# Patient Record
Sex: FEMALE | Race: BLACK OR AFRICAN AMERICAN | Employment: OTHER | URBAN - METROPOLITAN AREA
[De-identification: names, ages, dates, MRNs, and addresses within clinical notes are randomized per-mention and may not be internally consistent; named-entity substitution may affect disease eponyms.]

---

## 2017-02-23 ENCOUNTER — HOSPITAL ENCOUNTER (OUTPATIENT)
Dept: PHYSICAL THERAPY | Age: 72
Discharge: HOME | End: 2017-02-23
Payer: COMMERCIAL

## 2017-02-23 PROCEDURE — 97161 PT EVAL LOW COMPLEX 20 MIN: CPT

## 2017-02-23 NOTE — PROGRESS NOTES
Carlos Eduardo Quick  : [address] [facility]  [address] , [address], [place]  [phone]   [phone]        OUTPATIENT PHYSICAL THERAPY:Initial Assessment 2017    ICD-10: Treatment Diagnosis: Pain in right shoulder (M25.511),Strain of muscle and tendon of front wall of thorax, sequela (S29.011S)   Precautions/Allergies:   Review of patient's allergies indicates no known allergies. Fall Risk Score: 6  MD Orders: Evaluate and treat. MEDICAL/REFERRING DIAGNOSIS:  strain of tendon of front wall of thorax   DATE OF ONSET: 17  REFERRING PHYSICIAN: Victor Manuel Greene [facility] Street: 3-1-17     INITIAL ASSESSMENT:  Ms. Mary Soto presents to physical therapy with a pain in her right pectoralis minor and pectoralis major muscles due a fall on 17. She demonstrates strength deficits and range of motion deficits in both upper extremities. Ms. Mary Soto would benefit from skilled physical therapy to address these impairments and improve the mobility in her upper extremities. PROBLEM LIST (Impacting functional limitations):  1. Decreased Strength  2. Increased Pain  3. Decreased Activity Tolerance  4. Decreased Flexibility/Joint Mobility INTERVENTIONS PLANNED:  1. Cold  2. Heat  3. Home Exercise Program (HEP)  4. Manual Therapy  5. Neuromuscular Re-education/Strengthening  6. Range of Motion (ROM)  7. Therapeutic Exercise/Strengthening   TREATMENT PLAN:  Effective Dates: 17 TO 17. Frequency/Duration: 2 times a week for 3 weeks  GOALS: (Goals have been discussed and agreed upon with patient.)  Short-Term Functional Goals: Time Frame: 1.5 weeks  1. Patient will decrease by 5 points on the QuickDASH to show improvement in mobility and function. 2. Patient will participate in a strengthening program for both upper extremities to improve her mobility.    3. Patient will demonstrate compliance with HEP so that she is able to increase the strength in her upper extremities to have less difficulty performing duties at work. Discharge Goals: Time Frame: 3 weeks  1. Patient will decrease by 10 points on the QuickDASH to show improvement in mobility and function. 2. Patient will demonstrate full range of motion in her right upper extremity without pain so that she is able to perform the physical requirements of her work. 3. Patient will demonstrate 5/5 strength in her right upper extremity with all movements so that she is able to perform all duties required by her job. Rehabilitation Potential For Stated Goals: Good  Regarding Kristi Arambula's therapy, I certify that the treatment plan above will be carried out by a therapist or under their direction. Thank you for this referral,  Crispin Martinez     Referring Physician Signature: Alyssa Rooney*              Date                    HISTORY:   History of Present Injury/Illness (Reason for Referral):  Patient reports having a fall on 1-23-17. She reports blacking out before she hit the floor but she remembers trying to avoid bumping into a kid and side-stepping and then coming back to consciousness on the floor. She reports going to the doctor and being told that she has a muscle strain. She reports having pain when raising right arm above head and feeling a pulling sensation in her right chest. Patient states that pain depends on how active she is during the day. Patient reports that the pain is not constant but currently it is at a 4/10 pain. Patient reports that tylenol and ice help the pain. Patient reports currently working on light duty. Patient denies any recent falls within the last three months. Past Medical History/Comorbidities:   Ms. Nicholas Suárez  has a past medical history of Abdominal pain; Arthritis; Breast cancer, left ([facility] [address] (2001); Breast pain in female; Bunion;  Cholesterol serum elevated; Chronic pain of left knee; Corns and callosities; DDD (degenerative disc disease), lumbar; Diabetes mellitus type II, controlled ([facility] [address] Emotional disorder; Fungal infection of toenail; Hallux valgus; Hemorrhoids; Hip pain, bilateral; Hyperglycemia; Hyperlipidemia; Hypertension; Hypothyroidism; Lower GI bleed; Obesity; Other ill-defined conditions(799.89); Postmenopausal; Radiation therapy (2001); Radiation therapy complication; Right median nerve neuropathy; Stress incontinence; Traumatic arthropathy of knee; and Urinary incontinence. Ms. Nguyen Araujo  has a past surgical history that includes orthopaedic (2005); orthopaedic; orthopaedic; us guided core breast biopsy (Right); hysterectomy; breast lumpectomy (Left, 2001); tubal ligation; orthopaedic (Left); urological; shoulder replacement (Left); colonoscopy (2008); and breast biopsy (Right, 2011). Social History/Living Environment:    Lives alone. Prior Level of Function/Work/Activity:  Walking  Dominant Side:         RIGHT  Current Medications:    Current Outpatient Prescriptions:     Calcium-Cholecalciferol, D3, (CALCIUM 600 WITH VITAMIN D3) 600 mg(1,500mg) -400 unit chew, Take  by mouth daily. , Disp: , Rfl:     metFORMIN (GLUCOPHAGE) 500 mg tablet, Take  by mouth two (2) times a day. 1/2 tab, Disp: , Rfl:     multivitamin (ONE A DAY) tablet, Take 1 Tab by mouth., Disp: , Rfl:     glucose blood VI test strips (ONETOUCH ULTRA TEST) strip, by Does Not Apply route See Admin Instructions. , Disp: , Rfl:     promethazine-dextromethorphan (PROMETHAZINE-DM) 6.25-15 mg/5 mL syrup, Take  by mouth three (3) times daily as needed for Cough. , Disp: , Rfl:     Cetirizine (ZYRTEC) 10 mg cap, Take 10 mg by mouth nightly., Disp: , Rfl:     diclofenac EC (VOLTAREN) 75 mg EC tablet, Take  by mouth., Disp: , Rfl:     B.infantis-B.ani-B.long-B.bifi (PROBIOTIC 4X) 10-15 mg TbEC, Take  by mouth., Disp: , Rfl:     meloxicam (MOBIC) 15 mg tablet, Take 1 Tab by mouth daily. , Disp: 14 Tab, Rfl: 1    traMADol (ULTRAM) 50 mg tablet, Take 50 mg by mouth every six (6) hours as needed. , Disp: , Rfl:     Cholecalciferol, Vitamin D3, (VITAMIN D3) 1,000 unit Cap, Take  by mouth every Monday and Friday.  , Disp: , Rfl:     magnesium 500 mg Tab, Take  by mouth.  , Disp: , Rfl:     omega-3 fatty acids-vitamin e (FISH OIL) 1,000 mg Cap, Take 1 Cap by mouth daily. , Disp: , Rfl:     PV W-O TONO/FERROUS FUMARATE/FA (M-VIT PO), Take  by mouth daily. , Disp: , Rfl:     coenzyme q10 (CO Q-10) 10 mg Cap, Take  by mouth daily. , Disp: , Rfl:     levothyroxine (SYNTHROID) 88 mcg tablet, Take 88 mcg by mouth Daily (before breakfast). Take day of surgery. , Disp: , Rfl:     valsartan (DIOVAN) 320 mg tablet, Take 320 mg by mouth daily. , Disp: , Rfl:     atorvastatin (LIPITOR) 10 mg tablet, Take 20 mg by mouth every evening.  Pt takes at night, Disp: , Rfl:      Date Last Reviewed:  2/23/2017   Number of Personal Factors/Comorbidities that affect the Plan of Care: 0: LOW COMPLEXITY   EXAMINATION:   Observation/Orthostatic Postural Assessment:          Slouched posture   Palpation:          Trigger-point over right pectoralis major and pectoralis minor   ROM:          Decreased range on the left due to history of total shoulder replacement   Right: active range of motion: decreased flexion and abduction   Right Passive: pain at end range during flexion and abduction, range within functional limits  Strength:     UE: LEFT  Shoulder Flexion 3+/5     Shoulder Abduction 4/5  Shoulder Adduction 4-/5  Shoulder Internal Rotation 5/5  Shoulder External Rotation 4-/5  Elbow Flexion 5/5  Elbow Extension 3+/5    UE: RIGHT   Shoulder Flexion 5/5 (painful)  Shoulder Abduction 5/5  Shoulder Adduction 4/5  Shoulder Internal Rotation 5/5  Shoulder External Rotation 4/5  Elbow Flexion 5/5  Elbow Extension 5/5  Special Tests:          Pectoralis length: positive on right   Neurological Screen:        Myotomes:  intact        Dermatomes:  intact  Balance:          Not tested   Body Structures Involved:  1. Bones  2. Joints  3. Muscles Body Functions Affected:  1. Neuromusculoskeletal  2. Movement Related Activities and Participation Affected:  1. Domestic Life  2. Community, Social and [place]   Number of elements that affect the Plan of Care: 1-2: LOW COMPLEXITY   CLINICAL PRESENTATION:   Presentation: Stable and uncomplicated: LOW COMPLEXITY   CLINICAL DECISION MAKING:   Outcome Measure: Tool Used: Disabilities of the Arm, Shoulder and Hand (DASH) Questionnaire - Quick Version  Score:  Initial: 28/55  Most Recent: X/55 (Date: -- )   Interpretation of Score: The DASH is designed to measure the activities of daily living in person's with upper extremity dysfunction or pain. Each section is scored on a 1-5 scale, 5 representing the greatest disability. The scores of each section are added together for a total score of 55. Score 11 12-19 20-28 29-37 38-45 46-54 55   Modifier CH CI CJ CK CL CM CN     Medical Necessity:   · Patient demonstrates good rehab potential due to higher previous functional level. Reason for Services/Other Comments:  · Patient continues to require skilled intervention due to strength deficits and range of motion deficits. Use of outcome tool(s) and clinical judgement create a POC that gives a: Clear prediction of patient's progress: LOW COMPLEXITY   TREATMENT:   Pre-treatment Symptoms:  4/10  (In addition to Assessment/Re-Assessment sessions the following treatments were rendered)  THERAPEUTIC EXERCISE: ( minutes):  Exercises per grid below to improve mobility and strength. Required minimal visual, verbal, manual and tactile cues to promote proper body posture and promote proper body mechanics. Progressed resistance, range and repetitions as indicated.    Date:  2-23-17 Date:   Date:     Activity/Exercise Parameters Parameters Parameters   Patient Education Discussed POC and pectoralis stretch for HEP                                           Treatment/Session Assessment:  See assessment above. · Pain: Initial:  Pain Intensity 1: 4  Post Session:  4/10 ·   Compliance with Program/Exercises: compliant all of the time. · Recommendations/Intent for next treatment session: \"Next visit will focus on advancements to more challenging activities\". No future appointments.   Total Treatment Duration:  PT Patient Time In/Time Out  Time In: 1445  Time Out: 9024 [facility]

## 2017-02-23 NOTE — PROGRESS NOTES
Ambulatory/Rehab Services H2 Model Falls Risk Assessment    Risk Factor Pts. ·   Confusion/Disorientation/Impulsivity  []    4 ·   Symptomatic Depression  []   2 ·   Altered Elimination  []   1 ·   Dizziness/Vertigo  []   1 ·   Gender (Male)  []   1 ·   Any administered antiepileptics (anticonvulsants):  []   2 ·   Any administered benzodiazepines:  []   1 ·   Visual Impairment (specify):  []   1 ·   Portable Oxygen Use  []   1 ·   Orthostatic ? BP  []   1 ·   History of Recent Falls (within 3 mos.)  [x]   5     Ability to Rise from Chair (choose one) Pts. ·   Ability to rise in a single movement  []   0 ·   Pushes up, successful in one attempt  [x]   1 ·   Multiple attempts, but successful  []   3 ·   Unable to rise without assistance  []   4   Total: (5 or greater = High Risk) 6     Falls Prevention Plan:   [x]                Physical Limitations to Exercise (specify): supervision and set up    []                Mobility Assistance Device (type):   []                Exercise/Equipment Adaptation (specify):    ©2010 [facility] [address] [place] [address] Patent #[id number].  Federal Law prohibits the replication, distribution or use without written permission from [facility] [employer]

## 2017-02-28 ENCOUNTER — HOSPITAL ENCOUNTER (OUTPATIENT)
Dept: PHYSICAL THERAPY | Age: 72
Discharge: HOME | End: 2017-02-28
Payer: COMMERCIAL

## 2017-02-28 PROCEDURE — 97140 MANUAL THERAPY 1/> REGIONS: CPT

## 2017-02-28 PROCEDURE — 97110 THERAPEUTIC EXERCISES: CPT

## 2017-02-28 NOTE — PROGRESS NOTES
Gibson Stovall  :  [facility]  [address], [address], [place] [address]  [phone]   [phone]        OUTPATIENT PHYSICAL THERAPY:Daily Note 2017    ICD-10: Treatment Diagnosis: Pain in right shoulder (M25.511),Strain of muscle and tendon of front wall of thorax, sequela (S29.011S)   Precautions/Allergies:   Review of patient's allergies indicates no known allergies. Fall Risk Score: 6  MD Orders: Evaluate and treat. MEDICAL/REFERRING DIAGNOSIS:  strain of tendon of front wall of thorax   DATE OF ONSET: 17  REFERRING PHYSICIAN: Victor Manuel Greene [facility] Street: 3-1-17     INITIAL ASSESSMENT:  Ms. Elida Mckoy presents to physical therapy with a pain in her right pectoralis minor and pectoralis major muscles due a fall on 17. She demonstrates strength deficits and range of motion deficits in both upper extremities. Ms. Elida Mckoy would benefit from skilled physical therapy to address these impairments and improve the mobility in her upper extremities. PROBLEM LIST (Impacting functional limitations):  1. Decreased Strength  2. Increased Pain  3. Decreased Activity Tolerance  4. Decreased Flexibility/Joint Mobility INTERVENTIONS PLANNED:  1. Cold  2. Heat  3. Home Exercise Program (HEP)  4. Manual Therapy  5. Neuromuscular Re-education/Strengthening  6. Range of Motion (ROM)  7. Therapeutic Exercise/Strengthening   TREATMENT PLAN:  Effective Dates: 17 TO 17. Frequency/Duration: 2 times a week for 3 weeks  GOALS: (Goals have been discussed and agreed upon with patient.)  Short-Term Functional Goals: Time Frame: 1.5 weeks  1. Patient will decrease by 5 points on the QuickDASH to show improvement in mobility and function. 2. Patient will participate in a strengthening program for both upper extremities to improve her mobility.    3. Patient will demonstrate compliance with HEP so that she is able to increase the strength in her upper extremities to have less difficulty performing duties at work. Discharge Goals: Time Frame: 3 weeks  1. Patient will decrease by 10 points on the QuickDASH to show improvement in mobility and function. 2. Patient will demonstrate full range of motion in her right upper extremity without pain so that she is able to perform the physical requirements of her work. 3. Patient will demonstrate 5/5 strength in her right upper extremity with all movements so that she is able to perform all duties required by her job. Rehabilitation Potential For Stated Goals: Good  Regarding Kristi Arambula's therapy, I certify that the treatment plan above will be carried out by a therapist or under their direction. Thank you for this referral,  Brandi Samson DPT     Referring Physician Signature: Erick Back*              Date                    HISTORY:   History of Present Injury/Illness (Reason for Referral):  Patient reports having a fall on 1-23-17. She reports blacking out before she hit the floor but she remembers trying to avoid bumping into a kid and side-stepping and then coming back to consciousness on the floor. She reports going to the doctor and being told that she has a muscle strain. She reports having pain when raising right arm above head and feeling a pulling sensation in her right chest. Patient states that pain depends on how active she is during the day. Patient reports that the pain is not constant but currently it is at a 4/10 pain. Patient reports that tylenol and ice help the pain. Patient reports currently working on light duty. Patient denies any recent falls within the last three months. Past Medical History/Comorbidities:   Ms. Babs Fernandez  has a past medical history of Abdominal pain; Arthritis; Breast cancer, left ([facility] [address] (2001); Breast pain in female; Bunion;  Cholesterol serum elevated; Chronic pain of left knee; Corns and callosities; DDD (degenerative disc disease), lumbar; Diabetes mellitus type II, controlled ([facility] [address] Emotional disorder; Fungal infection of toenail; Hallux valgus; Hemorrhoids; Hip pain, bilateral; Hyperglycemia; Hyperlipidemia; Hypertension; Hypothyroidism; Lower GI bleed; Obesity; Other ill-defined conditions(799.89); Postmenopausal; Radiation therapy (2001); Radiation therapy complication; Right median nerve neuropathy; Stress incontinence; Traumatic arthropathy of knee; and Urinary incontinence. Ms. Elida Mckoy  has a past surgical history that includes orthopaedic (2005); orthopaedic; orthopaedic; us guided core breast biopsy (Right); hysterectomy; breast lumpectomy (Left, 2001); tubal ligation; orthopaedic (Left); urological; shoulder replacement (Left); colonoscopy (2008); and breast biopsy (Right, 2011). Social History/Living Environment:    Lives alone. Prior Level of Function/Work/Activity:  Walking  Dominant Side:         RIGHT  Current Medications:    Current Outpatient Prescriptions:     Calcium-Cholecalciferol, D3, (CALCIUM 600 WITH VITAMIN D3) 600 mg(1,500mg) -400 unit chew, Take  by mouth daily. , Disp: , Rfl:     metFORMIN (GLUCOPHAGE) 500 mg tablet, Take  by mouth two (2) times a day. 1/2 tab, Disp: , Rfl:     multivitamin (ONE A DAY) tablet, Take 1 Tab by mouth., Disp: , Rfl:     glucose blood VI test strips (ONETOUCH ULTRA TEST) strip, by Does Not Apply route See Admin Instructions. , Disp: , Rfl:     promethazine-dextromethorphan (PROMETHAZINE-DM) 6.25-15 mg/5 mL syrup, Take  by mouth three (3) times daily as needed for Cough. , Disp: , Rfl:     Cetirizine (ZYRTEC) 10 mg cap, Take 10 mg by mouth nightly., Disp: , Rfl:     diclofenac EC (VOLTAREN) 75 mg EC tablet, Take  by mouth., Disp: , Rfl:     B.infantis-B.ani-B.long-B.bifi (PROBIOTIC 4X) 10-15 mg TbEC, Take  by mouth., Disp: , Rfl:     meloxicam (MOBIC) 15 mg tablet, Take 1 Tab by mouth daily. , Disp: 14 Tab, Rfl: 1    traMADol (ULTRAM) 50 mg tablet, Take 50 mg by mouth every six (6) hours as needed. , Disp: , Rfl:     Cholecalciferol, Vitamin D3, (VITAMIN D3) 1,000 unit Cap, Take  by mouth every Monday and Friday.  , Disp: , Rfl:     magnesium 500 mg Tab, Take  by mouth.  , Disp: , Rfl:     omega-3 fatty acids-vitamin e (FISH OIL) 1,000 mg Cap, Take 1 Cap by mouth daily. , Disp: , Rfl:     PV W-O TONO/FERROUS FUMARATE/FA (M-VIT PO), Take  by mouth daily. , Disp: , Rfl:     coenzyme q10 (CO Q-10) 10 mg Cap, Take  by mouth daily. , Disp: , Rfl:     levothyroxine (SYNTHROID) 88 mcg tablet, Take 88 mcg by mouth Daily (before breakfast). Take day of surgery. , Disp: , Rfl:     valsartan (DIOVAN) 320 mg tablet, Take 320 mg by mouth daily. , Disp: , Rfl:     atorvastatin (LIPITOR) 10 mg tablet, Take 20 mg by mouth every evening.  Pt takes at night, Disp: , Rfl:      Date Last Reviewed:  2/28/2017   Number of Personal Factors/Comorbidities that affect the Plan of Care: 0: LOW COMPLEXITY   EXAMINATION:   Observation/Orthostatic Postural Assessment:          Slouched posture   Palpation:          Trigger-point over right pectoralis major and pectoralis minor   ROM:          Decreased range on the left due to history of total shoulder replacement   Right: active range of motion: decreased flexion and abduction   Right Passive: pain at end range during flexion and abduction, range within functional limits  Strength:     UE: LEFT  Shoulder Flexion 3+/5     Shoulder Abduction 4/5  Shoulder Adduction 4-/5  Shoulder Internal Rotation 5/5  Shoulder External Rotation 4-/5  Elbow Flexion 5/5  Elbow Extension 3+/5    UE: RIGHT   Shoulder Flexion 5/5 (painful)  Shoulder Abduction 5/5  Shoulder Adduction 4/5  Shoulder Internal Rotation 5/5  Shoulder External Rotation 4/5  Elbow Flexion 5/5  Elbow Extension 5/5  Special Tests:          Pectoralis length: positive on right   Neurological Screen:        Myotomes:  intact        Dermatomes:  intact  Balance:          Not tested   Body Structures Involved:  1. Bones  2. Joints  3. Muscles Body Functions Affected:  1. Neuromusculoskeletal  2. Movement Related Activities and Participation Affected:  1. Domestic Life  2. Community, Social and [place]   Number of elements that affect the Plan of Care: 1-2: LOW COMPLEXITY   CLINICAL PRESENTATION:   Presentation: Stable and uncomplicated: LOW COMPLEXITY   CLINICAL DECISION MAKING:   Outcome Measure: Tool Used: Disabilities of the Arm, Shoulder and Hand (DASH) Questionnaire - Quick Version  Score:  Initial: 28/55  Most Recent: X/55 (Date: -- )   Interpretation of Score: The DASH is designed to measure the activities of daily living in person's with upper extremity dysfunction or pain. Each section is scored on a 1-5 scale, 5 representing the greatest disability. The scores of each section are added together for a total score of 55. Score 11 12-19 20-28 29-37 38-45 46-54 55   Modifier CH CI CJ CK CL CM CN     Medical Necessity:   · Patient demonstrates good rehab potential due to higher previous functional level. Reason for Services/Other Comments:  · Patient continues to require skilled intervention due to strength deficits and range of motion deficits. Use of outcome tool(s) and clinical judgement create a POC that gives a: Clear prediction of patient's progress: LOW COMPLEXITY   TREATMENT:   Pre-treatment Symptoms:  Patient reports the stretch has been helping. (In addition to Assessment/Re-Assessment sessions the following treatments were rendered)  THERAPEUTIC EXERCISE: ( 15 minutes):  Exercises per grid below to improve mobility and strength. Required minimal visual, verbal, manual and tactile cues to promote proper body posture and promote proper body mechanics. Progressed resistance, range and repetitions as indicated.    Date:  2-23-17 Date:  2-28-17 Date:     Activity/Exercise Parameters Parameters Parameters   Patient Education Discussed POC and pectoralis stretch for HEP     UBE  Level 1 4/4      Corner Pec Stretch    5 sec hold x 10    Scapular Retractions    5 sec hold x 10                        Manual Therapy: (25 Minutes) to increase soft tissue mobility  Trigger point release and soft tissue mobilization to Pec Major and Pec Minor  Manual Pec Stretch in supine, 15 sec hold x 2    Modalities: (10 Minutes) to decrease pain and improve mobility  Moist hot pack over left shoulder     Treatment/Session Assessment:  Patient reported improved mobility and decreased pain following manual and stretching today. Updated HEP. Discussed heating more often throughout day. · Pain: Initial:  Pain Intensity 1: 3  Post Session:   ·   Compliance with Program/Exercises: compliant all of the time. · Recommendations/Intent for next treatment session: \"Next visit will focus on advancements to more challenging activities\".   Future Appointments  Date Time Provider Anaya Pooni   3/2/2017 2:45 PM Mando Vines DPT [facility]   [facility]2017 [facility]   3/9/2017 2:30 PM OMAYRA Soler [facility]   [facility]     Total Treatment Duration:  PT Patient Time In/Time Out  Time In: 1921  Time Out: [facility]

## 2017-03-02 ENCOUNTER — HOSPITAL ENCOUNTER (OUTPATIENT)
Dept: PHYSICAL THERAPY | Age: 72
Discharge: HOME | End: 2017-03-02
Payer: COMMERCIAL

## 2017-03-02 PROCEDURE — 97110 THERAPEUTIC EXERCISES: CPT

## 2017-03-02 PROCEDURE — 97140 MANUAL THERAPY 1/> REGIONS: CPT

## 2017-03-02 NOTE — PROGRESS NOTES
Tanvir Roberts  :  Therapy Center at [facility]  [address] , [address], [place] [address]  [phone]   [phone]        OUTPATIENT PHYSICAL THERAPY:Daily Note 3/2/2017    ICD-10: Treatment Diagnosis: Pain in right shoulder (M25.511),Strain of muscle and tendon of front wall of thorax, sequela (S29.011S)   Precautions/Allergies:   Review of patient's allergies indicates no known allergies. Fall Risk Score: 6  MD Orders: Evaluate and treat. MEDICAL/REFERRING DIAGNOSIS:  strain of tendon of front wall of thorax   DATE OF ONSET: 17  REFERRING PHYSICIAN: Victor Manuel Greene [facility] Street: 3-1-17     INITIAL ASSESSMENT:  Ms. Sam Marrero presents to physical therapy with a pain in her right pectoralis minor and pectoralis major muscles due a fall on 17. She demonstrates strength deficits and range of motion deficits in both upper extremities. Ms. Sam Marrero would benefit from skilled physical therapy to address these impairments and improve the mobility in her upper extremities. PROBLEM LIST (Impacting functional limitations):  1. Decreased Strength  2. Increased Pain  3. Decreased Activity Tolerance  4. Decreased Flexibility/Joint Mobility INTERVENTIONS PLANNED:  1. Cold  2. Heat  3. Home Exercise Program (HEP)  4. Manual Therapy  5. Neuromuscular Re-education/Strengthening  6. Range of Motion (ROM)  7. Therapeutic Exercise/Strengthening   TREATMENT PLAN:  Effective Dates: 17 TO 17. Frequency/Duration: 2 times a week for 3 weeks  GOALS: (Goals have been discussed and agreed upon with patient.)  Short-Term Functional Goals: Time Frame: 1.5 weeks  1. Patient will decrease by 5 points on the QuickDASH to show improvement in mobility and function. 2. Patient will participate in a strengthening program for both upper extremities to improve her mobility.    3. Patient will demonstrate compliance with HEP so that she is able to increase the strength in her upper extremities to have less difficulty performing duties at work. Discharge Goals: Time Frame: 3 weeks  1. Patient will decrease by 10 points on the QuickDASH to show improvement in mobility and function. 2. Patient will demonstrate full range of motion in her right upper extremity without pain so that she is able to perform the physical requirements of her work. 3. Patient will demonstrate 5/5 strength in her right upper extremity with all movements so that she is able to perform all duties required by her job. Rehabilitation Potential For Stated Goals: Good  Regarding Kristi Arambula's therapy, I certify that the treatment plan above will be carried out by a therapist or under their direction. Thank you for this referral,  Radha Ge DPT     Referring Physician Signature: Jose Paredes*              Date                    HISTORY:   History of Present Injury/Illness (Reason for Referral):  Patient reports having a fall on 1-23-17. She reports blacking out before she hit the floor but she remembers trying to avoid bumping into a kid and side-stepping and then coming back to consciousness on the floor. She reports going to the doctor and being told that she has a muscle strain. She reports having pain when raising right arm above head and feeling a pulling sensation in her right chest. Patient states that pain depends on how active she is during the day. Patient reports that the pain is not constant but currently it is at a 4/10 pain. Patient reports that tylenol and ice help the pain. Patient reports currently working on light duty. Patient denies any recent falls within the last three months. Past Medical History/Comorbidities:   Ms. Lesli Damon  has a past medical history of Abdominal pain; Arthritis; Breast cancer, left ([facility] [address] (2001); Breast pain in female; Bunion;  Cholesterol serum elevated; Chronic pain of left knee; Corns and callosities; DDD (degenerative disc disease), lumbar; Diabetes mellitus type II, controlled ([facility] [address] Emotional disorder; Fungal infection of toenail; Hallux valgus; Hemorrhoids; Hip pain, bilateral; Hyperglycemia; Hyperlipidemia; Hypertension; Hypothyroidism; Lower GI bleed; Obesity; Other ill-defined conditions(799.89); Postmenopausal; Radiation therapy (2001); Radiation therapy complication; Right median nerve neuropathy; Stress incontinence; Traumatic arthropathy of knee; and Urinary incontinence. Ms. Lesli Damon  has a past surgical history that includes orthopaedic (2005); orthopaedic; orthopaedic; us guided core breast biopsy (Right); hysterectomy; breast lumpectomy (Left, 2001); tubal ligation; orthopaedic (Left); urological; shoulder replacement (Left); colonoscopy (2008); and breast biopsy (Right, 2011). Social History/Living Environment:    Lives alone. Prior Level of Function/Work/Activity:  Walking  Dominant Side:         RIGHT  Current Medications:    Current Outpatient Prescriptions:     Calcium-Cholecalciferol, D3, (CALCIUM 600 WITH VITAMIN D3) 600 mg(1,500mg) -400 unit chew, Take  by mouth daily. , Disp: , Rfl:     metFORMIN (GLUCOPHAGE) 500 mg tablet, Take  by mouth two (2) times a day. 1/2 tab, Disp: , Rfl:     multivitamin (ONE A DAY) tablet, Take 1 Tab by mouth., Disp: , Rfl:     glucose blood VI test strips (ONETOUCH ULTRA TEST) strip, by Does Not Apply route See Admin Instructions. , Disp: , Rfl:     promethazine-dextromethorphan (PROMETHAZINE-DM) 6.25-15 mg/5 mL syrup, Take  by mouth three (3) times daily as needed for Cough. , Disp: , Rfl:     Cetirizine (ZYRTEC) 10 mg cap, Take 10 mg by mouth nightly., Disp: , Rfl:     diclofenac EC (VOLTAREN) 75 mg EC tablet, Take  by mouth., Disp: , Rfl:     B.infantis-B.ani-B.long-B.bifi (PROBIOTIC 4X) 10-15 mg TbEC, Take  by mouth., Disp: , Rfl:     meloxicam (MOBIC) 15 mg tablet, Take 1 Tab by mouth daily. , Disp: 14 Tab, Rfl: 1    traMADol (ULTRAM) 50 mg tablet, Take 50 mg by mouth every six (6) hours as needed. , Disp: , Rfl:     Cholecalciferol, Vitamin D3, (VITAMIN D3) 1,000 unit Cap, Take  by mouth every Monday and Friday.  , Disp: , Rfl:     magnesium 500 mg Tab, Take  by mouth.  , Disp: , Rfl:     omega-3 fatty acids-vitamin e (FISH OIL) 1,000 mg Cap, Take 1 Cap by mouth daily. , Disp: , Rfl:     PV W-O TONO/FERROUS FUMARATE/FA (M-VIT PO), Take  by mouth daily. , Disp: , Rfl:     coenzyme q10 (CO Q-10) 10 mg Cap, Take  by mouth daily. , Disp: , Rfl:     levothyroxine (SYNTHROID) 88 mcg tablet, Take 88 mcg by mouth Daily (before breakfast). Take day of surgery. , Disp: , Rfl:     valsartan (DIOVAN) 320 mg tablet, Take 320 mg by mouth daily. , Disp: , Rfl:     atorvastatin (LIPITOR) 10 mg tablet, Take 20 mg by mouth every evening.  Pt takes at night, Disp: , Rfl:      Date Last Reviewed:  3/2/2017   Number of Personal Factors/Comorbidities that affect the Plan of Care: 0: LOW COMPLEXITY   EXAMINATION:   Observation/Orthostatic Postural Assessment:          Slouched posture   Palpation:          Trigger-point over right pectoralis major and pectoralis minor   ROM:          Decreased range on the left due to history of total shoulder replacement   Right: active range of motion: decreased flexion and abduction   Right Passive: pain at end range during flexion and abduction, range within functional limits  Strength:     UE: LEFT  Shoulder Flexion 3+/5     Shoulder Abduction 4/5  Shoulder Adduction 4-/5  Shoulder Internal Rotation 5/5  Shoulder External Rotation 4-/5  Elbow Flexion 5/5  Elbow Extension 3+/5    UE: RIGHT   Shoulder Flexion 5/5 (painful)  Shoulder Abduction 5/5  Shoulder Adduction 4/5  Shoulder Internal Rotation 5/5  Shoulder External Rotation 4/5  Elbow Flexion 5/5  Elbow Extension 5/5  Special Tests:          Pectoralis length: positive on right   Neurological Screen:        Myotomes:  intact        Dermatomes:  intact  Balance:          Not tested   Body Structures Involved:  1. Bones  2. Joints  3. Muscles Body Functions Affected:  1. Neuromusculoskeletal  2. Movement Related Activities and Participation Affected:  1. Domestic Life  2. Community, Social and [place]   Number of elements that affect the Plan of Care: 1-2: LOW COMPLEXITY   CLINICAL PRESENTATION:   Presentation: Stable and uncomplicated: LOW COMPLEXITY   CLINICAL DECISION MAKING:   Outcome Measure: Tool Used: Disabilities of the Arm, Shoulder and Hand (DASH) Questionnaire - Quick Version  Score:  Initial: 28/55  Most Recent: X/55 (Date: -- )   Interpretation of Score: The DASH is designed to measure the activities of daily living in person's with upper extremity dysfunction or pain. Each section is scored on a 1-5 scale, 5 representing the greatest disability. The scores of each section are added together for a total score of 55. Score 11 12-19 20-28 29-37 38-45 46-54 55   Modifier CH CI CJ CK CL CM CN     Medical Necessity:   · Patient demonstrates good rehab potential due to higher previous functional level. Reason for Services/Other Comments:  · Patient continues to require skilled intervention due to strength deficits and range of motion deficits. Use of outcome tool(s) and clinical judgement create a POC that gives a: Clear prediction of patient's progress: LOW COMPLEXITY   TREATMENT:   Pre-treatment Symptoms:  Patient reports she felt really good right after therapy and the day after but yesterday she was more sore. (In addition to Assessment/Re-Assessment sessions the following treatments were rendered)  THERAPEUTIC EXERCISE: ( 30 minutes):  Exercises per grid below to improve mobility and strength. Required minimal visual, verbal, manual and tactile cues to promote proper body posture and promote proper body mechanics. Progressed resistance, range and repetitions as indicated.    Date:  2-23-17 Date:  2-28-17 Date:  3-2-17   Activity/Exercise Parameters Parameters Parameters   Patient Education Discussed POC and pectoralis stretch for HEP     UBE  Level 1 4/4   Level 2 4/4   Corner Pec Stretch    5 sec hold x 10 5 sec hold x 10   Scapular Retractions    5 sec hold x 10 5 sec hold x 10   Rows     GTB x 15   Extensions     GTB x 15           Manual Therapy: (15 Minutes) to increase soft tissue mobility  Trigger point release and soft tissue mobilization to Pec Major and Pec Minor  Manual Pec Stretch in supine, 15 sec hold x 2          Treatment/Session Assessment:  Patient reported improved mobility and decreased pain following manual and stretching today. · Pain: Initial:  Pain Intensity 1: 3  Post Session:   ·   Compliance with Program/Exercises: compliant all of the time. · Recommendations/Intent for next treatment session: \"Next visit will focus on advancements to more challenging activities\".   Future Appointments  Date Time Provider Anaya Arias   3/6/2017 2:30 PM Marie Sanders DPT [facility]   3/9/2017 2:30 PM Marie Sanders DPT [facility]   3/13/2017 2:30 PM Marie Sanders DPT [facility]     Total Treatment Duration:  PT Patient Time In/Time Out  Time In: 2090  Time Out: 0440 [facility], T

## 2017-03-06 ENCOUNTER — HOSPITAL ENCOUNTER (OUTPATIENT)
Dept: PHYSICAL THERAPY | Age: 72
Discharge: HOME | End: 2017-03-06
Payer: COMMERCIAL

## 2017-03-06 PROCEDURE — 97140 MANUAL THERAPY 1/> REGIONS: CPT

## 2017-03-06 PROCEDURE — 97110 THERAPEUTIC EXERCISES: CPT

## 2017-03-06 NOTE — PROGRESS NOTES
Bert Mims  :  Therapy Center at [facility]  [address], [address], [place] [address]  [phone]   [phone]        OUTPATIENT PHYSICAL THERAPY:Daily Note 3/6/2017    ICD-10: Treatment Diagnosis: Pain in right shoulder (M25.511),Strain of muscle and tendon of front wall of thorax, sequela (S29.011S)   Precautions/Allergies:   Review of patient's allergies indicates no known allergies. Fall Risk Score: 6  MD Orders: Evaluate and treat. MEDICAL/REFERRING DIAGNOSIS:  strain of tendon of front wall of thorax   DATE OF ONSET: 17  REFERRING PHYSICIAN: Victor Manuel Greene [facility] Street: 3-1-17     INITIAL ASSESSMENT:  Ms. Ya Ortega presents to physical therapy with a pain in her right pectoralis minor and pectoralis major muscles due a fall on 17. She demonstrates strength deficits and range of motion deficits in both upper extremities. Ms. Ya Ortega would benefit from skilled physical therapy to address these impairments and improve the mobility in her upper extremities. PROBLEM LIST (Impacting functional limitations):  1. Decreased Strength  2. Increased Pain  3. Decreased Activity Tolerance  4. Decreased Flexibility/Joint Mobility INTERVENTIONS PLANNED:  1. Cold  2. Heat  3. Home Exercise Program (HEP)  4. Manual Therapy  5. Neuromuscular Re-education/Strengthening  6. Range of Motion (ROM)  7. Therapeutic Exercise/Strengthening   TREATMENT PLAN:  Effective Dates: 17 TO 17. Frequency/Duration: 2 times a week for 3 weeks  GOALS: (Goals have been discussed and agreed upon with patient.)  Short-Term Functional Goals: Time Frame: 1.5 weeks  1. Patient will decrease by 5 points on the QuickDASH to show improvement in mobility and function. 2. Patient will participate in a strengthening program for both upper extremities to improve her mobility.    3. Patient will demonstrate compliance with HEP so that she is able to increase the strength in her upper extremities to have less difficulty performing duties at work. Discharge Goals: Time Frame: 3 weeks  1. Patient will decrease by 10 points on the QuickDASH to show improvement in mobility and function. 2. Patient will demonstrate full range of motion in her right upper extremity without pain so that she is able to perform the physical requirements of her work. 3. Patient will demonstrate 5/5 strength in her right upper extremity with all movements so that she is able to perform all duties required by her job. Rehabilitation Potential For Stated Goals: Good  Regarding Kristi Arambula's therapy, I certify that the treatment plan above will be carried out by a therapist or under their direction. Thank you for this referral,  Kori Bledsoe DPT     Referring Physician Signature: Joyce Salazar*              Date                    HISTORY:   History of Present Injury/Illness (Reason for Referral):  Patient reports having a fall on 1-23-17. She reports blacking out before she hit the floor but she remembers trying to avoid bumping into a kid and side-stepping and then coming back to consciousness on the floor. She reports going to the doctor and being told that she has a muscle strain. She reports having pain when raising right arm above head and feeling a pulling sensation in her right chest. Patient states that pain depends on how active she is during the day. Patient reports that the pain is not constant but currently it is at a 4/10 pain. Patient reports that tylenol and ice help the pain. Patient reports currently working on light duty. Patient denies any recent falls within the last three months. Past Medical History/Comorbidities:   Ms. Oneyda Denise  has a past medical history of Abdominal pain; Arthritis; Breast cancer, left ([facility] [address] (2001); Breast pain in female; Bunion;  Cholesterol serum elevated; Chronic pain of left knee; Corns and callosities; DDD (degenerative disc disease), lumbar; Diabetes mellitus type II, controlled ([facility] [address] Emotional disorder; Fungal infection of toenail; Hallux valgus; Hemorrhoids; Hip pain, bilateral; Hyperglycemia; Hyperlipidemia; Hypertension; Hypothyroidism; Lower GI bleed; Obesity; Other ill-defined conditions(799.89); Postmenopausal; Radiation therapy (2001); Radiation therapy complication; Right median nerve neuropathy; Stress incontinence; Traumatic arthropathy of knee; and Urinary incontinence. Ms. Jesus Manuel Lentz  has a past surgical history that includes orthopaedic (2005); orthopaedic; orthopaedic; us guided core breast biopsy (Right); hysterectomy; breast lumpectomy (Left, 2001); tubal ligation; orthopaedic (Left); urological; shoulder replacement (Left); colonoscopy (2008); and breast biopsy (Right, 2011). Social History/Living Environment:    Lives alone. Prior Level of Function/Work/Activity:  Walking  Dominant Side:         RIGHT  Current Medications:    Current Outpatient Prescriptions:     Calcium-Cholecalciferol, D3, (CALCIUM 600 WITH VITAMIN D3) 600 mg(1,500mg) -400 unit chew, Take  by mouth daily. , Disp: , Rfl:     metFORMIN (GLUCOPHAGE) 500 mg tablet, Take  by mouth two (2) times a day. 1/2 tab, Disp: , Rfl:     multivitamin (ONE A DAY) tablet, Take 1 Tab by mouth., Disp: , Rfl:     glucose blood VI test strips (ONETOUCH ULTRA TEST) strip, by Does Not Apply route See Admin Instructions. , Disp: , Rfl:     promethazine-dextromethorphan (PROMETHAZINE-DM) 6.25-15 mg/5 mL syrup, Take  by mouth three (3) times daily as needed for Cough. , Disp: , Rfl:     Cetirizine (ZYRTEC) 10 mg cap, Take 10 mg by mouth nightly., Disp: , Rfl:     diclofenac EC (VOLTAREN) 75 mg EC tablet, Take  by mouth., Disp: , Rfl:     B.infantis-B.ani-B.long-B.bifi (PROBIOTIC 4X) 10-15 mg TbEC, Take  by mouth., Disp: , Rfl:     meloxicam (MOBIC) 15 mg tablet, Take 1 Tab by mouth daily. , Disp: 14 Tab, Rfl: 1    traMADol (ULTRAM) 50 mg tablet, Take 50 mg by mouth every six (6) hours as needed. , Disp: , Rfl:     Cholecalciferol, Vitamin D3, (VITAMIN D3) 1,000 unit Cap, Take  by mouth every Monday and Friday.  , Disp: , Rfl:     magnesium 500 mg Tab, Take  by mouth.  , Disp: , Rfl:     omega-3 fatty acids-vitamin e (FISH OIL) 1,000 mg Cap, Take 1 Cap by mouth daily. , Disp: , Rfl:     PV W-O TONO/FERROUS FUMARATE/FA (M-VIT PO), Take  by mouth daily. , Disp: , Rfl:     coenzyme q10 (CO Q-10) 10 mg Cap, Take  by mouth daily. , Disp: , Rfl:     levothyroxine (SYNTHROID) 88 mcg tablet, Take 88 mcg by mouth Daily (before breakfast). Take day of surgery. , Disp: , Rfl:     valsartan (DIOVAN) 320 mg tablet, Take 320 mg by mouth daily. , Disp: , Rfl:     atorvastatin (LIPITOR) 10 mg tablet, Take 20 mg by mouth every evening.  Pt takes at night, Disp: , Rfl:      Date Last Reviewed:  3/6/2017   Number of Personal Factors/Comorbidities that affect the Plan of Care: 0: LOW COMPLEXITY   EXAMINATION:   Observation/Orthostatic Postural Assessment:          Slouched posture   Palpation:          Trigger-point over right pectoralis major and pectoralis minor   ROM:          Decreased range on the left due to history of total shoulder replacement   Right: active range of motion: decreased flexion and abduction   Right Passive: pain at end range during flexion and abduction, range within functional limits  Strength:     UE: LEFT  Shoulder Flexion 3+/5     Shoulder Abduction 4/5  Shoulder Adduction 4-/5  Shoulder Internal Rotation 5/5  Shoulder External Rotation 4-/5  Elbow Flexion 5/5  Elbow Extension 3+/5    UE: RIGHT   Shoulder Flexion 5/5 (painful)  Shoulder Abduction 5/5  Shoulder Adduction 4/5  Shoulder Internal Rotation 5/5  Shoulder External Rotation 4/5  Elbow Flexion 5/5  Elbow Extension 5/5  Special Tests:          Pectoralis length: positive on right   Neurological Screen:        Myotomes:  intact        Dermatomes:  intact  Balance:          Not tested   Body Structures Involved:  1. Bones  2. Joints  3. Muscles Body Functions Affected:  1. Neuromusculoskeletal  2. Movement Related Activities and Participation Affected:  1. Domestic Life  2. Community, Social and [place]   Number of elements that affect the Plan of Care: 1-2: LOW COMPLEXITY   CLINICAL PRESENTATION:   Presentation: Stable and uncomplicated: LOW COMPLEXITY   CLINICAL DECISION MAKING:   Outcome Measure: Tool Used: Disabilities of the Arm, Shoulder and Hand (DASH) Questionnaire - Quick Version  Score:  Initial: 28/55  Most Recent: X/55 (Date: -- )   Interpretation of Score: The DASH is designed to measure the activities of daily living in person's with upper extremity dysfunction or pain. Each section is scored on a 1-5 scale, 5 representing the greatest disability. The scores of each section are added together for a total score of 55. Score 11 12-19 20-28 29-37 38-45 46-54 55   Modifier CH CI CJ CK CL CM CN     Medical Necessity:   · Patient demonstrates good rehab potential due to higher previous functional level. Reason for Services/Other Comments:  · Patient continues to require skilled intervention due to strength deficits and range of motion deficits. Use of outcome tool(s) and clinical judgement create a POC that gives a: Clear prediction of patient's progress: LOW COMPLEXITY   TREATMENT:   Pre-treatment Symptoms:  Patient reports she is doing better. (In addition to Assessment/Re-Assessment sessions the following treatments were rendered)  THERAPEUTIC EXERCISE: ( 30 minutes):  Exercises per grid below to improve mobility and strength. Required minimal visual, verbal, manual and tactile cues to promote proper body posture and promote proper body mechanics. Progressed resistance, range and repetitions as indicated.    Date:  2-23-17 Date:  2-28-17 Date:  3-2-17 Date:  3-6-17   Activity/Exercise Parameters Parameters Parameters    Patient Education Discussed POC and pectoralis stretch for HEP UBE  Level 1 4/4   Level 2 4/4 Level 3 4/4   Corner Pec Stretch    5 sec hold x 10 5 sec hold x 10 5 sec hold x 10   Scapular Retractions    5 sec hold x 10 5 sec hold x 10 5 sec hold x 10   Rows     GTB x 15 GTB  x 15   Extensions     GTB x 15 GTB x 15   Scaption    1# x 15 R only     Lateral Raises    1# x 15 R only     Wall Washes      Next Visit     Manual Therapy: (15 Minutes) to increase soft tissue mobility  Trigger point release and soft tissue mobilization to Pec Major and Pec Minor  Manual Pec Stretch in supine, 15 sec hold x 2          Treatment/Session Assessment:  Patient's pec major/minor feel less tight and trigger points are reduced. Passive elevation of arm does not increase pain like it used too. Updated HEP today. · Pain: Initial:  Pain Intensity 1: 0  Post Session:   ·   Compliance with Program/Exercises: compliant all of the time. · Recommendations/Intent for next treatment session: \"Next visit will focus on advancements to more challenging activities\".   Future Appointments  Date Time Provider Anaya Arias   3/9/2017 2:30 PM OMAYRA Ballesteros [facility]   3/13/2017 2:30 PM OMAYRA Ballesteros [facility]     Total Treatment Duration:  PT Patient Time In/Time Out  Time In: 1430  [facility]

## 2017-03-09 ENCOUNTER — HOSPITAL ENCOUNTER (OUTPATIENT)
Dept: PHYSICAL THERAPY | Age: 72
Discharge: HOME | End: 2017-03-09
Payer: COMMERCIAL

## 2017-03-09 PROCEDURE — 97110 THERAPEUTIC EXERCISES: CPT

## 2017-03-13 ENCOUNTER — HOSPITAL ENCOUNTER (OUTPATIENT)
Dept: PHYSICAL THERAPY | Age: 72
Discharge: HOME | End: 2017-03-13
Payer: COMMERCIAL

## 2017-03-13 PROCEDURE — 97110 THERAPEUTIC EXERCISES: CPT

## 2017-03-13 NOTE — PROGRESS NOTES
Val Mcdonald  :  [facility]  [facility] [address], [place] [address]  [phone]   [phone]        OUTPATIENT PHYSICAL THERAPY:Daily Note and Discharge 3/13/2017    ICD-10: Treatment Diagnosis: Pain in right shoulder (M25.511),Strain of muscle and tendon of front wall of thorax, sequela (S29.011S)   Precautions/Allergies:   Review of patient's allergies indicates no known allergies. Fall Risk Score: 6  MD Orders: Evaluate and treat. MEDICAL/REFERRING DIAGNOSIS:  strain of tendon of front wall of thorax   DATE OF ONSET: 17  REFERRING PHYSICIAN: Katlyn Greene[address] [place] [address] Street: 3-1-17     INITIAL ASSESSMENT:  Ms. Rosy Martinez has been seen for 6 physical therapy visits since her initial evaluation on 2017. She has met 6/6 goals. She demonstrates 5/5 strength and full ROM to RUE. She no longer has trigger points in the pec major/minor muscles. She is compliant with her HEP. Discharge at this time to home program.           GOALS: (Goals have been discussed and agreed upon with patient.)  Short-Term Functional Goals: Time Frame: 1.5 weeks  1. Patient will decrease by 5 points on the QuickDASH to show improvement in mobility and function. Goal Met 3-13-17  2. Patient will participate in a strengthening program for both upper extremities to improve her mobility. Goal Met 3-13-17  3. Patient will demonstrate compliance with HEP so that she is able to increase the strength in her upper extremities to have less difficulty performing duties at work. Goal Met 3-13-17  Discharge Goals: Time Frame: 3 weeks  1. Patient will decrease by 10 points on the QuickDASH to show improvement in mobility and function. Goal Met 3-13-17  2. Patient will demonstrate full range of motion in her right upper extremity without pain so that she is able to perform the physical requirements of her work. Goal Met 3-13-17  3.  Patient will demonstrate 5/5 strength in her right upper extremity with all movements so that she is able to perform all duties required by her job. Goal Met [id number]    Regarding Lesly Arambula's therapy, I certify that the treatment plan above will be carried out by a therapist or under their direction. Thank you for this referral,  Jesus Officer, DPT     Referring Physician Signature: Keena Parrish*              Date                    HISTORY:   History of Present Injury/Illness (Reason for Referral):  Patient reports having a fall on 1-23-17. She reports blacking out before she hit the floor but she remembers trying to avoid bumping into a kid and side-stepping and then coming back to consciousness on the floor. She reports going to the doctor and being told that she has a muscle strain. She reports having pain when raising right arm above head and feeling a pulling sensation in her right chest. Patient states that pain depends on how active she is during the day. Patient reports that the pain is not constant but currently it is at a 4/10 pain. Patient reports that tylenol and ice help the pain. Patient reports currently working on light duty. Patient denies any recent falls within the last three months. Past Medical History/Comorbidities:   Ms. Rosy Martinez  has a past medical history of Abdominal pain; Arthritis; Breast cancer, left ([address] (2001); Breast pain in female; Bunion; Cholesterol serum elevated; Chronic pain of left knee; Corns and callosities; DDD (degenerative disc disease), lumbar; Diabetes mellitus type II, controlled ([address] Emotional disorder; Fungal infection of toenail; Hallux valgus; Hemorrhoids; Hip pain, bilateral; Hyperglycemia; Hyperlipidemia; Hypertension; Hypothyroidism; Lower GI bleed; Obesity; Other ill-defined conditions(799.89); Postmenopausal; Radiation therapy (2001);  Radiation therapy complication; Right median nerve neuropathy; Stress incontinence; Traumatic arthropathy of knee; and Urinary incontinence. Ms. Oneyda Denise  has a past surgical history that includes orthopaedic (2005); orthopaedic; orthopaedic; us guided core breast biopsy (Right); hysterectomy; breast lumpectomy (Left, 2001); tubal ligation; orthopaedic (Left); urological; shoulder replacement (Left); colonoscopy (2008); and breast biopsy (Right, 2011). Social History/Living Environment:    Lives alone. Prior Level of Function/Work/Activity:  Walking  Dominant Side:         RIGHT  Current Medications:    Current Outpatient Prescriptions:     Calcium-Cholecalciferol, D3, (CALCIUM 600 WITH VITAMIN D3) 600 mg(1,500mg) -400 unit chew, Take  by mouth daily. , Disp: , Rfl:     metFORMIN (GLUCOPHAGE) 500 mg tablet, Take  by mouth two (2) times a day. 1/2 tab, Disp: , Rfl:     multivitamin (ONE A DAY) tablet, Take 1 Tab by mouth., Disp: , Rfl:     glucose blood VI test strips (ONETOUCH ULTRA TEST) strip, by Does Not Apply route See Admin Instructions. , Disp: , Rfl:     promethazine-dextromethorphan (PROMETHAZINE-DM) 6.25-15 mg/5 mL syrup, Take  by mouth three (3) times daily as needed for Cough. , Disp: , Rfl:     Cetirizine (ZYRTEC) 10 mg cap, Take 10 mg by mouth nightly., Disp: , Rfl:     diclofenac EC (VOLTAREN) 75 mg EC tablet, Take  by mouth., Disp: , Rfl:     B.infantis-B.ani-B.long-B.bifi (PROBIOTIC 4X) 10-15 mg TbEC, Take  by mouth., Disp: , Rfl:     meloxicam (MOBIC) 15 mg tablet, Take 1 Tab by mouth daily. , Disp: 14 Tab, Rfl: 1    traMADol (ULTRAM) 50 mg tablet, Take 50 mg by mouth every six (6) hours as needed. , Disp: , Rfl:     Cholecalciferol, Vitamin D3, (VITAMIN D3) 1,000 unit Cap, Take  by mouth every Monday and Friday.  , Disp: , Rfl:     magnesium 500 mg Tab, Take  by mouth.  , Disp: , Rfl:     omega-3 fatty acids-vitamin e (FISH OIL) 1,000 mg Cap, Take 1 Cap by mouth daily. , Disp: , Rfl:     PV W-O TONO/FERROUS FUMARATE/FA (M-VIT PO), Take  by mouth daily.   , Disp: , Rfl:     coenzyme q10 (CO Q-10) 10 mg Cap, Take  by mouth daily. , Disp: , Rfl:     levothyroxine (SYNTHROID) 88 mcg tablet, Take 88 mcg by mouth Daily (before breakfast). Take day of surgery. , Disp: , Rfl:     valsartan (DIOVAN) 320 mg tablet, Take 320 mg by mouth daily. , Disp: , Rfl:     atorvastatin (LIPITOR) 10 mg tablet, Take 20 mg by mouth every evening. Pt takes at night, Disp: , Rfl:      Date Last Reviewed:  3/13/2017   Number of Personal Factors/Comorbidities that affect the Plan of Care: 0: LOW COMPLEXITY   EXAMINATION:   Observation/Orthostatic Postural Assessment:          Slouched posture   Palpation:         N/a 3-13-17   ROM:  3-13-17        Decreased range on the left due to history of total shoulder replacement   Right: active range of motion: WNL and painfree  Right Passive: WNL and painfree  Strength:  3-13-17   UE: LEFT  Shoulder Flexion 3+/5     Shoulder Abduction 4/5  Shoulder Adduction 4-/5  Shoulder Internal Rotation 5/5  Shoulder External Rotation 4-/5  Elbow Flexion 5/5  Elbow Extension 3+/5    UE: RIGHT   Shoulder Flexion 5/5   Shoulder Abduction 5/5  Shoulder Adduction 5/5  Shoulder Internal Rotation 5/5  Shoulder External Rotation 5/5  Elbow Flexion 5/5  Elbow Extension 5/5  Special Tests:          Pectoralis length: positive on right   Neurological Screen:        Myotomes:  intact        Dermatomes:  intact  Balance:          Not tested   1.  1.  1.    Number of elements that affect the Plan of Care: 1-2: LOW COMPLEXITY   CLINICAL PRESENTATION:   Presentation: Stable and uncomplicated: LOW COMPLEXITY   CLINICAL DECISION MAKING:   Outcome Measure: Tool Used: Disabilities of the Arm, Shoulder and Hand (DASH) Questionnaire - Quick Version  Score:  Initial: 28/55  Most Recent: 15/55 (Date: 3-13-17 ) ·      Use of outcome tool(s) and clinical judgement create a POC that gives a: Clear prediction of patient's progress: LOW COMPLEXITY   TREATMENT:   Pre-treatment Symptoms:  Patient reports no complaints.    (In addition to Assessment/Re-Assessment sessions the following treatments were rendered)  THERAPEUTIC EXERCISE: ( 30 minutes):  Exercises per grid below to improve mobility and strength. Required minimal visual, verbal, manual and tactile cues to promote proper body posture and promote proper body mechanics. Progressed resistance, range and repetitions as indicated. Date:  2-23-17 Date:  2-28-17 Date:  3-2-17 Date:  3-6-17 Date:  3-9-17 Date:  3-13-17   Activity/Exercise Parameters Parameters Parameters      Patient Education Discussed POC and pectoralis stretch for HEP        UBE  Level 1 4/4   Level 2 4/4 Level 3 4/4 Level 2 4/4 RUE only    Corner Pec Stretch    5 sec hold x 10 5 sec hold x 10 5 sec hold x 10 5 sec hold x 10    Scapular Retractions    5 sec hold x 10 5 sec hold x 10 5 sec hold x 10     Rows     GTB x 15 GTB  x 15 BTB x 15 BTB x 15   Extensions     GTB x 15 GTB x 15 BTB x 15 BTB x 15   Bear Hugs     YTB x 15   YTB x 15   Scaption    1# x 15 R only   1# x 15 R only 1# x 15 R only   Lateral Raises    1# x 15 R only   1# x 15 R only 1# x 15 R only   Wall Washes      Next Visit Forward elevation x 15    Circles CW/CCW x 10 each direction Forward elevation x 15    Circles CW/CCW x 10 each direction         · Treatment/Session Assessment:  See assessment above  No future appointments.   Total Treatment Duration:  PT Patient Time In/Time Out  Time In: 1445  Time Out: [address], DPT

## 2017-03-16 ENCOUNTER — APPOINTMENT (OUTPATIENT)
Dept: PHYSICAL THERAPY | Age: 72
End: 2017-03-16
Payer: COMMERCIAL

## 2017-03-20 ENCOUNTER — APPOINTMENT (OUTPATIENT)
Dept: PHYSICAL THERAPY | Age: 72
End: 2017-03-20
Payer: COMMERCIAL

## 2017-03-21 ENCOUNTER — APPOINTMENT (OUTPATIENT)
Dept: PHYSICAL THERAPY | Age: 72
End: 2017-03-21
Payer: COMMERCIAL

## 2017-03-28 ENCOUNTER — APPOINTMENT (OUTPATIENT)
Dept: PHYSICAL THERAPY | Age: 72
End: 2017-03-28
Payer: COMMERCIAL

## 2017-03-30 ENCOUNTER — APPOINTMENT (OUTPATIENT)
Dept: PHYSICAL THERAPY | Age: 72
End: 2017-03-30
Payer: COMMERCIAL

## 2017-03-31 ENCOUNTER — APPOINTMENT (OUTPATIENT)
Dept: CT IMAGING | Age: 72
DRG: 149 | End: 2017-03-31
Attending: EMERGENCY MEDICINE
Payer: OTHER MISCELLANEOUS

## 2017-03-31 ENCOUNTER — APPOINTMENT (OUTPATIENT)
Dept: GENERAL RADIOLOGY | Age: 72
DRG: 149 | End: 2017-03-31
Attending: EMERGENCY MEDICINE
Payer: OTHER MISCELLANEOUS

## 2017-03-31 ENCOUNTER — HOSPITAL ENCOUNTER (INPATIENT)
Age: 72
LOS: 2 days | Discharge: HOME | DRG: 149 | End: 2017-04-02
Attending: EMERGENCY MEDICINE | Admitting: FAMILY MEDICINE
Payer: OTHER MISCELLANEOUS

## 2017-03-31 DIAGNOSIS — R55 SYNCOPE AND COLLAPSE: ICD-10-CM

## 2017-03-31 DIAGNOSIS — S09.90XA HEAD INJURY, INITIAL ENCOUNTER: Primary | ICD-10-CM

## 2017-03-31 DIAGNOSIS — S01.81XA FACIAL LACERATION, INITIAL ENCOUNTER: ICD-10-CM
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
## 2017-03-31 LAB
ALBUMIN SERPL BCP-MCNC: 3.7 G/DL (ref 3.2–4.6)
ALBUMIN/GLOB SERPL: 0.8 {RATIO} (ref 1.2–3.5)
ALP SERPL-CCNC: 74 U/L (ref 50–136)
ALT SERPL-CCNC: 25 U/L (ref 12–65)
ANION GAP BLD CALC-SCNC: 10 MMOL/L (ref 7–16)
AST SERPL W P-5'-P-CCNC: 21 U/L (ref 15–37)
BASOPHILS # BLD AUTO: 0 K/UL (ref 0–0.2)
BASOPHILS # BLD: 0 % (ref 0–2)
BILIRUB SERPL-MCNC: 0.7 MG/DL (ref 0.2–1.1)
BUN SERPL-MCNC: 22 MG/DL (ref 8–23)
CALCIUM SERPL-MCNC: 9.2 MG/DL (ref 8.3–10.4)
CHLORIDE SERPL-SCNC: 105 MMOL/L (ref 98–107)
CO2 SERPL-SCNC: 27 MMOL/L (ref 21–32)
CREAT SERPL-MCNC: 0.95 MG/DL (ref 0.6–1)
DIFFERENTIAL METHOD BLD: NORMAL
EOSINOPHIL # BLD: 0.1 K/UL (ref 0–0.8)
EOSINOPHIL NFR BLD: 1 % (ref 0.5–7.8)
ERYTHROCYTE [DISTWIDTH] IN BLOOD BY AUTOMATED COUNT: 14 % (ref 11.9–14.6)
GLOBULIN SER CALC-MCNC: 4.8 G/DL (ref 2.3–3.5)
GLUCOSE BLD STRIP.AUTO-MCNC: 98 MG/DL (ref 65–100)
GLUCOSE SERPL-MCNC: 97 MG/DL (ref 65–100)
HCT VFR BLD AUTO: 40 % (ref 35.8–46.3)
HGB BLD-MCNC: 13.1 G/DL (ref 11.7–15.4)
IMM GRANULOCYTES # BLD: 0 K/UL (ref 0–0.5)
IMM GRANULOCYTES NFR BLD AUTO: 0.3 % (ref 0–5)
LYMPHOCYTES # BLD AUTO: 21 % (ref 13–44)
LYMPHOCYTES # BLD: 1.6 K/UL (ref 0.5–4.6)
MCH RBC QN AUTO: 26.9 PG (ref 26.1–32.9)
MCHC RBC AUTO-ENTMCNC: 32.8 G/DL (ref 31.4–35)
MCV RBC AUTO: 82.1 FL (ref 79.6–97.8)
MONOCYTES # BLD: 0.6 K/UL (ref 0.1–1.3)
MONOCYTES NFR BLD AUTO: 8 % (ref 4–12)
NEUTS SEG # BLD: 5.5 K/UL (ref 1.7–8.2)
NEUTS SEG NFR BLD AUTO: 70 % (ref 43–78)
PLATELET # BLD AUTO: 250 K/UL (ref 150–450)
PMV BLD AUTO: 10.8 FL (ref 10.8–14.1)
POTASSIUM SERPL-SCNC: 3.4 MMOL/L (ref 3.5–5.1)
PROT SERPL-MCNC: 8.5 G/DL (ref 6.3–8.2)
RBC # BLD AUTO: 4.87 M/UL (ref 4.05–5.25)
SODIUM SERPL-SCNC: 142 MMOL/L (ref 136–145)
TROPONIN I SERPL-MCNC: 0.09 NG/ML (ref 0.02–0.05)
TROPONIN I SERPL-MCNC: 0.09 NG/ML (ref 0.02–0.05)
WBC # BLD AUTO: 7.8 K/UL (ref 4.3–11.1)

## 2017-03-31 PROCEDURE — 77030018836 HC SOL IRR NACL ICUM -A

## 2017-03-31 PROCEDURE — 80053 COMPREHEN METABOLIC PANEL: CPT | Performed by: EMERGENCY MEDICINE

## 2017-03-31 PROCEDURE — 75810000293 HC SIMP/SUPERF WND  RPR: Performed by: EMERGENCY MEDICINE

## 2017-03-31 PROCEDURE — 74011250637 HC RX REV CODE- 250/637: Performed by: FAMILY MEDICINE

## 2017-03-31 PROCEDURE — 87641 MR-STAPH DNA AMP PROBE: CPT | Performed by: FAMILY MEDICINE

## 2017-03-31 PROCEDURE — 82962 GLUCOSE BLOOD TEST: CPT

## 2017-03-31 PROCEDURE — 74011250636 HC RX REV CODE- 250/636: Performed by: EMERGENCY MEDICINE

## 2017-03-31 PROCEDURE — 99284 EMERGENCY DEPT VISIT MOD MDM: CPT | Performed by: EMERGENCY MEDICINE

## 2017-03-31 PROCEDURE — 36415 COLL VENOUS BLD VENIPUNCTURE: CPT | Performed by: FAMILY MEDICINE

## 2017-03-31 PROCEDURE — 77030010507 HC ADH SKN DERMBND J&J -B

## 2017-03-31 PROCEDURE — 84484 ASSAY OF TROPONIN QUANT: CPT | Performed by: EMERGENCY MEDICINE

## 2017-03-31 PROCEDURE — 90471 IMMUNIZATION ADMIN: CPT | Performed by: EMERGENCY MEDICINE

## 2017-03-31 PROCEDURE — 93005 ELECTROCARDIOGRAM TRACING: CPT | Performed by: EMERGENCY MEDICINE

## 2017-03-31 PROCEDURE — 96372 THER/PROPH/DIAG INJ SC/IM: CPT

## 2017-03-31 PROCEDURE — 74011250636 HC RX REV CODE- 250/636: Performed by: FAMILY MEDICINE

## 2017-03-31 PROCEDURE — 65270000029 HC RM PRIVATE

## 2017-03-31 PROCEDURE — G0378 HOSPITAL OBSERVATION PER HR: HCPCS

## 2017-03-31 PROCEDURE — 85025 COMPLETE CBC W/AUTO DIFF WBC: CPT | Performed by: EMERGENCY MEDICINE

## 2017-03-31 PROCEDURE — 71020 XR CHEST PA LAT: CPT

## 2017-03-31 PROCEDURE — 99218 HC RM OBSERVATION: CPT

## 2017-03-31 PROCEDURE — 90715 TDAP VACCINE 7 YRS/> IM: CPT | Performed by: EMERGENCY MEDICINE

## 2017-03-31 PROCEDURE — 70486 CT MAXILLOFACIAL W/O DYE: CPT

## 2017-03-31 PROCEDURE — 70450 CT HEAD/BRAIN W/O DYE: CPT

## 2017-03-31 PROCEDURE — 72125 CT NECK SPINE W/O DYE: CPT

## 2017-03-31 PROCEDURE — 0HQ1XZZ REPAIR FACE SKIN, EXTERNAL APPROACH: ICD-10-PCS | Performed by: EMERGENCY MEDICINE

## 2017-03-31 RX ORDER — FERROUS SULFATE, DRIED 160(50) MG
1 TABLET, EXTENDED RELEASE ORAL 2 TIMES DAILY WITH MEALS
Status: DISCONTINUED | OUTPATIENT
Start: 2017-04-01 | End: 2017-04-02 | Stop reason: HOSPADM

## 2017-03-31 RX ORDER — OXYCODONE HYDROCHLORIDE 5 MG/1
5 TABLET ORAL
Status: DISCONTINUED | OUTPATIENT
Start: 2017-03-31 | End: 2017-04-02 | Stop reason: HOSPADM

## 2017-03-31 RX ORDER — TRAMADOL HYDROCHLORIDE 50 MG/1
50 TABLET ORAL
Status: DISCONTINUED | OUTPATIENT
Start: 2017-03-31 | End: 2017-04-02 | Stop reason: HOSPADM

## 2017-03-31 RX ORDER — ACETAMINOPHEN 325 MG/1
650 TABLET ORAL
Status: DISCONTINUED | OUTPATIENT
Start: 2017-03-31 | End: 2017-04-02 | Stop reason: HOSPADM

## 2017-03-31 RX ORDER — LEVOTHYROXINE SODIUM 88 UG/1
88 TABLET ORAL
Status: DISCONTINUED | OUTPATIENT
Start: 2017-04-01 | End: 2017-04-02 | Stop reason: HOSPADM

## 2017-03-31 RX ORDER — LANOLIN ALCOHOL/MO/W.PET/CERES
400 CREAM (GRAM) TOPICAL DAILY
Status: DISCONTINUED | OUTPATIENT
Start: 2017-04-01 | End: 2017-04-02 | Stop reason: HOSPADM

## 2017-03-31 RX ORDER — DEXTROSE 40 %
15 GEL (GRAM) ORAL AS NEEDED
Status: DISCONTINUED | OUTPATIENT
Start: 2017-03-31 | End: 2017-04-02 | Stop reason: HOSPADM

## 2017-03-31 RX ORDER — SODIUM CHLORIDE 0.9 % (FLUSH) 0.9 %
5-10 SYRINGE (ML) INJECTION AS NEEDED
Status: DISCONTINUED | OUTPATIENT
Start: 2017-03-31 | End: 2017-04-02 | Stop reason: HOSPADM

## 2017-03-31 RX ORDER — DEXTROSE 50 % IN WATER (D50W) INTRAVENOUS SYRINGE
25-50 AS NEEDED
Status: DISCONTINUED | OUTPATIENT
Start: 2017-03-31 | End: 2017-04-02 | Stop reason: HOSPADM

## 2017-03-31 RX ORDER — VALSARTAN 320 MG/1
320 TABLET ORAL DAILY
Status: DISCONTINUED | OUTPATIENT
Start: 2017-04-01 | End: 2017-04-02 | Stop reason: HOSPADM

## 2017-03-31 RX ORDER — ATORVASTATIN CALCIUM 10 MG/1
20 TABLET, FILM COATED ORAL EVERY EVENING
Status: DISCONTINUED | OUTPATIENT
Start: 2017-03-31 | End: 2017-04-02 | Stop reason: HOSPADM

## 2017-03-31 RX ORDER — ENOXAPARIN SODIUM 100 MG/ML
40 INJECTION SUBCUTANEOUS EVERY 24 HOURS
Status: DISCONTINUED | OUTPATIENT
Start: 2017-03-31 | End: 2017-04-02 | Stop reason: HOSPADM

## 2017-03-31 RX ORDER — BISACODYL 5 MG
5 TABLET, DELAYED RELEASE (ENTERIC COATED) ORAL DAILY PRN
Status: DISCONTINUED | OUTPATIENT
Start: 2017-03-31 | End: 2017-04-02 | Stop reason: HOSPADM

## 2017-03-31 RX ORDER — SODIUM CHLORIDE 0.9 % (FLUSH) 0.9 %
5-10 SYRINGE (ML) INJECTION EVERY 8 HOURS
Status: DISCONTINUED | OUTPATIENT
Start: 2017-03-31 | End: 2017-04-02 | Stop reason: HOSPADM

## 2017-03-31 RX ORDER — LORATADINE 10 MG/1
10 TABLET ORAL DAILY
Status: DISCONTINUED | OUTPATIENT
Start: 2017-04-01 | End: 2017-04-02 | Stop reason: HOSPADM

## 2017-03-31 RX ADMIN — ATORVASTATIN CALCIUM 20 MG: 10 TABLET, FILM COATED ORAL at 22:27

## 2017-03-31 RX ADMIN — TETANUS TOXOID, REDUCED DIPHTHERIA TOXOID AND ACELLULAR PERTUSSIS VACCINE, ADSORBED 0.5 ML: 5; 2.5; 8; 8; 2.5 SUSPENSION INTRAMUSCULAR at 19:37

## 2017-03-31 RX ADMIN — ENOXAPARIN SODIUM 40 MG: 40 INJECTION SUBCUTANEOUS at 22:27

## 2017-03-31 RX ADMIN — Medication 10 ML: at 22:31

## 2017-03-31 NOTE — ED PROVIDER NOTES
HPI Comments: Patient is a 67 yo female with fall and head injury. States she was at work and had just finished rolling out the garbage and states she went to step forward and \"stumbled\" and fell forward and struck her face. On multiple questioning states she doesn't exactly know why she fell and thinks she may have had short LOC. Denies any chest pain, or SOB, no abdominal pain, no nausea or vomiting, no dizziness (although states does have history of vertigo however no dizziness today or with this event). States pain on her right wrist and right face. Patient is a 66 y.o. female presenting with fall. The history is provided by the patient. No  was used. Fall   Associated symptoms include headaches (since striking her head). Pertinent negatives include no fever, no abdominal pain, no nausea and no vomiting.         Past Medical History:   Diagnosis Date    Abdominal pain     Arthritis     Breast cancer, left ([address] 2001    Breast pain in female     Bunion     Cholesterol serum elevated     medication    Chronic pain of left knee     Corns and callosities     DDD (degenerative disc disease), lumbar     Diabetes mellitus type II, controlled ([address]     Emotional disorder     Fungal infection of toenail     Hallux valgus     Hemorrhoids     Hip pain, bilateral     Hyperglycemia     Hyperlipidemia     Hypertension     medication    Hypothyroidism     Lower GI bleed     Obesity     Other ill-defined conditions(799.89)     pt reports urinalysis shows blood - she is scheduled for CT pelvis and abdomen in August)    Postmenopausal     Radiation therapy 2001    Radiation therapy complication     7293    Right median nerve neuropathy     Stress incontinence     Traumatic arthropathy of knee     Urinary incontinence        Past Surgical History:   Procedure Laterality Date    HX BREAST BIOPSY Right 2011    benign    HX BREAST LUMPECTOMY Left 2001    radiation for CA    HX COLONOSCOPY  2008    HX HYSTERECTOMY      HX ORTHOPAEDIC  2005    Left TSA    HX ORTHOPAEDIC      right ankle    HX ORTHOPAEDIC      left knee repair    HX ORTHOPAEDIC Left     ankle arthroscopy    HX SHOULDER REPLACEMENT Left     HX TUBAL LIGATION      HX UROLOGICAL      cystoscopy    US GUIDED CORE BREAST BIOPSY Right          Family History:   Problem Relation Age of Onset    Breast Cancer Sister 77    Breast Cancer Sister     Colon Cancer Other     Cancer Other      bone CA    Diabetes Other     Hypertension Other     Malignant Hyperthermia Neg Hx     Pseudocholinesterase Deficiency Neg Hx     Delayed Awakening Neg Hx     Post-op Nausea/Vomiting Neg Hx     Emergence Delirium Neg Hx     Post-op Cognitive Dysfunction Neg Hx     Other Neg Hx        Social History     Social History    Marital status: SINGLE     Spouse name: N/A    Number of children: N/A    Years of education: N/A     Occupational History    Not on file. Social History Main Topics    Smoking status: Former Smoker     Packs/day: 0.25     Years: 25.00     Quit date: 7/13/2001    Smokeless tobacco: Not on file    Alcohol use No    Drug use: No    Sexual activity: Not on file     Other Topics Concern    Not on file     Social History Narrative         ALLERGIES: Review of patient's allergies indicates no known allergies. Review of Systems   Constitutional: Negative for chills and fever. HENT: Negative for rhinorrhea and sore throat. Eyes: Negative for visual disturbance. Respiratory: Negative for cough and shortness of breath. Cardiovascular: Negative for chest pain and leg swelling. Gastrointestinal: Negative for abdominal pain, diarrhea, nausea and vomiting. Genitourinary: Negative for dysuria. Musculoskeletal: Positive for arthralgias. Negative for back pain and neck pain. Skin: Positive for wound. Negative for rash. Neurological: Positive for headaches (since striking her head).  Negative for weakness. Psychiatric/Behavioral: The patient is not nervous/anxious. Vitals:    03/31/17 1458   BP: (!) 143/95   Pulse: 77   Resp: 16   Temp: 97.8 °F (36.6 °C)   SpO2: 97%   Weight: 79.4 kg (175 lb)   Height: 5' 2\" (1.575 m)            Physical Exam   Constitutional: She is oriented to person, place, and time. She appears well-developed and well-nourished. HENT:   Head: Normocephalic. Right Ear: External ear normal.   Left Ear: External ear normal.   1 inch laceration below right eye, bruising around right eye, EOMI, TM WNL, no septal hematoma, no racoon eyes, no  Camara sign. Eyes: Conjunctivae and EOM are normal. Pupils are equal, round, and reactive to light. Neck: Normal range of motion. Neck supple. No tracheal deviation present. Cardiovascular: Normal rate, regular rhythm, normal heart sounds and intact distal pulses. No murmur heard. Pulmonary/Chest: Effort normal and breath sounds normal. No respiratory distress. Abdominal: Soft. There is no tenderness. Musculoskeletal: Normal range of motion. Neurological: She is alert and oriented to person, place, and time. No cranial nerve deficit. Cn 2-12 fully intact, strength and sensation 5/5 in all extremities, negative pronator drift, ambulates without difficulty, no focal deficits appreciated. Skin: No rash noted. Nursing note and vitals reviewed.        MDM  Number of Diagnoses or Management Options  Facial laceration, initial encounter: new and requires workup  Fall, initial encounter: new and requires workup  Head injury, initial encounter: new and requires workup  Syncope and collapse: new and requires workup     Amount and/or Complexity of Data Reviewed  Clinical lab tests: ordered and reviewed  Tests in the radiology section of CPT®: ordered and reviewed  Tests in the medicine section of CPT®: ordered and reviewed  Review and summarize past medical records: yes    Risk of Complications, Morbidity, and/or Mortality  Presenting problems: high  Diagnostic procedures: high  Management options: high    Patient Progress  Patient progress: stable    ED Course       Wound Repair  Date/Time: 3/31/2017 7:59 PM  Performed by: attendingPreparation: skin prepped with Betadine  Location details: face  Wound length:2.6 - 7.5 cm  Foreign bodies: no foreign bodies  Irrigation solution: saline  Irrigation method: syringe  Debridement: none  Skin closure: glue  Patient tolerance: Patient tolerated the procedure well with no immediate complications  My total time at bedside, performing this procedure was 1-15 minutes. Recent Results (from the past 12 hour(s))   CBC WITH AUTOMATED DIFF    Collection Time: 03/31/17  6:50 PM   Result Value Ref Range    WBC 7.8 4.3 - 11.1 K/uL    RBC 4.87 4.05 - 5.25 M/uL    HGB 13.1 11.7 - 15.4 g/dL    HCT 40.0 35.8 - 46.3 %    MCV 82.1 79.6 - 97.8 FL    MCH 26.9 26.1 - 32.9 PG    MCHC 32.8 31.4 - 35.0 g/dL    RDW 14.0 11.9 - 14.6 %    PLATELET 761 764 - 042 K/uL    MPV 10.8 10.8 - 14.1 FL    DF AUTOMATED      NEUTROPHILS 70 43 - 78 %    LYMPHOCYTES 21 13 - 44 %    MONOCYTES 8 4.0 - 12.0 %    EOSINOPHILS 1 0.5 - 7.8 %    BASOPHILS 0 0.0 - 2.0 %    IMMATURE GRANULOCYTES 0.3 0.0 - 5.0 %    ABS. NEUTROPHILS 5.5 1.7 - 8.2 K/UL    ABS. LYMPHOCYTES 1.6 0.5 - 4.6 K/UL    ABS. MONOCYTES 0.6 0.1 - 1.3 K/UL    ABS. EOSINOPHILS 0.1 0.0 - 0.8 K/UL    ABS. BASOPHILS 0.0 0.0 - 0.2 K/UL    ABS. IMM.  GRANS. 0.0 0.0 - 0.5 K/UL   METABOLIC PANEL, COMPREHENSIVE    Collection Time: 03/31/17  6:50 PM   Result Value Ref Range    Sodium 142 136 - 145 mmol/L    Potassium 3.4 (L) 3.5 - 5.1 mmol/L    Chloride 105 98 - 107 mmol/L    CO2 27 21 - 32 mmol/L    Anion gap 10 7 - 16 mmol/L    Glucose 97 65 - 100 mg/dL    BUN 22 8 - 23 MG/DL    Creatinine 0.95 0.6 - 1.0 MG/DL    GFR est AA >60 >60 ml/min/1.73m2    GFR est non-AA >60 >60 ml/min/1.73m2    Calcium 9.2 8.3 - 10.4 MG/DL    Bilirubin, total 0.7 0.2 - 1.1 MG/DL    ALT (SGPT) 25 12 - 65 U/L    AST (SGOT) 21 15 - 37 U/L    Alk. phosphatase 74 50 - 136 U/L    Protein, total 8.5 (H) 6.3 - 8.2 g/dL    Albumin 3.7 3.2 - 4.6 g/dL    Globulin 4.8 (H) 2.3 - 3.5 g/dL    A-G Ratio 0.8 (L) 1.2 - 3.5     TROPONIN I    Collection Time: 03/31/17  6:50 PM   Result Value Ref Range    Troponin-I, Qt. 0.09 (H) 0.02 - 0.05 NG/ML   EKG, 12 LEAD, INITIAL    Collection Time: 03/31/17  7:43 PM   Result Value Ref Range    Ventricular Rate 73 BPM    Atrial Rate 73 BPM    P-R Interval 184 ms    QRS Duration 70 ms    Q-T Interval 372 ms    QTC Calculation (Bezet) 409 ms    Calculated P Axis 67 degrees    Calculated R Axis 62 degrees    Calculated T Axis 82 degrees    Diagnosis       !! AGE AND GENDER SPECIFIC ECG ANALYSIS !! Normal sinus rhythm  Cannot rule out Anterior infarct , age undetermined  Abnormal ECG  When compared with ECG of 08-AUG-2009 23:39,  No significant change was found       Xr Chest Pa Lat    Result Date: 3/31/2017  CHEST X-RAY, 2 views 3/31/2017 History: Fall today. Technique: AP and lateral views of the chest. Comparison: Chest x-ray 8/9/2009 Findings: The cardiac silhouette is not felt to be significantly enlarged given the AP technique. The lungs are expanded without evidence for pneumothorax. No consolidation, or evidence of pleural effusion is seen. The bony thorax demonstrates no definite acute changes. Additional dedicated imaging as clinically indicated should be considered if acute osseous injury is suspected. Stable postsurgical changes are seen of the left shoulder. The upper abdomen is unremarkable in appearance. IMPRESSION: 1. No acute cardiopulmonary process evident by plain film imaging. Ct Head Wo Cont    Result Date: 3/31/2017  CT HEAD,  CERVICAL SPINE AND FACE WITHOUT CONTRAST, 3/31/2017. History: Fall striking right for head and cheek bone. Comparison: None.  Technique:   5 mm axial scans from the skull base to the vertex, 1.25 mm axial scans from the skull base into the upper chest performed, and sagittal and coronal reconstructed images performed, and Axial 2.5 mm scans from above the orbits through the mandible with coronal reconstructed images performed. Imaging was performed without contrast. All CT scans performed at this facility use one or all of the following: Automated exposure control, adjustment of the mA and/or kVp according to patient's size, iterative reconstruction. CT HEAD: Findings:  No evidence of intracranial hemorrhage is seen. No abnormal extra-axial fluid collections are seen. The ventricles are normal in size and configuration. No evidence of midline shift or obvious mass effect is seen. No abnormal edema pattern is seen in a vascular distribution to suggest large artery infarction. Evaluation with bone windows shows no acute osseous abnormality of the bony calvarium. No abnormal fluid collections are seen associated with the aerated sinuses. CT cervical spine: The skull base is grossly intact. No acute vertebral body, or posterior element abnormality is seen. 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 Reconstructed images show maintained alignment. The dens is intact, and the pre dens space is normal.  Prevertebral soft tissues are normal. Moderate left facet hypertrophic changes are seen in the upper to mid cervical spine. Limited evaluation of the lung apices to the weekend to. CT FACE: No acute findings are seen of the skull base, or mandible. The temporomandibular joints are maintained without evidence for dislocation. No fractures are seen on the facial bony structures. A moderate right periorbital soft tissue hematoma is seen. IMPRESSION: 1. No acute intracranial process evident by noncontrast CT study of the head. 2. No acute osseous abnormality the bony calvarium, facial bones, or cervical spine. A moderate right periorbital soft tissue hematoma is seen.      Ct Maxillofacial Wo Cont    Result Date: 3/31/2017  CT HEAD,  CERVICAL SPINE AND FACE WITHOUT CONTRAST, 3/31/2017. History: Fall striking right for head and cheek bone. Comparison: None. Technique:   5 mm axial scans from the skull base to the vertex, 1.25 mm axial scans from the skull base into the upper chest performed, and sagittal and coronal reconstructed images performed, and Axial 2.5 mm scans from above the orbits through the mandible with coronal reconstructed images performed. Imaging was performed without contrast. All CT scans performed at this facility use one or all of the following: Automated exposure control, adjustment of the mA and/or kVp according to patient's size, iterative reconstruction. CT HEAD: Findings:  No evidence of intracranial hemorrhage is seen. No abnormal extra-axial fluid collections are seen. The ventricles are normal in size and configuration. No evidence of midline shift or obvious mass effect is seen. No abnormal edema pattern is seen in a vascular distribution to suggest large artery infarction. Evaluation with bone windows shows no acute osseous abnormality of the bony calvarium. No abnormal fluid collections are seen associated with the aerated sinuses. CT cervical spine: The skull base is grossly intact. No acute vertebral body, or posterior element abnormality is seen. 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 Reconstructed images show maintained alignment. The dens is intact, and the pre dens space is normal.  Prevertebral soft tissues are normal. Moderate left facet hypertrophic changes are seen in the upper to mid cervical spine. Limited evaluation of the lung apices to the weekend to. CT FACE: No acute findings are seen of the skull base, or mandible. The temporomandibular joints are maintained without evidence for dislocation. No fractures are seen on the facial bony structures. A moderate right periorbital soft tissue hematoma is seen. IMPRESSION: 1.   No acute intracranial process evident by noncontrast CT study of the head. 2. No acute osseous abnormality the bony calvarium, facial bones, or cervical spine. A moderate right periorbital soft tissue hematoma is seen. Ct Spine Cerv Wo Cont    Result Date: 3/31/2017  CT HEAD,  CERVICAL SPINE AND FACE WITHOUT CONTRAST, 3/31/2017. History: Fall striking right for head and cheek bone. Comparison: None. Technique:   5 mm axial scans from the skull base to the vertex, 1.25 mm axial scans from the skull base into the upper chest performed, and sagittal and coronal reconstructed images performed, and Axial 2.5 mm scans from above the orbits through the mandible with coronal reconstructed images performed. Imaging was performed without contrast. All CT scans performed at this facility use one or all of the following: Automated exposure control, adjustment of the mA and/or kVp according to patient's size, iterative reconstruction. CT HEAD: Findings:  No evidence of intracranial hemorrhage is seen. No abnormal extra-axial fluid collections are seen. The ventricles are normal in size and configuration. No evidence of midline shift or obvious mass effect is seen. No abnormal edema pattern is seen in a vascular distribution to suggest large artery infarction. Evaluation with bone windows shows no acute osseous abnormality of the bony calvarium. No abnormal fluid collections are seen associated with the aerated sinuses. CT cervical spine: The skull base is grossly intact. No acute vertebral body, or posterior element abnormality is seen. 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 Reconstructed images show maintained alignment. The dens is intact, and the pre dens space is normal.  Prevertebral soft tissues are normal. Moderate left facet hypertrophic changes are seen in the upper to mid cervical spine. Limited evaluation of the lung apices to the weekend to. CT FACE: No acute findings are seen of the skull base, or mandible. The temporomandibular joints are maintained without evidence for dislocation. No fractures are seen on the facial bony structures. A moderate right periorbital soft tissue hematoma is seen. IMPRESSION: 1. No acute intracranial process evident by noncontrast CT study of the head. 2. No acute osseous abnormality the bony calvarium, facial bones, or cervical spine. A moderate right periorbital soft tissue hematoma is seen. 65 yo female with syncope:    Patient with mild elevation of troponin, unclear if patient passed out vs. Stumbled however I have concern she had syncopal episode given history. Will discuss with hospitalist for admission for observation and serial troponins.

## 2017-03-31 NOTE — IP AVS SNAPSHOT
Current Discharge Medication List  
  
CONTINUE these medications which have NOT CHANGED Dose & Instructions Dispensing Information Comments Morning Noon Evening Bedtime CALCIUM 600 WITH VITAMIN D3 600 mg(1,500mg) -400 unit Chew Generic drug:  Calcium-Cholecalciferol (D3) Your last dose was: Your next dose is: Take  by mouth daily. Refills:  0  
     
   
   
   
  
 CO Q-10 10 mg Cap Generic drug:  coenzyme q10 Your last dose was: Your next dose is: Take  by mouth daily. Refills:  0  
     
   
   
   
  
 diclofenac EC 75 mg EC tablet Commonly known as:  VOLTAREN Your last dose was: Your next dose is: Take  by mouth. Refills:  0  
     
   
   
   
  
 DIOVAN 320 mg tablet Generic drug:  valsartan Your last dose was: Your next dose is:    
   
   
 Dose:  320 mg Take 320 mg by mouth daily. Refills:  0  
     
   
   
   
  
 FISH OIL 1,000 mg Cap Generic drug:  omega-3 fatty acids-vitamin e Your last dose was: Your next dose is:    
   
   
 Dose:  1 Cap Take 1 Cap by mouth daily. Refills:  0  
     
   
   
   
  
 levothyroxine 88 mcg tablet Commonly known as:  SYNTHROID Your last dose was: Your next dose is:    
   
   
 Dose:  88 mcg Take 88 mcg by mouth Daily (before breakfast). Take day of surgery. Refills:  0 LIPITOR 10 mg tablet Generic drug:  atorvastatin Your last dose was: Your next dose is:    
   
   
 Dose:  20 mg Take 20 mg by mouth every evening. Pt takes at night Refills:  0  
     
   
   
   
  
 M-VIT PO Your last dose was: Your next dose is: Take  by mouth daily. Refills:  0  
     
   
   
   
  
 magnesium 500 mg Tab Your last dose was: Your next dose is: Take  by mouth. Refills:  0 meloxicam 15 mg tablet Commonly known as:  MOBIC Your last dose was: Your next dose is:    
   
   
 Dose:  15 mg Take 1 Tab by mouth daily. Quantity:  14 Tab Refills:  1  
     
   
   
   
  
 metFORMIN 500 mg tablet Commonly known as:  GLUCOPHAGE Your last dose was: Your next dose is: Take  by mouth two (2) times a day. 1/2 tab Refills:  0  
     
   
   
   
  
 multivitamin tablet Commonly known as:  ONE A DAY Your last dose was: Your next dose is:    
   
   
 Dose:  1 Tab Take 1 Tab by mouth. Refills:  0  
     
   
   
   
  
 ONETOUCH ULTRA TEST strip Generic drug:  glucose blood VI test strips Your last dose was: Your next dose is:    
   
   
 by Does Not Apply route See Admin Instructions. Refills:  0 PROBIOTIC 4X 10-15 mg Tbec Generic drug:  B.infantis-B.ani-B.long-B.bifi Your last dose was: Your next dose is: Take  by mouth. Refills:  0  
     
   
   
   
  
 promethazine-dextromethorphan 6.25-15 mg/5 mL syrup Commonly known as:  PROMETHAZINE-DM Your last dose was: Your next dose is: Take  by mouth three (3) times daily as needed for Cough. Refills:  0  
     
   
   
   
  
 traMADol 50 mg tablet Commonly known as:  ULTRAM  
   
Your last dose was: Your next dose is:    
   
   
 Dose:  50 mg Take 50 mg by mouth every six (6) hours as needed. Refills:  0  
     
   
   
   
  
 VITAMIN D3 1,000 unit Cap Generic drug:  cholecalciferol Your last dose was: Your next dose is: Take  by mouth every Monday and Friday. Refills:  0 ZyrTEC 10 mg Cap Generic drug:  Cetirizine Your last dose was: Your next dose is:    
   
   
 Dose:  10 mg Take 10 mg by mouth nightly. Refills:  0

## 2017-03-31 NOTE — IP AVS SNAPSHOT
Deni Kim 
 
 
 [address] [facility] [address] 
[phone] Patient: Gabrielle Ramsay MRN: [id number] EVZ:[birth year] You are allergic to the following No active allergies Immunizations Administered for This Admission Name Date Tdap 3/31/2017 Recent Documentation Height Weight BMI OB Status Smoking Status 1.575 m 82.3 kg 33.2 kg/m2 Hysterectomy Former Smoker Emergency Contacts Name Discharge Info Relation Home Work Mobile Anthony Carranza  Child [2] [phone] Joanne Ramirez  Friend [5] [phone] About your hospitalization You were admitted on:  March 31, 2017 You last received care in the:  [facility] 3 ICU You were discharged on:  April 2, 2017 Unit phone number:  [phone] Why you were hospitalized Your primary diagnosis was:  Syncope Your diagnoses also included:  Hypertension, Diabetes Mellitus Type Ii, Controlled (Hcc) Providers Seen During Your Hospitalizations Provider Role Specialty Primary office phone Mildred Ann MD Attending Provider Emergency Medicine [phone] Belkis Goldberg MD Attending Provider [facility] [phone] Jay Beltre MD Attending Provider Internal Medicine [phone] Your Primary Care Physician (PCP) Primary Care Physician Office Phone Office Fax NOT ON FILE ** None ** ** None ** Follow-up Information Follow up With Details Comments Contact Info Not On File Bshsi   Not On File (62) Patient has a PCP but that physician is not listed in [address] [place] [address]. Current Discharge Medication List  
  
CONTINUE these medications which have NOT CHANGED Dose & Instructions Dispensing Information Comments Morning Noon Evening Bedtime CALCIUM 600 WITH VITAMIN D3 600 mg(1,500mg) -400 unit Chew Generic drug:  Calcium-Cholecalciferol (D3)  
   
 Your last dose was: Your next dose is: Take  by mouth daily. Refills:  0  
     
   
   
   
  
 CO Q-10 10 mg Cap Generic drug:  coenzyme q10 Your last dose was: Your next dose is: Take  by mouth daily. Refills:  0  
     
   
   
   
  
 diclofenac EC 75 mg EC tablet Commonly known as:  VOLTAREN Your last dose was: Your next dose is: Take  by mouth. Refills:  0  
     
   
   
   
  
 DIOVAN 320 mg tablet Generic drug:  valsartan Your last dose was: Your next dose is:    
   
   
 Dose:  320 mg Take 320 mg by mouth daily. Refills:  0  
     
   
   
   
  
 FISH OIL 1,000 mg Cap Generic drug:  omega-3 fatty acids-vitamin e Your last dose was: Your next dose is:    
   
   
 Dose:  1 Cap Take 1 Cap by mouth daily. Refills:  0  
     
   
   
   
  
 levothyroxine 88 mcg tablet Commonly known as:  SYNTHROID Your last dose was: Your next dose is:    
   
   
 Dose:  88 mcg Take 88 mcg by mouth Daily (before breakfast). Take day of surgery. Refills:  0 LIPITOR 10 mg tablet Generic drug:  atorvastatin Your last dose was: Your next dose is:    
   
   
 Dose:  20 mg Take 20 mg by mouth every evening. Pt takes at night Refills:  0  
     
   
   
   
  
 M-VIT PO Your last dose was: Your next dose is: Take  by mouth daily. Refills:  0  
     
   
   
   
  
 magnesium 500 mg Tab Your last dose was: Your next dose is: Take  by mouth. Refills:  0  
     
   
   
   
  
 meloxicam 15 mg tablet Commonly known as:  MOBIC Your last dose was: Your next dose is:    
   
   
 Dose:  15 mg Take 1 Tab by mouth daily. Quantity:  14 Tab Refills:  1  
     
   
   
   
  
 metFORMIN 500 mg tablet Commonly known as:  GLUCOPHAGE Your last dose was: Your next dose is: Take  by mouth two (2) times a day. 1/2 tab Refills:  0  
     
   
   
   
  
 multivitamin tablet Commonly known as:  ONE A DAY Your last dose was: Your next dose is:    
   
   
 Dose:  1 Tab Take 1 Tab by mouth. Refills:  0  
     
   
   
   
  
 ONETOUCH ULTRA TEST strip Generic drug:  glucose blood VI test strips Your last dose was: Your next dose is:    
   
   
 by Does Not Apply route See Admin Instructions. Refills:  0 PROBIOTIC 4X 10-15 mg Tbec Generic drug:  B.infantis-B.ani-B.long-B.bifi Your last dose was: Your next dose is: Take  by mouth. Refills:  0  
     
   
   
   
  
 promethazine-dextromethorphan 6.25-15 mg/5 mL syrup Commonly known as:  PROMETHAZINE-DM Your last dose was: Your next dose is: Take  by mouth three (3) times daily as needed for Cough. Refills:  0  
     
   
   
   
  
 traMADol 50 mg tablet Commonly known as:  ULTRAM  
   
Your last dose was: Your next dose is:    
   
   
 Dose:  50 mg Take 50 mg by mouth every six (6) hours as needed. Refills:  0  
     
   
   
   
  
 VITAMIN D3 1,000 unit Cap Generic drug:  cholecalciferol Your last dose was: Your next dose is: Take  by mouth every Monday and Friday. Refills:  0 ZyrTEC 10 mg Cap Generic drug:  Cetirizine Your last dose was: Your next dose is:    
   
   
 Dose:  10 mg Take 10 mg by mouth nightly. Refills:  0 Discharge Instructions Noninsulin Medicines for Type 2 Diabetes: Care Instructions Your Care Instructions There are different types of noninsulin medicines for diabetes. Each works in a different way to help you control your blood sugar.  Some types help your body make insulin to lower your blood sugar. Others lower how much insulin your body needs. Some can slow how quickly your body digests sugars. And some can remove extra glucose through your urine. Some of these medicines are: · Alpha-glucosidase inhibitors. These keep starches from breaking down. This means that they lower the amount of glucose absorbed when you eat. They do not help your body make more insulin. So they will not cause low blood sugar unless they are used with other medicines for diabetes. They include acarbose and miglitol. · DPP-4 inhibitors. These help your body increase the level of insulin after eating. They also help your body make less of a hormone that raises blood sugar. They include linagliptin, saxagliptin, and sitagliptin. · Incretin hormones (GLP-1 receptor agonists). Your body makes a protein that can raise your insulin level, lower your blood sugar, and make you less hungry. You can inject hormone medicines that work the same way as this protein. They include exenatide and liraglutide. · Meglitinides. These help your body release insulin. They also help slow how your body digests sugars. In this way, they prevent your blood sugar from rising too fast after you eat. They include nateglinide and repaglinide. · Metformin. This lowers how much glucose your liver makes. And it helps you respond better to insulin. It also lowers the amount of stored sugar that your liver releases when you are not eating. · Sodium glucose co-transporter 2 inhibitors (SGLT2 inhibitors). These help to remove extra glucose through your urine. They may also help some people lose weight. They include canagliflozin, dapagliflozin, and empagliflozin. · Sulfonylureas. These help your body release more insulin. Some work for many hours and can cause low blood sugar if you don't eat as you expected. They include glipizide and glyburide. · Thiazolidinediones. These reduce the amount of blood glucose. They also help you respond better to insulin. They include pioglitazone and rosiglitazone. You may need to take more than one medicine for diabetes. Two or more medicines may work better to lower your blood sugar level than just one medicine. Follow-up care is a key part of your treatment and safety. Be sure to make and go to all appointments, and call your doctor if you are having problems. It's also a good idea to know your test results and keep a list of the medicines you take. How can you care for yourself at home? · Eat a healthy diet. Get some exercise each day. This may help you to reduce how much medicine you need. · Do not take other prescription or over-the-counter medicines, vitamins, herbal products, or supplements without talking to your doctor first. Some medicines for type 2 diabetes can cause problems with other medicines or supplements. · Tell your doctor if you plan to get pregnant. Some of these drugs are not safe for pregnant women. · Be safe with medicines. Take your medicines exactly as prescribed. Meglitinides or sulfonylureas can cause your blood sugar to drop very low. Call your doctor if you think you are having a problem with your medicine. · Check your blood sugar levels often. You can use a glucose monitor. Keeping track can help you know how certain foods, activities, and medicines affect your blood sugar. And it can help you keep your blood sugar from getting so low that it's not safe. When should you call for help? Call 911 anytime you think you may need emergency care. For example, call if: 
· You passed out (lost consciousness), or you suddenly become very sleepy or confused. (You may have very low blood sugar.) · You have symptoms of high blood sugar, such as: ¨ Blurred vision. ¨ Trouble staying awake or being woken up. ¨ Fast, deep breathing. ¨ Breath that smells fruity. ¨ Belly pain, not feeling hungry, and vomiting. ¨ Feeling confused. Call your doctor now or seek immediate medical care if: 
· You are sick and cannot control your blood sugar. · You have been vomiting or have had diarrhea for more than 6 hours. · Your blood sugar stays higher than the level your doctor has set for you. · You have symptoms of low blood sugar, such as: ¨ Sweating. ¨ Feeling nervous, shaky, and weak. ¨ Extreme hunger and slight nausea. ¨ Dizziness and headache. ¨ Blurred vision. ¨ Confusion. Watch closely for changes in your health, and be sure to contact your doctor if: 
· You have a hard time knowing when your blood sugar is low. · You have trouble keeping your blood sugar in the target range. · You often have problems controlling your blood sugar. · You have symptoms of long-term diabetes problems, such as: ¨ New vision changes. ¨ New pain, numbness, or tingling in your hands or feet. ¨ Skin problems. Where can you learn more? Go to http://kennedy-baldo.info/. Enter H153 in the search box to learn more about \"Noninsulin Medicines for Type 2 Diabetes: Care Instructions. \" Current as of: August 3, 2016 Content Version: 11.2 © 4676-9251 [employer]. Care instructions adapted under license by [employer] (which disclaims liability or warranty for this information). If you have questions about a medical condition or this instruction, always ask your healthcare professional. [place] [address] any warranty or liability for your use of this information. Learning About Diabetes Food Guidelines Your Care Instructions Meal planning is important to manage diabetes. It helps keep your blood sugar at a target level (which you set with your doctor). You don't have to eat special foods. You can eat what your family eats, including sweets once in a while.  But you do have to pay attention to how often you eat and how much you eat of certain foods. You may want to work with a dietitian or a certified diabetes educator (CDE) to help you plan meals and snacks. A dietitian or CDE can also help you lose weight if that is one of your goals. What should you know about eating carbs? Managing the amount of carbohydrate (carbs) you eat is an important part of healthy meals when you have diabetes. Carbohydrate is found in many foods. · Learn which foods have carbs. And learn the amounts of carbs in different foods. ¨ Bread, cereal, pasta, and rice have about 15 grams of carbs in a serving. A serving is 1 slice of bread (1 ounce), ½ cup of cooked cereal, or 1/3 cup of cooked pasta or rice. ¨ Fruits have 15 grams of carbs in a serving. A serving is 1 small fresh fruit, such as an apple or orange; ½ of a banana; ½ cup of cooked or canned fruit; ½ cup of fruit juice; 1 cup of melon or raspberries; or 2 tablespoons of dried fruit. ¨ Milk and no-sugar-added yogurt have 15 grams of carbs in a serving. A serving is 1 cup of milk or 2/3 cup of no-sugar-added yogurt. ¨ Starchy vegetables have 15 grams of carbs in a serving. A serving is ½ cup of mashed potatoes or sweet potato; 1 cup winter squash; ½ of a small baked potato; ½ cup of cooked beans; or ½ cup cooked corn or green peas. · Learn how much carbs to eat each day and at each meal. A dietitian or CDE can teach you how to keep track of the amount of carbs you eat. This is called carbohydrate counting. · If you are not sure how to count carbohydrate grams, use the Plate Method to plan meals. It is a good, quick way to make sure that you have a balanced meal. It also helps you spread carbs throughout the day. ¨ Divide your plate by types of foods. Put non-starchy vegetables on half the plate, meat or other protein food on one-quarter of the plate, and a grain or starchy vegetable in the final quarter of the plate.  To this you can add a small piece of fruit and 1 cup of milk or yogurt, depending on how many carbs you are supposed to eat at a meal. 
· Try to eat about the same amount of carbs at each meal. Do not \"save up\" your daily allowance of carbs to eat at one meal. 
· Proteins have very little or no carbs per serving. Examples of proteins are beef, chicken, turkey, fish, eggs, tofu, cheese, cottage cheese, and peanut butter. A serving size of meat is 3 ounces, which is about the size of a deck of cards. Examples of meat substitute serving sizes (equal to 1 ounce of meat) are 1/4 cup of cottage cheese, 1 egg, 1 tablespoon of peanut butter, and ½ cup of tofu. How can you eat out and still eat healthy? · Learn to estimate the serving sizes of foods that have carbohydrate. If you measure food at home, it will be easier to estimate the amount in a serving of restaurant food. · If the meal you order has too much carbohydrate (such as potatoes, corn, or baked beans), ask to have a low-carbohydrate food instead. Ask for a salad or green vegetables. · If you use insulin, check your blood sugar before and after eating out to help you plan how much to eat in the future. · If you eat more carbohydrate at a meal than you had planned, take a walk or do other exercise. This will help lower your blood sugar. What else should you know? · Limit saturated fat, such as the fat from meat and dairy products. This is a healthy choice because people who have diabetes are at higher risk of heart disease. So choose lean cuts of meat and nonfat or low-fat dairy products. Use olive or canola oil instead of butter or shortening when cooking. · Don't skip meals. Your blood sugar may drop too low if you skip meals and take insulin or certain medicines for diabetes. · Check with your doctor before you drink alcohol. Alcohol can cause your blood sugar to drop too low. Alcohol can also cause a bad reaction if you take certain diabetes medicines. Follow-up care is a key part of your treatment and safety. Be sure to make and go to all appointments, and call your doctor if you are having problems. It's also a good idea to know your test results and keep a list of the medicines you take. Where can you learn more? Go to http://kennedy-baldo.info/. Enter T610 in the search box to learn more about \"Learning About Diabetes Food Guidelines. \" Current as of: May 23, 2016 Content Version: 11.2 © 6918-4255 [employer]. Care instructions adapted under license by [employer] (which disclaims liability or warranty for this information). If you have questions about a medical condition or this instruction, always ask your healthcare professional. [address] any warranty or liability for your use of this information. Fainting: Care Instructions Your Care Instructions When you faint, or pass out, you lose consciousness for a short time. A brief drop in blood flow to the brain often causes it. When you fall or lie down, more blood flows to your brain and you regain consciousness. Emotional stress, pain, or overheatingespecially if you have been standingcan make you faint. In these cases, fainting is usually not serious. But fainting can be a sign of a more serious problem. Your doctor may want you to have more tests to rule out other causes. The treatment you need depends on the reason why you fainted. The doctor has checked you carefully, but problems can develop later. If you notice any problems or new symptoms, get medical treatment right away. Follow-up care is a key part of your treatment and safety. Be sure to make and go to all appointments, and call your doctor if you are having problems. It's also a good idea to know your test results and keep a list of the medicines you take. How can you care for yourself at home? · Drink plenty of fluids to prevent dehydration. If you have kidney, heart, or liver disease and have to limit fluids, talk with your doctor before you increase your fluid intake. When should you call for help? Call 911 anytime you think you may need emergency care. For example, call if: 
· You have symptoms of a heart problem. These may include: ¨ Chest pain or pressure. ¨ Severe trouble breathing. ¨ A fast or irregular heartbeat. ¨ Lightheadedness or sudden weakness. ¨ Coughing up pink, foamy mucus. ¨ Passing out. After you call 911, the  may tell you to chew 1 adult-strength or 2 to 4 low-dose aspirin. Wait for an ambulance. Do not try to drive yourself. · You have symptoms of a stroke. These may include: 
¨ Sudden numbness, tingling, weakness, or loss of movement in your face, arm, or leg, especially on only one side of your body. ¨ Sudden vision changes. ¨ Sudden trouble speaking. ¨ Sudden confusion or trouble understanding simple statements. ¨ Sudden problems with walking or balance. ¨ A sudden, severe headache that is different from past headaches. · You passed out (lost consciousness) again. Watch closely for changes in your health, and be sure to contact your doctor if: 
· You do not get better as expected. Where can you learn more? Go to http://kennedy-baldo.info/. Enter X064 in the search box to learn more about \"Fainting: Care Instructions. \" Current as of: May 27, 2016 Content Version: 11.2 © 7231-6602 [employer]. Care instructions adapted under license by [employer] (which disclaims liability or warranty for this information). If you have questions about a medical condition or this instruction, always ask your healthcare professional. [address] any warranty or liability for your use of this information. Dizziness: Care Instructions Your Care Instructions Dizziness is the feeling of unsteadiness or fuzziness in your head. It is different than having vertigo, which is a feeling that the room is spinning or that you are moving or falling. It is also different from lightheadedness, which is the feeling that you are about to faint. It can be hard to know what causes dizziness. Some people feel dizzy when they have migraine headaches. Sometimes bouts of flu can make you feel dizzy. Some medical conditions, such as heart problems or high blood pressure, can make you feel dizzy. Many medicines can cause dizziness, including medicines for high blood pressure, pain, or anxiety. If a medicine causes your symptoms, your doctor may recommend that you stop or change the medicine. If it is a problem with your heart, you may need medicine to help your heart work better. If there is no clear reason for your symptoms, your doctor may suggest watching and waiting for a while to see if the dizziness goes away on its own. Follow-up care is a key part of your treatment and safety. Be sure to make and go to all appointments, and call your doctor if you are having problems. It's also a good idea to know your test results and keep a list of the medicines you take. How can you care for yourself at home? · If your doctor recommends or prescribes medicine, take it exactly as directed. Call your doctor if you think you are having a problem with your medicine. · Do not drive while you feel dizzy. · Try to prevent falls. Steps you can take include: ¨ Using nonskid mats, adding grab bars near the tub, and using night-lights. ¨ Clearing your home so that walkways are free of anything you might trip on. ¨ Letting family and friends know that you have been feeling dizzy. This will help them know how to help you. When should you call for help? Call 911 anytime you think you may need emergency care. For example, call if: 
· You passed out (lost consciousness). · You have dizziness along with symptoms of a heart attack. These may include: ¨ Chest pain or pressure, or a strange feeling in the chest. 
¨ Sweating. ¨ Shortness of breath. ¨ Nausea or vomiting. ¨ Pain, pressure, or a strange feeling in the back, neck, jaw, or upper belly or in one or both shoulders or arms. ¨ Lightheadedness or sudden weakness. ¨ A fast or irregular heartbeat. · You have symptoms of a stroke. These may include: 
¨ Sudden numbness, tingling, weakness, or loss of movement in your face, arm, or leg, especially on only one side of your body. ¨ Sudden vision changes. ¨ Sudden trouble speaking. ¨ Sudden confusion or trouble understanding simple statements. ¨ Sudden problems with walking or balance. ¨ A sudden, severe headache that is different from past headaches. Call your doctor now or seek immediate medical care if: 
· You feel dizzy and have a fever, headache, or ringing in your ears. · You have new or increased nausea and vomiting. · Your dizziness does not go away or comes back. Watch closely for changes in your health, and be sure to contact your doctor if: 
· You do not get better as expected. Where can you learn more? Go to http://kennedy-baldo.info/. Enter N938 in the search box to learn more about \"Dizziness: Care Instructions. \" Current as of: May 27, 2016 Content Version: 11.2 © 6689-5890 [employer]. Care instructions adapted under license by [employer] (which disclaims liability or warranty for this information). If you have questions about a medical condition or this instruction, always ask your healthcare professional. [place] [address] any warranty or liability for your use of this information. DISCHARGE SUMMARY from Nurse CALL PRIMARY CARE PHYSICIAN ON April 3, 2017 TO SCHEDULE A ONE WEEK FOLLOW-UP APPOINTMENT. The following personal items are in your possession at time of discharge: Dental Appliances: Partials, Uppers Visual Aid: Glasses, With patient Clothing: Footwear, Socks, Pants, [place], Undergarments, [place], With patient PATIENT INSTRUCTIONS: 
 
 
F-face looks uneven A-arms unable to move or move unevenly S-speech slurred or non-existent T-time-call 911 as soon as signs and symptoms begin-DO NOT go Back to bed or wait to see if you get better-TIME IS BRAIN. Warning Signs of HEART ATTACK Call 911 if you have these symptoms: 
? Chest discomfort. Most heart attacks involve discomfort in the center of the chest that lasts more than a few minutes, or that goes away and comes back. It can feel like uncomfortable pressure, squeezing, fullness, or pain. ? Discomfort in other areas of the upper body. Symptoms can include pain or discomfort in one or both arms, the back, neck, jaw, or stomach. ? Shortness of breath with or without chest discomfort. ? Other signs may include breaking out in a cold sweat, nausea, or lightheadedness. Don't wait more than five minutes to call [address]! Fast action can save your life. Calling 911 is almost always the fastest way to get lifesaving treatment. Emergency Medical Services staff can begin treatment when they arrive  up to an hour sooner than if someone gets to the hospital by car. The discharge information has been reviewed with the patient. The patient verbalized understanding. Discharge medications reviewed with the patient and appropriate educational materials and side effects teaching were provided. Discharge Orders None [facility] Announcement  We are excited to announce that we are making your provider's discharge notes available to you in [employer]. You will see these notes when they are completed and signed by the physician that discharged you from your recent hospital stay. If you have any questions or concerns about any information you see in [employer] Voice, please call the Health Information Department where you were seen or reach out to your Primary Care Provider for more information about your plan of care. Introducing \[facility]\"" & HEALTH SERVICES! Andressa Dunaway introduces [employer] patient portal. Now you can access parts of your medical record, email your doctor's office, and request medication refills online. 1. In your internet browser, go to [email or web address]. Remember The Member/[employer] 2. Click on the First Time User? Click Here link in the Sign In box. You will see the New Member Sign Up page. 3. Enter your [employer] Access Code exactly as it appears below. You will not need to use this code after youve completed the sign-up process. If you do not sign up before the expiration date, you must request a new code. · [employer] Access Code: [email or web address] Expires: 5/17/2017  4:52 PM 
 
4. Enter the last four digits of your Social Security Number (xxxx) and Date of Birth (mm/dd/yyyy) as indicated and click Submit. You will be taken to the next sign-up page. 5. Create a [employer] ID. This will be your [employer] login ID and cannot be changed, so think of one that is secure and easy to remember. 6. Create a [employer] password. You can change your password at any time. 7. Enter your Password Reset Question and Answer. This can be used at a later time if you forget your password. 8. Enter your e-mail address. You will receive e-mail notification when new information is available in [address] 9. Click Sign Up. You can now view and download portions of your medical record. 10. Click the Download Summary menu link to download a portable copy of your medical information. If you have questions, please visit the Frequently Asked Questions section of the MyChart website. Remember, MyChart is NOT to be used for urgent needs. For medical emergencies, dial 911. Now available from your iPhone and Android! General Information Please provide this summary of care documentation to your next provider. Patient Signature:  ____________________________________________________________ Date:  ____________________________________________________________  
  
Cathlean Abu Provider Signature:  ____________________________________________________________ Date:  ____________________________________________________________

## 2017-03-31 NOTE — ED TRIAGE NOTES
Patient advises that she just got finished throwing a bag of trash into dumpster and fell when she turned back around. Patient complaining of right wrist pain, abrasion and contusion to right facial cheek, bandaged with ice pack present to face.

## 2017-04-01 ENCOUNTER — APPOINTMENT (OUTPATIENT)
Dept: GENERAL RADIOLOGY | Age: 72
DRG: 149 | End: 2017-04-01
Attending: HOSPITALIST
Payer: OTHER MISCELLANEOUS

## 2017-04-01 ENCOUNTER — APPOINTMENT (OUTPATIENT)
Dept: ULTRASOUND IMAGING | Age: 72
DRG: 149 | End: 2017-04-01
Attending: HOSPITALIST
Payer: OTHER MISCELLANEOUS

## 2017-04-01 LAB
ATRIAL RATE: 73 BPM
BACTERIA SPEC CULT: NORMAL
CALCULATED P AXIS, ECG09: 67 DEGREES
CALCULATED R AXIS, ECG10: 62 DEGREES
CALCULATED T AXIS, ECG11: 82 DEGREES
DIAGNOSIS, 93000: NORMAL
EST. AVERAGE GLUCOSE BLD GHB EST-MCNC: 146 MG/DL
GLUCOSE BLD STRIP.AUTO-MCNC: 108 MG/DL (ref 65–100)
GLUCOSE BLD STRIP.AUTO-MCNC: 108 MG/DL (ref 65–100)
GLUCOSE BLD STRIP.AUTO-MCNC: 109 MG/DL (ref 65–100)
GLUCOSE BLD STRIP.AUTO-MCNC: 122 MG/DL (ref 65–100)
HBA1C MFR BLD: 6.7 % (ref 4.8–6)
P-R INTERVAL, ECG05: 184 MS
Q-T INTERVAL, ECG07: 372 MS
QRS DURATION, ECG06: 70 MS
QTC CALCULATION (BEZET), ECG08: 409 MS
SERVICE CMNT-IMP: NORMAL
TROPONIN I SERPL-MCNC: 0.07 NG/ML (ref 0.02–0.05)
TROPONIN I SERPL-MCNC: 0.08 NG/ML (ref 0.02–0.05)
TROPONIN I SERPL-MCNC: 0.08 NG/ML (ref 0.02–0.05)
TROPONIN I SERPL-MCNC: 0.09 NG/ML (ref 0.02–0.05)
VENTRICULAR RATE, ECG03: 73 BPM

## 2017-04-01 PROCEDURE — 96375 TX/PRO/DX INJ NEW DRUG ADDON: CPT

## 2017-04-01 PROCEDURE — 74011250637 HC RX REV CODE- 250/637: Performed by: FAMILY MEDICINE

## 2017-04-01 PROCEDURE — 96361 HYDRATE IV INFUSION ADD-ON: CPT

## 2017-04-01 PROCEDURE — 82962 GLUCOSE BLOOD TEST: CPT

## 2017-04-01 PROCEDURE — G0378 HOSPITAL OBSERVATION PER HR: HCPCS

## 2017-04-01 PROCEDURE — 36415 COLL VENOUS BLD VENIPUNCTURE: CPT | Performed by: FAMILY MEDICINE

## 2017-04-01 PROCEDURE — 93880 EXTRACRANIAL BILAT STUDY: CPT

## 2017-04-01 PROCEDURE — 84484 ASSAY OF TROPONIN QUANT: CPT | Performed by: FAMILY MEDICINE

## 2017-04-01 PROCEDURE — 97161 PT EVAL LOW COMPLEX 20 MIN: CPT

## 2017-04-01 PROCEDURE — 99218 HC RM OBSERVATION: CPT

## 2017-04-01 PROCEDURE — 96365 THER/PROPH/DIAG IV INF INIT: CPT

## 2017-04-01 PROCEDURE — 96366 THER/PROPH/DIAG IV INF ADDON: CPT

## 2017-04-01 PROCEDURE — 93306 TTE W/DOPPLER COMPLETE: CPT

## 2017-04-01 PROCEDURE — 74011250637 HC RX REV CODE- 250/637: Performed by: HOSPITALIST

## 2017-04-01 PROCEDURE — 65270000029 HC RM PRIVATE

## 2017-04-01 PROCEDURE — G8979 MOBILITY GOAL STATUS: HCPCS

## 2017-04-01 PROCEDURE — 96368 THER/DIAG CONCURRENT INF: CPT

## 2017-04-01 PROCEDURE — G8978 MOBILITY CURRENT STATUS: HCPCS

## 2017-04-01 PROCEDURE — 73110 X-RAY EXAM OF WRIST: CPT

## 2017-04-01 PROCEDURE — 74011250636 HC RX REV CODE- 250/636: Performed by: HOSPITALIST

## 2017-04-01 PROCEDURE — 74011250636 HC RX REV CODE- 250/636: Performed by: FAMILY MEDICINE

## 2017-04-01 PROCEDURE — 96372 THER/PROPH/DIAG INJ SC/IM: CPT

## 2017-04-01 PROCEDURE — 83036 HEMOGLOBIN GLYCOSYLATED A1C: CPT | Performed by: FAMILY MEDICINE

## 2017-04-01 RX ORDER — GUAIFENESIN 100 MG/5ML
81 LIQUID (ML) ORAL DAILY
Status: DISCONTINUED | OUTPATIENT
Start: 2017-04-01 | End: 2017-04-02 | Stop reason: HOSPADM

## 2017-04-01 RX ORDER — POTASSIUM CHLORIDE 14.9 MG/ML
20 INJECTION INTRAVENOUS ONCE
Status: COMPLETED | OUTPATIENT
Start: 2017-04-01 | End: 2017-04-01

## 2017-04-01 RX ORDER — SODIUM CHLORIDE 9 MG/ML
75 INJECTION, SOLUTION INTRAVENOUS CONTINUOUS
Status: DISCONTINUED | OUTPATIENT
Start: 2017-04-01 | End: 2017-04-02 | Stop reason: HOSPADM

## 2017-04-01 RX ORDER — MAGNESIUM SULFATE HEPTAHYDRATE 40 MG/ML
2 INJECTION, SOLUTION INTRAVENOUS ONCE
Status: COMPLETED | OUTPATIENT
Start: 2017-04-01 | End: 2017-04-01

## 2017-04-01 RX ORDER — ONDANSETRON 2 MG/ML
4 INJECTION INTRAMUSCULAR; INTRAVENOUS
Status: DISCONTINUED | OUTPATIENT
Start: 2017-04-01 | End: 2017-04-02 | Stop reason: HOSPADM

## 2017-04-01 RX ADMIN — POTASSIUM CHLORIDE 20 MEQ: 200 INJECTION, SOLUTION INTRAVENOUS at 16:31

## 2017-04-01 RX ADMIN — ACETAMINOPHEN 650 MG: 325 TABLET, FILM COATED ORAL at 21:51

## 2017-04-01 RX ADMIN — ONDANSETRON 4 MG: 2 INJECTION INTRAMUSCULAR; INTRAVENOUS at 16:38

## 2017-04-01 RX ADMIN — ATORVASTATIN CALCIUM 20 MG: 10 TABLET, FILM COATED ORAL at 18:41

## 2017-04-01 RX ADMIN — OXYCODONE HYDROCHLORIDE 5 MG: 5 TABLET ORAL at 21:51

## 2017-04-01 RX ADMIN — OXYCODONE HYDROCHLORIDE 5 MG: 5 TABLET ORAL at 16:38

## 2017-04-01 RX ADMIN — Medication 10 ML: at 15:09

## 2017-04-01 RX ADMIN — Medication 400 MG: at 08:46

## 2017-04-01 RX ADMIN — CALCIUM CARBONATE 500 MG (1,250 MG)-VITAMIN D3 200 UNIT TABLET 1 TABLET: at 16:38

## 2017-04-01 RX ADMIN — VALSARTAN 320 MG: 320 TABLET, FILM COATED ORAL at 08:47

## 2017-04-01 RX ADMIN — ENOXAPARIN SODIUM 40 MG: 40 INJECTION SUBCUTANEOUS at 21:56

## 2017-04-01 RX ADMIN — ASPIRIN 81 MG CHEWABLE TABLET 81 MG: 81 TABLET CHEWABLE at 08:46

## 2017-04-01 RX ADMIN — MAGNESIUM SULFATE IN WATER 2 G: 40 INJECTION, SOLUTION INTRAVENOUS at 16:31

## 2017-04-01 RX ADMIN — SODIUM CHLORIDE 75 ML/HR: 900 INJECTION, SOLUTION INTRAVENOUS at 09:00

## 2017-04-01 RX ADMIN — OXYCODONE HYDROCHLORIDE 5 MG: 5 TABLET ORAL at 11:22

## 2017-04-01 RX ADMIN — LORATADINE 10 MG: 10 TABLET ORAL at 08:47

## 2017-04-01 RX ADMIN — Medication 10 ML: at 22:52

## 2017-04-01 RX ADMIN — ACETAMINOPHEN 650 MG: 325 TABLET, FILM COATED ORAL at 10:12

## 2017-04-01 RX ADMIN — Medication 10 ML: at 06:00

## 2017-04-01 RX ADMIN — CALCIUM CARBONATE 500 MG (1,250 MG)-VITAMIN D3 200 UNIT TABLET 1 TABLET: at 08:46

## 2017-04-01 RX ADMIN — LEVOTHYROXINE SODIUM 88 MCG: 88 TABLET ORAL at 08:46

## 2017-04-01 NOTE — PROGRESS NOTES
Dual Skin Assessment    Skin assessment completed with Radha Cole RN    See below.   Swollen red and purple right eye with serous drainage s/p fall  Scar lower mid abdomen  Scar on right knee          Jerod Machuca RN    4/1/2017 1:40 AM

## 2017-04-01 NOTE — PROGRESS NOTES
Shift assessment complete. Patient is alert and oriented. NSR/ST on the monitor. /75. Pain 7/10 in right wrist. Ice placed and wrist elevated. Patient wants to wait and see if pain improves without medication. She is oriented to call light and states she will call before exiting the bed or if pain does not improve or worsens. Lung sounds clear. Respirations are even and regular. Edema in right wrist from trauma. Otherwise extremities are within normal limits. Abdomen is soft and bowel sounds are active.

## 2017-04-01 NOTE — PROGRESS NOTES
Patient complaint of nausea and vomiting with some chest heaviness and diaphoresis. EKG obtained at this time. Dr. Nick Khalil notified.

## 2017-04-01 NOTE — PROGRESS NOTES
Bedside shift report received from [facility], [facility]. Patient in bed, resting quietly. VSS. Will continue to monitor.

## 2017-04-01 NOTE — PROGRESS NOTES
Patient developed acute onset of chest pain and diaphoresis. Repeat trop requested. ECG with possible U waves.   Will replete K and Mag - recheck in the am.    Eneida Talavera

## 2017-04-01 NOTE — PROGRESS NOTES
TRANSFER - IN REPORT:    Verbal report received from Loman Kand, [place] on Lizzie Fisher  being received from ED for routine progression of care      Report consisted of patients Situation, Background, Assessment and   Recommendations(SBAR). Information from the following report(s) SBAR, Kardex, ED Summary, Intake/Output, MAR and Recent Results was reviewed with the receiving nurse. Opportunity for questions and clarification was provided. Assessment completed upon patients arrival to unit and care assumed.

## 2017-04-01 NOTE — PROGRESS NOTES
Problem: Mobility Impaired (Adult and Pediatric)  Goal: *Acute Goals and Plan of Care (Insert Text)    LTG:  (1.)Ms. Quita Siddiqi will move from supine to sit and sit to supine in bed with INDEPENDENT within 1-3 day(s). (2.)Ms. Quita Siddiqi will transfer from bed to chair and chair to bed with INDEPENDENCE using the least restrictive device within 1-3 day(s). (3.)Ms. Quita Siddiqi will ambulate with SUPERVISION for 500 feet with the least restrictive device within 1-3 day(s). ________________________________________________________________________________________________      PHYSICAL THERAPY: INITIAL ASSESSMENT 4/1/2017  OBSERVATION: Hospital Day: 2  [place] / [place] / Product Type: Medicare /      NAME/AGE/GENDER: Evan Gowers is a 66 y.o. female         PRIMARY DIAGNOSIS: syncope Syncope Syncope        ICD-10: Treatment Diagnosis:       · Pain in Right Wrist (M25.531)  · Difficulty in walking, Not elsewhere classified (R26.2)   Precaution/Allergies:  Review of patient's allergies indicates no known allergies. ASSESSMENT:      Ms. Quita Siddiqi presents with limited independence with bed mobility and transfers along with a swollen painful right wrist with little to no arom due to a fall. She would benefit from PT to work on her gait. Advised her to ice her wrist, rest it, gently wrap it with an ace wrap, and slowly begin to try to move it as tolerated. Wrist x-ray came back negative for any fracture. She may go home today. No skilled needs noted once she discharges but did recommend she use a cane with gait for increased steadiness. She stated her unsteadiness comes from arthritic hips and stiffness once she gets up to walk. She may benefit from Outpatient vestibular therapy due to vertigo diagnosis. This section established at most recent assessment   PROBLEM LIST (Impairments causing functional limitations):  1. Decreased [place] with Bed Mobility  2.  Decreased [place] with Transfers  3. Decreased [place] with Ambulation  4. Decreased [place] with shoulder HEP    INTERVENTIONS PLANNED: (Benefits and precautions of physical therapy have been discussed with the patient.)  1. Bed Mobility Training  2. Transfer Training  3. Gait Training  4. Therapeutic Exercises per MD orders  5. Modalities for Pain      TREATMENT PLAN: Frequency/Duration: daily for duration of hospital stay  Rehabilitation Potential For Stated Goals: EXCELLENT      RECOMMENDED REHABILITATION/EQUIPMENT: (at time of discharge pending progress): Continue Skilled Therapy. May benefit from outpatient balance therapy due to vertigo. HISTORY:   History of Present Injury/Illness (Reason for Referral):  Patient is a 67 y/o female who was admitted after a trip and fall. Patient is quite articulate and oriented and vigorously denies any loss of consciousness. She reports recent onset of vertigo and ringing in the right ear for which her PCP made an ENT referral on Monday. Apart from discomfort to her face from her local trauma, patient denies any new complaints. Denies any chest pain or SOB. Troponins have trended down. Has diabetes but no longer taking Meformin since was unable to tolerate due to side effect - She is doing dietary modifications. Past Medical History/Comorbidities:   Ms. Blake Mcleod  has a past medical history of Abdominal pain; Arthritis; Breast cancer, left ([address] (2001); Breast pain in female; Bunion; Cholesterol serum elevated; Chronic pain of left knee; Corns and callosities; DDD (degenerative disc disease), lumbar; Diabetes mellitus type II, controlled ([address] Emotional disorder; Fungal infection of toenail; Hallux valgus; Hemorrhoids; Hip pain, bilateral; Hyperglycemia; Hyperlipidemia; Hypertension; Hypothyroidism; Lower GI bleed; Obesity; Other ill-defined conditions(799.89); Postmenopausal; Radiation therapy (2001);  Radiation therapy complication; Right median nerve neuropathy; Stress incontinence; Traumatic arthropathy of knee; and Urinary incontinence. Ms. Willie Sow  has a past surgical history that includes orthopaedic (2005); orthopaedic; orthopaedic; us guided core breast biopsy (Right); hysterectomy; breast lumpectomy (Left, 2001); tubal ligation; orthopaedic (Left); urological; shoulder replacement (Left); colonoscopy (2008); and breast biopsy (Right, 2011). Social History/Living Environment:   Home Environment: Apartment  One/Two Story Residence: Other (Comment)  Living Alone: Yes  Support Systems: Child(maureen), Rastafari / jason community, Friends \ neighbors  Patient Expects to be Discharged to[de-identified] Apartment  Current DME Used/Available at Home: Glucometer, Raised toilet seat, Safety frame toliet  Prior Level of Function/Work/Activity:  Works full time in [employer]   Number of Personal Factors/Comorbidities that affect the Plan of Care: 0: LOW COMPLEXITY   EXAMINATION:   Most Recent Physical Functioning:   Gross Assessment:  AROM: Generally decreased, functional (very limited R wrist)  Strength: Generally decreased, functional (very limited R wrist)               Posture:  Posture (WDL): Within defined limits  Balance:  Sitting: Intact  Standing: Pull to stand; With support Bed Mobility:  Supine to Sit: Stand-by asssistance  Sit to Supine:  (left up in chair)  Scooting: Independent  Wheelchair Mobility:     Transfers:  Sit to Stand: Contact guard assistance  Stand to Sit: Contact guard assistance  Bed to Chair: Contact guard assistance  Gait:     Speed/Elisabeth: Pace decreased (<100 feet/min)  Gait Abnormalities: Antalgic (mildly unstead)  Distance (ft): 300 Feet (ft)  Assistive Device:  (HHA)  Ambulation - Level of Assistance: Contact guard assistance;Assist x1  Interventions: Safety awareness training       Body Structures Involved:  1. Joints  2. Muscles Body Functions Affected:  1. Movement Related Activities and Participation Affected:  1.  Mobility   Number of elements that affect the Plan of Care: 4+: HIGH COMPLEXITY   CLINICAL PRESENTATION:   Presentation: Stable and uncomplicated: LOW COMPLEXITY   CLINICAL DECISION MAKIN [facility] [address] [address] AM-PAC 6 Clicks   Basic Mobility Inpatient Short Form  How much difficulty does the patient currently have. .. Unable A Lot A Little None   1. Turning over in bed (including adjusting bedclothes, sheets and blankets)? [ ] 1   [ ] 2   [X] 3   [ ] 4   2. Sitting down on and standing up from a chair with arms ( e.g., wheelchair, bedside commode, etc.)   [ ] 1   [ ] 2   [X] 3   [ ] 4   3. Moving from lying on back to sitting on the side of the bed? [ ] 1   [ ] 2   [X] 3   [ ] 4   How much help from another person does the patient currently need. .. Total A Lot A Little None   4. Moving to and from a bed to a chair (including a wheelchair)? [ ] 1   [ ] 2   [X] 3   [ ] 4   5. Need to walk in hospital room? [ ] 1   [ ] 2   [X] 3   [ ] 4   6. Climbing 3-5 steps with a railing? [ ] 1   [ ] 2   [X] 3   [ ] 4   © , Trustees of [address] [facility] [address] [address], under license to [employer]. All rights reserved    Score:  Initial: 18 Most Recent: X (Date: -- )     Interpretation of Tool:  Represents activities that are increasingly more difficult (i.e. Bed mobility, Transfers, Gait). Score 24 23 22-20 19-15 14-10 9-7 6       Modifier CH CI CJ CK CL CM CN         · Mobility - Walking and Moving Around:               - CURRENT STATUS:    CK - 40%-59% impaired, limited or restricted               - GOAL STATUS:           CJ - 20%-39% impaired, limited or restricted               - D/C STATUS:                       ---------------To be determined---------------  [place] / Product Type: Medicare /       Medical Necessity:     · Patient is expected to demonstrate progress in balance and functional technique to increase independence with transfers and gait.   Reason for Services/Other Comments:  · Patient continues to require skilled intervention due to limited functional independence. Use of outcome tool(s) and clinical judgement create a POC that gives a: Clear prediction of patient's progress: LOW COMPLEXITY                 TREATMENT:   (In addition to Assessment/Re-Assessment sessions the following treatments were rendered)   Pre-treatment Symptoms/Complaints:  No complaints  Pain: Initial:   Pain Intensity 1: 3  Post Session:  3      Assessment/Reassessment only, no treatment provided today       Date:    Date:    Date:      ACTIVITY/EXERCISE AM PM AM PM AM PM   Gripping               Wrist Flexion/Extension               Wrist Ulnar/Radial Deviation               Pronation/Supination               Elbow Flexion/Extension               Shoulder Flexion/Extension               Shoulder AB/ADduction               Shoulder IR/ER               Pulleys               Pendulums               Shrugs               Isometric:                 Flexion               Extension               ABduction               ADduction               Biceps/Triceps                               B = bilateral; AA = active assistive; A = active; P = passive     Treatment/Session Assessment:    · Response to Treatment:  Tolerated therapy well. · Interdisciplinary Collaboration:  · Physical Therapist  · Registered Nurse  · After treatment position/precautions:  · Up in chair  · Bed/Chair-wheels locked  · Call light within reach  · RN notified  · Family at bedside  · Compliance with Program/Exercises: compliant all of the time. · Recommendations/Intent for next treatment session: \"Next visit will focus on advancements to more challenging activities and reduction in assistance provided\".   Total Treatment Duration:  PT Patient Time In/Time Out  Time In: 1410  Time Out: [facility],

## 2017-04-01 NOTE — PROGRESS NOTES
Hospitalist Progress Note    Subjective:   Daily Progress Note: 4/1/2017 8:10 AM    Patient is a 65 y/o female who was admitted after a trip and fall. Patient is quite articulate and oriented and vigorously denies any loss of consciousness. She reports recent onset of vertigo and ringing in the right ear for which her PCP made an ENT referral on Monday. Apart from discomfort to her face from her local trauma, patient denies any new complaints. Denies any chest pain or SOB. Troponins have trended down. Has diabetes but no longer taking Meformin since was unable to tolerate due to side effect - She is doing dietary modifications.     Current Facility-Administered Medications   Medication Dose Route Frequency    aspirin chewable tablet 81 mg  81 mg Oral DAILY    atorvastatin (LIPITOR) tablet 20 mg  20 mg Oral QPM    calcium-vitamin D (OS-TONO) 500 mg-200 unit tablet  1 Tab Oral BID WITH MEALS    loratadine (CLARITIN) tablet 10 mg  10 mg Oral DAILY    levothyroxine (SYNTHROID) tablet 88 mcg  88 mcg Oral ACB    magnesium oxide (MAG-OX) tablet 400 mg  400 mg Oral DAILY    traMADol (ULTRAM) tablet 50 mg  50 mg Oral Q6H PRN    valsartan (DIOVAN) tablet 320 mg  320 mg Oral DAILY    sodium chloride (NS) flush 5-10 mL  5-10 mL IntraVENous Q8H    sodium chloride (NS) flush 5-10 mL  5-10 mL IntraVENous PRN    insulin regular (NOVOLIN R, HUMULIN R) injection   SubCUTAneous AC&HS    dextrose 40% (GLUTOSE) oral gel 1 Tube  15 g Oral PRN    glucagon (GLUCAGEN) injection 1 mg  1 mg IntraMUSCular PRN    dextrose (D50W) injection syrg 12.5-25 g  25-50 mL IntraVENous PRN    acetaminophen (TYLENOL) tablet 650 mg  650 mg Oral Q4H PRN    oxyCODONE IR (ROXICODONE) tablet 5 mg  5 mg Oral Q4H PRN    bisacodyl (DULCOLAX) tablet 5 mg  5 mg Oral DAILY PRN    enoxaparin (LOVENOX) injection 40 mg  40 mg SubCUTAneous Q24H        Review of Systems  A comprehensive review of systems was negative except for that written in the HPI.    Objective:     Visit Vitals    /86    Pulse 69    Temp 98.4 °F (36.9 °C)    Resp 18    Ht 5' 2\" (1.575 m)    Wt 79.8 kg (175 lb 14.8 oz)    SpO2 96%    BMI 32.18 kg/m2      O2 Device: Room air    Temp (24hrs), Av °F (36.7 °C), Min:97.8 °F (36.6 °C), Max:98.4 °F (36.9 °C)              General appearance: alert, cooperative, no distress, large right periorbital hematoma. Lungs: clear to auscultation bilaterally  Heart: regular rate and rhythm, S1, S2 normal  Abdomen: soft, non-tender. Bowel sounds normal. No masses,  no organomegaly  Extremities: extremities normal, atraumatic, no cyanosis or edema  Psych: A+ox4    Additional comments:I reviewed the patient's new clinical lab test results. CT brain. Data Review    Recent Results (from the past 24 hour(s))   CBC WITH AUTOMATED DIFF    Collection Time: 17  6:50 PM   Result Value Ref Range    WBC 7.8 4.3 - 11.1 K/uL    RBC 4.87 4.05 - 5.25 M/uL    HGB 13.1 11.7 - 15.4 g/dL    HCT 40.0 35.8 - 46.3 %    MCV 82.1 79.6 - 97.8 FL    MCH 26.9 26.1 - 32.9 PG    MCHC 32.8 31.4 - 35.0 g/dL    RDW 14.0 11.9 - 14.6 %    PLATELET 555 835 - 352 K/uL    MPV 10.8 10.8 - 14.1 FL    DF AUTOMATED      NEUTROPHILS 70 43 - 78 %    LYMPHOCYTES 21 13 - 44 %    MONOCYTES 8 4.0 - 12.0 %    EOSINOPHILS 1 0.5 - 7.8 %    BASOPHILS 0 0.0 - 2.0 %    IMMATURE GRANULOCYTES 0.3 0.0 - 5.0 %    ABS. NEUTROPHILS 5.5 1.7 - 8.2 K/UL    ABS. LYMPHOCYTES 1.6 0.5 - 4.6 K/UL    ABS. MONOCYTES 0.6 0.1 - 1.3 K/UL    ABS. EOSINOPHILS 0.1 0.0 - 0.8 K/UL    ABS. BASOPHILS 0.0 0.0 - 0.2 K/UL    ABS. IMM.  GRANS. 0.0 0.0 - 0.5 K/UL   METABOLIC PANEL, COMPREHENSIVE    Collection Time: 17  6:50 PM   Result Value Ref Range    Sodium 142 136 - 145 mmol/L    Potassium 3.4 (L) 3.5 - 5.1 mmol/L    Chloride 105 98 - 107 mmol/L    CO2 27 21 - 32 mmol/L    Anion gap 10 7 - 16 mmol/L    Glucose 97 65 - 100 mg/dL    BUN 22 8 - 23 MG/DL    Creatinine 0.95 0.6 - 1.0 MG/DL    GFR est AA >60 >60 ml/min/1.73m2    GFR est non-AA >60 >60 ml/min/1.73m2    Calcium 9.2 8.3 - 10.4 MG/DL    Bilirubin, total 0.7 0.2 - 1.1 MG/DL    ALT (SGPT) 25 12 - 65 U/L    AST (SGOT) 21 15 - 37 U/L    Alk. phosphatase 74 50 - 136 U/L    Protein, total 8.5 (H) 6.3 - 8.2 g/dL    Albumin 3.7 3.2 - 4.6 g/dL    Globulin 4.8 (H) 2.3 - 3.5 g/dL    A-G Ratio 0.8 (L) 1.2 - 3.5     TROPONIN I    Collection Time: 03/31/17  6:50 PM   Result Value Ref Range    Troponin-I, Qt. 0.09 (H) 0.02 - 0.05 NG/ML   EKG, 12 LEAD, INITIAL    Collection Time: 03/31/17  7:43 PM   Result Value Ref Range    Ventricular Rate 73 BPM    Atrial Rate 73 BPM    P-R Interval 184 ms    QRS Duration 70 ms    Q-T Interval 372 ms    QTC Calculation (Bezet) 409 ms    Calculated P Axis 67 degrees    Calculated R Axis 62 degrees    Calculated T Axis 82 degrees    Diagnosis       !! AGE AND GENDER SPECIFIC ECG ANALYSIS !!   Normal sinus rhythm  Cannot rule out Anterior infarct , age undetermined  Abnormal ECG  When compared with ECG of 08-AUG-2009 23:39,  No significant change was found     MRSA SCREEN - PCR (NASAL)    Collection Time: 03/31/17  9:20 PM   Result Value Ref Range    Special Requests: NO SPECIAL REQUESTS      Culture result:        MRSA target DNA is not detected (presumptive not colonized with MRSA)   GLUCOSE, POC    Collection Time: 03/31/17 10:36 PM   Result Value Ref Range    Glucose (POC) 98 65 - 100 mg/dL   TROPONIN I    Collection Time: 03/31/17 11:00 PM   Result Value Ref Range    Troponin-I, Qt. 0.09 (H) 0.02 - 0.05 NG/ML   TROPONIN I    Collection Time: 04/01/17  3:00 AM   Result Value Ref Range    Troponin-I, Qt. 0.07 (H) 0.02 - 0.05 NG/ML   HEMOGLOBIN A1C WITH EAG    Collection Time: 04/01/17  3:07 AM   Result Value Ref Range    Hemoglobin A1c 6.7 (H) 4.8 - 6.0 %    Est. average glucose 146 mg/dL   TROPONIN I    Collection Time: 04/01/17  7:00 AM   Result Value Ref Range    Troponin-I, Qt. 0.09 (H) 0.02 - 0.05 NG/ML         Assessment/Plan: Principal Problem:    Syncope (3/31/2017)    Active Problems:    Hypertension ()      Diabetes mellitus type II, controlled ([facility] ()    Plan:    Patient with recent onset of vertigo. Will,  - Get orthostatics: systolic drop from 288 sitting to 119 standing so will start IVF's. - Get echo due to mild elevation in troponin. Start aspirin. Also get carotid US due to dizziness. PT evaluation. Patient given Epley exercises for vertigo. - diabetic education on discharge. - Hopefully can discharge later after w/up complete. Care Plan discussed with: Patient/Family and Nurse    Total time spent with patient: 25 minutes.     Signed By: Bravo Kennedy MD     April 1, 2017

## 2017-04-02 VITALS
SYSTOLIC BLOOD PRESSURE: 122 MMHG | TEMPERATURE: 98 F | HEIGHT: 62 IN | HEART RATE: 73 BPM | OXYGEN SATURATION: 99 % | BODY MASS INDEX: 33.4 KG/M2 | RESPIRATION RATE: 22 BRPM | WEIGHT: 181.5 LBS | DIASTOLIC BLOOD PRESSURE: 77 MMHG

## 2017-04-02 LAB
ANION GAP BLD CALC-SCNC: 8 MMOL/L (ref 7–16)
BUN SERPL-MCNC: 14 MG/DL (ref 8–23)
CALCIUM SERPL-MCNC: 8.3 MG/DL (ref 8.3–10.4)
CHLORIDE SERPL-SCNC: 107 MMOL/L (ref 98–107)
CO2 SERPL-SCNC: 25 MMOL/L (ref 21–32)
CREAT SERPL-MCNC: 0.83 MG/DL (ref 0.6–1)
GLUCOSE BLD STRIP.AUTO-MCNC: 125 MG/DL (ref 65–100)
GLUCOSE BLD STRIP.AUTO-MCNC: 94 MG/DL (ref 65–100)
GLUCOSE SERPL-MCNC: 97 MG/DL (ref 65–100)
MAGNESIUM SERPL-MCNC: 2 MG/DL (ref 1.8–2.4)
POTASSIUM SERPL-SCNC: 3.6 MMOL/L (ref 3.5–5.1)
SODIUM SERPL-SCNC: 140 MMOL/L (ref 136–145)

## 2017-04-02 PROCEDURE — 83735 ASSAY OF MAGNESIUM: CPT | Performed by: HOSPITALIST

## 2017-04-02 PROCEDURE — 99218 HC RM OBSERVATION: CPT

## 2017-04-02 PROCEDURE — 74011250637 HC RX REV CODE- 250/637: Performed by: FAMILY MEDICINE

## 2017-04-02 PROCEDURE — 74011250637 HC RX REV CODE- 250/637: Performed by: HOSPITALIST

## 2017-04-02 PROCEDURE — 97116 GAIT TRAINING THERAPY: CPT

## 2017-04-02 PROCEDURE — 74011250636 HC RX REV CODE- 250/636: Performed by: HOSPITALIST

## 2017-04-02 PROCEDURE — 80048 BASIC METABOLIC PNL TOTAL CA: CPT | Performed by: HOSPITALIST

## 2017-04-02 PROCEDURE — 82962 GLUCOSE BLOOD TEST: CPT

## 2017-04-02 PROCEDURE — G8980 MOBILITY D/C STATUS: HCPCS

## 2017-04-02 PROCEDURE — 36415 COLL VENOUS BLD VENIPUNCTURE: CPT | Performed by: HOSPITALIST

## 2017-04-02 PROCEDURE — G8979 MOBILITY GOAL STATUS: HCPCS

## 2017-04-02 PROCEDURE — 96361 HYDRATE IV INFUSION ADD-ON: CPT

## 2017-04-02 PROCEDURE — G0378 HOSPITAL OBSERVATION PER HR: HCPCS

## 2017-04-02 RX ADMIN — ASPIRIN 81 MG CHEWABLE TABLET 81 MG: 81 TABLET CHEWABLE at 08:34

## 2017-04-02 RX ADMIN — Medication 400 MG: at 08:34

## 2017-04-02 RX ADMIN — LEVOTHYROXINE SODIUM 88 MCG: 88 TABLET ORAL at 08:34

## 2017-04-02 RX ADMIN — ACETAMINOPHEN 650 MG: 325 TABLET, FILM COATED ORAL at 04:16

## 2017-04-02 RX ADMIN — VALSARTAN 320 MG: 320 TABLET, FILM COATED ORAL at 08:34

## 2017-04-02 RX ADMIN — SODIUM CHLORIDE 75 ML/HR: 900 INJECTION, SOLUTION INTRAVENOUS at 04:16

## 2017-04-02 RX ADMIN — TRAMADOL HYDROCHLORIDE 50 MG: 50 TABLET, FILM COATED ORAL at 01:10

## 2017-04-02 RX ADMIN — CALCIUM CARBONATE 500 MG (1,250 MG)-VITAMIN D3 200 UNIT TABLET 1 TABLET: at 08:34

## 2017-04-02 RX ADMIN — LORATADINE 10 MG: 10 TABLET ORAL at 08:35

## 2017-04-02 NOTE — PROGRESS NOTES
Bedside and Verbal shift change report given to Slava Hawkins RN (oncoming nurse) by Chandana Salazar RN (offgoing nurse). Report included the following information SBAR, Kardex, ED Summary, Intake/Output, MAR, Recent Results, Med Rec Status and Cardiac Rhythm NSR.

## 2017-04-02 NOTE — DISCHARGE SUMMARY
Hospitalist Discharge Summary     Patient ID:  Russel Watkins  [id number]  66 y.o.  1945  Admit date: 3/31/2017  3:51 PM  Discharge date and time: 4/2/2017  Attending: Monse Johnson MD  PCP:  Not On File B[facility]  Treatment Team: Attending Provider: Monse Johnson MD; Utilization Review: Omer Cook RN    Principal Diagnosis Syncope ,ACUTE VERTIGO,TRAUMATIC FALL  Principal Problem:    Syncope (3/31/2017)    Active Problems:    Hypertension ()      Diabetes mellitus type II, controlled [facility]) ()     VERTIGO        Hospital Course:  Please refer to the admission H&P for details of presentation. In summary, the patient is a 66year old black lady with type 2 DM,HTN who recently developed vertigo and has just been refered by her PCP to an ENT surgeon with appointment scheduled but she does not know when. .  She had a bad episode of dizziness and fell at home falling on her face with visible injuries-ecchymosis in right periorbit. She denies any loss of consciousness and work up including  CT. MRI of brain and cervical spine were negative for any lesion. CT facial structures show no fractures. Carotid doppler ultrasound and 2 D echo were witin normal limits. Physical therapy ambulated patient successfully. She is ready to go home and follow up with her PCP and ENT on meclizine. Significant Diagnostic Studies:   CT,MRI brain  CT facials,Cervical spine  Carotid doppler Ultrasound  2D echo.       Labs: Results:       Chemistry Recent Labs      04/02/17   0334  03/31/17   1850   GLU  97  97   NA  140  142   K  3.6  3.4*   CL  107  105   CO2  25  27   BUN  14  22   CREA  0.83  0.95   CA  8.3  9.2   AGAP  8  10   AP   --   74   TP   --   8.5*   ALB   --   3.7   GLOB   --   4.8*   AGRAT   --   0.8*      CBC w/Diff Recent Labs      03/31/17   1850   WBC  7.8   RBC  4.87   HGB  13.1   HCT  40.0   PLT  250   GRANS  70   LYMPH  21   EOS  1      Cardiac Enzymes No results for input(s): CPK, CKND1, KYLIE in the last 72 hours. No lab exists for component: CKRMB, TROIP   Coagulation No results for input(s): PTP, INR, APTT in the last 72 hours. No lab exists for component: INREXT    Lipid Panel No results found for: CHOL, CHOLPOCT, CHOLX, CHLST, CHOLV, [id number], HDL, LDL, NLDLCT, DLDL, LDLC, DLDLP, [id number], VLDLC, VLDL, TGL, TGLX, TRIGL, [id number], TRIGP, TGLPOCT, [id number], CHHD, CHHDX   BNP No results for input(s): BNPP in the last 72 hours. Liver Enzymes Recent Labs      03/31/17   1850   TP  8.5*   ALB  3.7   AP  74   SGOT  21      Thyroid Studies No results found for: T4, T3U, TSH, TSHEXT         Discharge Exam:  Visit Vitals    /82    Pulse 68    Temp 97.9 °F (36.6 °C)    Resp 18    Ht 5' 2\" (1.575 m)    Wt 82.3 kg (181 lb 8 oz)    SpO2 100%    BMI 33.2 kg/m2     General : Well developed well nourished pleasant black lady in no apparent distress. HEENT: + resolving bruises on right face and julita-orbit. Lungs: Clear auscultation bilaterally. CVS: S1S2 regular rate and rhythm, no murmur, click, rub or gallop  Abdomen: soft, non-tender. Bowel sounds normal. No masses,  no organomegaly  Extremities: no cyanosis or edema  Neurologic: Grossly normal.No focal deficit    Disposition: Home  Discharge Condition: Good. Ambulatory . Patient Instructions:   Current Discharge Medication List      see reconciliation meds. Activity: As tolerated  Diet: Cardiac,ADA 1800kcal    Follow-up:1. PCP faisal 1 week  ENT: per arrangement by PCP  ·     Time spent to discharge patient  55 minutes  Signed:  Anastacio Looney MD  4/2/2017  9:42 AM

## 2017-04-02 NOTE — PROGRESS NOTES
Problem: Mobility Impaired (Adult and Pediatric)  Goal: *Acute Goals and Plan of Care (Insert Text)    LTG:  (1.)Ms. Dawson Moran will move from supine to sit and sit to supine in bed with INDEPENDENT within 1-3 day(s). (2.)Ms. Dawson Moran will transfer from bed to chair and chair to bed with INDEPENDENCE using the least restrictive device within 1-3 day(s). (3.)Ms. Dawson Moran will ambulate with SUPERVISION for 500 feet with the least restrictive device within 1-3 day(s). ________________________________________________________________________________________________      PHYSICAL THERAPY: Daily Note and AM 4/2/2017  OBSERVATION: Hospital Day: 3  [place] / Product Type: Medicare /      NAME/AGE/GENDER: Monique Aguillon is a 66 y.o. female         PRIMARY DIAGNOSIS: syncope Syncope Syncope        ICD-10: Treatment Diagnosis:       · Pain in Right Wrist (M25.531)  · Difficulty in walking, Not elsewhere classified (R26.2)   Precaution/Allergies:  Review of patient's allergies indicates no known allergies. ASSESSMENT:      Ms. Dawson Moran presents with limited independence with bed mobility and transfers along with a swollen painful right wrist with little to no arom due to a fall. She would benefit from PT to work on her gait. Advised her to ice her wrist, rest it, gently wrap it with an ace wrap, and slowly begin to try to move it as tolerated. Wrist x-ray came back negative for any fracture. She participated well with gait today and was able to walk further. Still think she should use her cane with mobility for balance and safety. This section established at most recent assessment   PROBLEM LIST (Impairments causing functional limitations):  1. Decreased [place] with Bed Mobility  2. Decreased [place] with Transfers  3. Decreased [place] with Ambulation  4.  Decreased [place] with shoulder HEP    INTERVENTIONS PLANNED: (Benefits and precautions of physical therapy have been discussed with the patient.)  1. Bed Mobility Training  2. Transfer Training  3. Gait Training  4. Therapeutic Exercises per MD orders  5. Modalities for Pain      TREATMENT PLAN: Frequency/Duration: daily for duration of hospital stay  Rehabilitation Potential For Stated Goals: EXCELLENT      RECOMMENDED REHABILITATION/EQUIPMENT: (at time of discharge pending progress): Continue Skilled Therapy. May benefit from outpatient balance therapy due to vertigo. HISTORY:   History of Present Injury/Illness (Reason for Referral):  Patient is a 67 y/o female who was admitted after a trip and fall. Patient is quite articulate and oriented and vigorously denies any loss of consciousness. She reports recent onset of vertigo and ringing in the right ear for which her PCP made an ENT referral on Monday. Apart from discomfort to her face from her local trauma, patient denies any new complaints. Denies any chest pain or SOB. Troponins have trended down. Has diabetes but no longer taking Meformin since was unable to tolerate due to side effect - She is doing dietary modifications. Past Medical History/Comorbidities:   Ms. Evelyn Moseley  has a past medical history of Abdominal pain; Arthritis; Breast cancer, left ([address] (2001); Breast pain in female; Bunion; Cholesterol serum elevated; Chronic pain of left knee; Corns and callosities; DDD (degenerative disc disease), lumbar; Diabetes mellitus type II, controlled ([address] Emotional disorder; Fungal infection of toenail; Hallux valgus; Hemorrhoids; Hip pain, bilateral; Hyperglycemia; Hyperlipidemia; Hypertension; Hypothyroidism; Lower GI bleed; Obesity; Other ill-defined conditions(799.89); Postmenopausal; Radiation therapy (2001); Radiation therapy complication; Right median nerve neuropathy; Stress incontinence; Traumatic arthropathy of knee; and Urinary incontinence.   Ms. Evelyn Moseley  has a past surgical history that includes orthopaedic (2005); orthopaedic; orthopaedic; us guided core breast biopsy (Right); hysterectomy; breast lumpectomy (Left, 2001); tubal ligation; orthopaedic (Left); urological; shoulder replacement (Left); colonoscopy (2008); and breast biopsy (Right, 2011). Social History/Living Environment:   Home Environment: Apartment  One/Two Story Residence: Other (Comment)  Living Alone: Yes  Support Systems: Child(maureen), [employer], Friends \ neighbors  Patient Expects to be Discharged to[de-identified] Apartment  Current DME Used/Available at Home: Glucometer, Raised toilet seat, Safety frame toliet  Prior Level of Function/Work/Activity:  Works full time in [place]   Number of Personal Factors/Comorbidities that affect the Plan of Care: 0: LOW COMPLEXITY   EXAMINATION:   Most Recent Physical Functioning:   Gross Assessment:  AROM: Generally decreased, functional (very limited R wrist)  Strength: Generally decreased, functional (very limited R wrist)               Posture:  Posture (WDL): Within defined limits  Balance:  Sitting: Intact  Standing: Pull to stand; With support Bed Mobility:  Supine to Sit: Stand-by asssistance  Sit to Supine:  (left up in chair)  Wheelchair Mobility:     Transfers:  Sit to Stand: Stand-by asssistance  Stand to Sit: Stand-by asssistance  Bed to Chair: Stand-by asssistance  Gait:     Speed/Elisabeth: Pace decreased (<100 feet/min)  Gait Abnormalities: Antalgic  Distance (ft): 600 Feet (ft)  Assistive Device:  (HHA x 1)  Ambulation - Level of Assistance: Contact guard assistance  Interventions: Safety awareness training  Duration: 15 Minutes       Body Structures Involved:  1. Joints  2. Muscles Body Functions Affected:  1. Movement Related Activities and Participation Affected:  1.  Mobility   Number of elements that affect the Plan of Care: 4+: HIGH COMPLEXITY   CLINICAL PRESENTATION:   Presentation: Stable and uncomplicated: LOW COMPLEXITY   CLINICAL DECISION MAKING:   [facility]revon Tran Inpatient Short Form  How much difficulty does the patient currently have. .. Unable A Lot A Little None   1. Turning over in bed (including adjusting bedclothes, sheets and blankets)? [ ] 1   [ ] 2   [X] 3   [ ] 4   2. Sitting down on and standing up from a chair with arms ( e.g., wheelchair, bedside commode, etc.)   [ ] 1   [ ] 2   [X] 3   [ ] 4   3. Moving from lying on back to sitting on the side of the bed? [ ] 1   [ ] 2   [X] 3   [ ] 4   How much help from another person does the patient currently need. .. Total A Lot A Little None   4. Moving to and from a bed to a chair (including a wheelchair)? [ ] 1   [ ] 2   [X] 3   [ ] 4   5. Need to walk in hospital room? [ ] 1   [ ] 2   [X] 3   [ ] 4   6. Climbing 3-5 steps with a railing? [ ] 1   [ ] 2   [X] 3   [ ] 4   © 2007, Trustees of [address] [place] [address], under license to [employer]. All rights reserved    Score:  Initial: 18 Most Recent: X (Date: -- )     Interpretation of Tool:  Represents activities that are increasingly more difficult (i.e. Bed mobility, Transfers, Gait). Score 24 23 22-20 19-15 14-10 9-7 6       Modifier CH CI CJ CK CL CM CN         · Mobility - Walking and Moving Around:               - CURRENT STATUS:    CK - 40%-59% impaired, limited or restricted               - GOAL STATUS:           CJ - 20%-39% impaired, limited or restricted               - D/C STATUS:               CK - 40%-59% impaired, limited or restricted  [place] / Product Type: Medicare /       Medical Necessity:     · Patient is expected to demonstrate progress in balance and functional technique to increase independence with transfers and gait. Reason for Services/Other Comments:  · Patient continues to require skilled intervention due to limited functional independence.    Use of outcome tool(s) and clinical judgement create a POC that gives a: Clear prediction of patient's progress: LOW COMPLEXITY                 TREATMENT:   (In addition to Assessment/Re-Assessment sessions the following treatments were rendered)   Pre-treatment Symptoms/Complaints:  No complaints  Pain: Initial:   Pain Intensity 1: 0  Post Session:  0      Gait Training (15 Minutes):  Gait training to improve and/or restore physical functioning as related to mobility and strength. Ambulated 600 Feet (ft) with Contact guard assistance using a  (HHA x 1) and minimal Safety awareness training related to their stance phase to promote proper body alignment. Date:    Date:    Date:      ACTIVITY/EXERCISE AM PM AM PM AM PM   Gripping               Wrist Flexion/Extension               Wrist Ulnar/Radial Deviation               Pronation/Supination               Elbow Flexion/Extension               Shoulder Flexion/Extension               Shoulder AB/ADduction               Shoulder IR/ER               Pulleys               Pendulums               Shrugs               Isometric:                 Flexion               Extension               ABduction               ADduction               Biceps/Triceps                               B = bilateral; AA = active assistive; A = active; P = passive     Treatment/Session Assessment:    · Response to Treatment:  Tolerated therapy well. · Interdisciplinary Collaboration:  · Physical Therapist  · Registered Nurse  · After treatment position/precautions:  · Up in chair, Bed/Chair-wheels locked, Call light within reach and RN notified  · Compliance with Program/Exercises: compliant all of the time. · Recommendations/Intent for next treatment session: \"Next visit will focus on advancements to more challenging activities and reduction in assistance provided\".   Total Treatment Duration:  PT Patient Time In/Time Out  Time In: 1410  Time Out: [facility],

## 2017-04-02 NOTE — DISCHARGE INSTRUCTIONS
Noninsulin Medicines for Type 2 Diabetes: Care Instructions  Your Care Instructions  There are different types of noninsulin medicines for diabetes. Each works in a different way to help you control your blood sugar. Some types help your body make insulin to lower your blood sugar. Others lower how much insulin your body needs. Some can slow how quickly your body digests sugars. And some can remove extra glucose through your urine. Some of these medicines are:  · Alpha-glucosidase inhibitors. These keep starches from breaking down. This means that they lower the amount of glucose absorbed when you eat. They do not help your body make more insulin. So they will not cause low blood sugar unless they are used with other medicines for diabetes. They include acarbose and miglitol. · DPP-4 inhibitors. These help your body increase the level of insulin after eating. They also help your body make less of a hormone that raises blood sugar. They include linagliptin, saxagliptin, and sitagliptin. · Incretin hormones (GLP-1 receptor agonists). Your body makes a protein that can raise your insulin level, lower your blood sugar, and make you less hungry. You can inject hormone medicines that work the same way as this protein. They include exenatide and liraglutide. · Meglitinides. These help your body release insulin. They also help slow how your body digests sugars. In this way, they prevent your blood sugar from rising too fast after you eat. They include nateglinide and repaglinide. · Metformin. This lowers how much glucose your liver makes. And it helps you respond better to insulin. It also lowers the amount of stored sugar that your liver releases when you are not eating. · Sodium glucose co-transporter 2 inhibitors (SGLT2 inhibitors). These help to remove extra glucose through your urine. They may also help some people lose weight. They include canagliflozin, dapagliflozin, and empagliflozin. · Sulfonylureas. These help your body release more insulin. Some work for many hours and can cause low blood sugar if you don't eat as you expected. They include glipizide and glyburide. · Thiazolidinediones. These reduce the amount of blood glucose. They also help you respond better to insulin. They include pioglitazone and rosiglitazone. You may need to take more than one medicine for diabetes. Two or more medicines may work better to lower your blood sugar level than just one medicine. Follow-up care is a key part of your treatment and safety. Be sure to make and go to all appointments, and call your doctor if you are having problems. It's also a good idea to know your test results and keep a list of the medicines you take. How can you care for yourself at home? · Eat a healthy diet. Get some exercise each day. This may help you to reduce how much medicine you need. · Do not take other prescription or over-the-counter medicines, vitamins, herbal products, or supplements without talking to your doctor first. Some medicines for type 2 diabetes can cause problems with other medicines or supplements. · Tell your doctor if you plan to get pregnant. Some of these drugs are not safe for pregnant women. · Be safe with medicines. Take your medicines exactly as prescribed. Meglitinides or sulfonylureas can cause your blood sugar to drop very low. Call your doctor if you think you are having a problem with your medicine. · Check your blood sugar levels often. You can use a glucose monitor. Keeping track can help you know how certain foods, activities, and medicines affect your blood sugar. And it can help you keep your blood sugar from getting so low that it's not safe. When should you call for help? Call 911 anytime you think you may need emergency care. For example, call if:  · You passed out (lost consciousness), or you suddenly become very sleepy or confused.  (You may have very low blood sugar.)  · You have symptoms of high blood sugar, such as:  ¨ Blurred vision. ¨ Trouble staying awake or being woken up. ¨ Fast, deep breathing. ¨ Breath that smells fruity. ¨ Belly pain, not feeling hungry, and vomiting. ¨ Feeling confused. Call your doctor now or seek immediate medical care if:  · You are sick and cannot control your blood sugar. · You have been vomiting or have had diarrhea for more than 6 hours. · Your blood sugar stays higher than the level your doctor has set for you. · You have symptoms of low blood sugar, such as:  ¨ Sweating. ¨ Feeling nervous, shaky, and weak. ¨ Extreme hunger and slight nausea. ¨ Dizziness and headache. ¨ Blurred vision. ¨ Confusion. Watch closely for changes in your health, and be sure to contact your doctor if:  · You have a hard time knowing when your blood sugar is low. · You have trouble keeping your blood sugar in the target range. · You often have problems controlling your blood sugar. · You have symptoms of long-term diabetes problems, such as:  ¨ New vision changes. ¨ New pain, numbness, or tingling in your hands or feet. ¨ Skin problems. Where can you learn more? Go to http://kennedyCiao Telecombaldo.info/. Enter H153 in the search box to learn more about \"Noninsulin Medicines for Type 2 Diabetes: Care Instructions. \"  Current as of: August 3, 2016  Content Version: 11.2  © 4099-9733 [employer]. Care instructions adapted under license by [employer] (which disclaims liability or warranty for this information). If you have questions about a medical condition or this instruction, always ask your healthcare professional. [place] [address] any warranty or liability for your use of this information. Learning About Diabetes Food Guidelines  Your Care Instructions  Meal planning is important to manage diabetes. It helps keep your blood sugar at a target level (which you set with your doctor).  You don't have to eat special foods. You can eat what your family eats, including sweets once in a while. But you do have to pay attention to how often you eat and how much you eat of certain foods. You may want to work with a dietitian or a certified diabetes educator (CDE) to help you plan meals and snacks. A dietitian or CDE can also help you lose weight if that is one of your goals. What should you know about eating carbs? Managing the amount of carbohydrate (carbs) you eat is an important part of healthy meals when you have diabetes. Carbohydrate is found in many foods. · Learn which foods have carbs. And learn the amounts of carbs in different foods. ¨ Bread, cereal, pasta, and rice have about 15 grams of carbs in a serving. A serving is 1 slice of bread (1 ounce), ½ cup of cooked cereal, or 1/3 cup of cooked pasta or rice. ¨ Fruits have 15 grams of carbs in a serving. A serving is 1 small fresh fruit, such as an apple or orange; ½ of a banana; ½ cup of cooked or canned fruit; ½ cup of fruit juice; 1 cup of melon or raspberries; or 2 tablespoons of dried fruit. ¨ Milk and no-sugar-added yogurt have 15 grams of carbs in a serving. A serving is 1 cup of milk or 2/3 cup of no-sugar-added yogurt. ¨ Starchy vegetables have 15 grams of carbs in a serving. A serving is ½ cup of mashed potatoes or sweet potato; 1 cup winter squash; ½ of a small baked potato; ½ cup of cooked beans; or ½ cup cooked corn or green peas. · Learn how much carbs to eat each day and at each meal. A dietitian or CDE can teach you how to keep track of the amount of carbs you eat. This is called carbohydrate counting. · If you are not sure how to count carbohydrate grams, use the Plate Method to plan meals. It is a good, quick way to make sure that you have a balanced meal. It also helps you spread carbs throughout the day. ¨ Divide your plate by types of foods.  Put non-starchy vegetables on half the plate, meat or other protein food on one-quarter of the plate, and a grain or starchy vegetable in the final quarter of the plate. To this you can add a small piece of fruit and 1 cup of milk or yogurt, depending on how many carbs you are supposed to eat at a meal.  · Try to eat about the same amount of carbs at each meal. Do not \"save up\" your daily allowance of carbs to eat at one meal.  · Proteins have very little or no carbs per serving. Examples of proteins are beef, chicken, turkey, fish, eggs, tofu, cheese, cottage cheese, and peanut butter. A serving size of meat is 3 ounces, which is about the size of a deck of cards. Examples of meat substitute serving sizes (equal to 1 ounce of meat) are 1/4 cup of cottage cheese, 1 egg, 1 tablespoon of peanut butter, and ½ cup of tofu. How can you eat out and still eat healthy? · Learn to estimate the serving sizes of foods that have carbohydrate. If you measure food at home, it will be easier to estimate the amount in a serving of restaurant food. · If the meal you order has too much carbohydrate (such as potatoes, corn, or baked beans), ask to have a low-carbohydrate food instead. Ask for a salad or green vegetables. · If you use insulin, check your blood sugar before and after eating out to help you plan how much to eat in the future. · If you eat more carbohydrate at a meal than you had planned, take a walk or do other exercise. This will help lower your blood sugar. What else should you know? · Limit saturated fat, such as the fat from meat and dairy products. This is a healthy choice because people who have diabetes are at higher risk of heart disease. So choose lean cuts of meat and nonfat or low-fat dairy products. Use olive or canola oil instead of butter or shortening when cooking. · Don't skip meals. Your blood sugar may drop too low if you skip meals and take insulin or certain medicines for diabetes. · Check with your doctor before you drink alcohol. Alcohol can cause your blood sugar to drop too low.  Alcohol can also cause a bad reaction if you take certain diabetes medicines. Follow-up care is a key part of your treatment and safety. Be sure to make and go to all appointments, and call your doctor if you are having problems. It's also a good idea to know your test results and keep a list of the medicines you take. Where can you learn more? Go to http://kennedy-baldo.info/. Enter Q142 in the search box to learn more about \"Learning About Diabetes Food Guidelines. \"  Current as of: May 23, 2016  Content Version: 11.2  © 1164-3604 [employer]. Care instructions adapted under license by [employer] (which disclaims liability or warranty for this information). If you have questions about a medical condition or this instruction, always ask your healthcare professional. [address] any warranty or liability for your use of this information. Fainting: Care Instructions  Your Care Instructions    When you faint, or pass out, you lose consciousness for a short time. A brief drop in blood flow to the brain often causes it. When you fall or lie down, more blood flows to your brain and you regain consciousness. Emotional stress, pain, or overheating--especially if you have been standing--can make you faint. In these cases, fainting is usually not serious. But fainting can be a sign of a more serious problem. Your doctor may want you to have more tests to rule out other causes. The treatment you need depends on the reason why you fainted. The doctor has checked you carefully, but problems can develop later. If you notice any problems or new symptoms, get medical treatment right away. Follow-up care is a key part of your treatment and safety. Be sure to make and go to all appointments, and call your doctor if you are having problems. It's also a good idea to know your test results and keep a list of the medicines you take.   How can you care for yourself at home?  · Drink plenty of fluids to prevent dehydration. If you have kidney, heart, or liver disease and have to limit fluids, talk with your doctor before you increase your fluid intake. When should you call for help? Call 911 anytime you think you may need emergency care. For example, call if:  · You have symptoms of a heart problem. These may include:  ¨ Chest pain or pressure. ¨ Severe trouble breathing. ¨ A fast or irregular heartbeat. ¨ Lightheadedness or sudden weakness. ¨ Coughing up pink, foamy mucus. ¨ Passing out. After you call 911, the  may tell you to chew 1 adult-strength or 2 to 4 low-dose aspirin. Wait for an ambulance. Do not try to drive yourself. · You have symptoms of a stroke. These may include:  ¨ Sudden numbness, tingling, weakness, or loss of movement in your face, arm, or leg, especially on only one side of your body. ¨ Sudden vision changes. ¨ Sudden trouble speaking. ¨ Sudden confusion or trouble understanding simple statements. ¨ Sudden problems with walking or balance. ¨ A sudden, severe headache that is different from past headaches. · You passed out (lost consciousness) again. Watch closely for changes in your health, and be sure to contact your doctor if:  · You do not get better as expected. Where can you learn more? Go to http://kennedy-baldo.info/. Enter D899 in the search box to learn more about \"Fainting: Care Instructions. \"  Current as of: May 27, 2016  Content Version: 11.2  © 1913-2903 [employer]. Care instructions adapted under license by [employer] (which disclaims liability or warranty for this information). If you have questions about a medical condition or this instruction, always ask your healthcare professional. [place] [address] any warranty or liability for your use of this information.        Dizziness: Care Instructions  Your Care Instructions  Dizziness is the feeling of unsteadiness or fuzziness in your head. It is different than having vertigo, which is a feeling that the room is spinning or that you are moving or falling. It is also different from lightheadedness, which is the feeling that you are about to faint. It can be hard to know what causes dizziness. Some people feel dizzy when they have migraine headaches. Sometimes bouts of flu can make you feel dizzy. Some medical conditions, such as heart problems or high blood pressure, can make you feel dizzy. Many medicines can cause dizziness, including medicines for high blood pressure, pain, or anxiety. If a medicine causes your symptoms, your doctor may recommend that you stop or change the medicine. If it is a problem with your heart, you may need medicine to help your heart work better. If there is no clear reason for your symptoms, your doctor may suggest watching and waiting for a while to see if the dizziness goes away on its own. Follow-up care is a key part of your treatment and safety. Be sure to make and go to all appointments, and call your doctor if you are having problems. It's also a good idea to know your test results and keep a list of the medicines you take. How can you care for yourself at home? · If your doctor recommends or prescribes medicine, take it exactly as directed. Call your doctor if you think you are having a problem with your medicine. · Do not drive while you feel dizzy. · Try to prevent falls. Steps you can take include:  ¨ Using nonskid mats, adding grab bars near the tub, and using night-lights. ¨ Clearing your home so that walkways are free of anything you might trip on. ¨ Letting family and friends know that you have been feeling dizzy. This will help them know how to help you. When should you call for help? Call 911 anytime you think you may need emergency care. For example, call if:  · You passed out (lost consciousness).   · You have dizziness along with symptoms of a heart attack. These may include:  ¨ Chest pain or pressure, or a strange feeling in the chest.  ¨ Sweating. ¨ Shortness of breath. ¨ Nausea or vomiting. ¨ Pain, pressure, or a strange feeling in the back, neck, jaw, or upper belly or in one or both shoulders or arms. ¨ Lightheadedness or sudden weakness. ¨ A fast or irregular heartbeat. · You have symptoms of a stroke. These may include:  ¨ Sudden numbness, tingling, weakness, or loss of movement in your face, arm, or leg, especially on only one side of your body. ¨ Sudden vision changes. ¨ Sudden trouble speaking. ¨ Sudden confusion or trouble understanding simple statements. ¨ Sudden problems with walking or balance. ¨ A sudden, severe headache that is different from past headaches. Call your doctor now or seek immediate medical care if:  · You feel dizzy and have a fever, headache, or ringing in your ears. · You have new or increased nausea and vomiting. · Your dizziness does not go away or comes back. Watch closely for changes in your health, and be sure to contact your doctor if:  · You do not get better as expected. Where can you learn more? Go to http://kennedy-baldo.info/. Enter R745 in the search box to learn more about \"Dizziness: Care Instructions. \"  Current as of: May 27, 2016  Content Version: 11.2  © 6078-5292 [employer]. Care instructions adapted under license by [employer] (which disclaims liability or warranty for this information). If you have questions about a medical condition or this instruction, always ask your healthcare professional. [place] [address] any warranty or liability for your use of this information. DISCHARGE SUMMARY from Nurse    [address] [place] April 3, 2017 TO SCHEDULE A ONE WEEK FOLLOW-UP APPOINTMENT.      The following personal items are in your possession at time of discharge:    Dental Appliances: Partials, Uppers  Visual Aid: Glasses, With patient           Clothing: Footwear, Socks, Pants, Belt, Undergarments, Shirt, With patient                PATIENT INSTRUCTIONS:    After general anesthesia or intravenous sedation, for 24 hours or while taking prescription Narcotics:  · Limit your activities  · Do not drive and operate hazardous machinery  · Do not make important personal or business decisions  · Do  not drink alcoholic beverages  · If you have not urinated within 8 hours after discharge, please contact your surgeon on call. Report the following to your surgeon:  · Excessive pain, swelling, redness or odor of or around the surgical area  · Temperature over 100.5  · Nausea and vomiting lasting longer than 4 hours or if unable to take medications  · Any signs of decreased circulation or nerve impairment to extremity: change in color, persistent  numbness, tingling, coldness or increase pain  · Any questions        What to do at Home:  Recommended activity: Activity as tolerated    If you experience any of the following symptoms lightheaded, dizziness, or fainting please follow up with your Primary Care Physician or dial 911. *  Please give a list of your current medications to your Primary Care Provider. *  Please update this list whenever your medications are discontinued, doses are      changed, or new medications (including over-the-counter products) are added. *  Please carry medication information at all times in case of emergency situations. These are general instructions for a healthy lifestyle:    No smoking/ No tobacco products/ Avoid exposure to second hand smoke    Surgeon General's Warning:  Quitting smoking now greatly reduces serious risk to your health.     Obesity, smoking, and sedentary lifestyle greatly increases your risk for illness    A healthy diet, regular physical exercise & weight monitoring are important for maintaining a healthy lifestyle    You may be retaining fluid if you have a history of heart failure or if you experience any of the following symptoms:  Weight gain of 3 pounds or more overnight or 5 pounds in a week, increased swelling in our hands or feet or shortness of breath while lying flat in bed. Please call your doctor as soon as you notice any of these symptoms; do not wait until your next office visit. Recognize signs and symptoms of STROKE:    F-face looks uneven    A-arms unable to move or move unevenly    S-speech slurred or non-existent    T-time-call 911 as soon as signs and symptoms begin-DO NOT go       Back to bed or wait to see if you get better-TIME IS BRAIN. Warning Signs of HEART ATTACK     Call 911 if you have these symptoms:   Chest discomfort. Most heart attacks involve discomfort in the center of the chest that lasts more than a few minutes, or that goes away and comes back. It can feel like uncomfortable pressure, squeezing, fullness, or pain.  Discomfort in other areas of the upper body. Symptoms can include pain or discomfort in one or both arms, the back, neck, jaw, or stomach.  Shortness of breath with or without chest discomfort.  Other signs may include breaking out in a cold sweat, nausea, or lightheadedness. Don't wait more than five minutes to call 911 - MINUTES MATTER! Fast action can save your life. Calling 911 is almost always the fastest way to get lifesaving treatment. Emergency Medical Services staff can begin treatment when they arrive -- up to an hour sooner than if someone gets to the hospital by car. The discharge information has been reviewed with the patient. The patient verbalized understanding. Discharge medications reviewed with the patient and appropriate educational materials and side effects teaching were provided.

## 2017-04-02 NOTE — PROGRESS NOTES
Discharge instructions given to patient and daughter. Questions and concerns addressed. IV removed with tip intact. Patient taken to discharge area by NOLA Rivera via wheelchair. Patient stable at discharge.

## 2017-04-13 ENCOUNTER — HOSPITAL ENCOUNTER (OUTPATIENT)
Dept: OCCUPATIONAL MEDICINE | Age: 72
Discharge: HOME | End: 2017-04-13

## 2017-04-13 DIAGNOSIS — T14.90XA INJURY, OTHER AND UNSPECIFIED, UNSPECIFIED SITE: ICD-10-CM

## 2017-06-04 ENCOUNTER — HOSPITAL ENCOUNTER (EMERGENCY)
Age: 72
Discharge: HOME | End: 2017-06-05
Attending: EMERGENCY MEDICINE
Payer: COMMERCIAL

## 2017-06-04 DIAGNOSIS — I15.9 SECONDARY HYPERTENSION: Primary | ICD-10-CM

## 2017-06-04 LAB
ALBUMIN SERPL BCP-MCNC: 3.6 G/DL (ref 3.2–4.6)
ALBUMIN/GLOB SERPL: 0.8 {RATIO} (ref 1.2–3.5)
ALP SERPL-CCNC: 71 U/L (ref 50–136)
ALT SERPL-CCNC: 23 U/L (ref 12–65)
ANION GAP BLD CALC-SCNC: 12 MMOL/L (ref 7–16)
AST SERPL W P-5'-P-CCNC: 24 U/L (ref 15–37)
BASOPHILS # BLD AUTO: 0 K/UL (ref 0–0.2)
BASOPHILS # BLD: 1 % (ref 0–2)
BILIRUB SERPL-MCNC: 1.1 MG/DL (ref 0.2–1.1)
BUN SERPL-MCNC: 11 MG/DL (ref 8–23)
CALCIUM SERPL-MCNC: 9.5 MG/DL (ref 8.3–10.4)
CHLORIDE SERPL-SCNC: 105 MMOL/L (ref 98–107)
CO2 SERPL-SCNC: 27 MMOL/L (ref 21–32)
CREAT SERPL-MCNC: 0.76 MG/DL (ref 0.6–1)
DIFFERENTIAL METHOD BLD: NORMAL
EOSINOPHIL # BLD: 0.1 K/UL (ref 0–0.8)
EOSINOPHIL NFR BLD: 1 % (ref 0.5–7.8)
ERYTHROCYTE [DISTWIDTH] IN BLOOD BY AUTOMATED COUNT: 14.4 % (ref 11.9–14.6)
GLOBULIN SER CALC-MCNC: 4.8 G/DL (ref 2.3–3.5)
GLUCOSE BLD STRIP.AUTO-MCNC: 85 MG/DL (ref 65–100)
GLUCOSE SERPL-MCNC: 91 MG/DL (ref 65–100)
HCT VFR BLD AUTO: 41.1 % (ref 35.8–46.3)
HGB BLD-MCNC: 13.1 G/DL (ref 11.7–15.4)
IMM GRANULOCYTES # BLD: 0 K/UL (ref 0–0.5)
IMM GRANULOCYTES NFR BLD AUTO: 0 % (ref 0–5)
LYMPHOCYTES # BLD AUTO: 24 % (ref 13–44)
LYMPHOCYTES # BLD: 1.6 K/UL (ref 0.5–4.6)
MCH RBC QN AUTO: 26.5 PG (ref 26.1–32.9)
MCHC RBC AUTO-ENTMCNC: 31.9 G/DL (ref 31.4–35)
MCV RBC AUTO: 83.2 FL (ref 79.6–97.8)
MONOCYTES # BLD: 0.6 K/UL (ref 0.1–1.3)
MONOCYTES NFR BLD AUTO: 9 % (ref 4–12)
NEUTS SEG # BLD: 4.4 K/UL (ref 1.7–8.2)
NEUTS SEG NFR BLD AUTO: 65 % (ref 43–78)
PLATELET # BLD AUTO: 218 K/UL (ref 150–450)
PMV BLD AUTO: 10.8 FL (ref 10.8–14.1)
POTASSIUM SERPL-SCNC: 4.1 MMOL/L (ref 3.5–5.1)
PROT SERPL-MCNC: 8.4 G/DL (ref 6.3–8.2)
RBC # BLD AUTO: 4.94 M/UL (ref 4.05–5.25)
SODIUM SERPL-SCNC: 144 MMOL/L (ref 136–145)
TROPONIN I BLD-MCNC: 0 NG/ML (ref 0–0.08)
WBC # BLD AUTO: 6.6 K/UL (ref 4.3–11.1)

## 2017-06-04 PROCEDURE — 84484 ASSAY OF TROPONIN QUANT: CPT

## 2017-06-04 PROCEDURE — 82962 GLUCOSE BLOOD TEST: CPT

## 2017-06-04 PROCEDURE — 99285 EMERGENCY DEPT VISIT HI MDM: CPT | Performed by: EMERGENCY MEDICINE

## 2017-06-04 PROCEDURE — 85025 COMPLETE CBC W/AUTO DIFF WBC: CPT | Performed by: EMERGENCY MEDICINE

## 2017-06-04 PROCEDURE — 80053 COMPREHEN METABOLIC PANEL: CPT | Performed by: EMERGENCY MEDICINE

## 2017-06-04 PROCEDURE — 74011250637 HC RX REV CODE- 250/637: Performed by: EMERGENCY MEDICINE

## 2017-06-04 RX ORDER — AMLODIPINE BESYLATE 10 MG/1
5 TABLET ORAL DAILY
Qty: 30 TAB | Refills: 0 | Status: SHIPPED | OUTPATIENT
Start: 2017-06-04 | End: 2017-06-14

## 2017-06-04 RX ORDER — CLONIDINE HYDROCHLORIDE 0.1 MG/1
0.1 TABLET ORAL
Status: COMPLETED | OUTPATIENT
Start: 2017-06-04 | End: 2017-06-04

## 2017-06-04 RX ADMIN — CLONIDINE HYDROCHLORIDE 0.1 MG: 0.1 TABLET ORAL at 22:47

## 2017-06-05 VITALS
SYSTOLIC BLOOD PRESSURE: 160 MMHG | BODY MASS INDEX: 31.1 KG/M2 | OXYGEN SATURATION: 96 % | WEIGHT: 169 LBS | TEMPERATURE: 98 F | HEART RATE: 64 BPM | HEIGHT: 62 IN | RESPIRATION RATE: 18 BRPM | DIASTOLIC BLOOD PRESSURE: 90 MMHG

## 2017-06-05 LAB
ATRIAL RATE: 59 BPM
CALCULATED P AXIS, ECG09: 63 DEGREES
CALCULATED R AXIS, ECG10: 53 DEGREES
CALCULATED T AXIS, ECG11: 56 DEGREES
DIAGNOSIS, 93000: NORMAL
P-R INTERVAL, ECG05: 194 MS
Q-T INTERVAL, ECG07: 424 MS
QRS DURATION, ECG06: 76 MS
QTC CALCULATION (BEZET), ECG08: 419 MS
VENTRICULAR RATE, ECG03: 59 BPM

## 2017-06-05 NOTE — DISCHARGE INSTRUCTIONS
High Blood Pressure: Care Instructions  Your Care Instructions  If your blood pressure is usually above 140/90, you have high blood pressure, or hypertension. That means the top number is 140 or higher or the bottom number is 90 or higher, or both. Despite what a lot of people think, high blood pressure usually doesn't cause headaches or make you feel dizzy or lightheaded. It usually has no symptoms. But it does increase your risk for heart attack, stroke, and kidney or eye damage. The higher your blood pressure, the more your risk increases. Your doctor will give you a goal for your blood pressure. Your goal will be based on your health and your age. An example of a goal is to keep your blood pressure below 140/90. Lifestyle changes, such as eating healthy and being active, are always important to help lower blood pressure. You might also take medicine to reach your blood pressure goal.  Follow-up care is a key part of your treatment and safety. Be sure to make and go to all appointments, and call your doctor if you are having problems. It's also a good idea to know your test results and keep a list of the medicines you take. How can you care for yourself at home? Medical treatment  · If you stop taking your medicine, your blood pressure will go back up. You may take one or more types of medicine to lower your blood pressure. Be safe with medicines. Take your medicine exactly as prescribed. Call your doctor if you think you are having a problem with your medicine. · Talk to your doctor before you start taking aspirin every day. Aspirin can help certain people lower their risk of a heart attack or stroke. But taking aspirin isn't right for everyone, because it can cause serious bleeding. · See your doctor regularly. You may need to see the doctor more often at first or until your blood pressure comes down.   · If you are taking blood pressure medicine, talk to your doctor before you take decongestants or anti-inflammatory medicine, such as ibuprofen. Some of these medicines can raise blood pressure. · Learn how to check your blood pressure at home. Lifestyle changes  · Stay at a healthy weight. This is especially important if you put on weight around the waist. Losing even 10 pounds can help you lower your blood pressure. · If your doctor recommends it, get more exercise. Walking is a good choice. Bit by bit, increase the amount you walk every day. Try for at least 30 minutes on most days of the week. You also may want to swim, bike, or do other activities. · Avoid or limit alcohol. Talk to your doctor about whether you can drink any alcohol. · Try to limit how much sodium you eat to less than 2,300 milligrams (mg) a day. Your doctor may ask you to try to eat less than 1,500 mg a day. · Eat plenty of fruits (such as bananas and oranges), vegetables, legumes, whole grains, and low-fat dairy products. · Lower the amount of saturated fat in your diet. Saturated fat is found in animal products such as milk, cheese, and meat. Limiting these foods may help you lose weight and also lower your risk for heart disease. · Do not smoke. Smoking increases your risk for heart attack and stroke. If you need help quitting, talk to your doctor about stop-smoking programs and medicines. These can increase your chances of quitting for good. When should you call for help? Call 911 anytime you think you may need emergency care. This may mean having symptoms that suggest that your blood pressure is causing a serious heart or blood vessel problem. Your blood pressure may be over 180/110. For example, call 911 if:  · You have symptoms of a heart attack. These may include:  ¨ Chest pain or pressure, or a strange feeling in the chest.  ¨ Sweating. ¨ Shortness of breath. ¨ Nausea or vomiting. ¨ Pain, pressure, or a strange feeling in the back, neck, jaw, or upper belly or in one or both shoulders or arms.   ¨ Lightheadedness or sudden weakness. ¨ A fast or irregular heartbeat. · You have symptoms of a stroke. These may include:  ¨ Sudden numbness, tingling, weakness, or loss of movement in your face, arm, or leg, especially on only one side of your body. ¨ Sudden vision changes. ¨ Sudden trouble speaking. ¨ Sudden confusion or trouble understanding simple statements. ¨ Sudden problems with walking or balance. ¨ A sudden, severe headache that is different from past headaches. · You have severe back or belly pain. Do not wait until your blood pressure comes down on its own. Get help right away. Call your doctor now or seek immediate care if:  · Your blood pressure is much higher than normal (such as 180/110 or higher), but you don't have symptoms. · You think high blood pressure is causing symptoms, such as:  ¨ Severe headache. ¨ Blurry vision. Watch closely for changes in your health, and be sure to contact your doctor if:  · Your blood pressure measures 140/90 or higher at least 2 times. That means the top number is 140 or higher or the bottom number is 90 or higher, or both. · You think you may be having side effects from your blood pressure medicine. · Your blood pressure is usually normal, but it goes above normal at least 2 times. Where can you learn more? Go to http://kennedy-baldo.info/. Enter Q405 in the search box to learn more about \"High Blood Pressure: Care Instructions. \"  Current as of: August 8, 2016  Content Version: 11.2  © 6874-2727 [email or web address]. Care instructions adapted under license by [employer] (which disclaims liability or warranty for this information). If you have questions about a medical condition or this instruction, always ask your healthcare professional. [place] [address] any warranty or liability for your use of this information.     Amlodipine (By mouth)   Amlodipine ([id number])  Treats high blood pressure and angina (chest pain). This medicine is a calcium channel blocker. Brand Name(s): Norvasc   There may be other brand names for this medicine. When This Medicine Should Not Be Used: This medicine is not right for everyone. Do not use it if you had an allergic reaction to amlodipine. How to Use This Medicine:   Tablet, Dissolving Tablet  · Take your medicine as directed. Your dose may need to be changed several times to find what works best for you. Take this medicine at the same time each day. · Read and follow the patient instructions that come with this medicine. Talk to your doctor or pharmacist if you have any questions. · Missed dose: Take a dose as soon as you remember. If it has been more than 12 hours since you were supposed to take your dose, skip the missed dose and take your next regular dose at the regular time. · Store the medicine in a closed container at room temperature, away from heat, moisture, and direct light. Drugs and Foods to Avoid:   Ask your doctor or pharmacist before using any other medicine, including over-the-counter medicines, vitamins, and herbal products. · Some medicines can affect how amlodipine works. Tell your doctor if you are also using any of the following:   ¨ Clarithromycin, cyclosporine, diltiazem, itraconazole, ritonavir, sildenafil, simvastatin, tacrolimus  Warnings While Using This Medicine:   · Tell your doctor if you are pregnant or breastfeeding, or if you have liver disease, heart disease, coronary artery disease, or aortic stenosis. · This medicine could lower your blood pressure too much, especially when you first use it or if you are dehydrated. Stand or sit up slowly if you feel lightheaded or dizzy. · Your doctor will check your progress and the effects of this medicine at regular visits. Keep all appointments. · Do not stop using this medicine without asking your doctor, even if you feel well.  This medicine will not cure high blood pressure, but it will help keep it in normal range. You may have to take blood pressure medicine for the rest of your life. · Keep all medicine out of the reach of children. Never share your medicine with anyone. Possible Side Effects While Using This Medicine:   Call your doctor right away if you notice any of these side effects:  · Allergic reaction: Itching or hives, swelling in your face or hands, swelling or tingling in your mouth or throat, chest tightness, trouble breathing  · Lightheadedness, dizziness  · New or worsening chest pain  · Swelling in your hands, ankles, or legs  · Trouble breathing, nausea, unusual sweating, fainting  If you notice other side effects that you think are caused by this medicine, tell your doctor. Call your doctor for medical advice about side effects. You may report side effects to FDA at 2-459-FDA-1946  © 2017 [facility] Information is for End User's use only and may not be sold, redistributed or otherwise used for commercial purposes. The above information is an  only. It is not intended as medical advice for individual conditions or treatments. Talk to your doctor, nurse or pharmacist before following any medical regimen to see if it is safe and effective for you.

## 2017-06-05 NOTE — ED NOTES
I have reviewed discharge instructions with the patient. The patient verbalized understanding. pt left ambulatory and states she feels so much better.

## 2017-06-05 NOTE — ED PROVIDER NOTES
HPI Comments: Patient is a 66 yo female with htn. Patient states she was at home when she took her BP and states it was over 180 on the top and over 100 on the bottom. Went to her PCP where from talking to the patient sounds like she had a misunderstanding with her PCP because she was concerned about the side effects of the medication she was to be prescribed so she came to the emergency department so she could get the medications and be observed since the patient was concerned about the side effects. Again in the ED the patient denies any chest pain, no shortness of breath, no nausea or vomiting, no abdominal pain, no headache. Patient is a 67 y.o. female presenting with hypertension. The history is provided by the patient. No  was used. Hypertension    Pertinent negatives include no chest pain, no headaches, no neck pain, no shortness of breath, no nausea and no vomiting.         Past Medical History:   Diagnosis Date    Abdominal pain     Arthritis     Breast cancer, left ([address] 2001    Breast pain in female     Bunion     Cholesterol serum elevated     medication    Chronic pain of left knee     Corns and callosities     DDD (degenerative disc disease), lumbar     Diabetes mellitus type II, controlled ([address]     Emotional disorder     Fungal infection of toenail     Hallux valgus     Hemorrhoids     Hip pain, bilateral     Hyperglycemia     Hyperlipidemia     Hypertension     medication    Hypothyroidism     Lower GI bleed     Obesity     Other ill-defined conditions     pt reports urinalysis shows blood - she is scheduled for CT pelvis and abdomen in August)    Postmenopausal     Radiation therapy 2001    Radiation therapy complication     9304    Right median nerve neuropathy     Stress incontinence     Traumatic arthropathy of knee     Urinary incontinence        Past Surgical History:   Procedure Laterality Date    HX BREAST BIOPSY Right 2011    benign  HX BREAST LUMPECTOMY Left 2001    radiation for CA    HX COLONOSCOPY  2008    HX HYSTERECTOMY      HX ORTHOPAEDIC  2005    Left TSA    HX ORTHOPAEDIC      right ankle    HX ORTHOPAEDIC      left knee repair    HX ORTHOPAEDIC Left     ankle arthroscopy    HX SHOULDER REPLACEMENT Left     HX TUBAL LIGATION      HX UROLOGICAL      cystoscopy    US GUIDED CORE BREAST BIOPSY Right          Family History:   Problem Relation Age of Onset    Breast Cancer Sister 77    Breast Cancer Sister     Colon Cancer Other     Cancer Other      bone CA    Diabetes Other     Hypertension Other     Malignant Hyperthermia Neg Hx     Pseudocholinesterase Deficiency Neg Hx     Delayed Awakening Neg Hx     Post-op Nausea/Vomiting Neg Hx     Emergence Delirium Neg Hx     Post-op Cognitive Dysfunction Neg Hx     Other Neg Hx        Social History     Social History    Marital status: SINGLE     Spouse name: N/A    Number of children: N/A    Years of education: N/A     Occupational History    Not on file. Social History Main Topics    Smoking status: Former Smoker     Packs/day: 0.25     Years: 25.00     Quit date: 7/13/2001    Smokeless tobacco: Never Used    Alcohol use No    Drug use: No    Sexual activity: Not Currently     Other Topics Concern    Not on file     Social History Narrative         ALLERGIES: Review of patient's allergies indicates no known allergies. Review of Systems   Constitutional: Negative for chills and fever. HENT: Negative for rhinorrhea and sore throat. Eyes: Negative for visual disturbance. Respiratory: Negative for cough and shortness of breath. Cardiovascular: Negative for chest pain and leg swelling. Gastrointestinal: Negative for abdominal pain, diarrhea, nausea and vomiting. Genitourinary: Negative for dysuria. Musculoskeletal: Negative for back pain and neck pain. Skin: Negative for rash. Neurological: Negative for weakness and headaches. Psychiatric/Behavioral: The patient is not nervous/anxious. Vitals:    06/04/17 1828   BP: (!) 181/111   Pulse: 67   Resp: 18   Temp: 97.9 °F (36.6 °C)   SpO2: 98%   Weight: 76.7 kg (169 lb)   Height: 5' 2\" (1.575 m)            Physical Exam   Constitutional: She is oriented to person, place, and time. She appears well-developed and well-nourished. HENT:   Head: Normocephalic. Right Ear: External ear normal.   Left Ear: External ear normal.   Eyes: Conjunctivae and EOM are normal. Pupils are equal, round, and reactive to light. Neck: Normal range of motion. Neck supple. No tracheal deviation present. Cardiovascular: Normal rate, regular rhythm, normal heart sounds and intact distal pulses. No murmur heard. Pulmonary/Chest: Effort normal and breath sounds normal. No respiratory distress. Abdominal: Soft. There is no tenderness. Musculoskeletal: Normal range of motion. Neurological: She is alert and oriented to person, place, and time. No cranial nerve deficit. Cn 2-12 fully intact, strength and sensation 5/5 in all extremities, negative pronator drift, ambulates without difficulty, visual fields fully intact, EOMI, finger to nose normal. no focal deficits appreciated. Skin: No rash noted. Nursing note and vitals reviewed.        MDM  Number of Diagnoses or Management Options  Secondary hypertension: new and requires workup     Amount and/or Complexity of Data Reviewed  Clinical lab tests: ordered and reviewed  Review and summarize past medical records: yes    Risk of Complications, Morbidity, and/or Mortality  Presenting problems: moderate  Diagnostic procedures: moderate  Management options: moderate    Patient Progress  Patient progress: stable    ED Course       Procedures     Recent Results (from the past 12 hour(s))   CBC WITH AUTOMATED DIFF    Collection Time: 06/04/17 10:30 PM   Result Value Ref Range    WBC 6.6 4.3 - 11.1 K/uL    RBC 4.94 4.05 - 5.25 M/uL    HGB 13.1 11.7 - 15.4 g/dL    HCT 41.1 35.8 - 46.3 %    MCV 83.2 79.6 - 97.8 FL    MCH 26.5 26.1 - 32.9 PG    MCHC 31.9 31.4 - 35.0 g/dL    RDW 14.4 11.9 - 14.6 %    PLATELET 729 003 - 040 K/uL    MPV 10.8 10.8 - 14.1 FL    DF AUTOMATED      NEUTROPHILS 65 43 - 78 %    LYMPHOCYTES 24 13 - 44 %    MONOCYTES 9 4.0 - 12.0 %    EOSINOPHILS 1 0.5 - 7.8 %    BASOPHILS 1 0.0 - 2.0 %    IMMATURE GRANULOCYTES 0.0 0.0 - 5.0 %    ABS. NEUTROPHILS 4.4 1.7 - 8.2 K/UL    ABS. LYMPHOCYTES 1.6 0.5 - 4.6 K/UL    ABS. MONOCYTES 0.6 0.1 - 1.3 K/UL    ABS. EOSINOPHILS 0.1 0.0 - 0.8 K/UL    ABS. BASOPHILS 0.0 0.0 - 0.2 K/UL    ABS. IMM. GRANS. 0.0 0.0 - 0.5 K/UL   METABOLIC PANEL, COMPREHENSIVE    Collection Time: 06/04/17 10:30 PM   Result Value Ref Range    Sodium 144 136 - 145 mmol/L    Potassium 4.1 3.5 - 5.1 mmol/L    Chloride 105 98 - 107 mmol/L    CO2 27 21 - 32 mmol/L    Anion gap 12 7 - 16 mmol/L    Glucose 91 65 - 100 mg/dL    BUN 11 8 - 23 MG/DL    Creatinine 0.76 0.6 - 1.0 MG/DL    GFR est AA >60 >60 ml/min/1.73m2    GFR est non-AA >60 >60 ml/min/1.73m2    Calcium 9.5 8.3 - 10.4 MG/DL    Bilirubin, total 1.1 0.2 - 1.1 MG/DL    ALT (SGPT) 23 12 - 65 U/L    AST (SGOT) 24 15 - 37 U/L    Alk. phosphatase 71 50 - 136 U/L    Protein, total 8.4 (H) 6.3 - 8.2 g/dL    Albumin 3.6 3.2 - 4.6 g/dL    Globulin 4.8 (H) 2.3 - 3.5 g/dL    A-G Ratio 0.8 (L) 1.2 - 3.5     POC TROPONIN-I    Collection Time: 06/04/17 10:36 PM   Result Value Ref Range    Troponin-I (POC) 0 0.0 - 0.08 ng/ml   GLUCOSE, POC    Collection Time: 06/04/17 10:41 PM   Result Value Ref Range    Glucose (POC) 85 65 - 100 mg/dL         68 yo female with HTN:     Labs WNL, patient has follow up with PCP in 2 days for recheck, given catapress in ED and will observe for BP to improve and patient agrees with discharge with addition of amlodipine to mediations and return with any chest pain, SOB, nausea or vomiting, fevers or chills or any further concerns.  She is very well appearing and NAD and although initially very upset due to having to wait so long and the episode at her PCPs office today however I was able to calm her and she is in full agreement with above plan and grateful for care.

## 2017-06-12 ENCOUNTER — HOSPITAL ENCOUNTER (OUTPATIENT)
Dept: MAMMOGRAPHY | Age: 72
Discharge: HOME | End: 2017-06-12
Attending: INTERNAL MEDICINE
Payer: COMMERCIAL

## 2017-06-12 DIAGNOSIS — Z12.31 ENCOUNTER FOR SCREENING MAMMOGRAM FOR MALIGNANT NEOPLASM OF BREAST: ICD-10-CM

## 2017-06-12 PROCEDURE — 77067 SCR MAMMO BI INCL CAD: CPT

## 2017-06-13 PROBLEM — R42 DIZZINESS: Status: ACTIVE | Noted: 2017-06-13

## 2017-06-26 ENCOUNTER — HOSPITAL ENCOUNTER (OUTPATIENT)
Dept: CT IMAGING | Age: 72
Discharge: HOME | End: 2017-06-26
Attending: INTERNAL MEDICINE
Payer: SELF-PAY

## 2017-06-26 ENCOUNTER — HOSPITAL ENCOUNTER (OUTPATIENT)
Dept: LAB | Age: 72
Discharge: HOME | End: 2017-06-26
Attending: INTERNAL MEDICINE
Payer: MEDICARE

## 2017-06-26 DIAGNOSIS — I10 ESSENTIAL HYPERTENSION: ICD-10-CM

## 2017-06-26 DIAGNOSIS — E11.9 CONTROLLED TYPE 2 DIABETES MELLITUS WITHOUT COMPLICATION, WITHOUT LONG-TERM CURRENT USE OF INSULIN (HCC): ICD-10-CM

## 2017-06-26 LAB
T4 SERPL-MCNC: 10.6 UG/DL (ref 4.8–13.9)
TSH SERPL DL<=0.005 MIU/L-ACNC: 0.25 UIU/ML (ref 0.36–3.74)

## 2017-06-26 PROCEDURE — 36415 COLL VENOUS BLD VENIPUNCTURE: CPT | Performed by: INTERNAL MEDICINE

## 2017-06-26 PROCEDURE — 75571 CT HRT W/O DYE W/CA TEST: CPT

## 2017-06-26 PROCEDURE — 84436 ASSAY OF TOTAL THYROXINE: CPT | Performed by: INTERNAL MEDICINE

## 2017-06-26 PROCEDURE — 84443 ASSAY THYROID STIM HORMONE: CPT | Performed by: INTERNAL MEDICINE

## 2017-07-05 NOTE — PROGRESS NOTES
Pt was notified of elevated Calcium Score/Dr. Nicolás Laguna response, needs cara exercise nuke study. Pt voiced understanding. Pt states that she would like to discuss having the nuclear test  W/Dr. Jennifer Landers                         Pt notified of elevated calcium score/Dr. Aletha Sanchez response, needs  cara exercise nuke study. Pt states that she has a F/U appt w/Dr. Jennifer Landers on Friday and would like to discuss this w/him first.  Pt states that she also had blood work done and needs to come in for those results as well.

## 2017-11-09 NOTE — H&P
HOSPITALIST H&P/CONSULT  NAME:  Lenin Medina   Age:  66 y.o.  :   1945   MRN:   [id number]  PCP: Not On File [facility]  Treatment Team: Attending Provider: Tammy Tavarez MD; Primary Nurse: Denisse Nguyen RN    No Order     CC: Reason for admission is: syncope    HPI:   Patient case was reviewed with the ER provider prior to seeing the patient. Patient is a 66 y.o. female who presents to the ER after a fall/syncope at work. She struck the rt side of her head/face with obvious trauma. She is unsure what happened, but believes that she blacked out for a moment. ER w/u shows a slightly elevated troponin. We are asked to admit for telem monitoring and trend troponin. ROS:  +vertigo/tinnitus - chronic; headache since fall; All systems have been reviewed and are negative except as stated in HPI or elsewhere.       Past Medical History:   Diagnosis Date    Abdominal pain     Arthritis     Breast cancer, left ([address]     Breast pain in female     Bunion     Cholesterol serum elevated     medication    Chronic pain of left knee     Corns and callosities     DDD (degenerative disc disease), lumbar     Diabetes mellitus type II, controlled ([address]     Emotional disorder     Fungal infection of toenail     Hallux valgus     Hemorrhoids     Hip pain, bilateral     Hyperglycemia     Hyperlipidemia     Hypertension     medication    Hypothyroidism     Lower GI bleed     Obesity     Other ill-defined conditions(799.89)     pt reports urinalysis shows blood - she is scheduled for CT pelvis and abdomen in August)    Postmenopausal     Radiation therapy     Radiation therapy complication     7506    Right median nerve neuropathy     Stress incontinence     Traumatic arthropathy of knee     Urinary incontinence       Past Surgical History:   Procedure Laterality Date    HX BREAST BIOPSY Right     benign    HX BREAST LUMPECTOMY Left     radiation for CA    HX COLONOSCOPY  2008    HX HYSTERECTOMY      HX ORTHOPAEDIC  2005    Left TSA    HX ORTHOPAEDIC      right ankle    HX ORTHOPAEDIC      left knee repair    HX ORTHOPAEDIC Left     ankle arthroscopy    HX SHOULDER REPLACEMENT Left     HX TUBAL LIGATION      HX UROLOGICAL      cystoscopy    US GUIDED CORE BREAST BIOPSY Right       Social History   Substance Use Topics    Smoking status: Former Smoker     Packs/day: 0.25     Years: 25.00     Quit date: 7/13/2001    Smokeless tobacco: Not on file    Alcohol use No      Family History   Problem Relation Age of Onset    Breast Cancer Sister 77    Breast Cancer Sister     Colon Cancer Other     Cancer Other      bone CA    Diabetes Other     Hypertension Other     Malignant Hyperthermia Neg Hx     Pseudocholinesterase Deficiency Neg Hx     Delayed Awakening Neg Hx     Post-op Nausea/Vomiting Neg Hx     Emergence Delirium Neg Hx     Post-op Cognitive Dysfunction Neg Hx     Other Neg Hx        FH Reviewed and non-contributory to admitting diagnosis    No Known Allergies   Prior to Admission Medications   Prescriptions Last Dose Informant Patient Reported? Taking? B.infantis-B.ani-B.long-B.bifi (PROBIOTIC 4X) 10-15 mg TbEC   Yes No   Sig: Take  by mouth. Calcium-Cholecalciferol, D3, (CALCIUM 600 WITH VITAMIN D3) 600 mg(1,500mg) -400 unit chew   Yes No   Sig: Take  by mouth daily. Cetirizine (ZYRTEC) 10 mg cap   Yes No   Sig: Take 10 mg by mouth nightly. Cholecalciferol, Vitamin D3, (VITAMIN D3) 1,000 unit Cap   Yes No   Sig: Take  by mouth every Monday and Friday. PV W-O TONO/FERROUS FUMARATE/FA (M-VIT PO)   Yes No   Sig: Take  by mouth daily. atorvastatin (LIPITOR) 10 mg tablet   Yes No   Sig: Take 20 mg by mouth every evening. Pt takes at night   coenzyme q10 (CO Q-10) 10 mg Cap   Yes No   Sig: Take  by mouth daily. diclofenac EC (VOLTAREN) 75 mg EC tablet   Yes No   Sig: Take  by mouth.    glucose blood VI test strips (ONETOUCH ULTRA TEST) strip   Yes No   Sig: by Does Not Apply route See Admin Instructions. levothyroxine (SYNTHROID) 88 mcg tablet   Yes No   Sig: Take 88 mcg by mouth Daily (before breakfast). Take day of surgery. magnesium 500 mg Tab   Yes No   Sig: Take  by mouth.     meloxicam (MOBIC) 15 mg tablet   No No   Sig: Take 1 Tab by mouth daily. metFORMIN (GLUCOPHAGE) 500 mg tablet   Yes No   Sig: Take  by mouth two (2) times a day. 1/2 tab   multivitamin (ONE A DAY) tablet   Yes No   Sig: Take 1 Tab by mouth. omega-3 fatty acids-vitamin e (FISH OIL) 1,000 mg Cap   Yes No   Sig: Take 1 Cap by mouth daily. promethazine-dextromethorphan (PROMETHAZINE-DM) 6.25-15 mg/5 mL syrup   Yes No   Sig: Take  by mouth three (3) times daily as needed for Cough. traMADol (ULTRAM) 50 mg tablet   Yes No   Sig: Take 50 mg by mouth every six (6) hours as needed. valsartan (DIOVAN) 320 mg tablet   Yes No   Sig: Take 320 mg by mouth daily. Facility-Administered Medications: None         Objective:     Visit Vitals    BP (!) 143/95 (BP 1 Location: Right arm, BP Patient Position: At rest)    Pulse 77    Temp 97.8 °F (36.6 °C)    Resp 16    Ht 5' 2\" (1.575 m)    Wt 79.4 kg (175 lb)    SpO2 97%    BMI 32.01 kg/m2      Temp (24hrs), Av.8 °F (36.6 °C), Min:97.8 °F (36.6 °C), Max:97.8 °F (36.6 °C)    Oxygen Therapy  O2 Sat (%): 97 % (17 1458)  O2 Device: Room air (17 1458)   Body mass index is 32.01 kg/(m^2). Physical Exam:    General:    WD and WN , No apparent distress. Head:   Normocephalic, without obvious abnormality, ecchymosis around rt upper face/eye with abrasion  Eyes:  PERRL; EOMI  ENT:  Hearing is normal.  Oropharynx is clear with tacky mucous membranes   Resp:    Clear to auscultation bilaterally. No Wheezing or Rhonchi. Resp are even and unlabored  Heart[de-identified]  Regular rate and rhythm,  no murmur, rub or gallop. No LE edema  Abdomen:   Soft, non-tender. Not distended.   Bowel sounds normal. hepato-splenomegaly -none  Musc/SK: Muscle strength and tone normal and appropriate for age and condition. No cyanosis. No clubbing  Skin:     Texture, turgor normal. No significant rashes or lesions. Neurologic: CN II - XII are tested and intact; Reflexes unobtainable  Psych: Alert and oriented x 4;  Judgement and insight are normal     Data Review:   Recent Results (from the past 24 hour(s))   CBC WITH AUTOMATED DIFF    Collection Time: 03/31/17  6:50 PM   Result Value Ref Range    WBC 7.8 4.3 - 11.1 K/uL    RBC 4.87 4.05 - 5.25 M/uL    HGB 13.1 11.7 - 15.4 g/dL    HCT 40.0 35.8 - 46.3 %    MCV 82.1 79.6 - 97.8 FL    MCH 26.9 26.1 - 32.9 PG    MCHC 32.8 31.4 - 35.0 g/dL    RDW 14.0 11.9 - 14.6 %    PLATELET 468 034 - 329 K/uL    MPV 10.8 10.8 - 14.1 FL    DF AUTOMATED      NEUTROPHILS 70 43 - 78 %    LYMPHOCYTES 21 13 - 44 %    MONOCYTES 8 4.0 - 12.0 %    EOSINOPHILS 1 0.5 - 7.8 %    BASOPHILS 0 0.0 - 2.0 %    IMMATURE GRANULOCYTES 0.3 0.0 - 5.0 %    ABS. NEUTROPHILS 5.5 1.7 - 8.2 K/UL    ABS. LYMPHOCYTES 1.6 0.5 - 4.6 K/UL    ABS. MONOCYTES 0.6 0.1 - 1.3 K/UL    ABS. EOSINOPHILS 0.1 0.0 - 0.8 K/UL    ABS. BASOPHILS 0.0 0.0 - 0.2 K/UL    ABS. IMM. GRANS. 0.0 0.0 - 0.5 K/UL   METABOLIC PANEL, COMPREHENSIVE    Collection Time: 03/31/17  6:50 PM   Result Value Ref Range    Sodium 142 136 - 145 mmol/L    Potassium 3.4 (L) 3.5 - 5.1 mmol/L    Chloride 105 98 - 107 mmol/L    CO2 27 21 - 32 mmol/L    Anion gap 10 7 - 16 mmol/L    Glucose 97 65 - 100 mg/dL    BUN 22 8 - 23 MG/DL    Creatinine 0.95 0.6 - 1.0 MG/DL    GFR est AA >60 >60 ml/min/1.73m2    GFR est non-AA >60 >60 ml/min/1.73m2    Calcium 9.2 8.3 - 10.4 MG/DL    Bilirubin, total 0.7 0.2 - 1.1 MG/DL    ALT (SGPT) 25 12 - 65 U/L    AST (SGOT) 21 15 - 37 U/L    Alk.  phosphatase 74 50 - 136 U/L    Protein, total 8.5 (H) 6.3 - 8.2 g/dL    Albumin 3.7 3.2 - 4.6 g/dL    Globulin 4.8 (H) 2.3 - 3.5 g/dL    A-G Ratio 0.8 (L) 1.2 - 3.5     TROPONIN I    Collection Time: 03/31/17  6:50 PM   Result Value Ref Range    Troponin-I, Qt. 0.09 (H) 0.02 - 0.05 NG/ML   EKG, 12 LEAD, INITIAL    Collection Time: 03/31/17  7:43 PM   Result Value Ref Range    Ventricular Rate 73 BPM    Atrial Rate 73 BPM    P-R Interval 184 ms    QRS Duration 70 ms    Q-T Interval 372 ms    QTC Calculation (Bezet) 409 ms    Calculated P Axis 67 degrees    Calculated R Axis 62 degrees    Calculated T Axis 82 degrees    Diagnosis       !! AGE AND GENDER SPECIFIC ECG ANALYSIS !! Normal sinus rhythm  Cannot rule out Anterior infarct , age undetermined  Abnormal ECG  When compared with ECG of 08-AUG-2009 23:39,  No significant change was found       CXR Results  (Last 48 hours)               03/31/17 1830  XR CHEST PA LAT Final result    Impression:  IMPRESSION:    1. No acute cardiopulmonary process evident by plain film imaging. Narrative:  CHEST X-RAY, 2 views 3/31/2017       History: Fall today. Technique: AP and lateral views of the chest.        Comparison: Chest x-ray 8/9/2009       Findings: The cardiac silhouette is not felt to be significantly enlarged given the AP   technique. The lungs are expanded without evidence for pneumothorax. No   consolidation, or evidence of pleural effusion is seen. The bony thorax demonstrates no definite acute changes. Additional dedicated   imaging as clinically indicated should be considered if acute osseous injury is   suspected. Stable postsurgical changes are seen of the left shoulder. The upper   abdomen is unremarkable in appearance. CT Results  (Last 48 hours)               03/31/17 1811  CT HEAD WO CONT Final result    Impression:  IMPRESSION:    1. No acute intracranial process evident by noncontrast CT study of the head. 2. No acute osseous abnormality the bony calvarium, facial bones, or cervical   spine. A moderate right periorbital soft tissue hematoma is seen.            Narrative:  CT HEAD,  CERVICAL [address] [place] [address] WITHOUT CONTRAST, 3/31/2017. History: Fall striking right for head and cheek bone. Comparison: None. Technique:   5 mm axial scans from the skull base to the vertex, 1.25 mm axial   scans from the skull base into the upper chest performed, and sagittal and   coronal reconstructed images performed, and Axial 2.5 mm scans from above the   orbits through the mandible with coronal reconstructed images performed. Imaging was performed without contrast. All CT scans performed at this facility   use one or all of the following: Automated exposure control, adjustment of the   mA and/or kVp according to patient's size, iterative reconstruction. CT HEAD:   Findings:  No evidence of intracranial hemorrhage is seen. No abnormal   extra-axial fluid collections are seen. The ventricles are normal in size and   configuration. No evidence of midline shift or obvious mass effect is seen. No   abnormal edema pattern is seen in a vascular distribution to suggest large   artery infarction. Evaluation with bone windows shows no acute osseous abnormality of the bony   calvarium. No abnormal fluid collections are seen associated with the aerated   sinuses. CT cervical spine:    The skull base is grossly intact. No acute vertebral body, or posterior element   abnormality is seen. 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 Reconstructed images show maintained alignment. The dens is intact, and the pre   dens space is normal.  Prevertebral soft tissues are normal. Moderate left facet   hypertrophic changes are seen in the upper to mid cervical spine. Limited evaluation of the lung apices to the weekend to.       CT FACE:   No acute findings are seen of the skull base, or mandible. The temporomandibular   joints are maintained without evidence for dislocation. No fractures are seen on   the facial bony structures.  A moderate right periorbital soft tissue hematoma is   seen. 03/31/17 1811  CT MAXILLOFACIAL WO CONT Final result    Impression:  IMPRESSION:    1. No acute intracranial process evident by noncontrast CT study of the head. 2. No acute osseous abnormality the bony calvarium, facial bones, or cervical   spine. A moderate right periorbital soft tissue hematoma is seen. Narrative:  CT HEAD,  CERVICAL SPINE AND FACE WITHOUT CONTRAST, 3/31/2017. History: Fall striking right for head and cheek bone. Comparison: None. Technique:   5 mm axial scans from the skull base to the vertex, 1.25 mm axial   scans from the skull base into the upper chest performed, and sagittal and   coronal reconstructed images performed, and Axial 2.5 mm scans from above the   orbits through the mandible with coronal reconstructed images performed. Imaging was performed without contrast. All CT scans performed at this facility   use one or all of the following: Automated exposure control, adjustment of the   mA and/or kVp according to patient's size, iterative reconstruction. CT HEAD:   Findings:  No evidence of intracranial hemorrhage is seen. No abnormal   extra-axial fluid collections are seen. The ventricles are normal in size and   configuration. No evidence of midline shift or obvious mass effect is seen. No   abnormal edema pattern is seen in a vascular distribution to suggest large   artery infarction. Evaluation with bone windows shows no acute osseous abnormality of the bony   calvarium. No abnormal fluid collections are seen associated with the aerated   sinuses. CT cervical spine:    The skull base is grossly intact. No acute vertebral body, or posterior element   abnormality is seen. 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 Reconstructed images show maintained alignment.   The dens is intact, and the pre   dens space is normal.  Prevertebral soft tissues are normal. Moderate left facet   hypertrophic changes are seen in the upper to mid cervical spine. Limited evaluation of the lung apices to the weekend to.       CT FACE:   No acute findings are seen of the skull base, or mandible. The temporomandibular   joints are maintained without evidence for dislocation. No fractures are seen on   the facial bony structures. A moderate right periorbital soft tissue hematoma is   seen. 03/31/17 1811  CT SPINE CERV WO CONT Final result    Impression:  IMPRESSION:    1. No acute intracranial process evident by noncontrast CT study of the head. 2. No acute osseous abnormality the bony calvarium, facial bones, or cervical   spine. A moderate right periorbital soft tissue hematoma is seen. Narrative:  CT HEAD,  CERVICAL SPINE AND FACE WITHOUT CONTRAST, 3/31/2017. History: Fall striking right for head and cheek bone. Comparison: None. Technique:   5 mm axial scans from the skull base to the vertex, 1.25 mm axial   scans from the skull base into the upper chest performed, and sagittal and   coronal reconstructed images performed, and Axial 2.5 mm scans from above the   orbits through the mandible with coronal reconstructed images performed. Imaging was performed without contrast. All CT scans performed at this facility   use one or all of the following: Automated exposure control, adjustment of the   mA and/or kVp according to patient's size, iterative reconstruction. CT HEAD:   Findings:  No evidence of intracranial hemorrhage is seen. No abnormal   extra-axial fluid collections are seen. The ventricles are normal in size and   configuration. No evidence of midline shift or obvious mass effect is seen. No   abnormal edema pattern is seen in a vascular distribution to suggest large   artery infarction. Evaluation with bone windows shows no acute osseous abnormality of the bony   calvarium.   No abnormal fluid collections are seen associated with the aerated   sinuses. CT cervical spine:    The skull base is grossly intact. No acute vertebral body, or posterior element   abnormality is seen. 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 Reconstructed images show maintained alignment. The dens is intact, and the pre   dens space is normal.  Prevertebral soft tissues are normal. Moderate left facet   hypertrophic changes are seen in the upper to mid cervical spine. Limited evaluation of the lung apices to the weekend to.       CT FACE:   No acute findings are seen of the skull base, or mandible. The temporomandibular   joints are maintained without evidence for dislocation. No fractures are seen on   the facial bony structures. A moderate right periorbital soft tissue hematoma is   seen. Assessment and Plan: Active Hospital Problems    Diagnosis Date Noted    Syncope 03/31/2017    Hypertension     Diabetes mellitus type II, controlled ([facility] [address]          · PLAN   · Telem monitoring  · Serial troponin  · Echo in am  · Consult cardiology if any further events or positive tests  · D/c her hills-2 inhibitors due to age and potential for kidney damage; as well as her metformin  · orthostat VS  · Cont appropriate home meds (see MAR)  · Control symptoms (pain, n/v, fever, etc)  · Monitor appropriate labs   · Plans discussed with patient and/or caregiver; questions answered.     Med records reviewed if applicable; findings:     Critical care time if applicable:      Signed By: Teri Jordan MD     March 31, 2017 mother

## 2017-12-01 PROBLEM — R93.1 AGATSTON CORONARY ARTERY CALCIUM SCORE GREATER THAN 400: Status: ACTIVE | Noted: 2017-12-01

## 2018-02-20 ENCOUNTER — HOSPITAL ENCOUNTER (EMERGENCY)
Age: 73
Discharge: HOME | End: 2018-02-20
Attending: EMERGENCY MEDICINE
Payer: COMMERCIAL

## 2018-02-20 VITALS
WEIGHT: 178 LBS | OXYGEN SATURATION: 99 % | DIASTOLIC BLOOD PRESSURE: 82 MMHG | BODY MASS INDEX: 30.39 KG/M2 | TEMPERATURE: 98 F | RESPIRATION RATE: 16 BRPM | HEART RATE: 70 BPM | SYSTOLIC BLOOD PRESSURE: 138 MMHG | HEIGHT: 64 IN

## 2018-02-20 DIAGNOSIS — R04.0 EPISTAXIS: Primary | ICD-10-CM

## 2018-02-20 PROCEDURE — 77030013848 HC PK NSL EPITAX S&N -B

## 2018-02-20 PROCEDURE — 74011250637 HC RX REV CODE- 250/637: Performed by: EMERGENCY MEDICINE

## 2018-02-20 PROCEDURE — 75810000284 HC CNTRL NASAL HEMORHRAGE SIMPLE: Performed by: EMERGENCY MEDICINE

## 2018-02-20 PROCEDURE — 99283 EMERGENCY DEPT VISIT LOW MDM: CPT | Performed by: EMERGENCY MEDICINE

## 2018-02-20 RX ORDER — CEPHALEXIN 500 MG/1
500 CAPSULE ORAL 4 TIMES DAILY
Qty: 28 CAP | Refills: 0 | Status: SHIPPED | OUTPATIENT
Start: 2018-02-20 | End: 2018-02-27

## 2018-02-20 RX ORDER — OXYMETAZOLINE HCL 0.05 %
2 SPRAY, NON-AEROSOL (ML) NASAL
Status: COMPLETED | OUTPATIENT
Start: 2018-02-20 | End: 2018-02-20

## 2018-02-20 RX ADMIN — OXYMETAZOLINE HYDROCHLORIDE 2 SPRAY: 5 SPRAY NASAL at 10:37

## 2018-02-20 NOTE — DISCHARGE INSTRUCTIONS
Nosebleeds: Care Instructions  Your Care Instructions    Nosebleeds are common, especially if you have colds or allergies. Many things can cause a nosebleed. Some nosebleeds stop on their own with pressure. Others need packing. Some get cauterized (sealed). If you have gauze or other packing materials in your nose, you will need to follow up with your doctor to have the packing removed. You may need more treatment if you get nosebleeds a lot. The doctor has checked you carefully, but problems can develop later. If you notice any problems or new symptoms, get medical treatment right away. Follow-up care is a key part of your treatment and safety. Be sure to make and go to all appointments, and call your doctor if you are having problems. It's also a good idea to know your test results and keep a list of the medicines you take. How can you care for yourself at home? · If you get another nosebleed:  ¨ Sit up and tilt your head slightly forward. This keeps blood from going down your throat. ¨ Use your thumb and index finger to pinch your nose shut for 10 minutes. Use a clock. Do not check to see if the bleeding has stopped before the 10 minutes are up. If the bleeding has not stopped, pinch your nose shut for another 10 minutes. ¨ When the bleeding has stopped, try not to pick, rub, or blow your nose for 12 hours. Avoiding these things helps keep your nose from bleeding again. · If your doctor prescribed antibiotics, take them as directed. Do not stop taking them just because you feel better. You need to take the full course of antibiotics. To prevent nosebleeds  · Do not blow your nose too hard. · Try not to lift or strain after a nosebleed. · Raise your head on a pillow while you sleep. · Put a thin layer of a saline- or water-based nasal gel, such as NasoGel, inside your nose. Put it on the septum, which divides your nostrils. This will prevent dryness that can cause nosebleeds.   · Use a vaporizer or humidifier to add moisture to your bedroom. Follow the directions for cleaning the machine. · Do not use aspirin, ibuprofen (Advil, Motrin), or naproxen (Aleve) for 36 to 48 hours after a nosebleed unless your doctor tells you to. You can use acetaminophen (Tylenol) for pain relief. · Talk to your doctor about stopping any other medicines you are taking. Some medicines may make you more likely to get a nosebleed. · Do not use cold medicines or nasal sprays without first talking to your doctor. They can make your nose dry. When should you call for help? Call 911 anytime you think you may need emergency care. For example, call if:  ? · You passed out (lost consciousness). ?Call your doctor now or seek immediate medical care if:  ? · You get another nosebleed and your nose is still bleeding after you have applied pressure 3 times for 10 minutes each time (30 minutes total). ? · There is a lot of blood running down the back of your throat even after you pinch your nose and tilt your head forward. ? · You have a fever. ? · You have sinus pain. ? Watch closely for changes in your health, and be sure to contact your doctor if:  ? · You get nosebleeds often, even if they stop. ? · You do not get better as expected. Where can you learn more? Go to http://kennedy-baldo.info/. Enter S156 in the search box to learn more about \"Nosebleeds: Care Instructions. \"  Current as of: March 20, 2017  Content Version: 11.4  © 1778-0351 [employer]. Care instructions adapted under license by [employer] (which disclaims liability or warranty for this information). If you have questions about a medical condition or this instruction, always ask your healthcare professional. [address] any warranty or liability for your use of this information.

## 2018-02-20 NOTE — ED NOTES
I have reviewed discharge instructions with the patient. The patient verbalized understanding. Patient left ED via Discharge Method: ambulatory to Home with self. Opportunity for questions and clarification provided. Patient given 1 scripts. To continue your aftercare when you leave the hospital, you may receive an automated call from our care team to check in on how you are doing. This is a free service and part of our promise to provide the best care and service to meet your aftercare needs.  If you have questions, or wish to unsubscribe from this service please call [phone]. Thank you for Choosing our [facility] Emergency Department.

## 2018-02-20 NOTE — ED TRIAGE NOTES
Pt in states nose bleed starting at 0930. States she was working and went into freezer at work and nose started bleeding. States takes 81mg aspirin every other day. Bleeding present on arrival. Denies injury or pain.

## 2018-02-20 NOTE — ED PROVIDER NOTES
HPI Comments: 76-year-old female was at work felt the pop started having bleeding from her left naris. Persisted throughout her transport here.   Continue have some oozing from the left naris despite nasal clamp           Past Medical History:   Diagnosis Date    Abdominal pain     Arthritis     Breast cancer, left ([address] 2001    Breast pain in female     Bunion     Cholesterol serum elevated     medication    Chronic pain of left knee     Corns and callosities     DDD (degenerative disc disease), lumbar     Diabetes mellitus type II, controlled ([address]     Emotional disorder     Fungal infection of toenail     Hallux valgus     Hip pain, bilateral     Hyperglycemia     Hyperlipidemia     Hypertension     medication    Hypothyroidism     Lower GI bleed     Obesity     Other ill-defined conditions(799.89)     pt reports urinalysis shows blood - she is scheduled for CT pelvis and abdomen in August)    Postmenopausal     Radiation therapy 2001    Right median nerve neuropathy     Stress incontinence     Traumatic arthropathy of knee        Past Surgical History:   Procedure Laterality Date    HX BREAST BIOPSY Right 2011    benign    HX BREAST LUMPECTOMY Left 2001    radiation for CA    HX COLONOSCOPY  2008    HX HYSTERECTOMY      HX ORTHOPAEDIC  2005    Left TSA    HX ORTHOPAEDIC      right ankle    HX ORTHOPAEDIC      left knee repair    HX ORTHOPAEDIC Left     ankle arthroscopy    HX SHOULDER REPLACEMENT Left     HX TUBAL LIGATION      HX UROLOGICAL      cystoscopy    US GUIDED CORE BREAST BIOPSY Right          Family History:   Problem Relation Age of Onset    Breast Cancer Sister 77    Cancer Mother      colon    Diabetes Mother     Diabetes Father     Breast Cancer Sister     Colon Cancer Other      mother    Cancer Other      bone CA    Diabetes Other     Hypertension Other     Malignant Hyperthermia Neg Hx     Pseudocholinesterase Deficiency Neg Hx     Delayed Awakening Neg Hx     Post-op Nausea/Vomiting Neg Hx     Emergence Delirium Neg Hx     Post-op Cognitive Dysfunction Neg Hx     Other Neg Hx        Social History     Social History    Marital status: SINGLE     Spouse name: N/A    Number of children: N/A    Years of education: N/A     Occupational History    Not on file. Social History Main Topics    Smoking status: Former Smoker     Packs/day: 0.25     Years: 25.00     Quit date: 7/13/2001    Smokeless tobacco: Never Used    Alcohol use No    Drug use: No    Sexual activity: Not Currently     Other Topics Concern    Not on file     Social History Narrative         ALLERGIES: Review of patient's allergies indicates no known allergies. Review of Systems   Constitutional: Negative for chills and fever. Respiratory: Negative for shortness of breath. Vitals:    02/20/18 1011   BP: (!) 149/99   Pulse: 75   Resp: 16   Temp: 98.1 °F (36.7 °C)   SpO2: 98%   Weight: 80.7 kg (178 lb)   Height: 5' 4\" (1.626 m)            Physical Exam   Constitutional: She is oriented to person, place, and time. She appears well-developed and well-nourished. HENT:   Nose:       Cardiovascular: Regular rhythm. Pulmonary/Chest: Breath sounds normal.   Neurological: She is oriented to person, place, and time. Nursing note and vitals reviewed. MDM  Number of Diagnoses or Management Options  Diagnosis management comments: Head patient blowout large number of clots. Afrin instilled into the left naris. A 5.5 cm rapid Rhino was placed and gently inflated    We'll observe for changes  Umesh Boyd MD; 2/20/2018 @10:43 AM===========================================    Patient recheck. Resting comfortably. Little bit of oozing from the left naris but no active bleeding    Patient appears well    Start on Keflex. Have her follow-up with ENT on Thursday.   Umesh Boyd MD; 2/20/2018 @11:39 AM===========================================      ED Course       Procedures

## 2018-06-01 PROBLEM — R04.0 EPISTAXIS: Status: ACTIVE | Noted: 2018-06-01

## 2018-06-21 ENCOUNTER — HOSPITAL ENCOUNTER (OUTPATIENT)
Dept: MAMMOGRAPHY | Age: 73
Discharge: HOME | End: 2018-06-21
Attending: INTERNAL MEDICINE
Payer: COMMERCIAL

## 2018-06-21 DIAGNOSIS — Z12.31 VISIT FOR SCREENING MAMMOGRAM: ICD-10-CM

## 2018-06-21 PROCEDURE — 77067 SCR MAMMO BI INCL CAD: CPT

## 2018-07-24 PROBLEM — Z28.21 REFUSED DIPHTHERIA-TETANUS VACCINE: Status: ACTIVE | Noted: 2018-07-24

## 2018-07-24 PROBLEM — Z28.21 INFLUENZA VACCINE REFUSED: Status: ACTIVE | Noted: 2018-07-24

## 2018-07-24 PROBLEM — R55 SYNCOPE: Status: RESOLVED | Noted: 2017-03-31 | Resolved: 2018-07-24

## 2018-07-24 PROBLEM — Z28.21 PNEUMOCOCCAL VACCINE REFUSED: Status: ACTIVE | Noted: 2018-07-24

## 2018-07-24 PROBLEM — R42 DIZZINESS: Status: RESOLVED | Noted: 2017-06-13 | Resolved: 2018-07-24

## 2018-07-24 PROBLEM — R04.0 EPISTAXIS: Status: RESOLVED | Noted: 2018-06-01 | Resolved: 2018-07-24

## 2018-10-03 PROBLEM — L03.115 CELLULITIS OF RIGHT LOWER EXTREMITY: Status: ACTIVE | Noted: 2018-10-03

## 2018-11-26 ENCOUNTER — HOSPITAL ENCOUNTER (OUTPATIENT)
Dept: CT IMAGING | Age: 73
Discharge: HOME | End: 2018-11-26
Attending: INTERNAL MEDICINE
Payer: COMMERCIAL

## 2018-11-26 DIAGNOSIS — R10.9 ACUTE FLANK PAIN: ICD-10-CM

## 2018-11-26 DIAGNOSIS — R31.29 OTHER MICROSCOPIC HEMATURIA: ICD-10-CM

## 2018-11-26 PROCEDURE — 74176 CT ABD & PELVIS W/O CONTRAST: CPT

## 2018-11-27 NOTE — PROGRESS NOTES
I called pt and informed. Pt want to know if she needs a follow up visit due to she had blood in urine? Please advise.  thanks

## 2018-11-27 NOTE — PROGRESS NOTES
I called pt and informed. Pt still insisted that she wants to check her urine to see if their is any trace of blood.  Pt scheduled for nurse visit to recheck her urine

## 2018-12-18 PROBLEM — K43.9 ABDOMINAL WALL HERNIA: Status: ACTIVE | Noted: 2018-12-18

## 2018-12-18 PROBLEM — R10.9 ACUTE FLANK PAIN: Status: RESOLVED | Noted: 2018-11-26 | Resolved: 2018-12-18

## 2018-12-18 PROBLEM — L03.115 CELLULITIS OF RIGHT LOWER EXTREMITY: Status: RESOLVED | Noted: 2018-10-03 | Resolved: 2018-12-18

## 2018-12-18 PROBLEM — M47.817 SPONDYLOSIS OF LUMBOSACRAL REGION WITHOUT MYELOPATHY OR RADICULOPATHY: Status: ACTIVE | Noted: 2018-12-18

## 2018-12-18 PROBLEM — R31.29 OTHER MICROSCOPIC HEMATURIA: Status: RESOLVED | Noted: 2018-11-26 | Resolved: 2018-12-18

## 2019-02-27 ENCOUNTER — HOSPITAL ENCOUNTER (OUTPATIENT)
Dept: MRI IMAGING | Age: 74
Discharge: HOME | End: 2019-02-27
Attending: INTERNAL MEDICINE
Payer: COMMERCIAL

## 2019-02-27 DIAGNOSIS — M47.817 SPONDYLOSIS OF LUMBOSACRAL REGION WITHOUT MYELOPATHY OR RADICULOPATHY: ICD-10-CM

## 2019-02-27 DIAGNOSIS — Z85.3 HISTORY OF BREAST CANCER: ICD-10-CM

## 2019-02-27 PROCEDURE — 72148 MRI LUMBAR SPINE W/O DYE: CPT

## 2019-02-27 NOTE — PROGRESS NOTES
Please call patient. ... She has a bulging disc that is probably causing her low back pain. I would recommend consultation with a pain management specialist for evaluation for localized injection therapy/nerve block to decrease inflammation. I would also recommend PT. Referral orders are done.   Candy Canada MD

## 2019-03-06 ENCOUNTER — HOSPITAL ENCOUNTER (OUTPATIENT)
Dept: PHYSICAL THERAPY | Age: 74
Discharge: HOME | End: 2019-03-06
Attending: INTERNAL MEDICINE
Payer: COMMERCIAL

## 2019-03-06 DIAGNOSIS — M51.36 DDD (DEGENERATIVE DISC DISEASE), LUMBAR: ICD-10-CM

## 2019-03-06 PROCEDURE — 97110 THERAPEUTIC EXERCISES: CPT

## 2019-03-06 PROCEDURE — 97162 PT EVAL MOD COMPLEX 30 MIN: CPT

## 2019-03-06 NOTE — THERAPY EVALUATION
Malorie Clement  : [birth year]  Primary: Agustin Queen  Secondary: [place] [id number] [address] [facility], [address] [address]  [phone]   [phone]          OUTPATIENT PHYSICAL THERAPY:Initial Assessment 3/6/2019   ICD-10: Treatment Diagnosis: low back pain M54.5  Precautions/Allergies:   Patient has no known allergies. MD Orders: eval and treat MEDICAL/REFERRING DIAGNOSIS:  DDD (degenerative disc disease), lumbar [M51.36]   DATE OF ONSET: 2019  REFERRING PHYSICIAN: Jerardo Carr MD  RETURN PHYSICIAN APPOINTMENT: TBD, Pain management 2019     INITIAL ASSESSMENT:  Ms. Brett Saravia presents decreased AROM in all planes, decreased TA activation, pain with R sidebend and flexion that limits ability to lift and bend forward. Pt would benefit from skilled therapy to address these deficits for return to previous level of function. PROBLEM LIST (Impacting functional limitations):  1. Decreased Strength  2. Decreased ADL/Functional Activities  3. Increased Pain  4. Decreased Flexibility/Joint Mobility  5. Decreased [place] with Home Exercise Program INTERVENTIONS PLANNED: (Treatment may consist of any combination of the following)  1. Cold  2. Electrical Stimulation  3. Heat  4. Home Exercise Program (HEP)  5. Manual Therapy  6. Range of Motion (ROM)  7. Therapeutic Exercise/Strengthening   TREATMENT PLAN:  Effective Dates: 3/6/2019 TO 2019 (60 days). Frequency/Duration: 2 times a week for 60 Day(s)  GOALS: (Goals have been discussed and agreed upon with patient.)  Short-Term Functional Goals: Time Frame: 4 weeks  1. Pt will be I with HEP to allow for continued progress with symptom management. 2. Pt will improve sidebend ROM to 70%. Discharge Goals: Time Frame: 8 weeks  1. Pt will improve c/o pain to 2/10 to allow for improved tolerance for return to previous level of function.   2. Pt will improve ELLIE score to <10 to reflect an improvement in function. 3. Pt will demonstrate correct biomechanics with lifting 20 pounds from knee to waist height. 4. Pt will improve TA activation to 1 min. Rehabilitation Potential For Stated Goals: Good  Regarding Kristi Arambula's therapy, I certify that the treatment plan above will be carried out by a therapist or under their direction. Thank you for this referral,  [place], PT     Referring Physician Signature: Breanne Corona MD              Date                    The information in this section was collected on 03-06-19 (except where otherwise noted). HISTORY:   History of Present Injury/Illness (Reason for Referral):  Pt is a 75 y.o. Female presenting to PT with low back pain. Pain begins in the posterior R hip and runs down into the buttocks. When the pain was worse last week the pain limited her ability to bend forward. States that it is not always worse with forward flexion. Enjoys sleepingo nthe R side but when the pain was painful last Thursday pt c/o bilateral LE pain. Got out of bed and walked around. States that initially it was difficult to walk. Went to urgent care and was prescribed meloxicam for 15 days and tramadol. States that she tries to avoid taking tramadol. Works in the kitchen at [employer]. At work she bent forward that she had to [place] maneuver to stand back up.     Past Medical History/Comorbidities:   Ms. Irene Liao  has a past medical history of Abdominal pain, Arthritis, Breast cancer, left ([address] (2001), Breast pain in female, Bunion, Cholesterol serum elevated, Chronic pain of left knee, Corns and callosities, DDD (degenerative disc disease), lumbar, Diabetes mellitus type II, controlled ([address] Emotional disorder, Fungal infection of toenail, Hallux valgus, Hip pain, bilateral, Hyperglycemia, Hyperlipidemia, Hypertension, Hypothyroidism, Lower GI bleed, Obesity, Other ill-defined conditions(799.89), Postmenopausal, Radiation therapy (2001), Right median nerve neuropathy, Stress incontinence, and Traumatic arthropathy of knee. Ms. Jd Santoyo  has a past surgical history that includes us guided core breast biopsy (Right); hx hysterectomy; hx tubal ligation; hx urological; hx colonoscopy (2008); hx breast lumpectomy (Left, 2001); hx breast biopsy (Right, 2011); hx orthopaedic (2005); hx orthopaedic; hx orthopaedic; hx orthopaedic (Left); and hx shoulder replacement (Left). Social History/Living Environment:     unknown  Prior Level of Function/Work/Activity:  unrestricted  none     Ambulatory/Rehab Services H2 Model Falls Risk Assessment    Risk Factors:       No Risk Factors Identified Ability to Rise from Chair:       (1)  Pushes up, successful in one attempt    Falls Prevention Plan:       No modifications necessary   Total: (5 or greater = High Risk): 1    ©2010 [facility] of [employer]. All Rights Reserved. [employer] Patent #[id number]. Federal Law prohibits the replication, distribution or use without written permission from [facility] [employer]     Current Medications:       Current Outpatient Medications:     traMADol (ULTRAM) 50 mg tablet, Take 1 Tab by mouth every six (6) hours as needed for Pain. Max Daily Amount: 200 mg., Disp: 30 Tab, Rfl: 0    irbesartan (AVAPRO) 150 mg tablet, Take 2 Tabs by mouth nightly., Disp: 60 Tab, Rfl: 5    levothyroxine (SYNTHROID) 75 mcg tablet, TK 1 T PO D, Disp: 90 Tab, Rfl: 3    atenolol-chlorthalidone (TENORETIC) 50-25 mg per tablet, Take 0.5 Tabs by mouth daily. , Disp: 90 Tab, Rfl: 3    atorvastatin (LIPITOR) 40 mg tablet, Take 1 Tab by mouth nightly., Disp: 90 Tab, Rfl: 3    aspirin delayed-release 81 mg tablet, Take  by mouth every other day., Disp: , Rfl:     Lancing Device with Lancets (ONE TOUCH DELICA) kit, Needs new glucometer.  Check blood sugar BID, Disp: 1 Kit, Rfl: 0    glucose blood VI test strips (ONETOUCH ULTRA TEST) strip, by Does Not Apply route two (2) times a day. DX:E11.65, Disp: 200 Strip, Rfl: 2    ONETOUCH DELICA LANCETS 30 gauge misc, Check blood sugar daily, E11.9, Disp: 50 Lancet, Rfl: 5    mupirocin calcium (BACTROBAN) 2 % nasal ointment, by Both Nostrils route two (2) times a day., Disp: 1 g, Rfl: 0    co-enzyme Q-10 (CO Q-10) 100 mg capsule, Take 100 mg by mouth daily. , Disp: , Rfl:     magnesium 250 mg tab, Take 500 mg by mouth daily. , Disp: , Rfl:     multivitamin (ONE A DAY) tablet, Take 1 Tab by mouth daily. , Disp: , Rfl:     cholecalciferol, vitamin D3, (VITAMIN D3) 2,000 unit tab, Take  by mouth., Disp: , Rfl:     Omega-3 Fatty Acids (FISH OIL) 300 mg cap, Take  by mouth., Disp: , Rfl:     Blood-Glucose Meter (ONETOUCH ULTRAMINI) monitoring kit, Check blood sugar BID, Disp: 1 Kit, Rfl: 0   Date Last Reviewed:  3/6/2019    Number of Personal Factors/Comorbidities that affect the Plan of Care: 1-2: MODERATE COMPLEXITY   EXAMINATION:   Observation/Orthostatic Postural Assessment:          Slight lateral shift of lumbar spine to L in standint  Palpation:          Tenderness in glutes  ROM:            LUMBAR SPINE    AROM    Flexion   Extension  Right Rotation  Left Rotation  Right Sidebend  Left Sidebend 80 %  80 %  80 %  70 %  50 % pain  80 %        Strength:          LE- 5-/5 globally with c/o pain with resisted R hip ER  TA-  4/5 with poor activation  Special Tests:          Slump- negative    Body Structures Involved:  1. Nerves  2. Joints  3. Muscles Body Functions Affected:  1. Sensory/Pain  2. Neuromusculoskeletal Activities and Participation Affected:  1. General Tasks and Demands   Number of elements (examined above) that affect the Plan of Care: 3: MODERATE COMPLEXITY   CLINICAL PRESENTATION:   Presentation: Evolving clinical presentation with changing clinical characteristics: MODERATE COMPLEXITY   CLINICAL DECISION MAKING:   Outcome Measure:    Tool Used: Modified Oswestry Low Back Pain Questionnaire  Score:  Initial: 10/50  Most Recent: X/50 (Date: -- )   Interpretation of Score: Each section is scored on a 0-5 scale, 5 representing the greatest disability. The scores of each section are added together for a total score of 50. Medical Necessity:   · Patient is expected to demonstrate progress in strength and range of motion to to return to previous level of function. Reason for Services/Other Comments:  · Patient continues to require present interventions due to patient's inability to lift at work. Use of outcome tool(s) and clinical judgement create a POC that gives a: Questionable prediction of patient's progress: MODERATE COMPLEXITY            TREATMENT:   (In addition to Assessment/Re-Assessment sessions the following treatments were rendered)  Pre-treatment Symptoms/Complaints:  Low back pain  Pain: Initial:   Pain Intensity 1: (2-4/10)  Post Session:  4/10     THERAPEUTIC EXERCISE: (13 minutes):  Exercises per grid below to improve mobility and strength. Required moderate visual, verbal, manual and tactile cues to promote proper body alignment, promote proper body posture and promote proper body mechanics. Progressed resistance, range, repetitions and complexity of movement as indicated. Date:  03-06-19 Date:   Date:     Activity/Exercise Parameters Parameters Parameters   Lumbar rotation supine 20 reps     SKTC 20 reps     Pelvic tilt 20 reps                                [employer] Portal  Treatment/Session Assessment:    · Response to Treatment:  Pt would benefit from skilled therapy to address the aforementioned deficits that limit functional ability to return to previous level of function. .  · Compliance with Program/Exercises: Will assess as treatment progresses. · Recommendations/Intent for next treatment session: \"Next visit will focus on advancements to more challenging activities\".   Total Treatment Duration:  PT Patient Time In/Time Out  Time In: Wade Ibarra, PT    Future Appointments   Date Time Provider Anaya Arias   3/12/2019  3:15 PM Angelo Hemphill Rias, PT [facility]   3/14/2019  2:30 PM Jeni Kingman Michel, PTA [facility]   3/18/2019  2:30 PM [employer] Bruce Michel, PTA [facility]   3/20/2019  3:15 PM Angelo Hemphill Rias, PT [facility]   3/25/2019  3:15 PM [employer] Kingman Michel, PTA [facility]   3/27/2019  3:15 PM Angelo Hemphill Rias, PT [facility]   6/7/2019  3:15 PM Mckenna Nguyễn MD [facility]   7/29/2019  8:30 AM MAT LAB [facility]   8/5/2019  3:00 PM Antione Doherty MD [facility] MAT

## 2019-03-12 ENCOUNTER — HOSPITAL ENCOUNTER (OUTPATIENT)
Dept: PHYSICAL THERAPY | Age: 74
Discharge: HOME | End: 2019-03-12
Attending: INTERNAL MEDICINE
Payer: COMMERCIAL

## 2019-03-12 PROCEDURE — 97140 MANUAL THERAPY 1/> REGIONS: CPT

## 2019-03-12 PROCEDURE — 97110 THERAPEUTIC EXERCISES: CPT

## 2019-03-12 NOTE — PROGRESS NOTES
Guero Pollard  : [id number]  Primary: Nitin Queen  [place] [id number] at [place] [id number] [facility], [address] [place] [address] [address], [address] [facility]  [phone]   [phone]          OUTPATIENT PHYSICAL THERAPY:Daily Note 3/12/2019   ICD-10: Treatment Diagnosis: low back pain M54.5  Precautions/Allergies:   Patient has no known allergies. MD Orders: eval and treat MEDICAL/REFERRING DIAGNOSIS:  Other intervertebral disc degeneration, lumbar region [M51.36]   DATE OF ONSET: 2019  REFERRING PHYSICIAN: Tristin Solis MD  RETURN PHYSICIAN APPOINTMENT: TBD, Pain management 2019     INITIAL ASSESSMENT:  Ms. Zulma Baez presents decreased AROM in all planes, decreased TA activation, pain with R sidebend and flexion that limits ability to lift and bend forward. Pt would benefit from skilled therapy to address these deficits for return to previous level of function. PROBLEM LIST (Impacting functional limitations):  1. Decreased Strength  2. Decreased ADL/Functional Activities  3. Increased Pain  4. Decreased Flexibility/Joint Mobility  5. Decreased [place] with Home Exercise Program INTERVENTIONS PLANNED: (Treatment may consist of any combination of the following)  1. Cold  2. Electrical Stimulation  3. Heat  4. Home Exercise Program (HEP)  5. Manual Therapy  6. Range of Motion (ROM)  7. Therapeutic Exercise/Strengthening   TREATMENT PLAN:  Effective Dates: 3/6/2019 TO 2019 (60 days). Frequency/Duration: 2 times a week for 60 Day(s)  GOALS: (Goals have been discussed and agreed upon with patient.)  Short-Term Functional Goals: Time Frame: 4 weeks  1. Pt will be I with HEP to allow for continued progress with symptom management. 2. Pt will improve sidebend ROM to 70%. Discharge Goals: Time Frame: 8 weeks  1. Pt will improve c/o pain to 2/10 to allow for improved tolerance for return to previous level of function.   2. Pt will improve ELLIE score to <10 to reflect an improvement in function. 3. Pt will demonstrate correct biomechanics with lifting 20 pounds from knee to waist height. 4. Pt will improve TA activation to 1 min. Rehabilitation Potential For Stated Goals: Good  Regarding Kristi ANETA Arambula's therapy, I certify that the treatment plan above will be carried out by a therapist or under their direction. Thank you for this referral,  Judy Casas PT     Referring Physician Signature: Karina Beavers MD              Date                    The information in this section was collected on 03-06-19 (except where otherwise noted). HISTORY:   History of Present Injury/Illness (Reason for Referral):  Pt is a 75 y.o. Female presenting to PT with low back pain. Pain begins in the posterior R hip and runs down into the buttocks. When the pain was worse last week the pain limited her ability to bend forward. States that it is not always worse with forward flexion. Enjoys sleepingo nthe R side but when the pain was painful last Thursday pt c/o bilateral LE pain. Got out of bed and walked around. States that initially it was difficult to walk. Went to urgent care and was prescribed meloxicam for 15 days and tramadol. States that she tries to avoid taking tramadol. Works in the kitchen at [employer]. At work she bent forward that she had to [place] maneuver to stand back up.     Past Medical History/Comorbidities:   Ms. Cale Downey  has a past medical history of Abdominal pain, Arthritis, Breast cancer, left ([address] (2001), Breast pain in female, Bunion, Cholesterol serum elevated, Chronic pain of left knee, Corns and callosities, DDD (degenerative disc disease), lumbar, Diabetes mellitus type II, controlled ([address] Emotional disorder, Fungal infection of toenail, Hallux valgus, Hip pain, bilateral, Hyperglycemia, Hyperlipidemia, Hypertension, Hypothyroidism, Lower GI bleed, Obesity, Other ill-defined conditions(799.89), Postmenopausal, Radiation therapy (2001), Right median nerve neuropathy, Stress incontinence, and Traumatic arthropathy of knee. Ms. Pablo Mac  has a past surgical history that includes us guided core breast biopsy (Right); hx hysterectomy; hx tubal ligation; hx urological; hx colonoscopy (2008); hx breast lumpectomy (Left, 2001); hx breast biopsy (Right, 2011); hx orthopaedic (2005); hx orthopaedic; hx orthopaedic; hx orthopaedic (Left); and hx shoulder replacement (Left). Social History/Living Environment:     unknown  Prior Level of Function/Work/Activity:  unrestricted  none     Ambulatory/Rehab Services H2 Model Falls Risk Assessment    Risk Factors:       No Risk Factors Identified Ability to Rise from Chair:       (1)  Pushes up, successful in one attempt    Falls Prevention Plan:       No modifications necessary   Total: (5 or greater = High Risk): 1    ©2010 [facility] of [employer]. All Rights Reserved. [facility] Patent #[id number]. Federal Law prohibits the replication, distribution or use without written permission from [facility] [employer]     Current Medications:       Current Outpatient Medications:     traMADol (ULTRAM) 50 mg tablet, Take 1 Tab by mouth every six (6) hours as needed for Pain. Max Daily Amount: 200 mg., Disp: 30 Tab, Rfl: 0    irbesartan (AVAPRO) 150 mg tablet, Take 2 Tabs by mouth nightly., Disp: 60 Tab, Rfl: 5    levothyroxine (SYNTHROID) 75 mcg tablet, TK 1 T PO D, Disp: 90 Tab, Rfl: 3    atenolol-chlorthalidone (TENORETIC) 50-25 mg per tablet, Take 0.5 Tabs by mouth daily. , Disp: 90 Tab, Rfl: 3    atorvastatin (LIPITOR) 40 mg tablet, Take 1 Tab by mouth nightly., Disp: 90 Tab, Rfl: 3    aspirin delayed-release 81 mg tablet, Take  by mouth every other day., Disp: , Rfl:     Lancing Device with Lancets (ONE TOUCH DELICA) kit, Needs new glucometer.  Check blood sugar BID, Disp: 1 Kit, Rfl: 0    glucose blood VI test strips (ONETOUCH ULTRA TEST) strip, by Does Not Apply route two (2) times a day. DX:E11.65, Disp: 200 Strip, Rfl: 2    ONETOUCH DELICA LANCETS 30 gauge misc, Check blood sugar daily, E11.9, Disp: 50 Lancet, Rfl: 5    mupirocin calcium (BACTROBAN) 2 % nasal ointment, by Both Nostrils route two (2) times a day., Disp: 1 g, Rfl: 0    co-enzyme Q-10 (CO Q-10) 100 mg capsule, Take 100 mg by mouth daily. , Disp: , Rfl:     magnesium 250 mg tab, Take 500 mg by mouth daily. , Disp: , Rfl:     multivitamin (ONE A DAY) tablet, Take 1 Tab by mouth daily. , Disp: , Rfl:     cholecalciferol, vitamin D3, (VITAMIN D3) 2,000 unit tab, Take  by mouth., Disp: , Rfl:     Omega-3 Fatty Acids (FISH OIL) 300 mg cap, Take  by mouth., Disp: , Rfl:     Blood-Glucose Meter (ONETOUCH ULTRAMINI) monitoring kit, Check blood sugar BID, Disp: 1 Kit, Rfl: 0   Date Last Reviewed:  3/12/2019    Number of Personal Factors/Comorbidities that affect the Plan of Care: 1-2: MODERATE COMPLEXITY   EXAMINATION:   Observation/Orthostatic Postural Assessment:          Slight lateral shift of lumbar spine to L in standint  Palpation:          Tenderness in glutes  ROM:            LUMBAR SPINE    AROM    Flexion   Extension  Right Rotation  Left Rotation  Right Sidebend  Left Sidebend 80 %  80 %  80 %  70 %  50 % pain  80 %        Strength:          LE- 5-/5 globally with c/o pain with resisted R hip ER  TA-  4/5 with poor activation  Special Tests:          Slump- negative    Body Structures Involved:  1. Nerves  2. Joints  3. Muscles Body Functions Affected:  1. Sensory/Pain  2. Neuromusculoskeletal Activities and Participation Affected:  1. General Tasks and Demands   Number of elements (examined above) that affect the Plan of Care: 3: MODERATE COMPLEXITY   CLINICAL PRESENTATION:   Presentation: Evolving clinical presentation with changing clinical characteristics: MODERATE COMPLEXITY   CLINICAL DECISION MAKING:   Outcome Measure:    Tool Used: Modified Oswestry Low Back Pain Questionnaire  Score:  Initial: 10/50  Most Recent: X/50 (Date: -- )   Interpretation of Score: Each section is scored on a 0-5 scale, 5 representing the greatest disability. The scores of each section are added together for a total score of 50. Medical Necessity:   · Patient is expected to demonstrate progress in strength and range of motion to to return to previous level of function. Reason for Services/Other Comments:  · Patient continues to require present interventions due to patient's inability to lift at work. Use of outcome tool(s) and clinical judgement create a POC that gives a: Questionable prediction of patient's progress: MODERATE COMPLEXITY            TREATMENT:   (In addition to Assessment/Re-Assessment sessions the following treatments were rendered)  Pre-treatment Symptoms/Complaints:  Low back pain- 3/12/2019 Pt reports that she is doing her exercises daily. Pain: Initial:   Pain Intensity 1: 3  Post Session:  4/10     THERAPEUTIC EXERCISE: (30 minutes):  Exercises per grid below to improve mobility and strength. Required moderate visual, verbal, manual and tactile cues to promote proper body alignment, promote proper body posture and promote proper body mechanics. Progressed resistance, range, repetitions and complexity of movement as indicated. Date:  03-06-19 Date:  03-12-19 Date:     Activity/Exercise Parameters Parameters Parameters   Lumbar rotation supine 20 reps 20 reps    SKTC 20 reps 10x3 sec hold each    Pelvic tilt 20 reps     nustep  6 min level 4.0    Figure 4 stretch  3x30 sec hold    Posterior hip stretch  3x30 sec hold    bridge  20 reps    Clamshell  20 reps each green    SLR with TA  10 reps each    March with TA  20 reps each    Isometric hip flexion  10x5 sec hold each                                   MANUAL THERAPY (10 minutes)  Trigger point release R glutesTo promote tissue length and joint mobility for return to previous level of function.    [employer] Portal  Treatment/Session Assessment:    · Response to Treatment:  Pt would benefit from skilled therapy to address the aforementioned deficits that limit functional ability to return to previous level of function. .  · Compliance with Program/Exercises: Will assess as treatment progresses. · Recommendations/Intent for next treatment session: \"Next visit will focus on advancements to more challenging activities\".   Total Treatment Duration:  PT Patient Time In/Time Out  Time In: 7945  Time Out: [address] [address], PT    Future Appointments   Date Time Provider Anaya Arias   3/14/2019  2:30 PM Thien Rosado PTA [facility] SFE   3/18/2019  2:30 PM Jose Luis Ngo, PTA [facility]FE   3/20/2019  3:15 PM Kenyatta Hemphill, PT [facility] SFE   3/25/2019  3:15 PM Thien Rosado, PTA [facility] SFE   3/27/2019  3:15 PM Kenyatta Hemphill, PT [facility] SFE   6/7/2019  3:15 PM MD MARY Lopez [facility] UCD   7/29/2019  8:30 AM MAT LAB MARY MAT MAT   8/5/2019  3:00 PM MD MARY Guerrero MAT MAT

## 2019-03-14 ENCOUNTER — HOSPITAL ENCOUNTER (OUTPATIENT)
Dept: PHYSICAL THERAPY | Age: 74
Discharge: HOME | End: 2019-03-14
Attending: INTERNAL MEDICINE
Payer: COMMERCIAL

## 2019-03-14 PROCEDURE — 97140 MANUAL THERAPY 1/> REGIONS: CPT

## 2019-03-14 PROCEDURE — 97110 THERAPEUTIC EXERCISES: CPT

## 2019-03-14 NOTE — PROGRESS NOTES
Daniel Kwong  : 89  Primary: Tunde García [facility]  [place]: [place] [id number] [facility] [address], [address] [place] [address], [address] [facility]  [phone]   [phone]          OUTPATIENT PHYSICAL THERAPY:Daily Note 3/14/2019   ICD-10: Treatment Diagnosis: low back pain M54.5  Precautions/Allergies:   Patient has no known allergies. MD Orders: eval and treat MEDICAL/REFERRING DIAGNOSIS:  Other intervertebral disc degeneration, lumbar region [M51.36]   DATE OF ONSET: 2019  REFERRING PHYSICIAN: Mary Haddad MD  RETURN PHYSICIAN APPOINTMENT: TBD, Pain management 2019     INITIAL ASSESSMENT:  Ms. Sylvester Hollingsworth presents decreased AROM in all planes, decreased TA activation, pain with R sidebend and flexion that limits ability to lift and bend forward. Pt would benefit from skilled therapy to address these deficits for return to previous level of function. PROBLEM LIST (Impacting functional limitations):  1. Decreased Strength  2. Decreased ADL/Functional Activities  3. Increased Pain  4. Decreased Flexibility/Joint Mobility  5. Decreased [place] with Home Exercise Program INTERVENTIONS PLANNED: (Treatment may consist of any combination of the following)  1. Cold  2. Electrical Stimulation  3. Heat  4. Home Exercise Program (HEP)  5. Manual Therapy  6. Range of Motion (ROM)  7. Therapeutic Exercise/Strengthening   TREATMENT PLAN:  Effective Dates: 3/6/2019 TO 2019 (60 days). Frequency/Duration: 2 times a week for 60 Day(s)  GOALS: (Goals have been discussed and agreed upon with patient.)  Short-Term Functional Goals: Time Frame: 4 weeks  1. Pt will be I with HEP to allow for continued progress with symptom management. 2. Pt will improve sidebend ROM to 70%. Discharge Goals: Time Frame: 8 weeks  1. Pt will improve c/o pain to 2/10 to allow for improved tolerance for return to previous level of function.   2. Pt will improve ELLIE score to <10 to reflect an improvement in function. 3. Pt will demonstrate correct biomechanics with lifting 20 pounds from knee to waist height. 4. Pt will improve TA activation to 1 min. Rehabilitation Potential For Stated Goals: Good  Regarding Kristi Arambula's therapy, I certify that the treatment plan above will be carried out by a therapist or under their direction. Thank you for this referral,  Agatha Black PTA     Referring Physician Signature: Tristin Solis MD              Date                    The information in this section was collected on 03-06-19 (except where otherwise noted). HISTORY:   History of Present Injury/Illness (Reason for Referral):  Pt is a 75 y.o. Female presenting to PT with low back pain. Pain begins in the posterior R hip and runs down into the buttocks. When the pain was worse last week the pain limited her ability to bend forward. States that it is not always worse with forward flexion. Enjoys sleepingo nthe R side but when the pain was painful last Thursday pt c/o bilateral LE pain. Got out of bed and walked around. States that initially it was difficult to walk. Went to urgent care and was prescribed meloxicam for 15 days and tramadol. States that she tries to avoid taking tramadol. 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     Past Medical History/Comorbidities:   Ms. Zulma Baez  has a past medical history of Abdominal pain, Arthritis, Breast cancer, left ([address] (2001), Breast pain in female, Bunion, Cholesterol serum elevated, Chronic pain of left knee, Corns and callosities, DDD (degenerative disc disease), lumbar, Diabetes mellitus type II, controlled ([address] Emotional disorder, Fungal infection of toenail, Hallux valgus, Hip pain, bilateral, Hyperglycemia, Hyperlipidemia, Hypertension, Hypothyroidism, Lower GI bleed, Obesity, Other ill-defined conditions(799.89), Postmenopausal, Radiation therapy (2001), Right median nerve neuropathy, Stress incontinence, and Traumatic arthropathy of knee. Ms. Telma Rush  has a past surgical history that includes us guided core breast biopsy (Right); hx hysterectomy; hx tubal ligation; hx urological; hx colonoscopy (2008); hx breast lumpectomy (Left, 2001); hx breast biopsy (Right, 2011); hx orthopaedic (2005); hx orthopaedic; hx orthopaedic; hx orthopaedic (Left); and hx shoulder replacement (Left). Social History/Living Environment:     unknown  Prior Level of Function/Work/Activity:  unrestricted  none     Ambulatory/Rehab Services H2 Model Falls Risk Assessment    Risk Factors:       No Risk Factors Identified Ability to Rise from Chair:       (1)  Pushes up, successful in one attempt    Falls Prevention Plan:       No modifications necessary   Total: (5 or greater = High Risk): 1    ©2010 [facility] of [employer]. All Rights Reserved. [facility] Patent #[id number]. Federal Law prohibits the replication, distribution or use without written permission from [facility] [employer]     Current Medications:       Current Outpatient Medications:     traMADol (ULTRAM) 50 mg tablet, Take 1 Tab by mouth every six (6) hours as needed for Pain. Max Daily Amount: 200 mg., Disp: 30 Tab, Rfl: 0    irbesartan (AVAPRO) 150 mg tablet, Take 2 Tabs by mouth nightly., Disp: 60 Tab, Rfl: 5    levothyroxine (SYNTHROID) 75 mcg tablet, TK 1 T PO D, Disp: 90 Tab, Rfl: 3    atenolol-chlorthalidone (TENORETIC) 50-25 mg per tablet, Take 0.5 Tabs by mouth daily. , Disp: 90 Tab, Rfl: 3    atorvastatin (LIPITOR) 40 mg tablet, Take 1 Tab by mouth nightly., Disp: 90 Tab, Rfl: 3    aspirin delayed-release 81 mg tablet, Take  by mouth every other day., Disp: , Rfl:     Lancing Device with Lancets (ONE TOUCH DELICA) kit, Needs new glucometer.  Check blood sugar BID, Disp: 1 Kit, Rfl: 0    glucose blood VI test strips (ONETOUCH ULTRA TEST) strip, by Does Not Apply route two (2) times a day. DX:E11.65, Disp: 200 Strip, Rfl: 2    ONETOUCH DELICA LANCETS 30 gauge misc, Check blood sugar daily, E11.9, Disp: 50 Lancet, Rfl: 5    mupirocin calcium (BACTROBAN) 2 % nasal ointment, by Both Nostrils route two (2) times a day., Disp: 1 g, Rfl: 0    co-enzyme Q-10 (CO Q-10) 100 mg capsule, Take 100 mg by mouth daily. , Disp: , Rfl:     magnesium 250 mg tab, Take 500 mg by mouth daily. , Disp: , Rfl:     multivitamin (ONE A DAY) tablet, Take 1 Tab by mouth daily. , Disp: , Rfl:     cholecalciferol, vitamin D3, (VITAMIN D3) 2,000 unit tab, Take  by mouth., Disp: , Rfl:     Omega-3 Fatty Acids (FISH OIL) 300 mg cap, Take  by mouth., Disp: , Rfl:     Blood-Glucose Meter (ONETOUCH ULTRAMINI) monitoring kit, Check blood sugar BID, Disp: 1 Kit, Rfl: 0   Date Last Reviewed:  3/14/2019    Number of Personal Factors/Comorbidities that affect the Plan of Care: 1-2: MODERATE COMPLEXITY   EXAMINATION:   Observation/Orthostatic Postural Assessment:          Slight lateral shift of lumbar spine to L in standint  Palpation:          Tenderness in glutes  ROM:            LUMBAR SPINE    AROM    Flexion   Extension  Right Rotation  Left Rotation  Right Sidebend  Left Sidebend 80 %  80 %  80 %  70 %  50 % pain  80 %        Strength:          LE- 5-/5 globally with c/o pain with resisted R hip ER  TA-  4/5 with poor activation  Special Tests:          Slump- negative    Body Structures Involved:  1. Nerves  2. Joints  3. Muscles Body Functions Affected:  1. Sensory/Pain  2. Neuromusculoskeletal Activities and Participation Affected:  1. General Tasks and Demands   Number of elements (examined above) that affect the Plan of Care: 3: MODERATE COMPLEXITY   CLINICAL PRESENTATION:   Presentation: Evolving clinical presentation with changing clinical characteristics: MODERATE COMPLEXITY   CLINICAL DECISION MAKING:   Outcome Measure:    Tool Used: Modified Oswestry Low Back Pain Questionnaire  Score:  Initial: 10/50  Most Recent: X/50 (Date: -- )   Interpretation of Score: Each section is scored on a 0-5 scale, 5 representing the greatest disability. The scores of each section are added together for a total score of 50. Medical Necessity:   · Patient is expected to demonstrate progress in strength and range of motion to to return to previous level of function. Reason for Services/Other Comments:  · Patient continues to require present interventions due to patient's inability to lift at work. Use of outcome tool(s) and clinical judgement create a POC that gives a: Questionable prediction of patient's progress: MODERATE COMPLEXITY            TREATMENT:   (In addition to Assessment/Re-Assessment sessions the following treatments were rendered)  Pre-treatment Symptoms/Complaints:  Low back pain- 3/14/2019 \"I am doing good\". Pain: Initial:      Post Session:  4/10     THERAPEUTIC EXERCISE: (30 minutes):  Exercises per grid below to improve mobility and strength. Required moderate visual, verbal, manual and tactile cues to promote proper body alignment, promote proper body posture and promote proper body mechanics. Progressed resistance, range, repetitions and complexity of movement as indicated. Date:  03-14-19 Date:     Activity/Exercise Parameters Parameters   Lumbar rotation supine 20 reps    SKTC 10x3 sec hold each    Pelvic tilt     nustep 6 min level 4.0    Figure 4 stretch 3x30 sec hold    Posterior hip stretch 3x30 sec hold    bridge 20 reps    Clamshell 20 reps each green    SLR with TA 10 reps each    March with TA 20 reps each    Isometric hip flexion 10x5 sec hold each                              MANUAL THERAPY (10 minutes)  Trigger point release R glutesTo promote tissue length and joint mobility for return to previous level of function. [employer] Portal  Treatment/Session Assessment:    · Response to Treatment:  Patient did well today. Patient has a lot of questions and concerns about her symptoms.   Point tenderness along Right Hip region. Continue plan of care. · Compliance with Program/Exercises: Will assess as treatment progresses. · Recommendations/Intent for next treatment session: \"Next visit will focus on advancements to more challenging activities\".   Total Treatment Duration:  PT Patient Time In/Time Out  Time In: 1430  Time Out: [facility] [place]    Future Appointments   Date Time Provider Anaya Arias   3/14/2019  2:30 PM Shaq Camejo, PTA [facility] SFE   3/18/2019  2:30 PM Cookie Ngo, PTA [facility] SFE   3/20/2019  3:15 PM Rosa Isela Hemphill, PT [facility] SFE   3/25/2019  3:15 PM Shaq Camejo, PTA [facility]   3/27/2019  3:15 PM Rosa Isela Hemphill, PT [facility] SFE   6/7/2019  3:15 PM Carley Lyman MD [facility] UCD   7/29/2019  8:30 AM MAT LAB [facility] MAT MAT   8/5/2019  3:00 PM Suzan Reid MD [facility] MAT MAT

## 2019-03-18 ENCOUNTER — HOSPITAL ENCOUNTER (OUTPATIENT)
Dept: PHYSICAL THERAPY | Age: 74
Discharge: HOME | End: 2019-03-18
Attending: INTERNAL MEDICINE
Payer: COMMERCIAL

## 2019-03-18 PROCEDURE — 97110 THERAPEUTIC EXERCISES: CPT

## 2019-03-18 PROCEDURE — 97140 MANUAL THERAPY 1/> REGIONS: CPT

## 2019-03-18 NOTE — PROGRESS NOTES
Ashutosh Aldrich  :   Primary: [employer]  [place] [id number] at [place] [address] [facility], [address] [place] [address],[address], [address] [facility]  [phone]   [phone]          OUTPATIENT PHYSICAL THERAPY:Daily Note 3/18/2019   ICD-10: Treatment Diagnosis: low back pain M54.5  Precautions/Allergies:   Patient has no known allergies. MD Orders: eval and treat MEDICAL/REFERRING DIAGNOSIS:  Other intervertebral disc degeneration, lumbar region [M51.36]   DATE OF ONSET: 2019  REFERRING PHYSICIAN: Zabrina Hopson MD  RETURN PHYSICIAN APPOINTMENT: TBD, Pain management 2019     INITIAL ASSESSMENT:  Ms. Kiel Casillas presents decreased AROM in all planes, decreased TA activation, pain with R sidebend and flexion that limits ability to lift and bend forward. Pt would benefit from skilled therapy to address these deficits for return to previous level of function. PROBLEM LIST (Impacting functional limitations):  1. Decreased Strength  2. Decreased ADL/Functional Activities  3. Increased Pain  4. Decreased Flexibility/Joint Mobility  5. Decreased [place] with Home Exercise Program INTERVENTIONS PLANNED: (Treatment may consist of any combination of the following)  1. Cold  2. Electrical Stimulation  3. Heat  4. Home Exercise Program (HEP)  5. Manual Therapy  6. Range of Motion (ROM)  7. Therapeutic Exercise/Strengthening   TREATMENT PLAN:  Effective Dates: 3/6/2019 TO 2019 (60 days). Frequency/Duration: 2 times a week for 60 Day(s)  GOALS: (Goals have been discussed and agreed upon with patient.)  Short-Term Functional Goals: Time Frame: 4 weeks  1. Pt will be I with HEP to allow for continued progress with symptom management. 2. Pt will improve sidebend ROM to 70%. Discharge Goals: Time Frame: 8 weeks  1. Pt will improve c/o pain to 2/10 to allow for improved tolerance for return to previous level of function.   2. Pt will improve ELLIE score to <10 to reflect an improvement in function. 3. Pt will demonstrate correct biomechanics with lifting 20 pounds from knee to waist height. 4. Pt will improve TA activation to 1 min. Rehabilitation Potential For Stated Goals: Good  Regarding Kristi CORNELL Ralf's therapy, I certify that the treatment plan above will be carried out by a therapist or under their direction. Thank you for this referral,  Cookie Moran PTA     Referring Physician Signature: Pablo Marie MD              Date                    The information in this section was collected on 03-06-19 (except where otherwise noted). HISTORY:   History of Present Injury/Illness (Reason for Referral):  Pt is a 75 y.o. Female presenting to PT with low back pain. Pain begins in the posterior R hip and runs down into the buttocks. When the pain was worse last week the pain limited her ability to bend forward. States that it is not always worse with forward flexion. Enjoys sleepingo nthe R side but when the pain was painful last Thursday pt c/o bilateral LE pain. Got out of bed and walked around. States that initially it was difficult to walk. Went to urgent care and was prescribed meloxicam for 15 days and tramadol. States that she tries to avoid taking tramadol. Works in the kitchen at [employer]. At work she bent forward that she had to Azar maneuver to stand back up.     Past Medical History/Comorbidities:   Ms. Tiki Boyer  has a past medical history of Abdominal pain, Arthritis, Breast cancer, left ([address] (2001), Breast pain in female, Bunion, Cholesterol serum elevated, Chronic pain of left knee, Corns and callosities, DDD (degenerative disc disease), lumbar, Diabetes mellitus type II, controlled ([address] Emotional disorder, Fungal infection of toenail, Hallux valgus, Hip pain, bilateral, Hyperglycemia, Hyperlipidemia, Hypertension, Hypothyroidism, Lower GI bleed, Obesity, Other ill-defined conditions(799.89), Postmenopausal, Radiation therapy (2001), Right median nerve neuropathy, Stress incontinence, and Traumatic arthropathy of knee. Ms. Pablo Mac  has a past surgical history that includes us guided core breast biopsy (Right); hx hysterectomy; hx tubal ligation; hx urological; hx colonoscopy (2008); hx breast lumpectomy (Left, 2001); hx breast biopsy (Right, 2011); hx orthopaedic (2005); hx orthopaedic; hx orthopaedic; hx orthopaedic (Left); and hx shoulder replacement (Left). Social History/Living Environment:     unknown  Prior Level of Function/Work/Activity:  unrestricted  none     Ambulatory/Rehab Services H2 Model Falls Risk Assessment    Risk Factors:       No Risk Factors Identified Ability to Rise from Chair:       (1)  Pushes up, successful in one attempt    Falls Prevention Plan:       No modifications necessary   Total: (5 or greater = High Risk): 1    ©2010 [facility]. All Rights Reserved. [facility] Patent #[id number]. Federal Law prohibits the replication, distribution or use without written permission from [facility] [employer]     Current Medications:       Current Outpatient Medications:     traMADol (ULTRAM) 50 mg tablet, Take 1 Tab by mouth every six (6) hours as needed for Pain. Max Daily Amount: 200 mg., Disp: 30 Tab, Rfl: 0    irbesartan (AVAPRO) 150 mg tablet, Take 2 Tabs by mouth nightly., Disp: 60 Tab, Rfl: 5    levothyroxine (SYNTHROID) 75 mcg tablet, TK 1 T PO D, Disp: 90 Tab, Rfl: 3    atenolol-chlorthalidone (TENORETIC) 50-25 mg per tablet, Take 0.5 Tabs by mouth daily. , Disp: 90 Tab, Rfl: 3    atorvastatin (LIPITOR) 40 mg tablet, Take 1 Tab by mouth nightly., Disp: 90 Tab, Rfl: 3    aspirin delayed-release 81 mg tablet, Take  by mouth every other day., Disp: , Rfl:     Lancing Device with Lancets (ONE TOUCH DELICA) kit, Needs new glucometer.  Check blood sugar BID, Disp: 1 Kit, Rfl: 0    glucose blood VI test strips (ONETOUCH ULTRA TEST) strip, by Does Not Apply route two (2) times a day. DX:E11.65, Disp: 200 Strip, Rfl: 2    ONETOUCH DELICA LANCETS 30 gauge misc, Check blood sugar daily, E11.9, Disp: 50 Lancet, Rfl: 5    mupirocin calcium (BACTROBAN) 2 % nasal ointment, by Both Nostrils route two (2) times a day., Disp: 1 g, Rfl: 0    co-enzyme Q-10 (CO Q-10) 100 mg capsule, Take 100 mg by mouth daily. , Disp: , Rfl:     magnesium 250 mg tab, Take 500 mg by mouth daily. , Disp: , Rfl:     multivitamin (ONE A DAY) tablet, Take 1 Tab by mouth daily. , Disp: , Rfl:     cholecalciferol, vitamin D3, (VITAMIN D3) 2,000 unit tab, Take  by mouth., Disp: , Rfl:     Omega-3 Fatty Acids (FISH OIL) 300 mg cap, Take  by mouth., Disp: , Rfl:     Blood-Glucose Meter (ONETOUCH ULTRAMINI) monitoring kit, Check blood sugar BID, Disp: 1 Kit, Rfl: 0   Date Last Reviewed:  3/18/2019    Number of Personal Factors/Comorbidities that affect the Plan of Care: 1-2: MODERATE COMPLEXITY   EXAMINATION:   Observation/Orthostatic Postural Assessment:          Slight lateral shift of lumbar spine to L in standint  Palpation:          Tenderness in glutes  ROM:            LUMBAR SPINE    AROM    Flexion   Extension  Right Rotation  Left Rotation  Right Sidebend  Left Sidebend 80 %  80 %  80 %  70 %  50 % pain  80 %        Strength:          LE- 5-/5 globally with c/o pain with resisted R hip ER  TA-  4/5 with poor activation  Special Tests:          Slump- negative    Body Structures Involved:  1. Nerves  2. Joints  3. Muscles Body Functions Affected:  1. Sensory/Pain  2. Neuromusculoskeletal Activities and Participation Affected:  1. General Tasks and Demands   Number of elements (examined above) that affect the Plan of Care: 3: MODERATE COMPLEXITY   CLINICAL PRESENTATION:   Presentation: Evolving clinical presentation with changing clinical characteristics: MODERATE COMPLEXITY   CLINICAL DECISION MAKING:   Outcome Measure:    Tool Used: Modified Oswestry Low Back Pain Questionnaire  Score:  Initial: 10/50  Most Recent: X/50 (Date: -- )   Interpretation of Score: Each section is scored on a 0-5 scale, 5 representing the greatest disability. The scores of each section are added together for a total score of 50. Medical Necessity:   · Patient is expected to demonstrate progress in strength and range of motion to to return to previous level of function. Reason for Services/Other Comments:  · Patient continues to require present interventions due to patient's inability to lift at work. Use of outcome tool(s) and clinical judgement create a POC that gives a: Questionable prediction of patient's progress: MODERATE COMPLEXITY            TREATMENT:   (In addition to Assessment/Re-Assessment sessions the following treatments were rendered)  Pre-treatment Symptoms/Complaints:  Low back pain- 3/18/2019 \"I stubbed my toy and it jointed my body so I am sore\". \"the weekend was rough\". \"feels like left side now\"  Pain: Initial:     4 Post Session:  4/10     THERAPEUTIC EXERCISE: (30 minutes):  Exercises per grid below to improve mobility and strength. Required moderate visual, verbal, manual and tactile cues to promote proper body alignment, promote proper body posture and promote proper body mechanics. Progressed resistance, range, repetitions and complexity of movement as indicated. Date:  03-18-19 Date:     Activity/Exercise Parameters Parameters   Lumbar rotation supine 20 reps    SKTC 10x3 sec hold each    Pelvic tilt     nustep 6 min level 4.0    Figure 4 stretch 3x30 sec hold    Posterior hip stretch 3x30 sec hold    bridge 20 reps    Clamshell 20 reps each red    SLR with TA 10 reps each    March with TA 20 reps each    Isometric hip flexion 10x5 sec hold each    SLR X 10 reps                         MANUAL THERAPY (10 minutes)  Trigger point release R glutesTo promote tissue length and joint mobility for return to previous level of function. MOIST HEAT (10):   Right Hip/ region secondary to most pain.   Skin clear.  [facility] Portal  · Treatment/Session Assessment:  Patient continue's to feel stiff, pain in right hip and discouraged. She has seen some slight improvement so far- I spoke with her that this is improvement, maybe not as quick as you would like but progress. She has a pain management appointment this month sometime. · Response to Treatment: Soreness  · Compliance with Program/Exercises: Compliant most of the time. · Recommendations/Intent for next treatment session: \"Next visit will focus on advancements to more challenging activities\".   Total Treatment Duration:  PT Patient Time In/Time Out  Time In: 1430  Time Out: [address] [place], [place]    Future Appointments   Date Time Provider Anaya Arias   3/20/2019  3:15 PM Sharon Maldonado, PT [facility] SFE   3/25/2019  3:15 PM Erica Wong, PTA SFEORPT E   3/27/2019  3:15 PM Ana María Hemphill, PT SFEORPT SFE   6/7/2019  3:15 PM Sudhakar Laguna MD [facility] UCDG UCD   7/29/2019  8:30 AM MAT LAB [facility] MAT MAT   8/5/2019  3:00 PM MD MARY Torrez MAT MAT

## 2019-03-20 ENCOUNTER — HOSPITAL ENCOUNTER (OUTPATIENT)
Dept: PHYSICAL THERAPY | Age: 74
Discharge: HOME | End: 2019-03-20
Attending: INTERNAL MEDICINE
Payer: COMMERCIAL

## 2019-03-20 PROCEDURE — 97140 MANUAL THERAPY 1/> REGIONS: CPT

## 2019-03-20 PROCEDURE — 97110 THERAPEUTIC EXERCISES: CPT

## 2019-03-20 NOTE — PROGRESS NOTES
Ana Lilia Lara  : [birth year]  Primary: [employer]  [place] [id number] at [place] [address] [facility], [address] [place] [address],[address], [facility].  [phone]   [phone]         OUTPATIENT PHYSICAL THERAPY: Daily Treatment Note 3/20/2019  Pre-treatment Symptoms/Complaints:  Right hip and LBP- 3/20/2019 Pt reports that she sees improvement. Pain: Initial:   3/10 Post Session:  3/10   Medications Last Reviewed:  3/20/2019   Updated Objective Findings:  None Today   TREATMENT:   THERAPEUTIC EXERCISE: (30 minutes):  Exercises per grid below to improve mobility and strength. Required moderate visual, verbal, manual and tactile cues to promote proper body alignment, promote proper body posture and promote proper body mechanics. Progressed resistance, range, repetitions and complexity of movement as indicated.      Date:  19 Date:      Activity/Exercise Parameters Parameters   Lumbar rotation supine 20 reps     SKTC 10x3 sec hold each     Pelvic tilt       nustep 6 min level 4.0     Figure 4 stretch 3x30 sec hold     Posterior hip stretch 3x30 sec hold     bridge 20 reps     Clamshell 20 reps each red     SLR with TA 10 reps each     March with TA 20 reps each     Isometric hip flexion 10x5 sec hold each     SLR X 10 reps                                          MANUAL THERAPY (10 minutes)  Trigger point release R glutesTo promote tissue length and joint mobility for return to previous level of function. MOIST HEAT (10):   Right Hip/ region secondary to most pain. Skin clear. [employer] Portal  Treatment/Session Summary:    · Response to Treatment:  Added self mobilization with ball at home. .  · Communication/Consultation:  None today  · Equipment provided today:  None today  · Recommendations/Intent for next treatment session: Next visit will focus on TA isolation. Treatment Plan of Care Effective Dates:  3/6/2019 TO 2019 (60 days).   Total Treatment Billable Duration:  40 minutes  PT Patient Time In/Time Out  Time In: 6105  Time Out: [address] PT    Future Appointments   Date Time Provider Anaya Arias   3/26/2019  8:15 AM Erica Wong, PTA SFEORPT E   3/28/2019  8:15 AM Ana María Hemphill, PT SFEORPT E   6/7/2019  3:15 PM Sud[facility]akar Laguna [facility]   7/29/2019  8:30 AM MAT LAB [facility] MAT MAT   8/5/2019  3:00 PM Tristin Solis MD [facility] MAT MAT

## 2019-03-26 ENCOUNTER — HOSPITAL ENCOUNTER (OUTPATIENT)
Dept: PHYSICAL THERAPY | Age: 74
Discharge: HOME | End: 2019-03-26
Attending: INTERNAL MEDICINE
Payer: COMMERCIAL

## 2019-03-26 PROCEDURE — 97014 ELECTRIC STIMULATION THERAPY: CPT

## 2019-03-26 PROCEDURE — 97110 THERAPEUTIC EXERCISES: CPT

## 2019-03-26 NOTE — PROGRESS NOTES
Erinn Lovell  : 1828  Primary: [facility]  [place] [id number] [address] at [place] [id number] [facility], [address], [place] [address].  [phone]   [phone]         OUTPATIENT PHYSICAL THERAPY: Daily Treatment Note 3/26/2019  Pre-treatment Symptoms/Complaints:  Right hip and LBP- 3/26/2019 \"I went to pain management this morning, I don't know what to do, injection, pills? Patient with a lot of questions this afternoon. Pain: Initial:   3/10 Post Session:  3/10   Medications Last Reviewed:  3/26/2019   DJD, Lumbar Nerve Root disorder per MRI report patient brought in to PT today. Updated Objective Findings:  None Today   TREATMENT:   THERAPEUTIC EXERCISE: (40 minutes):  Exercises per grid below to improve mobility and strength. Required minimal verbal cues to promote proper body alignment, promote proper body posture and promote proper body mechanics. Progressed resistance, range, repetitions and complexity of movement as indicated.      Date:  19 Date:      Activity/Exercise Parameters Parameters   Lumbar rotation supine 20 reps     SKTC 10x3 sec hold each     Pelvic tilt       nustep 10 min level 4.0     Figure 4 stretch 3x30 sec hold     Posterior hip stretch 3x30 sec hold     bridge 20 reps     Clamshell 20 reps each red     SLR with TA 10 reps each     March with TA 20 reps each     Isometric hip flexion 10x5 sec hold each     SLR X 10 reps                                          Estimulation to Right Hip/buttocks region secondary to pain and discomfort x 15 minutes to tolerance with moist heat-skin clear. [employer] Portal  Treatment/Session Summary:    · Response to Treatment:  Patient with alot of questions concerning her back. Patient went to Pain management this morning, trying to decide on injection or steroids in pill form.   .  Difficulty keeping patient on task secondary to having a lot of questions. · Communication/Consultation:  None today  · Equipment provided today:  None today  · Recommendations/Intent for next treatment session: Next visit will focus on TA isolation. Treatment Plan of Care Effective Dates:  3/6/2019 TO 5/5/2019 (60 days).   Total Treatment Billable Duration:  55 minutes  PT Patient Time In/Time Out  Time In: 1300  Time Out: [address], PTA    Future Appointments   Date Time Provider Anaya Arias   3/28/2019  8:15 AM Scarlet Hemphill, PT SFEORPT SFE   6/7/2019  3:15 PM MD MARY Flores UCDG UCD   7/29/2019  8:30 AM MAT LAB MARY MAT MAT   8/5/2019  3:00 PM MD MARY Yang MAT

## 2019-03-28 ENCOUNTER — HOSPITAL ENCOUNTER (OUTPATIENT)
Dept: PHYSICAL THERAPY | Age: 74
Discharge: HOME | End: 2019-03-28
Attending: INTERNAL MEDICINE
Payer: COMMERCIAL

## 2019-03-28 PROCEDURE — 97110 THERAPEUTIC EXERCISES: CPT

## 2019-03-28 NOTE — PROGRESS NOTES
Tiana Callaway  : [birth year]  Primary: [facility]  [place] [id number] at [facility], [address] [facility] 83,[address], [address].  [phone]   [phone]         OUTPATIENT PHYSICAL THERAPY: Daily Treatment Note 3/28/2019  Pre-treatment Symptoms/Complaints:  Right hip and LBP- 3/28/2019 Pt reports that she has good days and bad days. Pain: Initial:   3/10 Post Session:  3/10   Medications Last Reviewed:  3/28/2019   DJD, Lumbar Nerve Root disorder per MRI report patient brought in to PT today. Updated Objective Findings:  None Today   TREATMENT:   THERAPEUTIC EXERCISE: (40 minutes):  Exercises per grid below to improve mobility and strength. Required minimal verbal cues to promote proper body alignment, promote proper body posture and promote proper body mechanics. Progressed resistance, range, repetitions and complexity of movement as indicated.      Date:  19 Date:   19   Activity/Exercise Parameters Parameters   Lumbar rotation supine 20 reps  20 reps   SKTC 10x3 sec hold each  10x5 sec hold each   Pelvic tilt    20 reps   nustep 10 min level 4.0 8 min level 4.0   Figure 4 stretch 3x30 sec hold  3x30 sec hold   Posterior hip stretch 3x30 sec hold  3x30 sec hold   bridge 20 reps     Clamshell 20 reps each red     SLR with TA 10 reps each  20 reps each   March with TA 20 reps each  20 reps   Isometric hip flexion 10x5 sec hold each  10x5 sec hold each          TA with bounce on ball   5 min                             [employer] Portal  Treatment/Session Summary:    · Response to Treatment:  Patient tolerated all therapeutic exercise today without increased c/o pain. Some c/o tightness in R lateral hip that improved with manual stretch  · Communication/Consultation:  None today  · Equipment provided today:  None today  · Recommendations/Intent for next treatment session: Next visit will focus on TA isolation.   Treatment Plan of Care Effective Dates:  3/6/2019 TO 5/5/2019 (60 days).   Total Treatment Billable Duration:  40 minutes  PT Patient Time In/Time Out  Time In: 0815  Time Out: 0900  Dominga Mc, MOE    Future Appointments   Date Time Provider Anaya Arias   6/7/2019  3:15 PM Katia Posada MD [facility] UCDG UCD   7/29/2019  8:30 AM MAT LAB [facility] MAT MAT   8/5/2019  3:00 PM Ganesh Rios MD [facility] MAT

## 2019-06-09 ENCOUNTER — APPOINTMENT (OUTPATIENT)
Dept: GENERAL RADIOLOGY | Age: 74
End: 2019-06-09
Attending: EMERGENCY MEDICINE
Payer: COMMERCIAL

## 2019-06-09 ENCOUNTER — HOSPITAL ENCOUNTER (EMERGENCY)
Age: 74
Discharge: HOME | End: 2019-06-09
Attending: EMERGENCY MEDICINE
Payer: COMMERCIAL

## 2019-06-09 VITALS
TEMPERATURE: 98.2 F | HEIGHT: 64 IN | BODY MASS INDEX: 30.22 KG/M2 | OXYGEN SATURATION: 99 % | RESPIRATION RATE: 16 BRPM | HEART RATE: 78 BPM | SYSTOLIC BLOOD PRESSURE: 105 MMHG | DIASTOLIC BLOOD PRESSURE: 68 MMHG | WEIGHT: 177 LBS

## 2019-06-09 DIAGNOSIS — M25.562 ACUTE PAIN OF LEFT KNEE: Primary | ICD-10-CM

## 2019-06-09 PROCEDURE — L1830 KO IMMOB CANVAS LONG PRE OTS: HCPCS

## 2019-06-09 PROCEDURE — 73562 X-RAY EXAM OF KNEE 3: CPT

## 2019-06-09 PROCEDURE — 99283 EMERGENCY DEPT VISIT LOW MDM: CPT | Performed by: EMERGENCY MEDICINE

## 2019-06-09 RX ORDER — TRAMADOL HYDROCHLORIDE 50 MG/1
50 TABLET ORAL
Qty: 12 TAB | Refills: 0 | Status: SHIPPED | OUTPATIENT
Start: 2019-06-09 | End: 2019-06-12

## 2019-06-09 NOTE — LETTER
[address] [facility] EMERGENCY DEPT 
[facility] [address] [facility] [address] 
[phone] Work/School Note Date: 6/9/2019 To Whom It May concern: Gibson Stovall was seen and treated today in the emergency room by the following provider(s): 
Attending Provider: Chava Luna MD. Gibson Stovall Special Instructions: Work excuse, Beatrice 10 and 11 Sincerely, 
 
 
 
 
Zoe Martinez MD

## 2019-06-09 NOTE — DISCHARGE INSTRUCTIONS
Wear knee immobilizer until follow-up with orthopedist.  Cool pack to area of knee soreness. Recheck with any fever/redness or other changes not present currently. Over-the-counter medications for discomfort.   Limited prescription for tramadol for unrelieved pain

## 2019-06-09 NOTE — ED NOTES
I have reviewed discharge instructions with the patient. The patient verbalized understanding. Patient left ED via Discharge Method: ambulatory to Home with herself. Opportunity for questions and clarification provided. Patient given 1 scripts. To continue your aftercare when you leave the hospital, you may receive an automated call from our care team to check in on how you are doing. This is a free service and part of our promise to provide the best care and service to meet your aftercare needs.  If you have questions, or wish to unsubscribe from this service please call [phone]. Thank you for Choosing our [facility] Emergency Department.

## 2019-06-09 NOTE — ED TRIAGE NOTES
Patient advises left knee pain started on 6/7/2019, states no trauma. Patient advises knee has \"gave out\" on her however no falls. Patient is ambulating with cane and was able to drive herself here. Advises surgery \"years and years ago\" to above knee.

## 2019-06-09 NOTE — ED PROVIDER NOTES
Patient has a history of knee injury requiring surgery. Somewhat uncertain as to specifics beyond this general information. She does have a vertically oriented, well-healed scar to the niece on the left side. Has some global soreness, though no focal area of discomfort other than seemingly slightly more to the area above the knee And below. She maintains inability to take her knee into extension, though this is painful. By her report. She is walking with a cane. She has had in addition to knee surgery, surgery, left shoulder and also surgery on her left ankle (Dr. Bette Walker). 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 She is scheduled to be involved with a summer program beginning tomorrow. She works proximal to 6 hours and is on her feet. Part of the time. No fever, redness, or noted significant swelling. It does have some sense it might buckle if she does not use her cane      Knee Pain    This is a new problem. The current episode started more than 2 days ago. The problem has been gradually worsening. The pain is present in the left knee. The pain is moderate. Associated symptoms include stiffness. Pertinent negatives include no numbness and no tingling. Treatments tried: using her cane.         Past Medical History:   Diagnosis Date    Abdominal pain     Arthritis     Breast cancer, left ([address] 2001    Breast pain in female     Bunion     Cholesterol serum elevated     medication    Chronic pain of left knee     Corns and callosities     DDD (degenerative disc disease), lumbar     Diabetes mellitus type II, controlled ([address]     Emotional disorder     Fungal infection of toenail     Hallux valgus     Hip pain, bilateral     Hyperglycemia     Hyperlipidemia     Hypertension     medication    Hypothyroidism     Lower GI bleed     Obesity     Other ill-defined conditions(799.89)     pt reports urinalysis shows blood - she is scheduled for CT pelvis and abdomen in August)    Postmenopausal     Radiation therapy     Right median nerve neuropathy     Stress incontinence     Traumatic arthropathy of knee        Past Surgical History:   Procedure Laterality Date    HX BREAST BIOPSY Right 2011    benign    HX BREAST LUMPECTOMY Left     radiation for CA    HX COLONOSCOPY  2008    HX HYSTERECTOMY      HX ORTHOPAEDIC  2005    Left TSA    HX ORTHOPAEDIC      right ankle    HX ORTHOPAEDIC      left knee repair    HX ORTHOPAEDIC Left     ankle arthroscopy    HX SHOULDER REPLACEMENT Left     HX TUBAL LIGATION      HX UROLOGICAL      cystoscopy    US GUIDED CORE BREAST BIOPSY Right          Family History:   Problem Relation Age of Onset    Breast Cancer Sister 77    Cancer Mother         colon    Diabetes Mother     Diabetes Father     Breast Cancer Sister     Colon Cancer Other         mother    Cancer Other         bone CA    Diabetes Other     Hypertension Other     Malignant Hyperthermia Neg Hx     Pseudocholinesterase Deficiency Neg Hx     Delayed Awakening Neg Hx     Post-op Nausea/Vomiting Neg Hx     Emergence Delirium Neg Hx     Post-op Cognitive Dysfunction Neg Hx     Other Neg Hx        Social History     Socioeconomic History    Marital status: SINGLE     Spouse name: Not on file    Number of children: Not on file    Years of education: Not on file    Highest education level: Not on file   Occupational History    Not on file   Social Needs    Financial resource strain: Not on file    Food insecurity:     Worry: Not on file     Inability: Not on file    Transportation needs:     Medical: Not on file     Non-medical: Not on file   Tobacco Use    Smoking status: Former Smoker     Packs/day: 0.25     Years: 25.00     Pack years: 6.25     Last attempt to quit: 2001     Years since quittin.9    Smokeless tobacco: Never Used   Substance and Sexual Activity    Alcohol use: No    Drug use: No    Sexual activity: Not Currently   Lifestyle    Physical activity:     Days per week: Not on file     Minutes per session: Not on file    Stress: Not on file   Relationships    Social connections:     Talks on phone: Not on file     Gets together: Not on file     Attends Latter day service: Not on file     Active member of club or organization: Not on file     Attends meetings of clubs or organizations: Not on file     Relationship status: Not on file    Intimate partner violence:     Fear of current or ex partner: Not on file     Emotionally abused: Not on file     Physically abused: Not on file     Forced sexual activity: Not on file   Other Topics Concern    Not on file   Social History Narrative    Not on file         ALLERGIES: Patient has no known allergies. Review of Systems   Constitutional: Negative for chills and fever. Musculoskeletal: Positive for arthralgias, joint swelling and stiffness. Skin: Negative for color change and wound. Neurological: Negative for tingling and numbness. Psychiatric/Behavioral: Negative. All other systems reviewed and are negative. Vitals:    06/09/19 1230   BP: 109/73   Pulse: 85   Resp: 16   Temp: 98.3 °F (36.8 °C)   SpO2: 98%   Weight: 80.3 kg (177 lb)   Height: 5' 4\" (1.626 m)            Physical Exam   Constitutional: She appears well-developed and well-nourished. No distress. HENT:   Head: Atraumatic. Eyes: No scleral icterus. Neck: Neck supple. Cardiovascular: Normal rate and intact distal pulses. Pulmonary/Chest: Effort normal. No respiratory distress. Musculoskeletal: She exhibits tenderness. Left knee with it most minimal puffiness anteriorly. No crepitance or palpable abnormality to the patella upper. She is slightly sore to the area of the patellar tendon superiorly minimally inferiorly. She maintains inability to take her leg into extension. She states it is sore when she does this. Neurological: No sensory deficit.  She exhibits normal muscle tone. Skin: Skin is warm and dry. She is not diaphoretic. Psychiatric: Thought content normal.   Nursing note and vitals reviewed. MDM  Number of Diagnoses or Management Options  Acute pain of left knee:   Diagnosis management comments: Imaging is done of the knee which shows no acute fracture, dislocation changes. Exam has currently stable to exam, though. Exam limited by soreness. Place her in a knee immobilizer until rechecked by her orthopedist.  She was contacted. He might orthopedics tomorrow. Advising her to take over-the-counter medications for her discomfort.   She additionally will be given a limited prescription of tramadol, which she is taking for pain in the past without problem       Amount and/or Complexity of Data Reviewed  Tests in the radiology section of CPT®: reviewed and ordered  Independent visualization of images, tracings, or specimens: yes    Risk of Complications, Morbidity, and/or Mortality  Presenting problems: moderate  Diagnostic procedures: low  Management options: moderate    Patient Progress  Patient progress: stable         Procedures

## 2019-07-10 NOTE — THERAPY EVALUATION
Clay Stockton  : [id number]  Primary: Rebecca uQeen  Secondary: [place] [id number] at [facility], [address] [address], [address] [facility]  [phone]   [phone]          OUTPATIENT PHYSICAL THERAPY:Discontinuation Summary 3/6/2019   ICD-10: Treatment Diagnosis: low back pain M54.5  Precautions/Allergies:   Patient has no known allergies. MD Orders: eval and treat MEDICAL/REFERRING DIAGNOSIS:  DDD (degenerative disc disease), lumbar [M51.36]   DATE OF ONSET: 2019  REFERRING PHYSICIAN: Suzan Reid MD  RETURN PHYSICIAN APPOINTMENT: TBD, Pain management 2019     INITIAL ASSESSMENT:  Ms. Sarai Valente attended 7 visits from 19 to 19 with fair improvement of symptoms but pt self-D/C. PROBLEM LIST (Impacting functional limitations):  1. Decreased Strength  2. Decreased ADL/Functional Activities  3. Increased Pain  4. Decreased Flexibility/Joint Mobility  5. Decreased [place] with Home Exercise Program INTERVENTIONS PLANNED: (Treatment may consist of any combination of the following)  1. Cold  2. Electrical Stimulation  3. Heat  4. Home Exercise Program (HEP)  5. Manual Therapy  6. Range of Motion (ROM)  7. Therapeutic Exercise/Strengthening   Pt discontinued care therefore unable to assess progress. TREATMENT PLAN:  Effective Dates: 3/6/2019 TO 2019 (60 days). Frequency/Duration: 2 times a week for 60 Day(s)  GOALS: (Goals have been discussed and agreed upon with patient.)  Short-Term Functional Goals: Time Frame: 4 weeks  1. Pt will be I with HEP to allow for continued progress with symptom management. 2. Pt will improve sidebend ROM to 70%. Discharge Goals: Time Frame: 8 weeks  1. Pt will improve c/o pain to 2/10 to allow for improved tolerance for return to previous level of function. 2. Pt will improve ELLIE score to <10 to reflect an improvement in function.   3. Pt will demonstrate correct biomechanics with lifting 20 pounds from knee to waist height. 4. Pt will improve TA activation to 1 min. Rehabilitation Potential For Stated Goals: Good  Regarding Kristi Arambula's therapy, I certify that the treatment plan above will be carried out by a therapist or under their direction. Thank you for this referral,  [place]as, PT                 The information in this section was collected on 03-06-19 (except where otherwise noted). HISTORY:   History of Present Injury/Illness (Reason for Referral):  Pt is a 75 y.o. Female presenting to PT with low back pain. Pain begins in the posterior R hip and runs down into the buttocks. When the pain was worse last week the pain limited her ability to bend forward. States that it is not always worse with forward flexion. Enjoys sleepingo nthe R side but when the pain was painful last Thursday pt c/o bilateral LE pain. Got out of bed and walked around. States that initially it was difficult to walk. Went to urgent care and was prescribed meloxicam for 15 days and tramadol. States that she tries to avoid taking tramadol. Works in the kitchen at [employer]. At work she bent forward that she had to Enterprise maneuver to stand back up. Past Medical History/Comorbidities:   Ms. Irene Liao  has a past medical history of Abdominal pain, Arthritis, Breast cancer, left ([address] (2001), Breast pain in female, Bunion, Cholesterol serum elevated, Chronic pain of left knee, Corns and callosities, DDD (degenerative disc disease), lumbar, Diabetes mellitus type II, controlled ([address] Emotional disorder, Fungal infection of toenail, Hallux valgus, Hip pain, bilateral, Hyperglycemia, Hyperlipidemia, Hypertension, Hypothyroidism, Lower GI bleed, Obesity, Other ill-defined conditions(799.89), Postmenopausal, Radiation therapy (2001), Right median nerve neuropathy, Stress incontinence, and Traumatic arthropathy of knee.   Ms. Irene Liao  has a past surgical history that includes us guided core breast biopsy (Right); hx hysterectomy; hx tubal ligation; hx urological; hx colonoscopy (2008); hx breast lumpectomy (Left, 2001); hx breast biopsy (Right, 2011); hx orthopaedic (2005); hx orthopaedic; hx orthopaedic; hx orthopaedic (Left); and hx shoulder replacement (Left). Social History/Living Environment:     unknown  Prior Level of Function/Work/Activity:  unrestricted  none     Ambulatory/Rehab Services H2 Model Falls Risk Assessment    Risk Factors:       No Risk Factors Identified Ability to Rise from Chair:       (1)  Pushes up, successful in one attempt    Falls Prevention Plan:       No modifications necessary   Total: (5 or greater = High Risk): 1    ©2010 [facility] of tic. All Rights Reserved. [facility] Patent #[id number]. Federal Law prohibits the replication, distribution or use without written permission from [facility] [employer]     Current Medications:    . Current Outpatient Medications:     traMADol (ULTRAM) 50 mg tablet, Take 1 Tab by mouth every six (6) hours as needed for Pain. Max Daily Amount: 200 mg., Disp: 30 Tab, Rfl: 0    irbesartan (AVAPRO) 150 mg tablet, Take 2 Tabs by mouth nightly., Disp: 60 Tab, Rfl: 5    levothyroxine (SYNTHROID) 75 mcg tablet, TK 1 T PO D, Disp: 90 Tab, Rfl: 3    atenolol-chlorthalidone (TENORETIC) 50-25 mg per tablet, Take 0.5 Tabs by mouth daily. , Disp: 90 Tab, Rfl: 3    atorvastatin (LIPITOR) 40 mg tablet, Take 1 Tab by mouth nightly., Disp: 90 Tab, Rfl: 3    aspirin delayed-release 81 mg tablet, Take  by mouth every other day. 1 tab weekly, Disp: , Rfl:     Lancing Device with Lancets (ONE TOUCH DELICA) kit, Needs new glucometer. Check blood sugar BID, Disp: 1 Kit, Rfl: 0    glucose blood VI test strips (ONETOUCH ULTRA TEST) strip, by Does Not Apply route two (2) times a day.  DX:E11.65, Disp: 200 Strip, Rfl: 2    ONETOUCH DELICA LANCETS 30 gauge misc, Check blood sugar daily, E11.9, Disp: 50 Lancet, Rfl: 5    co-enzyme Q-10 (CO Q-10) 100 mg capsule, Take 100 mg by mouth daily. , Disp: , Rfl:     magnesium 250 mg tab, Take 500 mg by mouth daily. , Disp: , Rfl:     multivitamin (ONE A DAY) tablet, Take 1 Tab by mouth daily. , Disp: , Rfl:     cholecalciferol, vitamin D3, (VITAMIN D3) 2,000 unit tab, Take  by mouth., Disp: , Rfl:     Omega-3 Fatty Acids (FISH OIL) 300 mg cap, Take  by mouth., Disp: , Rfl:     Blood-Glucose Meter (ONETOUCH ULTRAMINI) monitoring kit, Check blood sugar BID, Disp: 1 Kit, Rfl: 0   Date Last Reviewed:  7/10/2019    Number of Personal Factors/Comorbidities that affect the Plan of Care: 1-2: MODERATE COMPLEXITY   EXAMINATION:   Observation/Orthostatic Postural Assessment:          Slight lateral shift of lumbar spine to L in standint  Palpation:          Tenderness in glutes  ROM:            LUMBAR SPINE    AROM    Flexion   Extension  Right Rotation  Left Rotation  Right Sidebend  Left Sidebend 80 %  80 %  80 %  70 %  50 % pain  80 %        Strength:          LE- 5-/5 globally with c/o pain with resisted R hip ER  TA-  4/5 with poor activation  Special Tests:          Slump- negative    Body Structures Involved:  1. Nerves  2. Joints  3. Muscles Body Functions Affected:  1. Sensory/Pain  2. Neuromusculoskeletal Activities and Participation Affected:  1. General Tasks and Demands   Number of elements (examined above) that affect the Plan of Care: 3: MODERATE COMPLEXITY   CLINICAL PRESENTATION:   Presentation: Evolving clinical presentation with changing clinical characteristics: MODERATE COMPLEXITY   CLINICAL DECISION MAKING:   Pt discontinued care therefore unable to assess progress. Outcome Measure: Tool Used: Modified Oswestry Low Back Pain Questionnaire  Score:  Initial: 10/50  Most Recent: X/50 (Date: -- )   Interpretation of Score: Each section is scored on a 0-5 scale, 5 representing the greatest disability. The scores of each section are added together for a total score of 50.         Medical Necessity:   · Patient is expected to demonstrate progress in strength and range of motion to to return to previous level of function. Reason for Services/Other Comments:  · Patient continues to require present interventions due to patient's inability to lift at work.    Use of outcome tool(s) and clinical judgement create a POC that gives a: Questionable prediction of patient's progress: MODERATE COMPLEXITY

## 2019-07-23 ENCOUNTER — HOSPITAL ENCOUNTER (OUTPATIENT)
Dept: MAMMOGRAPHY | Age: 74
Discharge: HOME | End: 2019-07-23
Attending: INTERNAL MEDICINE
Payer: COMMERCIAL

## 2019-07-23 DIAGNOSIS — Z12.31 VISIT FOR SCREENING MAMMOGRAM: ICD-10-CM

## 2019-07-23 PROCEDURE — 77067 SCR MAMMO BI INCL CAD: CPT

## 2019-10-15 PROBLEM — Z28.21 REFUSED DIPHTHERIA-TETANUS VACCINE: Status: RESOLVED | Noted: 2018-07-24 | Resolved: 2019-10-15

## 2019-10-15 PROBLEM — Z28.21 INFLUENZA VACCINE REFUSED: Status: RESOLVED | Noted: 2018-07-24 | Resolved: 2019-10-15

## 2019-10-15 PROBLEM — Z28.21 PNEUMOCOCCAL VACCINE REFUSED: Status: RESOLVED | Noted: 2018-07-24 | Resolved: 2019-10-15

## 2019-10-15 PROBLEM — Z28.21 REFUSED VARICELLA VACCINE: Status: ACTIVE | Noted: 2019-10-15

## 2020-01-21 ENCOUNTER — PATIENT OUTREACH (OUTPATIENT)
Dept: CASE MANAGEMENT | Age: 75
End: 2020-01-21

## 2020-01-27 NOTE — PROGRESS NOTES
RNCM calls to pt, messages left, pt returned call. CCM explained role and pt states she really doesn't have any needs. Pt very independent, works. She reports medication understanding and compliance. No questions or issues. Reports she actively strives to eat well, control A1C. \"I want to live until I'm 89\". She does question \"all the phone calls I get from people offering things re Medicare\". Encouraged to ignore such calls or ask for written information as there is always a chance of fraud or someone taking advantage of the elderly. Appreciative of contact. Aware to contact this RN or alert Dr Elias Kearns if any future needs. No further outreach planned. This note will not be viewable in [address]

## 2020-06-04 ENCOUNTER — APPOINTMENT (OUTPATIENT)
Dept: GENERAL RADIOLOGY | Age: 75
End: 2020-06-04
Attending: EMERGENCY MEDICINE
Payer: COMMERCIAL

## 2020-06-04 ENCOUNTER — HOSPITAL ENCOUNTER (EMERGENCY)
Age: 75
Discharge: HOME | End: 2020-06-04
Attending: EMERGENCY MEDICINE
Payer: COMMERCIAL

## 2020-06-04 ENCOUNTER — PATIENT OUTREACH (OUTPATIENT)
Dept: CASE MANAGEMENT | Age: 75
End: 2020-06-04

## 2020-06-04 VITALS
OXYGEN SATURATION: 98 % | TEMPERATURE: 97.7 F | DIASTOLIC BLOOD PRESSURE: 93 MMHG | RESPIRATION RATE: 18 BRPM | SYSTOLIC BLOOD PRESSURE: 149 MMHG | HEART RATE: 64 BPM

## 2020-06-04 DIAGNOSIS — M54.50 ACUTE BILATERAL LOW BACK PAIN WITHOUT SCIATICA: Primary | ICD-10-CM

## 2020-06-04 PROCEDURE — 72100 X-RAY EXAM L-S SPINE 2/3 VWS: CPT

## 2020-06-04 PROCEDURE — 99283 EMERGENCY DEPT VISIT LOW MDM: CPT

## 2020-06-04 PROCEDURE — 99282 EMERGENCY DEPT VISIT SF MDM: CPT

## 2020-06-04 NOTE — ED TRIAGE NOTES
Pt c/o exacerbation of L hip pain, states she has arthritis but the pain is worse today. Also states some pain in L shoulder. Pt also states she spilled some hot water on her feet about 2 hrs ago and as she is a diabetic wanted to make sure she didn't have any skin injury. No blistering noted on feet. She checked her BGL this evening and it was 123.  Masked on arrival.

## 2020-06-04 NOTE — ED NOTES
I have reviewed discharge instructions with the patient. The patient verbalized understanding. Patient left ED via Discharge Method: ambulatory to Home with POV. Opportunity for questions and clarification provided. Patient given 0 scripts. To continue your aftercare when you leave the hospital, you may receive an automated call from our care team to check in on how you are doing. This is a free service and part of our promise to provide the best care and service to meet your aftercare needs.  If you have questions, or wish to unsubscribe from this service please call [phone]. Thank you for Choosing our [facility].

## 2020-06-04 NOTE — ED PROVIDER NOTES
Patient with bilateral lower back pain going on for the past couple of days. Will move about. Worse with movement. No saddle anesthesia or change in bowel or bladder function. No previous similar episodes. The history is provided by the patient. No  was used. Back Pain    This is a new problem. The current episode started more than 2 days ago. The problem has not changed since onset. The problem occurs daily. Patient reports not work related injury. The pain is associated with no known injury. The pain is present in the lumbar spine, left side and right side. The quality of the pain is described as burning and aching. The pain does not radiate. The pain is mild. The symptoms are aggravated by bending and twisting. Pertinent negatives include no chest pain, no fever, no numbness, no headaches, no abdominal pain, no abdominal swelling, no bowel incontinence, no perianal numbness, no bladder incontinence, no dysuria, no leg pain, no paresthesias and no weakness. She has tried nothing for the symptoms.         Past Medical History:   Diagnosis Date    Abdominal pain     Arthritis     Breast cancer, left ([facility] [address] 2001    Breast pain in female     Bunion     Cholesterol serum elevated     medication    Chronic pain of left knee     Corns and callosities     DDD (degenerative disc disease), lumbar     Diabetes mellitus type II, controlled ([address]     Emotional disorder     Fungal infection of toenail     Hallux valgus     Hip pain, bilateral     Hyperglycemia     Hyperlipidemia     Hypertension     medication    Hypothyroidism     Lower GI bleed     Obesity     Other ill-defined conditions(799.89)     pt reports urinalysis shows blood - she is scheduled for CT pelvis and abdomen in August)    Postmenopausal     Radiation therapy 2001    Right median nerve neuropathy     Stress incontinence     Traumatic arthropathy of knee        Past Surgical History:   Procedure Laterality Date    HX BREAST BIOPSY Right     benign    HX BREAST LUMPECTOMY Left     radiation for CA    HX COLONOSCOPY  2008    HX HYSTERECTOMY      HX ORTHOPAEDIC  2005    Left TSA    HX ORTHOPAEDIC      right ankle    HX ORTHOPAEDIC      left knee repair    HX ORTHOPAEDIC Left     ankle arthroscopy    HX SHOULDER REPLACEMENT Left     HX TUBAL LIGATION      HX UROLOGICAL      cystoscopy    US GUIDED CORE BREAST BIOPSY Right          Family History:   Problem Relation Age of Onset    Breast Cancer Sister 77    Cancer Mother         Colon    Diabetes Mother     Dementia Mother     Diabetes Father     Breast Cancer Sister     Cancer Other         bone CA    Diabetes Other     Hypertension Other     Hypertension Maternal Grandmother     Malignant Hyperthermia Neg Hx     Pseudocholinesterase Deficiency Neg Hx     Delayed Awakening Neg Hx     Post-op Nausea/Vomiting Neg Hx     Emergence Delirium Neg Hx     Post-op Cognitive Dysfunction Neg Hx     Other Neg Hx        Social History     Socioeconomic History    Marital status: SINGLE     Spouse name: Not on file    Number of children: Not on file    Years of education: Not on file    Highest education level: Not on file   Occupational History    Not on file   Social Needs    Financial resource strain: Not on file    Food insecurity     Worry: Not on file     Inability: Not on file    Transportation needs     Medical: Not on file     Non-medical: Not on file   Tobacco Use    Smoking status: Former Smoker     Packs/day: 0.25     Years: 25.00     Pack years: 6.25     Last attempt to quit: 2001     Years since quittin.9    Smokeless tobacco: Never Used   Substance and Sexual Activity    Alcohol use: No    Drug use: No    Sexual activity: Not Currently   Lifestyle    Physical activity     Days per week: Not on file     Minutes per session: Not on file    Stress: Not on file   Relationships    Social connections     Talks on phone: Not on file     Gets together: Not on file     Attends Restorationist service: Not on file     Active member of club or organization: Not on file     Attends meetings of clubs or organizations: Not on file     Relationship status: Not on file    Intimate partner violence     Fear of current or ex partner: Not on file     Emotionally abused: Not on file     Physically abused: Not on file     Forced sexual activity: Not on file   Other Topics Concern    Not on file   Social History Narrative    Not on file         ALLERGIES: Patient has no known allergies. Review of Systems   Constitutional: Negative for chills and fever. HENT: Negative for rhinorrhea and sore throat. Eyes: Negative for pain and redness. Respiratory: Negative for chest tightness, shortness of breath and wheezing. Cardiovascular: Negative for chest pain and leg swelling. Gastrointestinal: Negative for abdominal pain, bowel incontinence, diarrhea, nausea and vomiting. Genitourinary: Negative for bladder incontinence, dysuria and hematuria. Musculoskeletal: Positive for back pain. Negative for gait problem, neck pain and neck stiffness. Skin: Negative for color change and rash. Neurological: Negative for weakness, numbness, headaches and paresthesias. Vitals:    06/04/20 0044   BP: 139/80   Pulse: 64   Resp: 18   Temp: 97.7 °F (36.5 °C)   SpO2: 94%            Physical Exam  Constitutional:       Appearance: Normal appearance. She is well-developed. HENT:      Head: Normocephalic and atraumatic. Neck:      Musculoskeletal: Normal range of motion and neck supple. Cardiovascular:      Rate and Rhythm: Normal rate and regular rhythm. Pulmonary:      Effort: Pulmonary effort is normal.      Breath sounds: Normal breath sounds. Abdominal:      General: Bowel sounds are normal.      Palpations: Abdomen is soft. Tenderness: There is no abdominal tenderness. Musculoskeletal: Normal range of motion. General: No tenderness. Comments: Lower back with no TTP or skin changes. Skin:     General: Skin is warm and dry. Neurological:      Mental Status: She is alert and oriented to person, place, and time. MDM  Number of Diagnoses or Management Options  Diagnosis management comments: Ambulates without difficulty. Patient with no acute on x-ray. Will discharge. Amount and/or Complexity of Data Reviewed  Tests in the radiology section of CPT®: ordered and reviewed    Patient Progress  Patient progress: stable         Procedures            XR SPINE LUMB 2 OR 3 V (Final result)   Result time 06/04/20 02:43:25   Final result by Earl Bello MD (06/04/20 02:43:25)                Impression:    IMPRESSION: Mild dextroscoliosis with moderately advanced degenerative changes,  similar to the prior lumbar spine MRI in February, 2019. Narrative:    EXAM: Lumbar spine x-rays. INDICATION: Low back pain. COMPARISON: Prior MRI lumbar spine on February 27, 2019. TECHNIQUE: 3 views. FINDINGS: There is mild dextroscoliosis. The L3-4, L4-5 and L5-S1 disc spaces  are moderately narrowed. There is multilevel facet arthritis, with associated  grade 1 spondylolisthesis at L2-3. Also noted is mild to moderate multilevel  degenerative spondylosis. Normal sacroiliac joint alignment is maintained.  No  acute fracture or suspicious lytic or blastic bone lesions are seen.

## 2020-06-04 NOTE — DISCHARGE INSTRUCTIONS
Patient Education        Learning About Relief for Back Pain  What is back tension and strain? Back strain happens when you overstretch, or pull, a muscle in your back. You may hurt your back in an accident or when you exercise or lift something. Most back pain will get better with rest and time. You can take care of yourself at home to help your back heal.  What can you do first to relieve back pain? When you first feel back pain, try these steps:  · Walk. Take a short walk (10 to 20 minutes) on a level surface (no slopes, hills, or stairs) every 2 to 3 hours. Walk only distances you can manage without pain, especially leg pain. · Relax. Find a comfortable position for rest. Some people are comfortable on the floor or a medium-firm bed with a small pillow under their head and another under their knees. Some people prefer to lie on their side with a pillow between their knees. Don't stay in one position for too long. · Try heat or ice. Try using a heating pad on a low or medium setting, or take a warm shower, for 15 to 20 minutes every 2 to 3 hours. Or you can buy single-use heat wraps that last up to 8 hours. You can also try an ice pack for 10 to 15 minutes every 2 to 3 hours. You can use an ice pack or a bag of frozen vegetables wrapped in a thin towel. There is not strong evidence that either heat or ice will help, but you can try them to see if they help. You may also want to try switching between heat and cold. · Take pain medicine exactly as directed. ¨ If the doctor gave you a prescription medicine for pain, take it as prescribed. ¨ If you are not taking a prescription pain medicine, ask your doctor if you can take an over-the-counter medicine. What else can you do? · Stretch and exercise. Exercises that increase flexibility may relieve your pain and make it easier for your muscles to keep your spine in a good, neutral position. And don't forget to keep walking. · Do self-massage.  You can use self-massage to unwind after work or school or to energize yourself in the morning. You can easily massage your feet, hands, or neck. Self-massage works best if you are in comfortable clothes and are sitting or lying in a comfortable position. Use oil or lotion to massage bare skin. · Reduce stress. Back pain can lead to a vicious [place]: Distress about the pain tenses the muscles in your back, which in turn causes more pain. Learn how to relax your mind and your muscles to lower your stress. Where can you learn more? Go to http://kennedy-baldo.info/. Enter G072 in the search box to learn more about \"Learning About Relief for Back Pain. \"  Current as of: March 21, 2017  Content Version: 11.5  © 5318-6360 [employer]. Care instructions adapted under license by [employer] (which disclaims liability or warranty for this information). If you have questions about a medical condition or this instruction, always ask your healthcare professional. [address] any warranty or liability for your use of this information.

## 2020-06-04 NOTE — PROGRESS NOTES
Patient contacted regarding recent discharge and COVID-19 risk, in ED w/ back pain states \"walking with a limp\". Has had it before and took PT on rt side. Pt reports this morning , /98, P63, denies fever. Pt taking Tylenol as needed, takes Tramadol when in severe pain. Pt using cane for ambulation. Pt denies excruciating pain, but \"when I walk it throws me to the side\". Pt states she had a \"tumble in the house last wk, but my back has been hurting for a while\". RNCM discussed fall precautions. Pt will call PCP to see if there any cancellations prior to scheduled visit on 20. Pt lives alone and attends to own needs. Ambulatory Care Manager contacted the patient by telephone to perform post discharge assessment. Verified name and  with patient as identifiers. Patient has following risk factors of: no known risk factors. ACM reviewed discharge instructions, medical action plan and red flags related to discharge diagnosis. Reviewed and educated them on any new and changed medications related to discharge diagnosis. Advised obtaining a 90-day supply of all daily and as-needed medications. Education provided regarding infection prevention, and signs and symptoms of COVID-19 and when to seek medical attention with patient who verbalized understanding. Discussed exposure protocols and quarantine from [address] you at higher risk for severe illness  and given an opportunity for questions and concerns. The patient agrees to contact the COVID-19 hotline [phone] or PCP office for questions related to their healthcare. CTN/ACM provided contact information for future reference. From CDC: Are you at higher risk for severe illness?  Wash your hands often.  Avoid close contact (6 feet, which is about two arm lengths) with people who are sick.  Put distance between yourself and other people if COVID-19 is spreading in your community.    Clean and disinfect frequently touched surfaces.  Avoid all cruise travel and non-essential air travel.  Call your healthcare professional if you have concerns about COVID-19 and your underlying condition or if you are sick. For more information on steps you can take to protect yourself, see CDC's How to Protect Yourself      Patient/family/caregiver given information for [facility] and agrees to enroll no     Plan for follow-up call in 7-14 days based on severity of symptoms and risk factors.

## 2020-06-18 ENCOUNTER — PATIENT OUTREACH (OUTPATIENT)
Dept: CASE MANAGEMENT | Age: 75
End: 2020-06-18

## 2020-06-18 NOTE — PROGRESS NOTES
Patient resolved from Transition of Care episode on 6/18/20. Patient/family has been provided the following resources and education related to COVID-19:                         Signs, symptoms and red flags related to COVID-19            CDC exposure and quarantine guidelines            [facility] exposure contact - [phone]            Contact for their local Department of Health                 Patient currently reports that the following symptoms have improved:  no new symptoms and no worsening symptoms. No further outreach scheduled with this ACM. Episode of Care resolved. Patient has this ACM contact information if future needs arise.

## 2020-08-13 ENCOUNTER — HOSPITAL ENCOUNTER (OUTPATIENT)
Dept: MAMMOGRAPHY | Age: 75
Discharge: HOME | End: 2020-08-13
Attending: INTERNAL MEDICINE
Payer: COMMERCIAL

## 2020-08-13 DIAGNOSIS — Z12.31 VISIT FOR SCREENING MAMMOGRAM: ICD-10-CM

## 2020-08-13 PROCEDURE — 77067 SCR MAMMO BI INCL CAD: CPT

## 2020-10-02 ENCOUNTER — TRANSCRIBE ORDER (OUTPATIENT)
Dept: SCHEDULING | Age: 75
End: 2020-10-02

## 2020-10-02 DIAGNOSIS — Z12.31 OTHER SCREENING MAMMOGRAM: Primary | ICD-10-CM

## 2020-10-22 PROBLEM — F41.9 ANXIETY: Status: ACTIVE | Noted: 2020-10-22

## 2020-11-28 ENCOUNTER — HOSPITAL ENCOUNTER (EMERGENCY)
Age: 75
Discharge: HOME | End: 2020-11-28
Attending: EMERGENCY MEDICINE
Payer: COMMERCIAL

## 2020-11-28 VITALS
HEART RATE: 63 BPM | SYSTOLIC BLOOD PRESSURE: 178 MMHG | OXYGEN SATURATION: 84 % | DIASTOLIC BLOOD PRESSURE: 88 MMHG | HEIGHT: 62 IN | WEIGHT: 164 LBS | BODY MASS INDEX: 30.18 KG/M2 | RESPIRATION RATE: 16 BRPM | TEMPERATURE: 98.1 F

## 2020-11-28 DIAGNOSIS — R55 SYNCOPE AND COLLAPSE: Primary | ICD-10-CM

## 2020-11-28 LAB
ALBUMIN SERPL-MCNC: 3.7 G/DL (ref 3.2–4.6)
ALBUMIN/GLOB SERPL: 0.8 {RATIO} (ref 1.2–3.5)
ALP SERPL-CCNC: 64 U/L (ref 50–136)
ALT SERPL-CCNC: 20 U/L (ref 12–65)
ANION GAP SERPL CALC-SCNC: 9 MMOL/L (ref 7–16)
AST SERPL-CCNC: 28 U/L (ref 15–37)
ATRIAL RATE: 59 BPM
BASOPHILS # BLD: 0 K/UL (ref 0–0.2)
BASOPHILS NFR BLD: 1 % (ref 0–2)
BILIRUB SERPL-MCNC: 1.1 MG/DL (ref 0.2–1.1)
BUN SERPL-MCNC: 20 MG/DL (ref 8–23)
CALCIUM SERPL-MCNC: 9.4 MG/DL (ref 8.3–10.4)
CALCULATED P AXIS, ECG09: 68 DEGREES
CALCULATED R AXIS, ECG10: 63 DEGREES
CALCULATED T AXIS, ECG11: 49 DEGREES
CHLORIDE SERPL-SCNC: 102 MMOL/L (ref 98–107)
CO2 SERPL-SCNC: 27 MMOL/L (ref 21–32)
CREAT SERPL-MCNC: 1.14 MG/DL (ref 0.6–1)
DIAGNOSIS, 93000: NORMAL
DIFFERENTIAL METHOD BLD: NORMAL
EOSINOPHIL # BLD: 0.1 K/UL (ref 0–0.8)
EOSINOPHIL NFR BLD: 1 % (ref 0.5–7.8)
ERYTHROCYTE [DISTWIDTH] IN BLOOD BY AUTOMATED COUNT: 13.6 % (ref 11.9–14.6)
GLOBULIN SER CALC-MCNC: 4.5 G/DL (ref 2.3–3.5)
GLUCOSE SERPL-MCNC: 95 MG/DL (ref 65–100)
HCT VFR BLD AUTO: 38.5 % (ref 35.8–46.3)
HGB BLD-MCNC: 12.6 G/DL (ref 11.7–15.4)
IMM GRANULOCYTES # BLD AUTO: 0 K/UL (ref 0–0.5)
IMM GRANULOCYTES NFR BLD AUTO: 0 % (ref 0–5)
LYMPHOCYTES # BLD: 1.6 K/UL (ref 0.5–4.6)
LYMPHOCYTES NFR BLD: 24 % (ref 13–44)
MCH RBC QN AUTO: 28.3 PG (ref 26.1–32.9)
MCHC RBC AUTO-ENTMCNC: 32.7 G/DL (ref 31.4–35)
MCV RBC AUTO: 86.3 FL (ref 79.6–97.8)
MONOCYTES # BLD: 0.7 K/UL (ref 0.1–1.3)
MONOCYTES NFR BLD: 11 % (ref 4–12)
NEUTS SEG # BLD: 4.4 K/UL (ref 1.7–8.2)
NEUTS SEG NFR BLD: 65 % (ref 43–78)
NRBC # BLD: 0 K/UL (ref 0–0.2)
P-R INTERVAL, ECG05: 192 MS
PLATELET # BLD AUTO: 221 K/UL (ref 150–450)
PMV BLD AUTO: 10.8 FL (ref 9.4–12.3)
POTASSIUM SERPL-SCNC: 3.7 MMOL/L (ref 3.5–5.1)
PROT SERPL-MCNC: 8.2 G/DL (ref 6.3–8.2)
Q-T INTERVAL, ECG07: 436 MS
QRS DURATION, ECG06: 74 MS
QTC CALCULATION (BEZET), ECG08: 431 MS
RBC # BLD AUTO: 4.46 M/UL (ref 4.05–5.2)
SODIUM SERPL-SCNC: 138 MMOL/L (ref 136–145)
VENTRICULAR RATE, ECG03: 59 BPM
WBC # BLD AUTO: 6.9 K/UL (ref 4.3–11.1)

## 2020-11-28 PROCEDURE — 80053 COMPREHEN METABOLIC PANEL: CPT

## 2020-11-28 PROCEDURE — 81003 URINALYSIS AUTO W/O SCOPE: CPT

## 2020-11-28 PROCEDURE — 93005 ELECTROCARDIOGRAM TRACING: CPT | Performed by: EMERGENCY MEDICINE

## 2020-11-28 PROCEDURE — 85025 COMPLETE CBC W/AUTO DIFF WBC: CPT

## 2020-11-28 PROCEDURE — 99284 EMERGENCY DEPT VISIT MOD MDM: CPT

## 2020-11-28 RX ORDER — METAXALONE 800 MG/1
400 TABLET ORAL 3 TIMES DAILY
Qty: 20 TAB | Refills: 0 | Status: SHIPPED | OUTPATIENT
Start: 2020-11-28 | End: 2020-12-03

## 2020-11-28 NOTE — ED TRIAGE NOTES
PT states \"I had a blackout yesterday, it didn't last long, I called the doctor's line because I started a new medication, and she said that I should go to emergency room, but I waited to come in today. \" Pt c/o \"right side of my neck gets tight sometimes\". Pt states she drove herself to ED today. Pt denies Change of vision, HA, dizziness.

## 2020-11-28 NOTE — ED PROVIDER NOTES
67-year-old female presents to the ER for evaluation of a spell she incurred yesterday. Patient was at a drive-through Covid swabbing place and they were instructed to cut the engine off after they pull up to minimize fumes from idling. She was seated in the car and had just turned the air conditioner back on and the next thing she knows she had apparently pulled forward striking the rear bumper of the car in front of her. She does not recall anything between adjusting the air conditioner and the minor impact. She denies having had any chest pain or palpitations or dyspnea. She just felt hot    Contacted her PCP on-call who advised her to come to the ER to get evaluated,  Patient actually has a cardiology appointment December 15 and had a normal stress test December 17. Patient feels fine today, only complaint is an intermittent spasm type pain to her right sternocleidomastoid which she describes as being \"a vein\"    Hypertension noted and discussed, she is actually only on 150 of irbesartan nightly, and continues to take her atenolol on the morning. She shows me a log of blood pressures and for the last week her blood pressures are typically in the 120s and 130s on her current dosing regimen.   Patient reports she tends to be anxiety prone, and is somewhat over wrought with emotion at the moment having just learned that her Covid test from yesterday was negative           Past Medical History:   Diagnosis Date    Abdominal pain     Arthritis     Breast cancer, left ([address] 2001    Breast pain in female     Bunion     Cholesterol serum elevated     medication    Chronic pain of left knee     Corns and callosities     DDD (degenerative disc disease), lumbar     Diabetes mellitus type II, controlled ([address]     Emotional disorder     Fungal infection of toenail     Hallux valgus     Hip pain, bilateral     Hyperglycemia     Hyperlipidemia     Hypertension     medication    Hypothyroidism     Lower GI bleed     Obesity     Other ill-defined conditions(799.89)     pt reports urinalysis shows blood - she is scheduled for CT pelvis and abdomen in August)    Postmenopausal     Radiation therapy 2001    Right median nerve neuropathy     Stress incontinence     Traumatic arthropathy of knee        Past Surgical History:   Procedure Laterality Date    HX BREAST BIOPSY Right 2011    benign    HX BREAST LUMPECTOMY Left 2001    radiation for CA    HX COLONOSCOPY  2008    HX HYSTERECTOMY      HX ORTHOPAEDIC  2005    Left TSA    HX ORTHOPAEDIC      right ankle    HX ORTHOPAEDIC      left knee repair    HX ORTHOPAEDIC Left     ankle arthroscopy    HX SHOULDER REPLACEMENT Left     HX TUBAL LIGATION      HX UROLOGICAL      cystoscopy    US GUIDED CORE BREAST BIOPSY Right          Family History:   Problem Relation Age of Onset    Breast Cancer Sister 77    Cancer Mother         Colon    Diabetes Mother     Dementia Mother     Diabetes Father     Breast Cancer Sister     Cancer Other         bone CA    Diabetes Other     Hypertension Other     Hypertension Maternal Grandmother     Malignant Hyperthermia Neg Hx     Pseudocholinesterase Deficiency Neg Hx     Delayed Awakening Neg Hx     Post-op Nausea/Vomiting Neg Hx     Emergence Delirium Neg Hx     Post-op Cognitive Dysfunction Neg Hx     Other Neg Hx        Social History     Socioeconomic History    Marital status: SINGLE     Spouse name: Not on file    Number of children: Not on file    Years of education: Not on file    Highest education level: Not on file   Occupational History    Not on file   Social Needs    Financial resource strain: Not on file    Food insecurity     Worry: Not on file     Inability: Not on file    Transportation needs     Medical: Not on file     Non-medical: Not on file   Tobacco Use    Smoking status: Former Smoker     Packs/day: 0.25     Years: 25.00     Pack years: 6.25     Last attempt to quit: 2001     Years since quittin.3    Smokeless tobacco: Never Used   Substance and Sexual Activity    Alcohol use: No    Drug use: No    Sexual activity: Not Currently   Lifestyle    Physical activity     Days per week: Not on file     Minutes per session: Not on file    Stress: Not on file   Relationships    Social connections     Talks on phone: Not on file     Gets together: Not on file     Attends Lutheran service: Not on file     Active member of club or organization: Not on file     Attends meetings of clubs or organizations: Not on file     Relationship status: Not on file    Intimate partner violence     Fear of current or ex partner: Not on file     Emotionally abused: Not on file     Physically abused: Not on file     Forced sexual activity: Not on file   Other Topics Concern    Not on file   Social History Narrative    Not on file         ALLERGIES: Patient has no known allergies. Review of Systems   Constitutional: Negative for chills and fever. HENT: Negative for rhinorrhea and sore throat. Eyes: Negative for discharge and redness. Respiratory: Negative for cough and shortness of breath. Cardiovascular: Negative for chest pain and palpitations. Gastrointestinal: Negative for abdominal pain, diarrhea, nausea and vomiting. Musculoskeletal: Negative for arthralgias and back pain. Skin: Negative for rash. Neurological: Positive for syncope. Negative for dizziness, seizures and headaches. All other systems reviewed and are negative. Vitals:    20 1431 20 1602   BP: 125/75 (!) 178/88   Pulse: 65 63   Resp: 16 16   Temp: 98.8 °F (37.1 °C) 98.1 °F (36.7 °C)   SpO2: 98% (!) 84%   Weight: 74.4 kg (164 lb)    Height: 5' 2\" (1.575 m)             Physical Exam  Vitals signs and nursing note reviewed. Constitutional:       General: She is not in acute distress. Appearance: Normal appearance. She is well-developed.  She is not ill-appearing, toxic-appearing or diaphoretic. HENT:      Head: Normocephalic and atraumatic. Right Ear: External ear normal.      Left Ear: External ear normal.      Mouth/Throat:      Mouth: Mucous membranes are moist.      Pharynx: Oropharynx is clear. No oropharyngeal exudate or posterior oropharyngeal erythema. Eyes:      General: No scleral icterus. Right eye: No discharge. Left eye: No discharge. Extraocular Movements: Extraocular movements intact. Conjunctiva/sclera: Conjunctivae normal.      Pupils: Pupils are equal, round, and reactive to light. Neck:      Musculoskeletal: Normal range of motion and neck supple. Normal range of motion. Thyroid: No thyromegaly. Trachea: Trachea normal.     Cardiovascular:      Rate and Rhythm: Normal rate and regular rhythm. Heart sounds: Normal heart sounds. No murmur. No gallop. Pulmonary:      Effort: Pulmonary effort is normal. No respiratory distress. Breath sounds: Normal breath sounds. No wheezing or rales. Abdominal:      General: Bowel sounds are normal.      Palpations: Abdomen is soft. There is no hepatomegaly, splenomegaly or pulsatile mass. Tenderness: There is no abdominal tenderness. There is no guarding. Musculoskeletal: Normal range of motion. Lymphadenopathy:      Cervical: No cervical adenopathy. Skin:     General: Skin is warm and dry. Neurological:      General: No focal deficit present. Mental Status: She is alert and oriented to person, place, and time. Mental status is at baseline. Motor: No abnormal muscle tone. Comments: cni 2-12 grossly   Psychiatric:         Mood and Affect: Mood normal.         Behavior: Behavior normal.          MDM  Number of Diagnoses or Management Options  Syncope and collapse:   Diagnosis management comments: Medical decision making note:  Syncopal spell yesterday, probable vasovagal from sitting in the hot car waiting to get Covid swab.   EKG and labs look good, hypertension noted but she does very well on her regimen at home will let her take her irbesartan tonight continue to keep track of her blood pressures  Cardiology appointment in about 16 days, I have called and left a message so that to facilitate getting a Holter done preclinic. This concludes the \"medical decision making note\" part of this emergency department visit note.            Procedures

## 2020-11-28 NOTE — ED NOTES
I have reviewed discharge instructions with the patient. The patient verbalized understanding. Patient left ED via Discharge Method: ambulatory to Home with self. Opportunity for questions and clarification provided. Patient given 2 scripts. To continue your aftercare when you leave the hospital, you may receive an automated call from our care team to check in on how you are doing. This is a free service and part of our promise to provide the best care and service to meet your aftercare needs.  If you have questions, or wish to unsubscribe from this service please call [phone]. Thank you for Choosing our [facility] Emergency Department.

## 2020-11-28 NOTE — DISCHARGE INSTRUCTIONS
Patient Education        Fainting: Care Instructions  Your Care Instructions     When you faint, or pass out, you lose consciousness for a short time. A brief drop in blood flow to the brain often causes it. When you fall or lie down, more blood flows to your brain and you regain consciousness. Emotional stress, pain, or overheating--especially if you have been standing--can make you faint. In these cases, fainting is usually not serious. But fainting can be a sign of a more serious problem. Your doctor may want you to have more tests to rule out other causes. The treatment you need depends on the reason why you fainted. The doctor has checked you carefully, but problems can develop later. If you notice any problems or new symptoms, get medical treatment right away. Follow-up care is a key part of your treatment and safety. Be sure to make and go to all appointments, and call your doctor if you are having problems. It's also a good idea to know your test results and keep a list of the medicines you take. How can you care for yourself at home? · Drink plenty of fluids to prevent dehydration. If you have kidney, heart, or liver disease and have to limit fluids, talk with your doctor before you increase your fluid intake. When should you call for help? Call 911 anytime you think you may need emergency care. For example, call if:    · You have symptoms of a heart problem. These may include:  ? Chest pain or pressure. ? Severe trouble breathing. ? A fast or irregular heartbeat. ? Lightheadedness or sudden weakness. ? Coughing up pink, foamy mucus. ? Passing out. After you call 911, the  may tell you to chew 1 adult-strength or 2 to 4 low-dose aspirin. Wait for an ambulance. Do not try to drive yourself.     · You have symptoms of a stroke. These may include:  ? Sudden numbness, tingling, weakness, or loss of movement in your face, arm, or leg, especially on only one side of your body.   ? Sudden vision changes. ? Sudden trouble speaking. ? Sudden confusion or trouble understanding simple statements. ? Sudden problems with walking or balance. ? A sudden, severe headache that is different from past headaches.     · You passed out (lost consciousness) again. Watch closely for changes in your health, and be sure to contact your doctor if:    · You do not get better as expected. Where can you learn more? Go to http://www.gray.com/  Enter A848 in the search box to learn more about \"Fainting: Care Instructions. \"  Current as of: June 26, 2019               Content Version: 12.6  © 1139-5733 [employer]. Care instructions adapted under license by [employer] (which disclaims liability or warranty for this information). If you have questions about a medical condition or this instruction, always ask your healthcare professional. [address] any warranty or liability for your use of this information.

## 2021-03-11 ENCOUNTER — HOSPITAL ENCOUNTER (OUTPATIENT)
Dept: PHYSICAL THERAPY | Age: 76
Discharge: HOME | End: 2021-03-11
Attending: INTERNAL MEDICINE
Payer: COMMERCIAL

## 2021-03-11 DIAGNOSIS — M75.51 BURSITIS OF RIGHT SHOULDER: ICD-10-CM

## 2021-03-11 PROCEDURE — 97162 PT EVAL MOD COMPLEX 30 MIN: CPT

## 2021-03-11 PROCEDURE — 97110 THERAPEUTIC EXERCISES: CPT

## 2021-03-11 NOTE — PROGRESS NOTES
Trevor Bright  : [birth year]  Primary: [facility]  [place]: [place] [id number] [address] at [facility]  [address], [address], [address] [facility]  [phone]   [phone]        OUTPATIENT PHYSICAL THERAPY: Daily Treatment Note 3/11/2021  Visit Count:  1    ICD-10: Treatment Diagnosis:   · Pain in right shoulder (M25.511)  · Stiffness of right shoulder, not elsewhere classified (M25.611)  Precautions/Allergies:   Patient has no known allergies. TREATMENT PLAN:  Effective Dates: 3/11/2021 TO 2021 (90 days). Frequency/Duration: 2 times a week for 90 Day(s)    Pre-treatment Symptoms/Complaints:  3/11/2021: Patient reports she is ready to feel better   Pain: Initial: Pain Intensity 1: 5  Pain Location 1: Shoulder  Pain Orientation 1: Right  Pain Intervention(s) 1: Rest, Medication (see MAR)  Post Session:  5/10   Medications Last Reviewed:  3/11/2021  Updated Objective Findings:  See evaluation note from today  TREATMENT:     THERAPEUTIC EXERCISE: (15 minutes):  Exercises per grid below to improve mobility and strength. Required minimal visual, verbal and manual cues to promote proper body alignment, promote proper body posture and promote proper body mechanics. Progressed resistance, range, repetitions and complexity of movement as indicated. Date:  3/11/2021   Activity/Exercise Parameters   Pulleys into flexion 10 reps  2 sets  HEP   Pectoralis stretch in doorway 5 second hold  3 reps  HEP  R UE      Treatment/Session Summary:    · Response to Treatment:  Patient tolerated assessment without complaints of increased right shoulder pain. Patient verbalized and demonstrated understanding of HEP. · Communication/Consultation:  None today  · Equipment provided today:  None today  · Recommendations/Intent for next treatment session: Next visit will focus on improving overall mobility and pain with daily activities.     Total Treatment Billable Duration: 15 minutes + evaluation  PT Patient Time In/Time Out  Time In: 1430  Time Out: [address] [facility], PT    Future Appointments   Date Time Provider Anaya Juana   3/16/2021  2:30 PM Annemarie Jesus, PTA [facility] SFE   3/19/2021  2:30 PM Atlanta Pane, PT [facility] SFE   3/22/2021  2:30 PM [employer], Sandralee Patience, PTA [facility]PT SFE   3/24/2021  2:30 PM [employer], Sandralee Patience, PTA [facility]PT SFE   3/29/2021  2:30 PM [employer], Sandralee Patience, PTA [facility]FE[facility]PT SFE   3/31/2021  2:30 PM Shun Pane, PT [facility] SFE   4/5/2021  2:30 PM [employer], Sandralee Patience, PTA [facility]PT SFE   4/7/2021  2:30 PM [employer], Sandralee Patience, PTA [facility]FE[facility]PT SFE   4/8/2021  3:00 PM Blake Hall MD [facility] UCDG UCD   4/13/2021  9:00 AM MAT LAB [facility] MAT BSCPC   5/10/2021  1:40 PM Gerry Ogden MD [facility] MAT BSCPC   8/16/2021  3:45 PM SFE [facility] ROOM 1 SFERMAM SFE   1/18/2022  2:45 PM PostClaudia lucas MD [facility]IS LUIS

## 2021-03-11 NOTE — THERAPY EVALUATION
Amilcar Hatch  :   [place]  [place] [address] at [place] [address] [facility], [address] [place] [address],[address], [place] [address].  [phone]   [phone]          OUTPATIENT PHYSICAL THERAPY:Initial Assessment 3/11/2021   ICD-10: Treatment Diagnosis:   · Pain in right shoulder (M25.511)  · Stiffness of right shoulder, not elsewhere classified (M25.611)  Precautions/Allergies:   Patient has no known allergies. TREATMENT PLAN:  Effective Dates: 3/11/2021 TO 2021 (90 days). Frequency/Duration: 2 times a week for 90 Day(s) MEDICAL/REFERRING DIAGNOSIS:  Bursitis of right shoulder [M75.51]   DATE OF ONSET: Chronic  REFERRING PHYSICIAN: Tyrone Kaur MD MD Orders: Evaluate and Treat  Return MD Appointment: TBD     INITIAL ASSESSMENT:  Ms. [facility] presents with decreased mobility, decreased strength, and pain in right shoulder secondary to degenerative changes. After discussing with patient, she agreed she would benefit from physical therapy to improve above deficits. Please sign this plan of treatment if you concur. Thank you for the opportunity to serve this patient. PROBLEM LIST (Impacting functional limitations):  1. Decreased Strength  2. Decreased ADL/Functional Activities  3. Increased Pain  4. Decreased Activity Tolerance INTERVENTIONS PLANNED: (Treatment may consist of any combination of the following)  1. Cold  2. Electrical Stimulation  3. Heat  4. Home Exercise Program (HEP)  5. Manual Therapy  6. Neuromuscular Re-education/Strengthening  7. Range of Motion (ROM)  8. Therapeutic Activites  9. Therapeutic Exercise/Strengthening  10. Ultrasound (US)     GOALS: (Goals have been discussed and agreed upon with patient.)  Short-Term Functional Goals: Time Frame: 30 days  1. Patient will be independent with home exercise program without exacerbation of symptoms or cueing needed.    2. Patient will be independent with correct sleeping positions and awareness/avoidance of aggravating positions without cueing needed. Discharge Goals: Time Frame: 90 days  1. Patient will be independent with all ADLs with minimal onset of right shoulder pain and no deficits with daily tasks. 2. Patient will report no fear avoidance with social or recreational activities due to right shoulder pain. 3. Patient will score less than or equal to 14/55 on Quick DASH with minimal effect of right shoulder pain on patient's ability to manage every day life activities. OUTCOME MEASURE:   Tool Used: Disabilities of the Arm, Shoulder and Hand (DASH) Questionnaire - Quick Version  Score:  Initial: 24/55  Most Recent: X/55 (Date: -- )   Interpretation of Score: The DASH is designed to measure the activities of daily living in person's with upper extremity dysfunction or pain. Each section is scored on a 1-5 scale, 5 representing the greatest disability. The scores of each section are added together for a total score of 55. MEDICAL NECESSITY:   · Patient is expected to demonstrate progress in strength and range of motion to improve safety during daily activities. · Patient demonstrates good rehab potential due to higher previous functional level. · Skilled intervention continues to be required due to decreased functional mobility. REASON FOR SERVICES/OTHER COMMENTS:  · Patient continues to demonstrate capacity to improve overall functional mobility which will increase independence and increase safety. Total Duration:  PT Patient Time In/Time Out  Time In: 1430  Time Out: 1515    Rehabilitation Potential For Stated Goals: Good  Regarding Kristi Arambula's therapy, I certify that the treatment plan above will be carried out by a therapist or under their direction. Thank you for this referral,  Marion Buerger, PT     Referring Physician Signature:  Yuri Lofton MD _______________________________ Date _____________      PAIN/SUBJECTIVE:   Initial: Pain Intensity 1: 5  Pain Location 1: Shoulder  Pain Orientation 1: Right  Pain Intervention(s) 1: Rest, Medication (see MAR)  Post Session:  5/10   HISTORY:   History of Injury/Illness (Reason for Referral):  Patient reports she has been having pain in her right shoulder since December 2020. She reports it hurts sometimes when she lifts it overhead. She reports that it will sometimes hurt when she reaches behind her. She reports that she was going to get a shot in it, but she is scheduled for the Covid-19 vaccine and so she decided to wait. She reports that she takes Tylenol or Advil for the pain right now. She reports she would just like to make sure she hasn't torn anything or broken a bone. Past Medical History/Comorbidities:   Ms. Jayde Zeng  has a past medical history of Abdominal pain, Arthritis, Breast cancer, left ([address] (2001), Breast pain in female, Bunion, Cholesterol serum elevated, Chronic pain of left knee, Corns and callosities, DDD (degenerative disc disease), lumbar, Diabetes mellitus type II, controlled ([address] Emotional disorder, Fungal infection of toenail, Hallux valgus, Hip pain, bilateral, Hyperglycemia, Hyperlipidemia, Hypertension, Hypothyroidism, Lower GI bleed, Obesity, Other ill-defined conditions(799.89), Postmenopausal, Radiation therapy (2001), Right median nerve neuropathy, Stress incontinence, and Traumatic arthropathy of knee. Ms. Jayde Zeng  has a past surgical history that includes us guided core breast biopsy (Right); hx hysterectomy; hx tubal ligation; hx urological; hx colonoscopy (2008); hx breast lumpectomy (Left, 2001); hx breast biopsy (Right, 2011); hx orthopaedic (2005); hx orthopaedic; hx orthopaedic; hx orthopaedic (Left); and hx shoulder replacement (Left).   Social History/Living Environment:   Home Environment: Private residence  Living Alone: No  Support Systems: Family member(s), Friends \ neighbors  Prior Level of Function/Work/Activity:  Independent  Dominant Side: RIGHT  Personal Factors:          Sex:  female        Age:  67 y.o. Ambulatory/Rehab Services H2 Model Falls Risk Assessment   Risk Factors:       No Risk Factors Identified Ability to Rise from Chair:       (0)  Ability to rise in a single movement   Falls Prevention Plan:       No modifications necessary   Total: (5 or greater = High Risk): 0   ©2010 [employer] [facility] [employer]. All Rights Reserved. [facility] Patent #[id number]. Federal Law prohibits the replication, distribution or use without written permission from [employer] [place]   Current Medications:       Current Outpatient Medications:     busPIRone (BUSPAR) 7.5 mg tablet, Take 1-2 tablets po bid as needed, Disp: 60 Tab, Rfl: 0    levothyroxine (SYNTHROID) 75 mcg tablet, TK 1 T PO D  Indications: a condition with low thyroid hormone levels, Disp: 90 Tab, Rfl: 3    azelastine (ASTELIN) 137 mcg (0.1 %) nasal spray, 1 Copake by Both Nostrils route two (2) times a day. Use in each nostril as directed, Disp: 1 Bottle, Rfl: 1    atenoloL-chlorthalidone (TENORETIC) 50-25 mg per tablet, Take 0.5 Tabs by mouth daily. , Disp: 90 Tab, Rfl: 3    irbesartan (AVAPRO) 150 mg tablet, Take 1 Tab by mouth daily. Indications: high blood pressure, Disp: 90 Tab, Rfl: 3    fluticasone propionate (Flonase Allergy Relief) 50 mcg/actuation nasal spray, 2 Sprays by Both Nostrils route daily. , Disp: 1 Bottle, Rfl: 5    glucose blood VI test strips (OneTouch Ultra Test) strip, by Does Not Apply route two (2) times a day. DX:E11.65, Disp: 200 Strip, Rfl: 2    atorvastatin (LIPITOR) 40 mg tablet, Take 1 Tab by mouth nightly., Disp: 90 Tab, Rfl: 3    OneTouch Delica Lancets 30 gauge misc, Check blood sugar daily, E11.9, Disp: 50 Lancet, Rfl: 5    celecoxib (CELEBREX) 200 mg capsule, Take 1 Cap by mouth two (2) times a day for 14 days. , Disp: 28 Cap, Rfl: 0    hydrocortisone (ANUCORT-HC) 25 mg supp, Insert 25 mg into rectum every twelve (12) hours. , Disp: , Rfl:    Lancing Device with Lancets (ONE TOUCH DELICA) kit, Needs new glucometer. Check blood sugar BID, Disp: 1 Kit, Rfl: 0    co-enzyme Q-10 (CO Q-10) 100 mg capsule, Take 100 mg by mouth daily. , Disp: , Rfl:     magnesium 250 mg tab, Take 500 mg by mouth daily. , Disp: , Rfl:     multivitamin (ONE A DAY) tablet, Take 1 Tab by mouth daily. , Disp: , Rfl:     cholecalciferol, vitamin D3, (VITAMIN D3) 2,000 unit tab, Take  by mouth., Disp: , Rfl:     Omega-3 Fatty Acids (FISH OIL) 300 mg cap, Take  by mouth., Disp: , Rfl:     Blood-Glucose Meter (ONETOUCH ULTRAMINI) monitoring kit, Check blood sugar BID, Disp: 1 Kit, Rfl: 0   Date Last Reviewed:  3/11/2021    Number of Personal Factors/Comorbidities that affect the Plan of Care: 1-2: MODERATE COMPLEXITY   EXAMINATION:   Observation/Orthostatic Postural Assessment:          Posture Assessment: Rounded shoulders, Forward head   Palpation:          Tightness and tenderness noted along right acromion process and upper trapezius. ROM:          R UE Assessment (AROM):  · Shoulder Flexion: 110 degrees  · Shoulder Extension: 10 degrees  · Shoulder Abduction: 90 degrees  · Shoulder Adduction: 0 degrees  · Shoulder Internal rotation: 60 degrees  · Shoulder External rotation: 60 degrees  · Elbow Flexion: 120 degrees  · Elbow Extension: 0 degrees    Strength:          R UE Assessment (Strength): · Shoulder Flexion: 3/5 with manual muscle testing  · Shoulder Extension: 3/5 with manual muscle testing  · Shoulder Abduction: 3/5 with manual muscle testing  · Shoulder Adduction: 3/5 with manual muscle testing  · Shoulder Internal rotation: 3/5 with manual muscle testing  · Shoulder External rotation: 3/5 with manual muscle testing  · Elbow Flexion: 3/5 with manual muscle testing  · Elbow Extension: 3/5 with manual muscle testing    Special Tests:          Negative  Neurological Screen:        Dermatomes:   Within normal limits        Reflexes:  2+  Functional Mobility:   Gait/Mobility: ·   Independent         Transfers:     · Sit to Stand: Independent  · Stand to Sit: Independent  · Stand Pivot Transfers: Independent  · Bed to Chair: Independent  · Lateral Transfers: Independent         Bed Mobility:     · Rolling: Independent  · Supine to Sit: Independent  · Sit to Supine: Independent  · Scooting: Independent        Body Structures Involved:  1. Nerves  2. Joints  3. Muscles Body Functions Affected:  1. Neuromusculoskeletal  2. Movement Related Activities and Participation Affected:  1. Mobility  2.  Self Care   Number of elements (examined above) that affect the Plan of Care: 4+: HIGH COMPLEXITY   CLINICAL PRESENTATION:   Presentation: Evolving clinical presentation with changing clinical characteristics: MODERATE COMPLEXITY   CLINICAL DECISION MAKING:   Use of outcome tool(s) and clinical judgement create a POC that gives a: Questionable prediction of patient's progress: MODERATE COMPLEXITY

## 2021-03-16 ENCOUNTER — HOSPITAL ENCOUNTER (OUTPATIENT)
Dept: PHYSICAL THERAPY | Age: 76
Discharge: HOME | End: 2021-03-16
Attending: INTERNAL MEDICINE
Payer: COMMERCIAL

## 2021-03-16 PROCEDURE — 97140 MANUAL THERAPY 1/> REGIONS: CPT

## 2021-03-16 PROCEDURE — 97110 THERAPEUTIC EXERCISES: CPT

## 2021-03-16 NOTE — PROGRESS NOTES
Maribel Ochoa  : [id number]  Primary: [facility]  [place] [id number] [place] [id number] [facility], [address] [place] [address],[address], [address] [facility]  [phone]   [phone]        OUTPATIENT PHYSICAL THERAPY: Daily Treatment Note 3/16/2021  Visit Count:  2    ICD-10: Treatment Diagnosis:   · Pain in right shoulder (M25.511)  · Stiffness of right shoulder, not elsewhere classified (M25.611)  Precautions/Allergies:   Patient has no known allergies. TREATMENT PLAN:  Effective Dates: 3/11/2021 TO 2021 (90 days). Frequency/Duration: 2 times a week for 90 Day(s)    Pre-treatment Symptoms/Complaints:  3/16/2021:  \"Oh I am doing ok, it is hurting, in the front more and sometimes in the back of my shoulder\". Pain: Initial:   6 Post Session:  5/10   Medications Last Reviewed:  3/16/2021  Updated Objective Findings:  None Today  TREATMENT:     THERAPEUTIC EXERCISE: (25 minutes):  Exercises per grid below to improve mobility and strength. Required minimal visual, verbal and manual cues to promote proper body alignment, promote proper body posture and promote proper body mechanics. Progressed resistance, range, repetitions and complexity of movement as indicated. MANUAL THERAPY: (15 minutes): Joint mobilization was utilized and necessary because of the patient's restricted joint motion and loss of articular motion. All planes of motion in Right Shoulder. Date:  3/11/2021   Activity/Exercise Parameters   Wall Ladder X 5 reps   UBE X 5 min Level 1.0   T-Band Rows Red x 15   T-Band Extension Red x 15           Pulleys into flexion 15 reps  2 sets  HEP   Pectoralis stretch in doorway 5 second hold  3 reps  HEP  R UE      Treatment/Session Summary:    · Response to Treatment:  Patient did well today with added exercises. Minimal pain in anterior shoulder with a few exercises.   Communication/Consultation:  None today  · Equipment provided today:  None today  · Recommendations/Intent for next treatment session: Next visit will focus on improving overall mobility and pain with daily activities.     Total Treatment Billable Duration:  40 minutes   PT Patient Time In/Time Out  Time In: 1430  Time Out: Marcos Basurto [facility] Appointments   Date Time Provider Anaya Juana   3/19/2021  2:30 PM Debora Johnson, PT [facility] SFE   3/22/2021  2:30 PM [employer], Gara Fujita, PTA SFEORPT SFE   3/24/2021  2:30 PM [employer], Gara Fujita, PTA [facility]FE[facility]PT SFE   3/29/2021  2:30 PM [employer], Gara Fujita, PTA SFEORPT SFE   3/31/2021  2:30 PM Debora Mylar, PT SFEORPT SFE   4/5/2021  2:30 PM [employer], Gara Fujita, PTA SFEORPT SFE   4/7/2021  2:30 PM [employer], Gara Fujita, PTA SFEORPT SFE   4/8/2021  3:00 PM Neha Santoyo MD [facility] UCDG UCD   4/13/2021  9:00 AM MAT LAB [facility] MAT BSCPC   5/10/2021  1:40 PM Cindy David MD [facility] MAT BSCPC   8/16/2021  3:45 PM SFE [facility] ROOM 1 SFERMAM SFE   1/18/2022  2:45 PM Angi García MD [facility] LUIS

## 2021-03-19 ENCOUNTER — HOSPITAL ENCOUNTER (OUTPATIENT)
Dept: PHYSICAL THERAPY | Age: 76
Discharge: HOME | End: 2021-03-19
Attending: INTERNAL MEDICINE
Payer: COMMERCIAL

## 2021-03-19 PROCEDURE — 97110 THERAPEUTIC EXERCISES: CPT

## 2021-03-19 PROCEDURE — 97140 MANUAL THERAPY 1/> REGIONS: CPT

## 2021-03-19 NOTE — PROGRESS NOTES
Kaylie Noriega  :   Primary: Kaylee Queen  [place] [id number] at [address], [address] [place] [address],[address], [facility] [address].  [phone]   [phone]        OUTPATIENT PHYSICAL THERAPY: Daily Treatment Note 3/19/2021  Visit Count:  3    ICD-10: Treatment Diagnosis:   · Pain in right shoulder (M25.511)  · Stiffness of right shoulder, not elsewhere classified (M25.611)  Precautions/Allergies:   Patient has no known allergies. TREATMENT PLAN:  Effective Dates: 3/11/2021 TO 2021 (90 days). Frequency/Duration: 2 times a week for 90 Day(s)    Pre-treatment Symptoms/Complaints:  3/19/2021:  Patient reports she got her shot today, so she is tired and cold. Pain: Initial: Pain Intensity 1: 5  Pain Location 1: Shoulder  Pain Orientation 1: Right  Pain Intervention(s) 1: Rest, Medication (see MAR)  Post Session:  5/10   Medications Last Reviewed:  3/19/2021  Updated Objective Findings:  None Today  TREATMENT:     THERAPEUTIC EXERCISE: (25 minutes):  Exercises per grid below to improve mobility and strength. Required minimal visual, verbal and manual cues to promote proper body alignment, promote proper body posture and promote proper body mechanics. Progressed resistance, range, repetitions and complexity of movement as indicated.     Date:  3/19/2021   Activity/Exercise Parameters   Wall Ladder 5 reps   UBE 5 minutes  Level 1.0   Theraband Rows Red t-band  15 reps   Therband Extension Red t-band  15 reps   Pulleys into flexion 15 reps  2 sets   Pectoralis stretch in doorway 5 second hold  3 reps  R UE      MANUAL THERAPY: (15 minutes): Joint mobilization and Soft tissue mobilization was utilized and necessary because of the patient's restricted joint motion and loss of articular motion.  (R UE)    Treatment/Session Summary:    · Response to Treatment:  Patient tolerated treatment well with improving overall mobility. · Communication/Consultation:  None today  · Equipment provided today:  None today  · Recommendations/Intent for next treatment session: Next visit will focus on improving overall mobility and pain with daily activities.     Total Treatment Billable Duration:  40 minutes   PT Patient Time In/Time Out  Time In: 1430  Time Out: [id number] [facility], PT    Future Appointments   Date Time Provider Anaya Arias   3/19/2021  2:30 PM Saritha Berry, PT [facility] SFE   3/22/2021  2:30 PM [employer], Alexander Betty, PTA [facility]FE[facility]PT SFE   3/24/2021  2:30 PM [employer], Alexander Betty, PTA [facility]FE[facility]PT SFE   3/29/2021  2:30 PM [employer], Alexander Betty, PTA SFE[facility]PT SFE   3/31/2021  2:30 PM Saritha Berry, PT SFEORPT SFE   4/5/2021  2:30 PM [employer], Alexander Betty, PTA SFEORPT SFE   4/7/2021  2:30 PM [employer], Alexander Betty, PTA SFEORPT SFE   4/8/2021  3:00 PM Tez Mesa MD [facility] UCDG UCD   4/13/2021  9:00 AM [facility] LAB [facility]   5/10/2021  1:40 PM Patrizia Cho MD [facility] MAT BSCPC   8/16/2021  3:45 PM SFE [facility] ROOM 1 SFERMAM SFE   1/18/2022  2:45 PM Posta, Meryle Gene., MD LUIS LUIS

## 2021-03-22 ENCOUNTER — HOSPITAL ENCOUNTER (OUTPATIENT)
Dept: PHYSICAL THERAPY | Age: 76
Discharge: HOME | End: 2021-03-22
Attending: INTERNAL MEDICINE
Payer: COMMERCIAL

## 2021-03-22 PROCEDURE — 97140 MANUAL THERAPY 1/> REGIONS: CPT

## 2021-03-22 PROCEDURE — 97110 THERAPEUTIC EXERCISES: CPT

## 2021-03-22 NOTE — PROGRESS NOTES
Chano Moon  : 8/15/1911  Primary: [place] [facility]  [place]: [place] [id number] at [facility]  [facility], [address] [place] [address],[address], [place] [address].  [phone]   [phone]        OUTPATIENT PHYSICAL THERAPY: Daily Treatment Note 3/22/2021  Visit Count:  4    ICD-10: Treatment Diagnosis:   · Pain in right shoulder (M25.511)  · Stiffness of right shoulder, not elsewhere classified (M25.611)  Precautions/Allergies:   Patient has no known allergies. TREATMENT PLAN:  Effective Dates: 3/11/2021 TO 2021 (90 days). Frequency/Duration: 2 times a week for 90 Day(s)    Pre-treatment Symptoms/Complaints:  3/22/2021:  Patient reports her arm is doing better, my hip is hurting\"  Pain: Initial:   3-4/10 Post Session:  310   Medications Last Reviewed:  3/22/2021  Updated Objective Findings:  None Today  TREATMENT:     THERAPEUTIC EXERCISE: (25 minutes):  Exercises per grid below to improve mobility and strength. Required minimal visual, verbal and manual cues to promote proper body alignment, promote proper body posture and promote proper body mechanics. Progressed resistance, range, repetitions and complexity of movement as indicated. Date:  3/22/2021   Activity/Exercise Parameters       Bicep Curls #3 x 20 reps   Wall Ladder 5 reps   UBE 5 minutes  Level 1.0       Theraband ER IR Red t-band  15 reps   Theraband Rows Red t-band  15 reps   Therband Extension Red t-band  15 reps   Pulleys into flexion 15 reps  2 sets   Pectoralis stretch in doorway 5 second hold  3 reps  R UE      MANUAL THERAPY: (15 minutes): Joint mobilization and Soft tissue mobilization was utilized and necessary because of the patient's restricted joint motion and loss of articular motion.  (R UE)    Treatment/Session Summary:    · Response to Treatment:  Patient tolerated treatment well with improving overall mobility. Added exercises today.   · Communication/Consultation:  None today  · Equipment provided today:  None today  · Recommendations/Intent for next treatment session: Next visit will focus on improving overall mobility and pain with daily activities.     Total Treatment Billable Duration:  40 minutes   PT Patient Time In/Time Out  Time In: 1430  Time Out: Marcos Basurto [place]    Future Appointments   Date Time Provider Anaya Arias   3/24/2021  2:30 PM Eitan Pump, PTA [facility] SFE   3/29/2021  2:30 PM Los Ngo Fill, PTA [facility] SFE   3/31/2021  2:30 PM Christina Bullocks, PT [facility] SFE   4/5/2021  2:30 PM Eitan Pump, PTA [facility] SFE   4/7/2021  2:30 PM KrLos lomax Fill, PTA SFEORPT SFE   4/8/2021  3:00 PM Tuan Galloway MD [facility] UCDG UCD   4/13/2021  9:00 AM MAT LAB [facility] MAT [facility]   5/10/2021  1:40 PM Gloria Carmona MD [facility] MAT [facility]   8/16/2021  3:45 PM SFE [facility] BI ROOM 1 SFERMAM SFE   1/18/2022  2:45 PM Posta, Mortimer Lies., MD [facility] LUIS

## 2021-03-29 ENCOUNTER — HOSPITAL ENCOUNTER (OUTPATIENT)
Dept: PHYSICAL THERAPY | Age: 76
Discharge: HOME | End: 2021-03-29
Attending: INTERNAL MEDICINE
Payer: COMMERCIAL

## 2021-03-29 PROCEDURE — 97110 THERAPEUTIC EXERCISES: CPT

## 2021-03-29 PROCEDURE — 97140 MANUAL THERAPY 1/> REGIONS: CPT

## 2021-03-29 NOTE — PROGRESS NOTES
Carole Harry  :   Primary: Dionne Dennis  [place] [id number] at [address], [address] [place] [address],[address], [place] [address].  [phone]   [phone]        OUTPATIENT PHYSICAL THERAPY: Daily Treatment Note 3/29/2021  Visit Count:  5    ICD-10: Treatment Diagnosis:   · Pain in right shoulder (M25.511)  · Stiffness of right shoulder, not elsewhere classified (M25.611)  Precautions/Allergies:   Patient has no known allergies. TREATMENT PLAN:  Effective Dates: 3/11/2021 TO 2021 (90 days). Frequency/Duration: 2 times a week for 90 Day(s)    Pre-treatment Symptoms/Complaints:  3/29/2021: \"It is better then it was initially but it is hard to reach overhead\"  Pain: Initial:   3-4/10 Post Session:  310   Medications Last Reviewed:  3/29/2021  Updated Objective Findings:  None Today  TREATMENT:     THERAPEUTIC EXERCISE: (25 minutes):  Exercises per grid below to improve mobility and strength. Required minimal visual, verbal and manual cues to promote proper body alignment, promote proper body posture and promote proper body mechanics. Progressed resistance, range, repetitions and complexity of movement as indicated. Date:  3/29/2021   Activity/Exercise Parameters       Bicep Curls #3 x 20 reps   Wall Ladder 5 reps   UBE 5 minutes  Level 1.0       Theraband ER IR Red t-band  15 reps   Theraband Rows Red t-band  15 reps   Therband Extension Red t-band  15 reps   Pulleys into flexion 15 reps  2 sets   Pectoralis stretch in doorway 5 second hold  3 reps  R UE      MANUAL THERAPY: (15 minutes): Joint mobilization and Soft tissue mobilization was utilized and necessary because of the patient's restricted joint motion and loss of articular motion.  (R UE)    Treatment/Session Summary:    · Response to Treatment:  Patient tolerated treatment well with improving overall mobility after session. Pt continues to have pain in Right Shoulder. · Communication/Consultation:  None today  · Equipment provided today:  None today  · Recommendations/Intent for next treatment session: Next visit will focus on improving overall mobility and pain with daily activities.     Total Treatment Billable Duration:  40 minutes   PT Patient Time In/Time Out  Time In: 1430  Time Out: Marcos Basurto, [place]    Future Appointments   Date Time Provider Anaya Arias   3/31/2021  2:30 PM Eleanor Damon, PT [facility] SFE   4/5/2021  2:30 PM Alireza Patelus, PTA SFE[facility] SFE   4/7/2021  2:30 PM Vinita Ngo, PTA [facility] SFE   4/8/2021  3:00 PM Balbina Leyva MD [facility] UCDG UCD   4/13/2021  9:00 AM [facility]   5/10/2021  1:40 PM Viviane [facility]ierrez [facility]   8/16/2021  3:45 PM SFE [facility] ROOM 1 SFERMAM SFE   1/18/2022  2:45 PM Posta, Claudette Ditch., MD [facility] LUIS

## 2021-03-31 ENCOUNTER — HOSPITAL ENCOUNTER (OUTPATIENT)
Dept: PHYSICAL THERAPY | Age: 76
Discharge: HOME | End: 2021-03-31
Attending: INTERNAL MEDICINE
Payer: COMMERCIAL

## 2021-03-31 PROCEDURE — 97140 MANUAL THERAPY 1/> REGIONS: CPT

## 2021-03-31 PROCEDURE — 97110 THERAPEUTIC EXERCISES: CPT

## 2021-04-05 ENCOUNTER — HOSPITAL ENCOUNTER (OUTPATIENT)
Dept: PHYSICAL THERAPY | Age: 76
Discharge: HOME | End: 2021-04-05
Attending: INTERNAL MEDICINE
Payer: COMMERCIAL

## 2021-04-05 PROCEDURE — 97140 MANUAL THERAPY 1/> REGIONS: CPT

## 2021-04-05 PROCEDURE — 97110 THERAPEUTIC EXERCISES: CPT

## 2021-04-05 NOTE — PROGRESS NOTES
Nithya Blackwell  : [birth year]  Primary: [place] [employer]  [place]: [place] [id number] at [address] [place] [id number] [facility], [address] [place] [address] [address], [address] [facility]  [phone]   [phone]        OUTPATIENT PHYSICAL THERAPY: Daily Treatment Note 2021  Visit Count:  7    ICD-10: Treatment Diagnosis:   · Pain in right shoulder (M25.511)  · Stiffness of right shoulder, not elsewhere classified (M25.611)  Precautions/Allergies:   Patient has no known allergies. TREATMENT PLAN:  Effective Dates: 3/11/2021 TO 2021 (90 days). Frequency/Duration: 2 times a week for 90 Day(s)    Pre-treatment Symptoms/Complaints:  2021:  ' I am pretty good, I am careful how I lift\". 'I still feel it with certain movements\"  Pain: Initial:   2 Post Session:  3/10   Medications Last Reviewed:  2021  Updated Objective Findings:  None Today  TREATMENT:     THERAPEUTIC EXERCISE: (25 minutes):  Exercises per grid below to improve mobility and strength. Required minimal visual, verbal and manual cues to promote proper body alignment, promote proper body posture and promote proper body mechanics. Progressed resistance, range, repetitions and complexity of movement as indicated. Date:2021   Activity/Exercise Parameters   Bicep Curls 20 reps  3 pounds   Wall Ladder 5 reps   UBE 6 minutes  Level 1.0   Theraband ER IR Red t-band  15 reps   Theraband Rows Red t-band  15 reps   Therband Extension Red t-band  15 reps   Pulleys into flexion 15 reps  2 sets   Pectoralis stretch in doorway 5 second hold  3 reps  R UE      MANUAL THERAPY: (15 minutes): Joint mobilization and Soft tissue mobilization was utilized and necessary because of the patient's restricted joint motion and loss of articular motion.  (R UE)    Treatment/Session Summary:    · Response to Treatment:  Patient tolerated treatment well with improving overall mobility in right Shoulder.     · Communication/Consultation:  None today  · Equipment provided today:  None today  · Recommendations/Intent for next treatment session: Next visit will focus on improving overall mobility and pain with daily activities.     Total Treatment Billable Duration:  40 minutes   PT Patient Time In/Time Out  Time In: 1430  Time Out: Marcos Basurto [place]    Future Appointments   Date Time Provider Anaya Arias   4/5/2021  2:30 PM Jayda Ramirez PTA [facility] SFE   4/7/2021  2:30 PM Kandace Ngo [facility] SFE   4/8/2021  3:00 PM Onelia Thompson MD [facility] UCDG UCD   4/13/2021  9:00 AM MAT LAB [facility] MAT [facility]   5/10/2021  1:40 PM Jacque Suresh MD [facility] MAT B[facility]   8/16/2021  3:45 PM SFE hospitals ROOM 1 SFERMAM SFE   1/18/2022  2:45 PM Hilario García MD [facility] LUIS

## 2021-04-07 ENCOUNTER — HOSPITAL ENCOUNTER (OUTPATIENT)
Dept: PHYSICAL THERAPY | Age: 76
Discharge: HOME | End: 2021-04-07
Attending: INTERNAL MEDICINE
Payer: COMMERCIAL

## 2021-04-07 PROCEDURE — 97140 MANUAL THERAPY 1/> REGIONS: CPT

## 2021-04-07 PROCEDURE — 97110 THERAPEUTIC EXERCISES: CPT

## 2021-04-16 ENCOUNTER — HOSPITAL ENCOUNTER (OUTPATIENT)
Dept: PHYSICAL THERAPY | Age: 76
Discharge: HOME | End: 2021-04-16
Attending: INTERNAL MEDICINE
Payer: COMMERCIAL

## 2021-04-16 NOTE — PROGRESS NOTES
Shola Freeman  : [birth year]  Primary: [place] [employer] [place] [employer]  [place] [id number] at [place] [id number] [place] [address],[address], [place] [address].  [phone]   [phone]          OUTPATIENT DAILY NOTE    NAME/AGE/GENDER: Shola Freeman is a 67 y.o. female. DATE: 2021    Patient cancelled (more than 24 hours ago) her appointment for today due to conflicting appointment. Will plan to follow up on next scheduled visit.     Mohamud Lindsey PT    Future Appointments   Date Time Provider Anaya Arias   2021  7:00 PM Denisse Ritter [facility] SFE   2021  4:45 PM [employer], Lucas Piety, PTA SFEORPT SFE   2021  3:15 PM Denisse Ritter PT [facility] SFE   2021  4:45 PM Mannie Stiles Lucas Piety, PTA SFEORPT SFE   2021  2:30 PM [employer] Lucas Piety, PTA [facility]FE[facility]PT SFE   5/10/2021  1:40 PM Ronny Mao MD [facility] BSCPC   2021  3:45 PM SFE [facility] ROOM 1 SFERMAM SFE   10/11/2021  1:00 PM Kenneth Cordova MD [facility] UCDG UCD   2022  2:45 PM PostDamon lucas MD [facility] LUIS

## 2021-04-19 ENCOUNTER — HOSPITAL ENCOUNTER (OUTPATIENT)
Dept: PHYSICAL THERAPY | Age: 76
Discharge: HOME | End: 2021-04-19
Attending: INTERNAL MEDICINE
Payer: COMMERCIAL

## 2021-04-19 PROCEDURE — 97140 MANUAL THERAPY 1/> REGIONS: CPT

## 2021-04-19 PROCEDURE — 97110 THERAPEUTIC EXERCISES: CPT

## 2021-04-19 NOTE — PROGRESS NOTES
Mike Leal  : [birth year]  Primary: [place]  [place]: [place] [id number] at [place] [address] [facility], [address] [place] [address],[address], [place] [address].  [phone]   [phone]        OUTPATIENT PHYSICAL THERAPY: Daily Treatment Note 2021  Visit Count:  9    ICD-10: Treatment Diagnosis:   · Pain in right shoulder (M25.511)  · Stiffness of right shoulder, not elsewhere classified (M25.611)  Precautions/Allergies:   Patient has no known allergies. TREATMENT PLAN:  Effective Dates: 3/11/2021 TO 2021 (90 days). Frequency/Duration: 2 times a week for 90 Day(s)    Pre-treatment Symptoms/Complaints:  2021: Patient reports she is doing alright. Pain: Initial:   2.5/10/ Post Session:  3/10   Medications Last Reviewed:  2021  Updated Objective Findings:  None Today  TREATMENT:     THERAPEUTIC EXERCISE: (25 minutes):  Exercises per grid below to improve mobility and strength. Required minimal visual, verbal and manual cues to promote proper body alignment, promote proper body posture and promote proper body mechanics. Progressed resistance, range, repetitions and complexity of movement as indicated. Date:  21   Activity/Exercise Parameters   Bicep Curls 20 reps  3 pounds   Wall Ladder 5 reps   UBE 5 minutes  Level 1.0   Theraband ER IR Red t-band  15 reps   Theraband Rows Greent-band  15 reps   Therband Extension Green t-band  15 reps   Pulleys into flexion 15 reps  2 sets   Pectoralis stretch in doorway 5 second hold  3 reps  R UE      MANUAL THERAPY: (15 minutes): Joint mobilization and Soft tissue mobilization was utilized and necessary because of the patient's restricted joint motion and loss of articular motion.  (R UE)    Treatment/Session Summary:    · Response to Treatment:  Patient tolerated treatment well with improving overall mobility.     · Communication/Consultation:  None today  · Equipment provided today:  None today  · Recommendations/Intent for next treatment session: Next visit will focus on improving overall mobility and pain with daily activities.     Total Treatment Billable Duration:  40 minutes   PT Patient Time In/Time Out  Time In: 1923  Time Out: [address], PTA    Future Appointments   Date Time Provider Anaya Arias   4/21/2021  3:15 PM Janelle Stephenson PT [facility] SFE   4/26/2021  4:45 PM Caden Ngo PTA SFE[facility] SFE   4/28/2021  2:30 PM Caden Ngo PTA [facility] SFE   5/10/2021  1:40 PM MD MARY Posada [facility] BSCPC   8/16/2021  3:45 PM SFE [facility] ROOM 1 SFERMAM SFE   10/11/2021  1:00 PM MD MARY Soto UCDG UCD   1/18/2022  2:45 PM Abimbola García MD LU[facility] LUIS

## 2021-04-21 ENCOUNTER — HOSPITAL ENCOUNTER (OUTPATIENT)
Dept: PHYSICAL THERAPY | Age: 76
Discharge: HOME | End: 2021-04-21
Attending: INTERNAL MEDICINE
Payer: COMMERCIAL

## 2021-04-21 PROCEDURE — 97110 THERAPEUTIC EXERCISES: CPT

## 2021-04-21 PROCEDURE — 97140 MANUAL THERAPY 1/> REGIONS: CPT

## 2021-04-21 NOTE — PROGRESS NOTES
Genet Juarez  :   Primary: [facility]  [place] [id number] at [address], [address] [place] [address],[address], [address] [facility]  [phone]   [phone]          OUTPATIENT PHYSICAL THERAPY:Progress Report 2021   ICD-10: Treatment Diagnosis:   · Pain in right shoulder (M25.511)  · Stiffness of right shoulder, not elsewhere classified (M25.611)  Precautions/Allergies:   Patient has no known allergies. TREATMENT PLAN:  Effective Dates: 3/11/2021 TO 2021 (90 days). Frequency/Duration: 2 times a week for 90 Day(s) MEDICAL/REFERRING DIAGNOSIS:  Bursitis of right shoulder [M75.51]   DATE OF ONSET: Chronic  REFERRING PHYSICIAN: Trip Oquendo MD MD Orders: Evaluate and Treat  Return MD Appointment: TBD     ASSESSMENT:  Ms. Angel Hernandez presents with improving mobility, strength, and pain in right shoulder since initial evaluation. Patient has attended a total of 10 scheduled physical therapy visits including initial evaluation on 3/11/2021. Treatment has consisted of mobility and strengthening activities to improve overall mobility and performance with activities of daily living. Patient is making steady progress with all aspects of therapy. PROBLEM LIST (Impacting functional limitations):  1. Decreased Strength  2. Decreased ADL/Functional Activities  3. Increased Pain  4. Decreased Activity Tolerance INTERVENTIONS PLANNED: (Treatment may consist of any combination of the following)  1. Cold  2. Electrical Stimulation  3. Heat  4. Home Exercise Program (HEP)  5. Manual Therapy  6. Neuromuscular Re-education/Strengthening  7. Range of Motion (ROM)  8. Therapeutic Activites  9. Therapeutic Exercise/Strengthening  10. Ultrasound (US)     GOALS: (Goals have been discussed and agreed upon with patient.)  Short-Term Functional Goals: Time Frame: 30 days  1.  Patient will be independent with home exercise program without exacerbation of symptoms or cueing needed--goal met. 2. Patient will be independent with correct sleeping positions and awareness/avoidance of aggravating positions without cueing needed--goal met. Discharge Goals: Time Frame: 90 days  1. Patient will be independent with all ADLs with minimal onset of right shoulder pain and no deficits with daily tasks--goal ongoing. 2. Patient will report no fear avoidance with social or recreational activities due to right shoulder pain--goal ongoing. 3. Patient will score less than or equal to 14/55 on Quick DASH with minimal effect of right shoulder pain on patient's ability to manage every day life activities--goal ongoing. OUTCOME MEASURE:   Tool Used: Disabilities of the Arm, Shoulder and Hand (DASH) Questionnaire - Quick Version  Score:  Initial: 24/55  Most Recent: 19/55 (Date: 4/21/2021 )   Interpretation of Score: The DASH is designed to measure the activities of daily living in person's with upper extremity dysfunction or pain. Each section is scored on a 1-5 scale, 5 representing the greatest disability. The scores of each section are added together for a total score of 55. MEDICAL NECESSITY:   · Patient is expected to demonstrate progress in strength and range of motion to improve safety during daily activities. · Patient demonstrates good rehab potential due to higher previous functional level. · Skilled intervention continues to be required due to decreased functional mobility. REASON FOR SERVICES/OTHER COMMENTS:  · Patient continues to demonstrate capacity to improve overall functional mobility which will increase independence and increase safety.   Total Duration:  PT Patient Time In/Time Out  Time In: 1515  Time Out: 1600    Rehabilitation Potential For Stated Goals: Good     PAIN/SUBJECTIVE:   Initial: Pain Intensity 1: 2  Pain Location 1: Shoulder  Pain Orientation 1: Right  Pain Intervention(s) 1: Rest, Medication (see MAR)  Post Session:  2/10   HISTORY: History of Injury/Illness (Reason for Referral):  Patient reports she has been having pain in her right shoulder since December 2020. She reports it hurts sometimes when she lifts it overhead. She reports that it will sometimes hurt when she reaches behind her. She reports that she was going to get a shot in it, but she is scheduled for the Covid-19 vaccine and so she decided to wait. She reports that she takes Tylenol or Advil for the pain right now. She reports she would just like to make sure she hasn't torn anything or broken a bone. 4/21/2021 (Progress Note): Patient reports her shoulder is getting better and she is sleeping better. Past Medical History/Comorbidities:   Ms. Kary Madden  has a past medical history of Abdominal pain, Arthritis, Breast cancer, left ([address] (2001), Breast pain in female, Bunion, Cholesterol serum elevated, Chronic pain of left knee, Corns and callosities, DDD (degenerative disc disease), lumbar, Diabetes mellitus type II, controlled ([address] Emotional disorder, Fungal infection of toenail, Hallux valgus, Hip pain, bilateral, Hyperglycemia, Hyperlipidemia, Hypertension, Hypothyroidism, Lower GI bleed, Obesity, Other ill-defined conditions(799.89), Postmenopausal, Radiation therapy (2001), Right median nerve neuropathy, Stress incontinence, and Traumatic arthropathy of knee. Ms. Kary Madden  has a past surgical history that includes us guided core breast biopsy (Right); hx hysterectomy; hx tubal ligation; hx urological; hx colonoscopy (2008); hx breast lumpectomy (Left, 2001); hx breast biopsy (Right, 2011); hx orthopaedic (2005); hx orthopaedic; hx orthopaedic; hx orthopaedic (Left); and hx shoulder replacement (Left).   Social History/Living Environment:   Home Environment: Private residence  Living Alone: No  Support Systems: Family member(s), Friends \ neighbors  Prior Level of Function/Work/Activity:  Independent  Dominant Side:         RIGHT  Personal Factors:          Sex:  female Age:  67 y.o. Current Medications:       Current Outpatient Medications:     busPIRone (BUSPAR) 7.5 mg tablet, Take 1-2 tablets po bid as needed, Disp: 60 Tab, Rfl: 0    levothyroxine (SYNTHROID) 75 mcg tablet, TK 1 T PO D  Indications: a condition with low thyroid hormone levels, Disp: 90 Tab, Rfl: 3    azelastine (ASTELIN) 137 mcg (0.1 %) nasal spray, 1 [place] by Both Nostrils route two (2) times a day. Use in each nostril as directed, Disp: 1 Bottle, Rfl: 1    atenoloL-chlorthalidone (TENORETIC) 50-25 mg per tablet, Take 0.5 Tabs by mouth daily. , Disp: 90 Tab, Rfl: 3    irbesartan (AVAPRO) 150 mg tablet, Take 1 Tab by mouth daily. Indications: high blood pressure, Disp: 90 Tab, Rfl: 3    fluticasone propionate (Flonase Allergy Relief) 50 mcg/actuation nasal spray, 2 Sprays by Both Nostrils route daily. , Disp: 1 Bottle, Rfl: 5    glucose blood VI test strips (OneTouch Ultra Test) strip, by Does Not Apply route two (2) times a day. DX:E11.65, Disp: 200 Strip, Rfl: 2    atorvastatin (LIPITOR) 40 mg tablet, Take 1 Tab by mouth nightly., Disp: 90 Tab, Rfl: 3    OneTouch Delica Lancets 30 gauge misc, Check blood sugar daily, E11.9, Disp: 50 Lancet, Rfl: 5    hydrocortisone (ANUCORT-HC) 25 mg supp, Insert 25 mg into rectum every twelve (12) hours. , Disp: , Rfl:     Lancing Device with Lancets (ONE TOUCH DELICA) kit, Needs new glucometer. Check blood sugar BID, Disp: 1 Kit, Rfl: 0    co-enzyme Q-10 (CO Q-10) 100 mg capsule, Take 100 mg by mouth daily. , Disp: , Rfl:     magnesium 250 mg tab, Take 500 mg by mouth daily. , Disp: , Rfl:     multivitamin (ONE A DAY) tablet, Take 1 Tab by mouth daily. , Disp: , Rfl:     cholecalciferol, vitamin D3, (VITAMIN D3) 2,000 unit tab, Take  by mouth., Disp: , Rfl:     Omega-3 Fatty Acids (FISH OIL) 300 mg cap, Take  by mouth., Disp: , Rfl:     Blood-Glucose Meter (ONETOUCH ULTRAMINI) monitoring kit, Check blood sugar BID, Disp: 1 Kit, Rfl: 0   Date Last Reviewed: 4/21/2021    EXAMINATION:   Observation/Orthostatic Postural Assessment:          Posture Assessment: Rounded shoulders, Forward head   Palpation:          Tightness and tenderness noted along right acromion process and upper trapezius. ROM:          R UE Assessment (AROM):  · Shoulder Flexion: 120 degrees  · Shoulder Extension: 10 degrees  · Shoulder Abduction: 100 degrees  · Shoulder Adduction: 0 degrees  · Shoulder Internal rotation: 60 degrees  · Shoulder External rotation: 70 degrees  · Elbow Flexion: 120 degrees  · Elbow Extension: 0 degrees    Strength:          R UE Assessment (Strength): · Shoulder Flexion: 3+/5 with manual muscle testing  · Shoulder Extension: 3+/5 with manual muscle testing  · Shoulder Abduction: 3+/5 with manual muscle testing  · Shoulder Adduction: 3+/5 with manual muscle testing  · Shoulder Internal rotation: 3+/5 with manual muscle testing  · Shoulder External rotation: 3+/5 with manual muscle testing  · Elbow Flexion: 3+/5 with manual muscle testing  · Elbow Extension: 3+/5 with manual muscle testing    Special Tests:          Negative  Neurological Screen:        Dermatomes:   Within normal limits        Reflexes:  2+  Functional Mobility:   Gait/Mobility:    ·   Independent         Transfers:     · Sit to Stand: Independent  · Stand to Sit: Independent  · Stand Pivot Transfers: Independent  · Bed to Chair: Independent  · Lateral Transfers: Independent         Bed Mobility:     · Rolling: Independent  · Supine to Sit: Independent  · Sit to Supine: Independent  · Scooting: Independent

## 2021-04-21 NOTE — PROGRESS NOTES
Bassem Horacio  : [birth year]  [place]  [place]: [place] [id number] at [facility], [address] [address],[address], [facility] U. 91.  [phone]   [phone]        OUTPATIENT PHYSICAL THERAPY: Daily Treatment Note 2021  Visit Count:  10    ICD-10: Treatment Diagnosis:   · Pain in right shoulder (M25.511)  · Stiffness of right shoulder, not elsewhere classified (M25.611)  Precautions/Allergies:   Patient has no known allergies. TREATMENT PLAN:  Effective Dates: 3/11/2021 TO 2021 (90 days). Frequency/Duration: 2 times a week for 90 Day(s)    Pre-treatment Symptoms/Complaints:  2021: Patient reports she is doing ok right now. Pain: Initial: Pain Intensity 1: 2  Pain Location 1: Shoulder  Pain Orientation 1: Right  Pain Intervention(s) 1: Rest, Medication (see MAR)  Post Session:  2/10   Medications Last Reviewed:  2021  Updated Objective Findings:  None Today  TREATMENT:     THERAPEUTIC EXERCISE: (25 minutes):  Exercises per grid below to improve mobility and strength. Required minimal visual, verbal and manual cues to promote proper body alignment, promote proper body posture and promote proper body mechanics. Progressed resistance, range, repetitions and complexity of movement as indicated.     Date:  2021   Activity/Exercise Parameters   Bicep Curls 20 reps  3 pounds   Wall Ladder 5 reps   UBE 5 minutes  Level 1.0   Theraband ER IR Red t-band  15 reps   Theraband Rows Greent-band  15 reps   Therband Extension Green t-band  15 reps   Pulleys into flexion 15 reps  2 sets   Pectoralis stretch in doorway 5 second hold  3 reps  R UE      MANUAL THERAPY: (15 minutes): Joint mobilization and Soft tissue mobilization was utilized and necessary because of the patient's restricted joint motion and loss of articular motion.  (R UE)    Treatment/Session Summary:    · Response to Treatment:  Patient tolerated treatment well with improving overall mobility. · Communication/Consultation:  None today  · Equipment provided today:  None today  · Recommendations/Intent for next treatment session: Next visit will focus on improving overall mobility and pain with daily activities.     Total Treatment Billable Duration:  40 minutes   PT Patient Time In/Time Out  Time In: 7932  Time Out: Cody 38, PT    Future Appointments   Date Time Provider Anaya Arias   4/26/2021  4:45 PM Dona Galeazzi, PTA [facility] SFE   4/30/2021  8:00 AM Jose M Durand, PT SFEORPT SFE   5/10/2021  1:40 PM Jose Marsh MD [facility]   8/16/2021  3:45 PM SFE [facility] ROOM 1 SFERMAM E   10/11/2021  1:00 PM [facility]   1/18/2022  2:45 PM Abraham García MD [facility]

## 2021-04-26 ENCOUNTER — HOSPITAL ENCOUNTER (OUTPATIENT)
Dept: PHYSICAL THERAPY | Age: 76
Discharge: HOME | End: 2021-04-26
Attending: INTERNAL MEDICINE
Payer: COMMERCIAL

## 2021-04-26 PROCEDURE — 97110 THERAPEUTIC EXERCISES: CPT

## 2021-04-26 PROCEDURE — 97140 MANUAL THERAPY 1/> REGIONS: CPT

## 2021-04-26 NOTE — PROGRESS NOTES
Charley Woody  :   Primary: Chris Baker [facility]  [place] [id number] at [facility]  [address] [facility], [address] [place] [address],[address], [address] [facility]  [phone]   [phone]        OUTPATIENT PHYSICAL THERAPY: Daily Treatment Note 2021  Visit Count:  11    ICD-10: Treatment Diagnosis:   · Pain in right shoulder (M25.511)  · Stiffness of right shoulder, not elsewhere classified (M25.611)  Precautions/Allergies:   Patient has no known allergies. TREATMENT PLAN:  Effective Dates: 3/11/2021 TO 2021 (90 days). Frequency/Duration: 2 times a week for 90 Day(s)    Pre-treatment Symptoms/Complaints:  2021: No new reports on her shoulder, 'I am doing good\". Pain: Initial:   1 Post Session:  2/10   Medications Last Reviewed:  2021  Updated Objective Findings:  None Today  TREATMENT:     THERAPEUTIC EXERCISE: (25 minutes):  Exercises per grid below to improve mobility and strength. Required minimal visual, verbal and manual cues to promote proper body alignment, promote proper body posture and promote proper body mechanics. Progressed resistance, range, repetitions and complexity of movement as indicated. Date:  2021   Activity/Exercise Parameters   Bicep Curls 20 reps  3 pounds   Wall Ladder 5 reps   UBE 5 minutes  Level 1.0   Theraband ER IR Red t-band  15 reps   Theraband Rows Greent-band  15 reps   Therband Extension Green t-band  15 reps   Pulleys into flexion 15 reps  2 sets   Pectoralis stretch in doorway 5 second hold  3 reps  R UE      MANUAL THERAPY: (15 minutes): Joint mobilization and Soft tissue mobilization was utilized and necessary because of the patient's restricted joint motion and loss of articular motion.  (R UE)    Treatment/Session Summary:    · Response to Treatment:  Patient tolerated treatment well with improving overall mobility.     · Communication/Consultation:  None today  · Equipment provided today:  None today  · Recommendations/Intent for next treatment session: Next visit will focus on improving overall mobility and pain with daily activities.     Total Treatment Billable Duration:  40 minutes   PT Patient Time In/Time Out  Time In: 2430  Time Out: [address], PTA    Future Appointments   Date Time Provider Anaya Arias   4/26/2021  4:45 PM Diamante Godoy PTA [facility] SFE   4/30/2021  8:00 AM Zaria Albarado, MOE SFEGAROT SFE   5/10/2021  1:40 PM Guillermo Poole MD [facility] BSCPC   8/16/2021  3:45 PM SFE [facility] BI ROOM 1 SFERMAM E   10/11/2021  1:00 PM Lucia Drake MD [facility] UCDG UCD   1/18/2022  2:45 PM Anthony García MD [facility]

## 2021-04-30 ENCOUNTER — HOSPITAL ENCOUNTER (OUTPATIENT)
Dept: PHYSICAL THERAPY | Age: 76
Discharge: HOME | End: 2021-04-30
Attending: INTERNAL MEDICINE
Payer: COMMERCIAL

## 2021-04-30 NOTE — PROGRESS NOTES
Filipe Fried  :   Primary: [facility]  [place] [id number] [address] [place] [address],[address] [facility], [address] [place] [address].  [phone]   [phone]          OUTPATIENT DAILY NOTE    NAME/AGE/GENDER: Filipe Fried is a 67 y.o. female. DATE: 2021    Patient cancelled (more than 24 hours ago) her appointment for today due to illness. Will plan to follow up on next scheduled visit.     Igor Boyce PT    Future Appointments   Date Time Provider Anaya Arias   2021  7:00 PM Dilia Coronel [facility] SFE   2021  4:00 PM Mary Mcgowan [facility] SFE   5/10/2021  1:40 PM Karley Rosales MD [facility] BSCPC   2021  3:45 PM SFE [facility] ROOM 1 SFERMAM SFE   10/11/2021  1:00 PM Blaise Ray MD [facility] UC UCD   2022  2:45 PM Nasra García MD [facility] LUIS

## 2021-05-07 ENCOUNTER — HOSPITAL ENCOUNTER (OUTPATIENT)
Dept: PHYSICAL THERAPY | Age: 76
Discharge: HOME | End: 2021-05-07
Attending: INTERNAL MEDICINE
Payer: COMMERCIAL

## 2021-05-07 PROCEDURE — 97140 MANUAL THERAPY 1/> REGIONS: CPT

## 2021-05-07 PROCEDURE — 97110 THERAPEUTIC EXERCISES: CPT

## 2021-05-07 NOTE — PROGRESS NOTES
Mak Martinez  :   Primary: [facility]  [place] [id number] at [address] [address] [facility], [address] [place] [address] 83,[address], [address] [facility]  [phone]   [phone]        OUTPATIENT PHYSICAL THERAPY: Daily Treatment Note 2021  Visit Count:  12    ICD-10: Treatment Diagnosis:   · Pain in right shoulder (M25.511)  · Stiffness of right shoulder, not elsewhere classified (M25.611)  Precautions/Allergies:   Patient has no known allergies. TREATMENT PLAN:  Effective Dates: 3/11/2021 TO 2021 (90 days). Frequency/Duration: 2 times a week for 90 Day(s)    Pre-treatment Symptoms/Complaints:  2021: Patient reports she is doing well today. Pain: Initial: Pain Intensity 1: 2  Pain Location 1: Shoulder  Pain Orientation 1: Right  Pain Intervention(s) 1: Rest, Medication (see MAR)  Post Session:  2/10   Medications Last Reviewed:  2021  Updated Objective Findings:  None Today  TREATMENT:     THERAPEUTIC EXERCISE: (25 minutes):  Exercises per grid below to improve mobility and strength. Required minimal visual, verbal and manual cues to promote proper body alignment, promote proper body posture and promote proper body mechanics. Progressed resistance, range, repetitions and complexity of movement as indicated.     Date:  2021   Activity/Exercise Parameters   Bicep Curls 20 reps  3 pounds   Wall Ladder 5 reps   UBE 5 minutes  Level 1.0   Theraband ER IR Red t-band  15 reps   Theraband Rows Greent-band  15 reps   Therband Extension Green t-band  15 reps   Pulleys into flexion 15 reps  2 sets   Pectoralis stretch in doorway 5 second hold  3 reps  R UE      MANUAL THERAPY: (15 minutes): Joint mobilization and Soft tissue mobilization was utilized and necessary because of the patient's restricted joint motion and loss of articular motion.  (R UE)    Treatment/Session Summary:    · Response to Treatment:  Patient tolerated treatment well with improving overall mobility. · Communication/Consultation:  None today  · Equipment provided today:  None today  · Recommendations/Intent for next treatment session: Next visit will focus on improving overall mobility and pain with daily activities.     Total Treatment Billable Duration:  40 minutes   PT Patient Time In/Time Out  Time In: 1600  Time Out: [address], PT    Future Appointments   Date Time Provider Anaya Arias   5/7/2021  4:00 PM Callie Natarajan, MOE [facility] SFE   5/10/2021  1:40 PM Ericka Brown MD [facility] BSC   8/16/2021  3:45 PM SFE [facility]   10/11/2021  1:00 PM Goran Wren MD [facility] UC UCD   1/18/2022  2:45 PM Posta, Roslynn Severin., MD [facility] LUIS

## 2021-06-15 NOTE — THERAPY DISCHARGE
Tamara Cox : 1945 Primary: [address] [place] [id number] [address] [facility] at [address], [address] [place] [address]. [phone]   [phone] OUTPATIENT PHYSICAL THERAPY:Discharge Summary 2021 ICD-10: Treatment Diagnosis:  
· Pain in right shoulder (M25.511) · Stiffness of right shoulder, not elsewhere classified (M25.611) Precautions/Allergies:  
Patient has no known allergies. MEDICAL/REFERRING DIAGNOSIS: 
Bursitis of right shoulder [M75.51] DATE OF ONSET: Chronic REFERRING PHYSICIAN: Pa Bae MD MD Orders: Evaluate and Treat Return MD Appointment: TBD ASSESSMENT:  Ms. Dionna Parker presented with improving mobility, strength, and pain in right shoulder since initial evaluation. Patient attended a total of 12 scheduled physical therapy visits including initial evaluation on 3/11/2021. Treatment consisted of mobility and strengthening activities to improve overall mobility and performance with activities of daily living. Patient was making steady progress with all aspects of therapy. Patient did not schedule or attend any additional physical therapy visits secondary to unknown reasons. Patient's therapy will be discontinued at this time. We will be happy to re-assess her with a change in her status and a new order from her doctor. Thank you for the opportunity to serve this patient. GOALS: (Goals have been discussed and agreed upon with patient.) Short-Term Functional Goals: Time Frame: 30 days 1. Patient will be independent with home exercise program without exacerbation of symptoms or cueing needed--goal met. 2. Patient will be independent with correct sleeping positions and awareness/avoidance of aggravating positions without cueing needed--goal met. Discharge Goals: Time Frame: 90 days 1.  Patient will be independent with all ADLs with minimal onset of right shoulder pain and no deficits with daily tasks--goal ongoing. 2. Patient will report no fear avoidance with social or recreational activities due to right shoulder pain--goal ongoing. 3. Patient will score less than or equal to 14/55 on Quick DASH with minimal effect of right shoulder pain on patient's ability to manage every day life activities--goal ongoing. OUTCOME MEASURE:  
Tool Used: Disabilities of the Arm, Shoulder and Hand (DASH) Questionnaire - Quick Version Score:  Initial: 24/55  Most Recent: 19/55 (Date: 4/21/2021 ) Interpretation of Score: The DASH is designed to measure the activities of daily living in person's with upper extremity dysfunction or pain. Each section is scored on a 1-5 scale, 5 representing the greatest disability. The scores of each section are added together for a total score of 55. EXAMINATION:  
Observation/Orthostatic Postural Assessment:   
      Posture Assessment: Rounded shoulders, Forward head Palpation:   
      Tightness and tenderness noted along right acromion process and upper trapezius. ROM:   
      R UE Assessment (AROM): 
· Shoulder Flexion: 120 degrees · Shoulder Extension: 10 degrees · Shoulder Abduction: 100 degrees · Shoulder Adduction: 0 degrees · Shoulder Internal rotation: 60 degrees · Shoulder External rotation: 70 degrees · Elbow Flexion: 120 degrees · Elbow Extension: 0 degrees Strength:   
      R UE Assessment (Strength): · Shoulder Flexion: 3+/5 with manual muscle testing · Shoulder Extension: 3+/5 with manual muscle testing · Shoulder Abduction: 3+/5 with manual muscle testing · Shoulder Adduction: 3+/5 with manual muscle testing · Shoulder Internal rotation: 3+/5 with manual muscle testing · Shoulder External rotation: 3+/5 with manual muscle testing · Elbow Flexion: 3+/5 with manual muscle testing · Elbow Extension: 3+/5 with manual muscle testing Special Tests:   
      Negative Neurological Screen: 
      Dermatomes:   Within normal limits Reflexes:  2+ Functional Mobility:  
Gait/Mobility:  
 ·   Independent Transfers: · Sit to Stand: Independent · Stand to Sit: Independent · Stand Pivot Transfers: Independent · Bed to Chair: Independent · Lateral Transfers: Independent Bed Mobility: · Rolling: Independent · Supine to Sit: Independent · Sit to Supine: Independent · Scooting: Independent

## 2021-08-16 ENCOUNTER — HOSPITAL ENCOUNTER (OUTPATIENT)
Dept: MAMMOGRAPHY | Age: 76
Discharge: HOME | End: 2021-08-16
Attending: INTERNAL MEDICINE
Payer: MEDICARE

## 2021-08-16 DIAGNOSIS — Z12.31 OTHER SCREENING MAMMOGRAM: ICD-10-CM

## 2021-08-16 PROCEDURE — 77067 SCR MAMMO BI INCL CAD: CPT

## 2022-02-16 ENCOUNTER — NURSE TRIAGE (OUTPATIENT)
Dept: OTHER | Facility: CLINIC | Age: 77
End: 2022-02-16

## 2022-02-16 NOTE — TELEPHONE ENCOUNTER
Received call from Stephanie at [facility] with Red Flag Complaint. Subjective: Caller states \"I felt like I had to go to bathroom, but when I went to clean. I saw blood - red- there was enough to color the water. It was a little hard for me and slightly to pass my stool. I have had hemorrhoids before - it's never happened like this before. Usually, I have a movement every day, but lately it's been every other day. It's like a lazy system. \"     Current Symptoms:   1 episode of blood in stool and rectal bleeding - now resolved - pt has history of hemorrhoids (used PrepH in the past) and experiencing some constipation recently - reports she does not take a stool softener, but does eat yogurt with probiotics - pt denies any use of blood thinner to bleeding disorders    Normal appetite and adequate fluid intake      BG this morning was 123     Denies abdominal pain, nausea or vomiting, chest pain, shortness of breath, dizziness, diarrhea    Onset: 1 episode of blood in stool     Associated Symptoms: NA    Pain Severity: Denies pain currently     Temperature: Denies fever     What has been tried: NA    LMP: NA Pregnant: NA    Recommended disposition: SEE PCP WITHIN 24 HOURS: If no appointment, go to [facility] - encouraged fluid intake and advised she can try taking a stool softener such as Colace as needed when experiencing constipation - advised to discuss with PCP     Care advice provided, patient verbalizes understanding; denies any other questions or concerns; instructed to call back for any new or worsening symptoms. Patient/Caller agrees with recommended disposition; writer provided warm transfer to Becca Soni at [facility] for appointment scheduling    Attention Provider: Thank you for allowing me to participate in the care of your patient. The patient was connected to triage in response to information provided to the [facility].   Please do not respond through this encounter as the response is not directed to a shared pool.    Reason for Disposition   MODERATE rectal bleeding (small blood clots, passing blood without stool, or toilet water turns red)    Protocols used: RECTAL BLEEDING-ADULT-AH

## 2022-02-17 ENCOUNTER — HOSPITAL ENCOUNTER (OUTPATIENT)
Dept: CT IMAGING | Age: 77
Discharge: HOME | End: 2022-02-17
Attending: PHYSICIAN ASSISTANT
Payer: MEDICARE

## 2022-02-17 DIAGNOSIS — R10.32 LLQ ABDOMINAL PAIN: ICD-10-CM

## 2022-02-17 LAB — CREAT BLD-MCNC: 0.97 MG/DL (ref 0.8–1.5)

## 2022-02-17 PROCEDURE — 82565 ASSAY OF CREATININE: CPT

## 2022-02-17 PROCEDURE — 74177 CT ABD & PELVIS W/CONTRAST: CPT

## 2022-02-17 PROCEDURE — 74011000636 HC RX REV CODE- 636: Performed by: PHYSICIAN ASSISTANT

## 2022-02-17 PROCEDURE — 74011000258 HC RX REV CODE- 258: Performed by: PHYSICIAN ASSISTANT

## 2022-02-17 RX ORDER — SODIUM CHLORIDE 0.9 % (FLUSH) 0.9 %
10 SYRINGE (ML) INJECTION
Status: COMPLETED | OUTPATIENT
Start: 2022-02-17 | End: 2022-02-17

## 2022-02-17 RX ADMIN — SODIUM CHLORIDE 100 ML: 9 INJECTION, SOLUTION INTRAVENOUS at 11:27

## 2022-02-17 RX ADMIN — IOPAMIDOL 100 ML: 755 INJECTION, SOLUTION INTRAVENOUS at 11:27

## 2022-02-17 RX ADMIN — DIATRIZOATE MEGLUMINE AND DIATRIZOATE SODIUM 15 ML: 660; 100 LIQUID ORAL; RECTAL at 11:27

## 2022-02-17 RX ADMIN — Medication 10 ML: at 11:27

## 2022-02-17 NOTE — PROGRESS NOTES
Pt notified that her CT scan did not show any signs of infection. It does confirm the hernia which does contact some small bowel loops but is not strangulating them so not a surgical emergency but does probably need to be surgically corrected in the near future to avoid complications from this. She has a 1.3 cm R adrenal nodule which is believed to be benign since it hasn't changed from previous scans where it was noticed. Other findings in the CT scan include arthritis in both hips (R>L), degenerative changes (arthritis) in the lumbar spine, atherosclerosis (plaque) in the abdominal arteries and R coronary arteries, and a very small cyst in the R kidney. Pt verbalized understanding and will wait to speak with Dr. Sanjiv Mckeon at her routine follow-up in a couple of weeks.

## 2022-02-17 NOTE — PROGRESS NOTES
CT scan did not show any signs of infection. It does confirm the hernia which does contact some small bowel loops but is not strangulating them so not a surgical emergency but does probably need to be surgically corrected in the near future to avoid complications from this. She has a 1.3 cm R adrenal nodule which is believed to be benign since it hasn't changed from previous scans where it was noticed. Other findings in the CT scan include arthritis in both hips (R>L), degenerative changes (arthritis) in the lumbar spine, atherosclerosis (plaque) in the abdominal arteries and R coronary arteries, and a very small cyst in the R kidney. Does she want me to go ahead and refer her to a surgeon to start discussion the hernia repair even if not ready to do immediately?

## 2022-02-28 PROBLEM — F41.8 ANXIETY ASSOCIATED WITH DEPRESSION: Status: ACTIVE | Noted: 2022-02-28

## 2022-03-18 PROBLEM — F41.8 ANXIETY ASSOCIATED WITH DEPRESSION: Status: ACTIVE | Noted: 2022-02-28

## 2022-03-18 PROBLEM — M47.817 SPONDYLOSIS OF LUMBOSACRAL REGION WITHOUT MYELOPATHY OR RADICULOPATHY: Status: ACTIVE | Noted: 2018-12-18

## 2022-03-19 PROBLEM — Z28.21 REFUSED VARICELLA VACCINE: Status: ACTIVE | Noted: 2019-10-15

## 2022-03-19 PROBLEM — R93.1 AGATSTON CORONARY ARTERY CALCIUM SCORE GREATER THAN 400: Status: ACTIVE | Noted: 2017-12-01

## 2022-03-19 PROBLEM — F41.9 ANXIETY: Status: ACTIVE | Noted: 2020-10-22

## 2022-03-19 PROBLEM — K43.9 ABDOMINAL WALL HERNIA: Status: ACTIVE | Noted: 2018-12-18

## 2022-05-09 PROBLEM — N18.30 CHRONIC RENAL DISEASE, STAGE III (HCC): Status: ACTIVE | Noted: 2022-05-09

## 2022-05-17 ENCOUNTER — HOSPITAL ENCOUNTER (OUTPATIENT)
Dept: LAB | Age: 77
Discharge: HOME | End: 2022-05-17
Attending: ORTHOPAEDIC SURGERY
Payer: MEDICARE

## 2022-05-17 DIAGNOSIS — T84.038D LOOSENING OF TOTAL SHOULDER REPLACEMENT, SUBSEQUENT ENCOUNTER: ICD-10-CM

## 2022-05-17 DIAGNOSIS — Z96.619 LOOSENING OF TOTAL SHOULDER REPLACEMENT, SUBSEQUENT ENCOUNTER: ICD-10-CM

## 2022-05-17 LAB
BASOPHILS # BLD: 0 K/UL (ref 0–0.2)
BASOPHILS NFR BLD: 1 % (ref 0–2)
CRP SERPL-MCNC: <0.3 MG/DL (ref 0–0.9)
DIFFERENTIAL METHOD BLD: NORMAL
EOSINOPHIL # BLD: 0.1 K/UL (ref 0–0.8)
EOSINOPHIL NFR BLD: 1 % (ref 0.5–7.8)
ERYTHROCYTE [DISTWIDTH] IN BLOOD BY AUTOMATED COUNT: 13.5 % (ref 11.9–14.6)
ERYTHROCYTE [SEDIMENTATION RATE] IN BLOOD: 23 MM/HR (ref 0–30)
HCT VFR BLD AUTO: 39.3 % (ref 35.8–46.3)
HGB BLD-MCNC: 12.8 G/DL (ref 11.7–15.4)
IMM GRANULOCYTES # BLD AUTO: 0 K/UL (ref 0–0.5)
IMM GRANULOCYTES NFR BLD AUTO: 0 % (ref 0–5)
LYMPHOCYTES # BLD: 1.4 K/UL (ref 0.5–4.6)
LYMPHOCYTES NFR BLD: 22 % (ref 13–44)
MCH RBC QN AUTO: 28.1 PG (ref 26.1–32.9)
MCHC RBC AUTO-ENTMCNC: 32.6 G/DL (ref 31.4–35)
MCV RBC AUTO: 86.4 FL (ref 79.6–97.8)
MONOCYTES # BLD: 0.6 K/UL (ref 0.1–1.3)
MONOCYTES NFR BLD: 9 % (ref 4–12)
NEUTS SEG # BLD: 4.5 K/UL (ref 1.7–8.2)
NEUTS SEG NFR BLD: 68 % (ref 43–78)
NRBC # BLD: 0 K/UL (ref 0–0.2)
PLATELET # BLD AUTO: 220 K/UL (ref 150–450)
PMV BLD AUTO: 10.6 FL (ref 9.4–12.3)
RBC # BLD AUTO: 4.55 M/UL (ref 4.05–5.2)
WBC # BLD AUTO: 6.5 K/UL (ref 4.3–11.1)

## 2022-05-17 PROCEDURE — 36415 COLL VENOUS BLD VENIPUNCTURE: CPT

## 2022-05-17 PROCEDURE — 85652 RBC SED RATE AUTOMATED: CPT

## 2022-05-17 PROCEDURE — 86140 C-REACTIVE PROTEIN: CPT

## 2022-05-17 PROCEDURE — 85025 COMPLETE CBC W/AUTO DIFF WBC: CPT

## 2022-05-31 ENCOUNTER — HOSPITAL ENCOUNTER (EMERGENCY)
Age: 77
Discharge: HOME | End: 2022-05-31
Attending: EMERGENCY MEDICINE
Payer: MEDICARE

## 2022-05-31 ENCOUNTER — OFFICE VISIT (OUTPATIENT)
Dept: INTERNAL MEDICINE CLINIC | Facility: CLINIC | Age: 77
End: 2022-05-31
Payer: MEDICARE

## 2022-05-31 VITALS
OXYGEN SATURATION: 98 % | HEIGHT: 64 IN | RESPIRATION RATE: 18 BRPM | TEMPERATURE: 97.9 F | WEIGHT: 171 LBS | HEART RATE: 76 BPM | DIASTOLIC BLOOD PRESSURE: 89 MMHG | SYSTOLIC BLOOD PRESSURE: 151 MMHG | BODY MASS INDEX: 29.19 KG/M2

## 2022-05-31 VITALS
SYSTOLIC BLOOD PRESSURE: 118 MMHG | WEIGHT: 171.8 LBS | DIASTOLIC BLOOD PRESSURE: 80 MMHG | BODY MASS INDEX: 31.62 KG/M2 | HEIGHT: 62 IN | TEMPERATURE: 97.2 F | RESPIRATION RATE: 16 BRPM | HEART RATE: 66 BPM | OXYGEN SATURATION: 99 %

## 2022-05-31 DIAGNOSIS — L03.114 CELLULITIS OF LEFT ARM: Primary | ICD-10-CM
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
## 2022-05-31 DIAGNOSIS — S50.862A NONVENOMOUS INSECT BITE OF LEFT FOREARM WITHOUT INFECTION, INITIAL ENCOUNTER: Primary | ICD-10-CM

## 2022-05-31 PROCEDURE — 99214 OFFICE O/P EST MOD 30 MIN: CPT | Performed by: NURSE PRACTITIONER

## 2022-05-31 PROCEDURE — 1123F ACP DISCUSS/DSCN MKR DOCD: CPT | Performed by: NURSE PRACTITIONER

## 2022-05-31 PROCEDURE — 99282 EMERGENCY DEPT VISIT SF MDM: CPT

## 2022-05-31 RX ORDER — CEPHALEXIN 500 MG/1
500 CAPSULE ORAL 3 TIMES DAILY
Qty: 21 CAPSULE | Refills: 0 | Status: SHIPPED | OUTPATIENT
Start: 2022-05-31 | End: 2022-06-07

## 2022-05-31 ASSESSMENT — ENCOUNTER SYMPTOMS
NAUSEA: 0
CONSTIPATION: 0
ABDOMINAL PAIN: 0
WHEEZING: 0
SHORTNESS OF BREATH: 0
COLOR CHANGE: 1
VOMITING: 0
COUGH: 0
NAUSEA: 0
COUGH: 0
COLOR CHANGE: 1
BACK PAIN: 0
DIARRHEA: 0
SORE THROAT: 0
VOMITING: 0
THROAT SWELLING: 0
SHORTNESS OF BREATH: 0
ABDOMINAL PAIN: 0
RHINORRHEA: 0
DIARRHEA: 0

## 2022-05-31 ASSESSMENT — PATIENT HEALTH QUESTIONNAIRE - PHQ9
SUM OF ALL RESPONSES TO PHQ9 QUESTIONS 1 & 2: 0
SUM OF ALL RESPONSES TO PHQ QUESTIONS 1-9: 0
2. FEELING DOWN, DEPRESSED OR HOPELESS: 0
1. LITTLE INTEREST OR PLEASURE IN DOING THINGS: 0
SUM OF ALL RESPONSES TO PHQ QUESTIONS 1-9: 0

## 2022-05-31 ASSESSMENT — PAIN SCALES - GENERAL: PAINLEVEL_OUTOF10: 0

## 2022-05-31 ASSESSMENT — PAIN - FUNCTIONAL ASSESSMENT: PAIN_FUNCTIONAL_ASSESSMENT: 0-10

## 2022-05-31 NOTE — PROGRESS NOTES
Otilia Whalen (:  ) is a 65 y.o. female,Established patient, here for evaluation of the following chief complaint(s): Other (insect bite/red and swollen- evening)         ASSESSMENT/PLAN:  1. Cellulitis of left arm  -     cephALEXin (KEFLEX) 500 MG capsule; Take 1 capsule by mouth 3 times daily for 7 days, Disp-21 capsule, R-0Normal  Start cephalexin. I have outlined area of erythema with marker today. Patient is to monitor the site and symptoms over the next few days. Will have patient follow-up in 3 days to reevlauate or sooner if needed. Return in about 3 days (around 6/3/2022). Subjective   SUBJECTIVE/OBJECTIVE:  HPI   Patient presents today with complaint of swelling and erythema to her left upper arm. Symptoms started yesterday after patient had been sitting out on her porch late the night before. She says that she may have been stung by an insect? The site is also pruritic. She has been applying an OTC hydrocortisone cream but decided to schedule an appointment today because she felt like the symptoms were worsening. She denies any fever or chills. Review of Systems   Constitutional: Negative for chills and fever. Respiratory: Negative for cough and shortness of breath. Cardiovascular: Negative for chest pain. Gastrointestinal: Negative for abdominal pain, diarrhea, nausea and vomiting. Skin: Positive for color change and wound. Neurological: Negative for dizziness and headaches. Vitals:    22 1112   BP: 118/80   Pulse: 66   Resp: 16   Temp: 97.2 °F (36.2 °C)   SpO2: 99%       Objective   Physical Exam  Vitals and nursing note reviewed. Constitutional:       General: She is not in acute distress. Appearance: Normal appearance. Cardiovascular:      Rate and Rhythm: Normal rate and regular rhythm. Heart sounds: Normal heart sounds. Pulmonary:      Effort: Pulmonary effort is normal. No respiratory distress.       Breath sounds: Normal breath sounds. Abdominal:      General: Bowel sounds are normal.      Palpations: Abdomen is soft. Skin:     General: Skin is warm and dry. Findings: Erythema present. Neurological:      Mental Status: She is alert and oriented to person, place, and time. On this date 5/31/2022 I have spent 30 minutes reviewing previous notes, test results and face to face with the patient discussing the diagnosis and importance of compliance with the treatment plan as well as documenting on the day of the visit. An electronic signature was used to authenticate this note.     --Aida Letters, APRN - CNP

## 2022-05-31 NOTE — ED PROVIDER NOTES
[facility] Emergency Department Provider Note                   PCP:                Annett Spatz, MD               Age: 65 y.o. Sex: female     No diagnosis found. DISPOSITION         New Prescriptions    No medications on file       No orders of the defined types were placed in this encounter. MDM  Number of Diagnoses or Management Options  Diagnosis management comments: Patient likely with 2 insect bites to the back of the left arm. Will treat with Benadryl and discharge. Patient Progress  Patient progress: stable       Geovanni Barboza is a 65 y.o. female who presents to the Emergency Department with chief complaint of    Chief Complaint   Patient presents with    Rash      Patient with 2 itchy red spots to the back of her left arm. Started yesterday while sitting outside and worse today so came in. Saw someone prior to my evaluation who wrote her for Keflex. She states they itch a lot and is here for further treatment. The history is provided by the patient. No  was used. Rash  Location:  Shoulder/arm  Shoulder/arm rash location:  L arm  Quality: itchiness, redness and swelling    Severity:  Mild  Duration:  1 day  Timing:  Constant  Progression:  Worsening  Chronicity:  New  Relieved by:  Nothing  Worsened by:  Nothing  Ineffective treatments:  Topical steroids  Associated symptoms: no abdominal pain, no diarrhea, no fatigue, no fever, no headaches, no myalgias, no nausea, no shortness of breath, no sore throat, no throat swelling, no tongue swelling, not vomiting and not wheezing        All other systems reviewed and are negative. Review of Systems   Constitutional: Negative for chills, fatigue and fever. HENT: Negative for rhinorrhea and sore throat. Respiratory: Negative for cough, shortness of breath and wheezing. Cardiovascular: Negative for chest pain and palpitations.    Gastrointestinal: Negative for abdominal pain, constipation, diarrhea, nausea and vomiting. Genitourinary: Negative for dysuria and hematuria. Musculoskeletal: Negative for back pain, myalgias and neck pain. Skin: Positive for color change and rash. Negative for wound. Neurological: Negative for numbness and headaches. All other systems reviewed and are negative.       Past Medical History:   Diagnosis Date    Abdominal pain     Arthritis     Breast cancer, left ([facility] [address] 2001    Breast pain in female     Bunion     Chronic pain of left knee     Corns and callosities     DDD (degenerative disc disease), lumbar     Diabetes mellitus type II, controlled ([facility] [address]     Emotional disorder     Fungal infection of toenail     Hallux valgus     Hip pain, bilateral     Hyperglycemia     Hyperlipidemia     Hypertension     medication    Hypothyroidism     Lower GI bleed     Obesity     Other ill-defined conditions(799.89)     pt reports urinalysis shows blood - she is scheduled for CT pelvis and abdomen in August)    Postmenopausal     Right median nerve neuropathy     Stress incontinence     Traumatic arthropathy of knee         Past Surgical History:   Procedure Laterality Date    BREAST BIOPSY Right 2011    benign    BREAST LUMPECTOMY Left 2001    radiation for CA    COLONOSCOPY  2008    HYSTERECTOMY      ORTHOPEDIC SURGERY Left     ankle arthroscopy    ORTHOPEDIC SURGERY      left knee repair    ORTHOPEDIC SURGERY      right ankle    ORTHOPEDIC SURGERY  2005    Left TSA    SHOULDER ARTHROPLASTY Left     TUBAL LIGATION      UROLOGICAL SURGERY      cystoscopy    US GUIDED CORE BREAST BIOPSY Right         Family History   Problem Relation Age of Onset    Post-op Cognitive Dysfunction Neg Hx     Cancer Other         bone CA    Breast Cancer Sister     Diabetes Father     Dementia Mother     Diabetes Mother     Cancer Mother 76        Colon    Breast Cancer Sister 77    Hypertension Other     Hypertension Maternal Grandmother     Malig Hypertherm Neg Hx     Pseudochol. Deficiency Neg Hx     Delayed Awakening Neg Hx     Post-op Nausea/Vomiting Neg Hx     Emergence Delirium Neg Hx     Diabetes Other     Other Neg Hx            Social Connections:     Frequency of Communication with Friends and Family: Not on file    Frequency of Social Gatherings with Friends and Family: Not on file    Attends [employer] Services: Not on file    Active Member of Clubs or Organizations: Not on file    Attends Club or Organization Meetings: Not on file    Marital Status: Not on file        No Known Allergies     Vitals signs and nursing note reviewed. Patient Vitals for the past 4 hrs:   Temp Pulse Resp BP SpO2   05/31/22 1815 97.9 °F (36.6 °C) -- -- -- --   05/31/22 1812 -- 76 18 (!) 151/89 98 %          Physical Exam  Vitals and nursing note reviewed. Constitutional:       Appearance: Normal appearance. HENT:      Head: Normocephalic and atraumatic. Cardiovascular:      Rate and Rhythm: Normal rate and regular rhythm. Pulmonary:      Effort: Pulmonary effort is normal.      Breath sounds: Normal breath sounds. No wheezing. Abdominal:      General: Bowel sounds are normal.      Palpations: Abdomen is soft. Tenderness: There is no abdominal tenderness. Musculoskeletal:         General: No swelling. Normal range of motion. Cervical back: Normal range of motion. No tenderness. Skin:     General: Skin is warm and dry. Findings: Erythema present. Neurological:      Mental Status: She is alert. Procedures    Labs Reviewed - No data to display     No orders to display            Osmar Coma Scale  Eye Opening: Spontaneous  Best Verbal Response: Oriented  Best Motor Response: Obeys commands  [place] Coma Scale Score: 15                     Voice dictation software was used during the making of this note. This software is not perfect and grammatical and other typographical errors may be present.   This note has not been completely proofread for errors.        Boubacar Castano III, MD  05/31/22 2094

## 2022-05-31 NOTE — ED TRIAGE NOTES
Pt was bit on left upper arm by mosquito on Sunday, went to see PCP today d/t redness, itching and swelling on arm. PCP prescribed antibiotics that pt has not started, alban circles around red areas and told pt to come to ER is redness spread.

## 2022-06-03 ENCOUNTER — OFFICE VISIT (OUTPATIENT)
Dept: INTERNAL MEDICINE CLINIC | Facility: CLINIC | Age: 77
End: 2022-06-03
Payer: MEDICARE

## 2022-06-03 VITALS
HEART RATE: 55 BPM | BODY MASS INDEX: 29.19 KG/M2 | WEIGHT: 171 LBS | OXYGEN SATURATION: 97 % | HEIGHT: 64 IN | TEMPERATURE: 97.9 F | SYSTOLIC BLOOD PRESSURE: 115 MMHG | DIASTOLIC BLOOD PRESSURE: 69 MMHG | RESPIRATION RATE: 16 BRPM

## 2022-06-03 DIAGNOSIS — L03.114 CELLULITIS OF LEFT ARM: Primary | ICD-10-CM

## 2022-06-03 DIAGNOSIS — R05.9 COUGH: ICD-10-CM

## 2022-06-03 DIAGNOSIS — F43.9 STRESS: ICD-10-CM

## 2022-06-03 LAB
EXP DATE SOLUTION: NORMAL
EXP DATE SWAB: NORMAL
LOT NUMBER SOLUTION: NORMAL
LOT NUMBER SWAB: NORMAL
SARS-COV-2 RNA, POC: NEGATIVE

## 2022-06-03 PROCEDURE — 99214 OFFICE O/P EST MOD 30 MIN: CPT | Performed by: NURSE PRACTITIONER

## 2022-06-03 PROCEDURE — 87635 SARS-COV-2 COVID-19 AMP PRB: CPT | Performed by: NURSE PRACTITIONER

## 2022-06-03 PROCEDURE — 1123F ACP DISCUSS/DSCN MKR DOCD: CPT | Performed by: NURSE PRACTITIONER

## 2022-06-03 ASSESSMENT — PATIENT HEALTH QUESTIONNAIRE - PHQ9
1. LITTLE INTEREST OR PLEASURE IN DOING THINGS: 0
SUM OF ALL RESPONSES TO PHQ QUESTIONS 1-9: 0
SUM OF ALL RESPONSES TO PHQ9 QUESTIONS 1 & 2: 0
SUM OF ALL RESPONSES TO PHQ QUESTIONS 1-9: 0
2. FEELING DOWN, DEPRESSED OR HOPELESS: 0

## 2022-06-03 ASSESSMENT — ENCOUNTER SYMPTOMS
NAUSEA: 0
SHORTNESS OF BREATH: 0
VOMITING: 0
SORE THROAT: 0
COUGH: 1
WHEEZING: 0
RHINORRHEA: 0
COLOR CHANGE: 1
ABDOMINAL PAIN: 0
DIARRHEA: 0

## 2022-06-03 NOTE — PROGRESS NOTES
Lata Puga (:  ) is a 65 y.o. female,Established patient, here for evaluation of the following chief complaint(s):  Follow-up (Wound recheck)         ASSESSMENT/PLAN:  1. Cellulitis of left arm  Improving. Continue antibiotics as prescribed. Call if symptoms worsen or fail to continue to improve. 2. Cough  -     AMB POC COVID-19 COV  Covid test is negative. Cough started this morning. Instructed to monitor symptoms at home, treat OTC if needed. Call if symptoms worsen or fail to improve. 3. Stress  -     [address] (Counseling)  Referral for mental health counseling placed. Return if symptoms worsen or fail to improve. Subjective   SUBJECTIVE/OBJECTIVE:  HPI   Patient presents today for follow-up on cellulitis to her left upper arm. Seen for this problem initially on 22. She was started on Keflex at that time and was instructed to follow-up today for a recheck of the site. She ended up going to the ER in the afternoon of  as she felt that the erythema was spreading and worsening. She was instructed by the ER provider to continue the Keflex and begin taking Benadryl. She has not been taking oral Benadryl but has been applying a Benadryl topical cream.      She also has complaint today of a cough that started this morning. She is requesting a Covid test. She denies any fever, chills, shortness of breath, wheezing, congestion, rhinorrhea, postnasal drainage, sore throat, ear pain. Denies any known sick contacts. Lastly, she is requesting referral for a counselor. She denies any symptoms of depression but reports increased stress recently. She says that she has had a lot going on personally. She has been going through probate for a friend that passed recently. She has also been having some conflict with her sons.  She feels that she would benefit from seeing a therapist.       Review of Systems   Constitutional: Negative for chills and fever.   HENT: Negative for congestion, ear pain, postnasal drip, rhinorrhea and sore throat. Respiratory: Positive for cough. Negative for shortness of breath and wheezing. Cardiovascular: Negative for chest pain, palpitations and leg swelling. Gastrointestinal: Negative for abdominal pain, diarrhea, nausea and vomiting. Skin: Positive for color change and wound. Negative for rash. Neurological: Negative for dizziness, light-headedness and headaches. Psychiatric/Behavioral: Negative for dysphoric mood. The patient is not nervous/anxious. Objective   Physical Exam  Vitals and nursing note reviewed. Constitutional:       General: She is not in acute distress. Appearance: Normal appearance. HENT:      Right Ear: Tympanic membrane, ear canal and external ear normal.      Left Ear: Tympanic membrane, ear canal and external ear normal.      Nose: Nose normal.      Mouth/Throat:      Mouth: Mucous membranes are moist.      Pharynx: Oropharynx is clear. Cardiovascular:      Rate and Rhythm: Normal rate and regular rhythm. Heart sounds: Normal heart sounds. Pulmonary:      Effort: Pulmonary effort is normal. No respiratory distress. Breath sounds: Normal breath sounds. No wheezing or rales. Abdominal:      General: Bowel sounds are normal.      Palpations: Abdomen is soft. Skin:     General: Skin is warm and dry. Neurological:      Mental Status: She is alert and oriented to person, place, and time. Psychiatric:         Mood and Affect: Mood normal.         Behavior: Behavior normal.         Thought Content: Thought content normal.            On this date 6/3/2022 I have spent 30 minutes reviewing previous notes, test results and face to face with the patient discussing the diagnosis and importance of compliance with the treatment plan as well as documenting on the day of the visit. An electronic signature was used to authenticate this note.     --Albin Fontanez Qiana Mcmullen, APRN - CNP

## 2022-07-03 ENCOUNTER — HOSPITAL ENCOUNTER (EMERGENCY)
Dept: GENERAL RADIOLOGY | Age: 77
Discharge: HOME | End: 2022-07-06
Payer: MEDICARE

## 2022-07-03 ENCOUNTER — HOSPITAL ENCOUNTER (EMERGENCY)
Age: 77
Discharge: HOME | End: 2022-07-03
Attending: EMERGENCY MEDICINE
Payer: MEDICARE

## 2022-07-03 VITALS
RESPIRATION RATE: 18 BRPM | DIASTOLIC BLOOD PRESSURE: 76 MMHG | WEIGHT: 170 LBS | OXYGEN SATURATION: 98 % | BODY MASS INDEX: 31.28 KG/M2 | TEMPERATURE: 98 F | HEIGHT: 62 IN | SYSTOLIC BLOOD PRESSURE: 110 MMHG | HEART RATE: 65 BPM

## 2022-07-03 DIAGNOSIS — U07.1 COVID-19: Primary | ICD-10-CM

## 2022-07-03 LAB
SARS-COV-2 RDRP RESP QL NAA+PROBE: DETECTED
SOURCE: ABNORMAL

## 2022-07-03 PROCEDURE — 99284 EMERGENCY DEPT VISIT MOD MDM: CPT

## 2022-07-03 PROCEDURE — 81003 URINALYSIS AUTO W/O SCOPE: CPT

## 2022-07-03 PROCEDURE — 87635 SARS-COV-2 COVID-19 AMP PRB: CPT

## 2022-07-03 PROCEDURE — 71046 X-RAY EXAM CHEST 2 VIEWS: CPT

## 2022-07-03 RX ORDER — 0.9 % SODIUM CHLORIDE 0.9 %
1000 INTRAVENOUS SOLUTION INTRAVENOUS
Status: DISCONTINUED | OUTPATIENT
Start: 2022-07-03 | End: 2022-07-03

## 2022-07-03 ASSESSMENT — ENCOUNTER SYMPTOMS
SORE THROAT: 1
TROUBLE SWALLOWING: 0
SHORTNESS OF BREATH: 0
RHINORRHEA: 0
NAUSEA: 0
VOMITING: 0
ABDOMINAL PAIN: 0
COUGH: 1

## 2022-07-03 ASSESSMENT — PAIN - FUNCTIONAL ASSESSMENT: PAIN_FUNCTIONAL_ASSESSMENT: 0-10

## 2022-07-03 ASSESSMENT — PAIN SCALES - GENERAL: PAINLEVEL_OUTOF10: 5

## 2022-07-03 NOTE — ED PROVIDER NOTES
[facility] Emergency Department Provider Note                   PCP:                Sy Bailey MD               Age: 65 y.o. Sex: female       ICD-10-CM    1. COVID-19  U5.3        DISPOSITION Decision To Discharge 07/03/2022 11:45:08 AM       Discharge Medication List as of 7/3/2022 12:03 PM          Orders Placed This Encounter   Procedures    COVID-19, Rapid    XR CHEST (2 VW)    POCT Urine Dipstick        [place] Pandya PA-C 12:11 PM      MDM  Number of Diagnoses or Management Options  COVID-19  Diagnosis management comments: Patient is a 58-year-old female who presents to facility today with concern of significant fatigue and a cough that she reports is improving. On exam patient is well-appearing in no acute distress. Afebrile and nontoxic in appearance. COVID test was positive. Urine without significant finding. Chest x-ray clear. Discussed results with patient. Advised the patient she is through her 5-day period of symptoms that would necessitate quarantine but did encourage her to wear her mask at all times for the next 5 to 6 days. Explained to her that I cannot determine if she is still actively spreading the virus or if the test picked up some viral shedding material.  Patient reports understanding. Encouraged her to drink plenty of fluids and get extra rest.  Encouraged her to utilize over-the-counter agents as necessary for symptom relief. Discussed return protocols with the patient which she reports understanding. Patient ambulatory upon discharge in stable condition.        Amount and/or Complexity of Data Reviewed  Clinical lab tests: reviewed and ordered  Tests in the radiology section of CPT®: ordered and reviewed  Tests in the medicine section of CPT®: reviewed and ordered  Review and summarize past medical records: yes  Independent visualization of images, tracings, or specimens: yes    Risk of Complications, Morbidity, and/or Mortality  Presenting problems: moderate  Diagnostic procedures: moderate  Management options: moderate  General comments: XR CHEST (2 VW)   Final Result    No acute findings in the chest         The patient was observed in the ED. Results Reviewed:      Recent Results (from the past 24 hour(s))  -COVID-19, Rapid:   Collection Time: 07/03/22 10:23 AM  Specimen: Nasopharyngeal       Result                      Value             Ref Range           Source                      NASAL SWAB                            SARS-CoV-2, Rapid           Detected (AA)     NOTD             I discussed the results of all labs, procedures, radiographs, and treatments with the patient and available family. Treatment plan is agreed upon and the patient is ready for discharge. All voiced understanding of the discharge plan and medication instructions or changes as appropriate. Questions about treatment in the ED were answered. All were encouraged to return should symptoms worsen or new problems develop. Patient Progress  Patient progress: stephan Arguelles is a 65 y.o. female who presents to the Emergency Department with chief complaint of    Chief Complaint   Patient presents with    Cough      Patient is a 79-year-old female who presents to facility today with 5 to 6 days of cough, fatigue, and sore throat. She reports this morning her legs gave out on her and she fell to the floor. She denies hitting her head or losing consciousness and was able to get herself up from the floor. She states she thinks the cough is getting better as now she is able to get some of the clear mucus up and out of her lungs. She states she is just extremely fatigued and does not feel like herself. She reports she finally decided she needed to come and be evaluated to try and figure out what was going on. Patient has been to an urgent care on the 30th and states they did a strep and COVID and both were negative. She denies any known sick contacts.   Reports she TOTAL SHOULDER ARTHROPLASTY Left     TUBAL LIGATION      UROLOGICAL SURGERY      cystoscopy    US GUIDED CORE BREAST BIOPSY Right         Family History   Problem Relation Age of Onset    Post-op Cognitive Dysfunction Neg Hx     Cancer Other         bone CA    Breast Cancer Sister     Diabetes Father     Dementia Mother     Diabetes Mother     Cancer Mother 76        Colon    Breast Cancer Sister 77    Hypertension Other     Hypertension Maternal Grandmother     Malig Hypertherm Neg Hx     Pseudochol. Deficiency Neg Hx     Delayed Awakening Neg Hx     Post-op Nausea/Vomiting Neg Hx     Emergence Delirium Neg Hx     Diabetes Other     Other Neg Hx            Social Connections:     Frequency of Communication with Friends and Family: Not on file    Frequency of Social Gatherings with Friends and Family: Not on file    Attends [employer] Services: Not on file    Active Member of Clubs or Organizations: Not on file    Attends Club or Organization Meetings: Not on file    Marital Status: Not on file        No Known Allergies     Vitals signs and nursing note reviewed. Patient Vitals for the past 4 hrs:   Temp Pulse Resp BP SpO2   07/03/22 1130 98 °F (36.7 °C) 65 18 110/76 98 %   07/03/22 0949 98.4 °F (36.9 °C) 63 16 105/68 97 %          Physical Exam  Vitals and nursing note reviewed. Constitutional:       General: She is not in acute distress. Appearance: Normal appearance. She is not ill-appearing. HENT:      Head: Normocephalic and atraumatic. Right Ear: External ear normal.      Left Ear: External ear normal.   Eyes:      Extraocular Movements: Extraocular movements intact. Conjunctiva/sclera: Conjunctivae normal.   Cardiovascular:      Rate and Rhythm: Normal rate and regular rhythm. Pulses: Normal pulses. Heart sounds: Normal heart sounds. Pulmonary:      Effort: Pulmonary effort is normal.      Breath sounds: Normal breath sounds.    Abdominal:      General: Abdomen is flat. Musculoskeletal:         General: Normal range of motion. Cervical back: Normal range of motion. Skin:     General: Skin is warm and dry. Capillary Refill: Capillary refill takes less than 2 seconds. Neurological:      General: No focal deficit present. Mental Status: She is alert and oriented to person, place, and time. Psychiatric:         Mood and Affect: Mood normal.         Behavior: Behavior normal.          Procedures      Labs Reviewed   COVID-19, RAPID - Abnormal; Notable for the following components:       Result Value    SARS-CoV-2, Rapid Detected (*)     All other components within normal limits        XR CHEST (2 VW)   Final Result   No acute findings in the chest                                Voice dictation software was used during the making of this note. This software is not perfect and grammatical and other typographical errors may be present. This note has not been completely proofread for errors.      Donta Long PA-C  07/03/22 [id number]

## 2022-07-03 NOTE — ED NOTES
Report given to Magda Chatman RN.  Care transferred at this time     Obed Maldonado RN  07/03/22 9927

## 2022-07-03 NOTE — ED NOTES
I have reviewed discharge instructions with the patient. The patient verbalized understanding. Patient left ED via Discharge Method: ambulatory to Home by self   Opportunity for questions and clarification provided. Patient given 0 scripts. To continue your aftercare when you leave the hospital, you may receive an automated call from our care team to check in on how you are doing. This is a free service and part of our promise to provide the best care and service to meet your aftercare needs.  If you have questions, or wish to unsubscribe from this service please call [phone]. Thank you for Choosing our [facility] Emergency Department.       Gopal Baca RN  07/03/22 4999

## 2022-07-03 NOTE — ED TRIAGE NOTES
Pt states she has had a cough with sore throat for about six days. States she was tested for covid and strep three days ago with negative results. States she fell this morning and feels like legs got weak causing the fall. Denies hitting head. Masked for triage.

## 2022-07-05 ENCOUNTER — CARE COORDINATION (OUTPATIENT)
Dept: CARE COORDINATION | Facility: CLINIC | Age: 77
End: 2022-07-05

## 2022-07-05 ENCOUNTER — TELEPHONE (OUTPATIENT)
Dept: INTERNAL MEDICINE CLINIC | Facility: CLINIC | Age: 77
End: 2022-07-05

## 2022-07-05 NOTE — CARE COORDINATION
Patient contacted regarding COVID-19 diagnosis and ED visit on 7/3/2022 for COVID 19 . Discussed COVID-19 POSITIVE related testing which was available at this time. Test results were positive. Patient informed of results, if available? Yes. Ambulatory Care Manager contacted the patient by telephone to perform post discharge assessment. Call within 2 business days of discharge: Yes. Verified name and  with patient as identifiers. Provided introduction to self, and explanation of the CTN/ACM role, and reason for call due to risk factors for infection and/or exposure to COVID-19. Symptoms reviewed with patient who verbalized the following symptoms: no worsening symptoms. Due to no new or worsening symptoms encounter was not routed to provider for escalation. Discussed follow-up appointments. If no appointment was previously scheduled, appointment scheduling offered: No.  [facility] follow up appointment(s):   Future Appointments   Date Time Provider Sheree Hernández   2022  4:00 PM RUBEN Katz CNP [facility] AMB   2022  8:50 AM MAT [facility] AMB   2022  8:40 AM Jill Sanchez MD [facility] AMB   2022  8:30 AM MD BISHNU Prescott [facility] AMB   11/10/2022 10:00 AM Timoteo Mas [place] [facility] [place]     Non[facility] follow up appointment(s):      Non-face-to-face services provided:  Obtained and reviewed discharge summary and/or continuity of care documents     Advance Care Planning:   Does patient have an Advance Directive:  not on file. Educated patient about risk for severe COVID-19 due to risk factors according to CDC guidelines. ACM reviewed discharge instructions, medical action plan and red flag symptoms with the patient who verbalized understanding. Discussed COVID vaccination status: Yes. Education provided on COVID-19 vaccination as appropriate. Discussed exposure protocols and quarantine with CDC Guidelines.  Patient was given an opportunity to verbalize any questions

## 2022-07-05 NOTE — TELEPHONE ENCOUNTER
Pt called through nurse triage call center reporting some lingering COVID sx's and blood in stool. Spoke with pt for further eval. Pt tested pos for COVID on 7/3/22 at ER, but already had sx's for longer than 5 days. Pt reports her sx's are getting better. Her only sx now is diarrhea and some bright red blood intermittently when wiping after having a BM. Pt does have hemorrhoids. Pt notified to treat her sx's with OTC medication like Imodium for her diarrhea and to report if her stools turn dark or black. Pt verbalized understanding.

## 2022-07-08 ENCOUNTER — OFFICE VISIT (OUTPATIENT)
Dept: INTERNAL MEDICINE CLINIC | Facility: CLINIC | Age: 77
End: 2022-07-08
Payer: MEDICARE

## 2022-07-08 VITALS
DIASTOLIC BLOOD PRESSURE: 70 MMHG | HEART RATE: 65 BPM | RESPIRATION RATE: 16 BRPM | TEMPERATURE: 97.4 F | OXYGEN SATURATION: 97 % | WEIGHT: 168.2 LBS | BODY MASS INDEX: 30.95 KG/M2 | SYSTOLIC BLOOD PRESSURE: 120 MMHG | HEIGHT: 62 IN

## 2022-07-08 DIAGNOSIS — U07.1 COVID-19: Primary | ICD-10-CM

## 2022-07-08 PROCEDURE — 1123F ACP DISCUSS/DSCN MKR DOCD: CPT | Performed by: NURSE PRACTITIONER

## 2022-07-08 PROCEDURE — 99213 OFFICE O/P EST LOW 20 MIN: CPT | Performed by: NURSE PRACTITIONER

## 2022-07-08 ASSESSMENT — ENCOUNTER SYMPTOMS
DIARRHEA: 0
SORE THROAT: 0
NAUSEA: 0
CONSTIPATION: 0
RHINORRHEA: 0
COUGH: 1
BLOOD IN STOOL: 0
ABDOMINAL PAIN: 0
VOMITING: 0
ANAL BLEEDING: 0

## 2022-07-08 ASSESSMENT — PATIENT HEALTH QUESTIONNAIRE - PHQ9
2. FEELING DOWN, DEPRESSED OR HOPELESS: 0
SUM OF ALL RESPONSES TO PHQ QUESTIONS 1-9: 0
SUM OF ALL RESPONSES TO PHQ QUESTIONS 1-9: 0
1. LITTLE INTEREST OR PLEASURE IN DOING THINGS: 0
SUM OF ALL RESPONSES TO PHQ9 QUESTIONS 1 & 2: 0
SUM OF ALL RESPONSES TO PHQ QUESTIONS 1-9: 0
SUM OF ALL RESPONSES TO PHQ QUESTIONS 1-9: 0

## 2022-07-08 NOTE — PROGRESS NOTES
[facility]      2022    Patient Name: Izzy Patel  :  [birth year]      Chief Complaint:  Chief Complaint   Patient presents with    Other     not feeling well, abdominal pain         HPI   Patient presents today for follow-up after recent Covid-19 infection. Symptoms started 10-14 days ago. She presented to the ER on 7/3/22 with complaint of 6-day history of fatigue, generalized weakness, diarrhea, abdominal pain, cough and sore throat. Covid-19 test at that time was positive. She called our office on 22 with complaint of persistent diarrhea and a small amount of bright red blood on the toilet paper when wiping. No blood in the stool. She reports trouble off and on with hemorrhoids for many years and reports that the bleeding is similar to when she has had problems with hemorrhoids in the past.     Since we spoke with her on 22, she reports a mild lingering cough, but otherwise, her respiratory symptoms are resolved. She denies any diarrhea or blood on the toilet since 2 days ago. She has been staying hydrated drinking water, Gatorade and Pedialyte. She denies any concerns or questions today.        Past Medical History:   Diagnosis Date    Abdominal pain     Arthritis     Breast cancer, left ([address]     Breast pain in female     Bunion     Chronic pain of left knee     Corns and callosities     DDD (degenerative disc disease), lumbar     Diabetes mellitus type II, controlled ([address]     Emotional disorder     Fungal infection of toenail     Hallux valgus     Hip pain, bilateral     Hyperglycemia     Hyperlipidemia     Hypertension     medication    Hypothyroidism     Lower GI bleed     Obesity     Other ill-defined conditions(799.89)     pt reports urinalysis shows blood - she is scheduled for CT pelvis and abdomen in August)    Postmenopausal     Right median nerve neuropathy     Stress incontinence     Traumatic arthropathy of knee        Past Surgical History: Procedure Laterality Date    BREAST BIOPSY Right     benign    BREAST LUMPECTOMY Left     radiation for CA    COLONOSCOPY  2008    HYSTERECTOMY (CERVIX STATUS UNKNOWN)      ORTHOPEDIC SURGERY Left     ankle arthroscopy    ORTHOPEDIC SURGERY      left knee repair    ORTHOPEDIC SURGERY      right ankle    ORTHOPEDIC SURGERY  2005    Left TSA    SHOULDER ARTHROPLASTY Left     TUBAL LIGATION      UROLOGICAL SURGERY      cystoscopy    US GUIDED CORE BREAST BIOPSY Right        Family History   Problem Relation Age of Onset    Post-op Cognitive Dysfunction Neg Hx     Cancer Other         bone CA    Breast Cancer Sister     Diabetes Father     Dementia Mother     Diabetes Mother     Cancer Mother 76        Colon    Breast Cancer Sister 77    Hypertension Other     Hypertension Maternal Grandmother     Malig Hypertherm Neg Hx     Pseudochol.  Deficiency Neg Hx     Delayed Awakening Neg Hx     Post-op Nausea/Vomiting Neg Hx     Emergence Delirium Neg Hx     Diabetes Other     Other Neg Hx        Social History     Tobacco Use    Smoking status: Former Smoker     Packs/day: 0.25     Quit date: 2001     Years since quittin.0    Smokeless tobacco: Never Used   Substance Use Topics    Alcohol use: No    Drug use: No         Current Outpatient Medications:     atenolol-chlorthalidone (TENORETIC) 50-25 MG per tablet, Take 0.5 tablets by mouth daily, Disp: , Rfl:     atorvastatin (LIPITOR) 40 MG tablet, Take 40 mg by mouth, Disp: , Rfl:     Cholecalciferol 50 MCG (2000) TABS, Take by mouth, Disp: , Rfl:     clotrimazole-betamethasone (LOTRISONE) 1-0.05 % cream, Apply topically 2 times daily, Disp: , Rfl:     coenzyme Q10 100 MG CAPS capsule, Take 100 mg by mouth daily, Disp: , Rfl:     hydrocortisone 2.5 % cream, Place rectally 4 times daily, Disp: , Rfl:     irbesartan (AVAPRO) 150 MG tablet, Take 150 mg by mouth daily, Disp: , Rfl:     levothyroxine (SYNTHROID) 75 MCG Neurological: Negative for dizziness, light-headedness and headaches. Objective:  /70 (Site: Right Upper Arm, Position: Sitting, Cuff Size: Small Adult)   Pulse 65   Temp 97.4 °F (36.3 °C) (Temporal)   Resp 16   Ht 5' 2\" (1.575 m)   Wt 168 lb 3.2 oz (76.3 kg)   SpO2 97%   BMI 30.76 kg/m²       Examination:  Physical Exam  Vitals and nursing note reviewed. Constitutional:       General: She is not in acute distress. Appearance: Normal appearance. HENT:      Right Ear: Tympanic membrane, ear canal and external ear normal.      Left Ear: Tympanic membrane, ear canal and external ear normal.      Nose: Nose normal.      Mouth/Throat:      Mouth: Mucous membranes are moist.      Pharynx: Oropharynx is clear. Cardiovascular:      Rate and Rhythm: Normal rate and regular rhythm. Heart sounds: Normal heart sounds. Pulmonary:      Effort: Pulmonary effort is normal. No respiratory distress. Breath sounds: Normal breath sounds. No wheezing or rales. Abdominal:      General: Bowel sounds are normal.      Palpations: Abdomen is soft. Tenderness: There is no abdominal tenderness. Musculoskeletal:      Right lower leg: No edema. Left lower leg: No edema. Skin:     General: Skin is warm and dry. Neurological:      Mental Status: She is alert and oriented to person, place, and time. Psychiatric:         Mood and Affect: Mood normal.           Assessment/Plan:    [address] [place] [address] was seen today for other. Diagnoses and all orders for this visit:    COVID-19  Other than a mild, nonproductive cough, symptoms are resolved. May use OTC cough suppressant or lozenges as needed. Patient instructed to continue staying hydrated. Call for any new or worsening symptoms. Follow-up and Dispositions    · Return if symptoms worsen or fail to improve.          On this date, 7/8/22, I have spent 20 minutes reviewing previous notes, test results and face to face with the patient discussing the diagnosis and importance of compliance with the treatment plan as well as documenting on the day of the visit. An electronic signature was used to authenticate this note.   RANDI Kwong

## 2022-07-26 ENCOUNTER — TELEPHONE (OUTPATIENT)
Dept: BEHAVIORAL/MENTAL HEALTH CLINIC | Age: 77
End: 2022-07-26

## 2022-07-26 NOTE — TELEPHONE ENCOUNTER
Patient called and was having some \"issues\" that interfered with her making her appointment today. She will call [facility] referrals to reschedule.   PORFIRIO Lang

## 2022-08-04 ENCOUNTER — CARE COORDINATION (OUTPATIENT)
Dept: CARE COORDINATION | Facility: CLINIC | Age: 77
End: 2022-08-04

## 2022-08-04 NOTE — CARE COORDINATION
Outreached to patient for final [facility] covid care transitions call ED visit on 7/3/2022 for COVID 19 . Discussed COVID-19 POSITIVE  Patient is doing good at this time   You Patient resolved from the Care Transitions episode on 8/4/2022  Discussed COVID-19 related testing which was  test done last week and was NEGATIVE  at this time. Test results were  test last week was NEGATIVE . Patient informed of results, if available? Yes    Patient/family has been provided the following resources and education related to COVID-19:                         Signs, symptoms and red flags related to COVID-19            CDC exposure and quarantine guidelines            [facility] exposure contact - [phone]            Contact for their local Department of Health                 Patient currently reports that the following symptoms have improved:  no new/worsening symptoms     No further outreach scheduled with this CTN/ACM. Episode of Care resolved. Patient has this CTN/ACM contact information if future needs arise.

## 2022-08-16 DIAGNOSIS — E78.5 HYPERLIPIDEMIA LDL GOAL <100: ICD-10-CM

## 2022-08-16 DIAGNOSIS — E03.9 ACQUIRED HYPOTHYROIDISM: ICD-10-CM

## 2022-08-16 DIAGNOSIS — E11.29 TYPE 2 DIABETES MELLITUS WITH OTHER DIABETIC KIDNEY COMPLICATION (HCC): Primary | ICD-10-CM

## 2022-08-16 DIAGNOSIS — I10 ESSENTIAL (PRIMARY) HYPERTENSION: ICD-10-CM

## 2022-08-17 ENCOUNTER — HOSPITAL ENCOUNTER (OUTPATIENT)
Dept: MAMMOGRAPHY | Age: 77
Discharge: HOME | End: 2022-08-20
Payer: MEDICARE

## 2022-08-17 DIAGNOSIS — Z12.31 SCREENING MAMMOGRAM FOR BREAST CANCER: ICD-10-CM

## 2022-08-17 PROCEDURE — 77067 SCR MAMMO BI INCL CAD: CPT

## 2022-08-23 NOTE — RESULT ENCOUNTER NOTE
Additional mammographic imaging is indicated. Please let me know if you need any assistance with scheduling.       Timmy Hargrove MD

## 2022-08-25 ENCOUNTER — HOSPITAL ENCOUNTER (OUTPATIENT)
Dept: MAMMOGRAPHY | Age: 77
Discharge: HOME | End: 2022-08-28
Payer: MEDICARE

## 2022-08-25 ENCOUNTER — APPOINTMENT (OUTPATIENT)
Dept: MAMMOGRAPHY | Age: 77
End: 2022-08-25
Payer: MEDICARE

## 2022-08-25 DIAGNOSIS — R92.8 ABNORMAL SCREENING MAMMOGRAM: ICD-10-CM

## 2022-08-25 DIAGNOSIS — I10 ESSENTIAL (PRIMARY) HYPERTENSION: ICD-10-CM

## 2022-08-25 DIAGNOSIS — E11.29 TYPE 2 DIABETES MELLITUS WITH OTHER DIABETIC KIDNEY COMPLICATION (HCC): ICD-10-CM

## 2022-08-25 DIAGNOSIS — E03.9 ACQUIRED HYPOTHYROIDISM: ICD-10-CM

## 2022-08-25 DIAGNOSIS — E78.5 HYPERLIPIDEMIA LDL GOAL <100: ICD-10-CM

## 2022-08-25 LAB
APPEARANCE UR: CLEAR
BACTERIA URNS QL MICRO: 0 /HPF
BASOPHILS # BLD: 0 K/UL (ref 0–0.2)
BASOPHILS NFR BLD: 1 % (ref 0–2)
BILIRUB UR QL: NEGATIVE
CASTS URNS QL MICRO: 0 /LPF
COLOR UR: ABNORMAL
CRYSTALS URNS QL MICRO: 0 /LPF
DIFFERENTIAL METHOD BLD: NORMAL
EOSINOPHIL # BLD: 0.1 K/UL (ref 0–0.8)
EOSINOPHIL NFR BLD: 1 % (ref 0.5–7.8)
EPI CELLS #/AREA URNS HPF: ABNORMAL /HPF
ERYTHROCYTE [DISTWIDTH] IN BLOOD BY AUTOMATED COUNT: 13.8 % (ref 11.9–14.6)
GLUCOSE UR STRIP.AUTO-MCNC: NEGATIVE MG/DL
HCT VFR BLD AUTO: 40 % (ref 35.8–46.3)
HGB BLD-MCNC: 12.9 G/DL (ref 11.7–15.4)
HGB UR QL STRIP: ABNORMAL
IMM GRANULOCYTES # BLD AUTO: 0 K/UL (ref 0–0.5)
IMM GRANULOCYTES NFR BLD AUTO: 0 % (ref 0–5)
KETONES UR QL STRIP.AUTO: NEGATIVE MG/DL
LEUKOCYTE ESTERASE UR QL STRIP.AUTO: ABNORMAL
LYMPHOCYTES # BLD: 1.2 K/UL (ref 0.5–4.6)
LYMPHOCYTES NFR BLD: 22 % (ref 13–44)
MCH RBC QN AUTO: 28.3 PG (ref 26.1–32.9)
MCHC RBC AUTO-ENTMCNC: 32.3 G/DL (ref 31.4–35)
MCV RBC AUTO: 87.7 FL (ref 79.6–97.8)
MONOCYTES # BLD: 0.5 K/UL (ref 0.1–1.3)
MONOCYTES NFR BLD: 9 % (ref 4–12)
MUCOUS THREADS URNS QL MICRO: 0 /LPF
NEUTS SEG # BLD: 3.8 K/UL (ref 1.7–8.2)
NEUTS SEG NFR BLD: 67 % (ref 43–78)
NITRITE UR QL STRIP.AUTO: NEGATIVE
NRBC # BLD: 0 K/UL (ref 0–0.2)
PH UR STRIP: 6.5 [PH] (ref 5–9)
PLATELET # BLD AUTO: 213 K/UL (ref 150–450)
PMV BLD AUTO: 10.9 FL (ref 9.4–12.3)
PROT UR STRIP-MCNC: 30 MG/DL
RBC # BLD AUTO: 4.56 M/UL (ref 4.05–5.2)
RBC #/AREA URNS HPF: ABNORMAL /HPF
SP GR UR REFRACTOMETRY: 1.02 (ref 1–1.02)
URINE CULTURE IF INDICATED: ABNORMAL
UROBILINOGEN UR QL STRIP.AUTO: 1 EU/DL (ref 0.2–1)
WBC # BLD AUTO: 5.5 K/UL (ref 4.3–11.1)
WBC URNS QL MICRO: ABNORMAL /HPF

## 2022-08-25 PROCEDURE — 76642 ULTRASOUND BREAST LIMITED: CPT

## 2022-08-25 PROCEDURE — 77065 DX MAMMO INCL CAD UNI: CPT

## 2022-08-26 LAB
ALBUMIN SERPL-MCNC: 3.7 G/DL (ref 3.2–4.6)
ALBUMIN/GLOB SERPL: 1 {RATIO} (ref 1.2–3.5)
ALP SERPL-CCNC: 63 U/L (ref 50–136)
ALT SERPL-CCNC: 21 U/L (ref 12–65)
ANION GAP SERPL CALC-SCNC: 2 MMOL/L (ref 7–16)
AST SERPL-CCNC: 16 U/L (ref 15–37)
BILIRUB SERPL-MCNC: 0.9 MG/DL (ref 0.2–1.1)
BUN SERPL-MCNC: 18 MG/DL (ref 8–23)
CALCIUM SERPL-MCNC: 9.4 MG/DL (ref 8.3–10.4)
CHLORIDE SERPL-SCNC: 107 MMOL/L (ref 98–107)
CHOLEST SERPL-MCNC: 132 MG/DL
CO2 SERPL-SCNC: 30 MMOL/L (ref 21–32)
CREAT SERPL-MCNC: 1.1 MG/DL (ref 0.6–1)
EST. AVERAGE GLUCOSE BLD GHB EST-MCNC: 148 MG/DL
GLOBULIN SER CALC-MCNC: 3.8 G/DL (ref 2.3–3.5)
GLUCOSE SERPL-MCNC: 118 MG/DL (ref 65–100)
HBA1C MFR BLD: 6.8 % (ref 4.8–5.6)
HDLC SERPL-MCNC: 50 MG/DL (ref 40–60)
HDLC SERPL: 2.6 {RATIO}
LDLC SERPL CALC-MCNC: 68.6 MG/DL
POTASSIUM SERPL-SCNC: 3.9 MMOL/L (ref 3.5–5.1)
PROT SERPL-MCNC: 7.5 G/DL (ref 6.3–8.2)
SODIUM SERPL-SCNC: 139 MMOL/L (ref 136–145)
TRIGL SERPL-MCNC: 67 MG/DL (ref 35–150)
TSH, 3RD GENERATION: 0.89 UIU/ML (ref 0.36–3.74)
VLDLC SERPL CALC-MCNC: 13.4 MG/DL (ref 6–23)

## 2022-08-31 ENCOUNTER — HOSPITAL ENCOUNTER (OUTPATIENT)
Dept: MAMMOGRAPHY | Age: 77
Discharge: HOME | End: 2022-09-03
Payer: MEDICARE

## 2022-08-31 ENCOUNTER — OFFICE VISIT (OUTPATIENT)
Dept: INTERNAL MEDICINE CLINIC | Facility: CLINIC | Age: 77
End: 2022-08-31
Payer: MEDICARE

## 2022-08-31 VITALS
DIASTOLIC BLOOD PRESSURE: 64 MMHG | TEMPERATURE: 97.2 F | BODY MASS INDEX: 30.36 KG/M2 | HEIGHT: 62 IN | OXYGEN SATURATION: 99 % | HEART RATE: 58 BPM | WEIGHT: 165 LBS | SYSTOLIC BLOOD PRESSURE: 118 MMHG

## 2022-08-31 VITALS
TEMPERATURE: 97.4 F | DIASTOLIC BLOOD PRESSURE: 70 MMHG | SYSTOLIC BLOOD PRESSURE: 123 MMHG | OXYGEN SATURATION: 99 % | HEART RATE: 56 BPM

## 2022-08-31 DIAGNOSIS — R92.8 ABNORMAL MAMMOGRAM OF RIGHT BREAST: ICD-10-CM

## 2022-08-31 DIAGNOSIS — N64.89 BREAST ASYMMETRY: ICD-10-CM

## 2022-08-31 DIAGNOSIS — R92.8 ABNORMAL MAMMOGRAM: ICD-10-CM

## 2022-08-31 DIAGNOSIS — I10 ESSENTIAL (PRIMARY) HYPERTENSION: ICD-10-CM

## 2022-08-31 DIAGNOSIS — E78.5 HYPERLIPIDEMIA LDL GOAL <100: ICD-10-CM

## 2022-08-31 DIAGNOSIS — R80.9 CONTROLLED TYPE 2 DIABETES MELLITUS WITH MICROALBUMINURIA, WITHOUT LONG-TERM CURRENT USE OF INSULIN (HCC): Primary | ICD-10-CM

## 2022-08-31 DIAGNOSIS — Z85.3 HISTORY OF BREAST CANCER: ICD-10-CM

## 2022-08-31 DIAGNOSIS — R92.8 ABNORMAL ULTRASOUND OF BREAST: ICD-10-CM

## 2022-08-31 DIAGNOSIS — E03.9 ACQUIRED HYPOTHYROIDISM: ICD-10-CM

## 2022-08-31 DIAGNOSIS — E11.29 CONTROLLED TYPE 2 DIABETES MELLITUS WITH MICROALBUMINURIA, WITHOUT LONG-TERM CURRENT USE OF INSULIN (HCC): Primary | ICD-10-CM

## 2022-08-31 PROCEDURE — A4217 STERILE WATER/SALINE, 500 ML: HCPCS | Performed by: INTERNAL MEDICINE

## 2022-08-31 PROCEDURE — 88305 TISSUE EXAM BY PATHOLOGIST: CPT

## 2022-08-31 PROCEDURE — 3044F HG A1C LEVEL LT 7.0%: CPT | Performed by: INTERNAL MEDICINE

## 2022-08-31 PROCEDURE — 76098 X-RAY EXAM SURGICAL SPECIMEN: CPT

## 2022-08-31 PROCEDURE — 77065 DX MAMMO INCL CAD UNI: CPT

## 2022-08-31 PROCEDURE — 2500000003 HC RX 250 WO HCPCS: Performed by: INTERNAL MEDICINE

## 2022-08-31 PROCEDURE — 19081 BX BREAST 1ST LESION STRTCTC: CPT

## 2022-08-31 PROCEDURE — 99214 OFFICE O/P EST MOD 30 MIN: CPT | Performed by: INTERNAL MEDICINE

## 2022-08-31 PROCEDURE — 1123F ACP DISCUSS/DSCN MKR DOCD: CPT | Performed by: INTERNAL MEDICINE

## 2022-08-31 PROCEDURE — 2580000003 HC RX 258: Performed by: INTERNAL MEDICINE

## 2022-08-31 PROCEDURE — 88361 TUMOR IMMUNOHISTOCHEM/COMPUT: CPT

## 2022-08-31 RX ORDER — LEVOTHYROXINE SODIUM 0.07 MG/1
75 TABLET ORAL DAILY
Qty: 90 TABLET | Refills: 3 | Status: SHIPPED | OUTPATIENT
Start: 2022-08-31 | End: 2022-09-19 | Stop reason: SDUPTHER

## 2022-08-31 RX ORDER — ATORVASTATIN CALCIUM 40 MG/1
40 TABLET, FILM COATED ORAL DAILY
Qty: 90 TABLET | Refills: 3 | Status: SHIPPED | OUTPATIENT
Start: 2022-08-31 | End: 2022-09-19 | Stop reason: SDUPTHER

## 2022-08-31 RX ORDER — LIDOCAINE HYDROCHLORIDE AND EPINEPHRINE 10; 10 MG/ML; UG/ML
15 INJECTION, SOLUTION INFILTRATION; PERINEURAL ONCE
Status: COMPLETED | OUTPATIENT
Start: 2022-08-31 | End: 2022-08-31

## 2022-08-31 RX ORDER — LIDOCAINE HYDROCHLORIDE 10 MG/ML
5 INJECTION, SOLUTION INFILTRATION; PERINEURAL ONCE
Status: COMPLETED | OUTPATIENT
Start: 2022-08-31 | End: 2022-08-31

## 2022-08-31 RX ORDER — MAGNESIUM HYDROXIDE 1200 MG/15ML
250 LIQUID ORAL ONCE
Status: COMPLETED | OUTPATIENT
Start: 2022-08-31 | End: 2022-08-31

## 2022-08-31 RX ORDER — IRBESARTAN 150 MG/1
150 TABLET ORAL DAILY
Qty: 90 TABLET | Refills: 3 | Status: SHIPPED | OUTPATIENT
Start: 2022-08-31 | End: 2022-09-19 | Stop reason: SDUPTHER

## 2022-08-31 RX ADMIN — LIDOCAINE HYDROCHLORIDE,EPINEPHRINE BITARTRATE 12 ML: 10; .01 INJECTION, SOLUTION INFILTRATION; PERINEURAL at 12:01

## 2022-08-31 RX ADMIN — SODIUM CHLORIDE 250 ML: 900 IRRIGANT IRRIGATION at 11:59

## 2022-08-31 RX ADMIN — LIDOCAINE HYDROCHLORIDE 4 ML: 10 INJECTION, SOLUTION EPIDURAL; INFILTRATION; INTRACAUDAL; PERINEURAL at 11:59

## 2022-08-31 ASSESSMENT — ENCOUNTER SYMPTOMS
SHORTNESS OF BREATH: 0
ABDOMINAL PAIN: 0
VOMITING: 0
NAUSEA: 0
CHEST TIGHTNESS: 0

## 2022-08-31 ASSESSMENT — PAIN - FUNCTIONAL ASSESSMENT
PAIN_FUNCTIONAL_ASSESSMENT: ACTIVITIES ARE NOT PREVENTED
PAIN_FUNCTIONAL_ASSESSMENT: ACTIVITIES ARE NOT PREVENTED
PAIN_FUNCTIONAL_ASSESSMENT: 0-10

## 2022-08-31 ASSESSMENT — PAIN SCALES - GENERAL
PAINLEVEL_OUTOF10: 5
PAINLEVEL_OUTOF10: 5

## 2022-08-31 ASSESSMENT — PAIN DESCRIPTION - ORIENTATION
ORIENTATION: RIGHT
ORIENTATION: RIGHT

## 2022-08-31 ASSESSMENT — PAIN DESCRIPTION - DESCRIPTORS
DESCRIPTORS: BURNING;TENDER
DESCRIPTORS: BURNING;TENDER

## 2022-08-31 ASSESSMENT — PAIN DESCRIPTION - LOCATION
LOCATION: BREAST
LOCATION: BREAST

## 2022-08-31 NOTE — PROGRESS NOTES
ASSESSMENT/PLAN:    1. Controlled type 2 diabetes mellitus with microalbuminuria, without long-term current use of insulin (HCC)  Controlled. Treatment regimen is effective. .    - Basic Metabolic Panel; Future  - Hemoglobin A1C; Future  - Lipid Panel; Future    2. Essential (primary) hypertension  Treatment regimen is effective. Cr stable. GFR 59  - irbesartan (AVAPRO) 150 MG tablet; Take 1 tablet by mouth daily  Dispense: 90 tablet; Refill: 3  - Basic Metabolic Panel; Future  - Hemoglobin A1C; Future  - Lipid Panel; Future    3. Acquired hypothyroidism  Treatment regimen is effective. - levothyroxine (SYNTHROID) 75 MCG tablet; Take 1 tablet by mouth Daily TK 1 T PO D Indications: a condition with low thyroid hormone levels  Dispense: 90 tablet; Refill: 3  - TSH; Future    4. Hyperlipidemia LDL goal <100  Treatment regimen is effective. - atorvastatin (LIPITOR) 40 MG tablet; Take 1 tablet by mouth daily  Dispense: 90 tablet; Refill: 3  - Lipid Panel; Future    5. Abnormal mammogram of right breast  Breast Bx at 1030 this morning of right breast    6. History of breast cancer  Breast cancer over 20 yrs ago. Recommend stereotactic biopsy. Results and recommendations discussed in    full       Evaluation and management of the chronic condition(s) delineated. No negative side effects reported. I have reviewed all the lab results. There are some abnormalities that are either expected or not critical to the patient's health, and are discussed in the office today and are addressed. Please refer to the above assessement and plan narrative and orders and follow up plan. Medication discussed and refilled as needed. Physical exam findings are stable unless otherwise indicated and this is addressed. The most recent lab work and imaging and consultant/urgent care visits and imaging are reviewed and discussed and considered during this visit encounter.       1. 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 Resolved. She had some dehydration that improved with hydration. She was seen at ER. Labs reviewed and discussed and medication refilled as needed for chronic medications during ov or adjusted based on lab results and/or our discussion as appropriate. See discussion. The patient's available records and electronic chart records are reviewed. The PMH, PSH, medications, allergies, medications, FH, health maintenance and vaccination status are all reviewed and updated as appropriate. Records from outside providers have been reviewed, summarized, and considered as noted in the history of present illness, past medical history, and objective data of this note and encounter.           Health Maintenance   Topic Date Due    Shingles vaccine (1 of 2) Never done    COVID-19 Vaccine (4 - Booster for Pfizer series) 03/05/2022    DEXA (modify frequency per FRAX score)  02/28/2023 (Originally 5/23/1999)    Flu vaccine (1) 09/01/2022    Annual Wellness Visit (AWV)  03/01/2023    Depression Monitoring  07/08/2023    Lipids  08/25/2023    DTaP/Tdap/Td vaccine (2 - Td or Tdap) 03/31/2027    Pneumococcal 65+ years Vaccine  Completed    Hepatitis A vaccine  Aged Out    Hib vaccine  Aged Out    Meningococcal (ACWY) vaccine  Aged Out    Hepatitis C screen  Discontinued     Patient Active Problem List   Diagnosis    History of breast cancer    Arthritis    Spondylosis of lumbosacral region without myelopathy or radiculopathy    Chronic pain of left knee    Hyperlipidemia    Anxiety associated with depression    Right median nerve neuropathy    Refused varicella vaccine    Hypertension    Abdominal wall hernia    Hip pain, bilateral    Adenomatous polyp of colon    Stress incontinence    Agatston coronary artery calcium score greater than 400    Controlled type 2 diabetes mellitus with microalbuminuria, without long-term current use of insulin (HCC)    Anxiety    Hypothyroidism Chronic renal disease, stage III (HCC)       Review of Systems   Constitutional:  Negative for activity change and fatigue. Eyes:  Negative for visual disturbance. Respiratory:  Negative for chest tightness and shortness of breath. Cardiovascular:  Negative for chest pain, palpitations and leg swelling. Gastrointestinal:  Negative for abdominal pain, nausea and vomiting. Endocrine: Negative for polydipsia and polyuria. Genitourinary:  Negative for dysuria. Musculoskeletal:  Negative for myalgias. Skin:  Negative for wound. Neurological:  Negative for headaches. Psychiatric/Behavioral:  Negative for confusion. The patient is nervous/anxious.       Lab Results   Component Value Date/Time    WBC 5.5 08/25/2022 08:23 AM    HGB 12.9 08/25/2022 08:23 AM    HCT 40.0 08/25/2022 08:23 AM    MCV 87.7 08/25/2022 08:23 AM    RDW 13.8 08/25/2022 08:23 AM     08/25/2022 08:23 AM    NEUTOPHILPCT 68 05/17/2022 03:30 PM    LYMPHOPCT 22 08/25/2022 08:23 AM    LYMPHOPCT 22 05/17/2022 03:30 PM    MONOPCT 9 08/25/2022 08:23 AM    MONOPCT 9 05/17/2022 03:30 PM    EOSRELPCT 1 08/25/2022 08:23 AM    BASOPCT 1 08/25/2022 08:23 AM    BASOPCT 1 05/17/2022 03:30 PM    LYMPHSABS 1.2 08/25/2022 08:23 AM    LYMPHSABS 1.4 05/17/2022 03:30 PM    MONOSABS 0.5 08/25/2022 08:23 AM    MONOSABS 0.6 05/17/2022 03:30 PM    EOSABS 0.1 08/25/2022 08:23 AM    EOSABS 0.1 05/17/2022 03:30 PM    BASOSABS 0.0 08/25/2022 08:23 AM    IMMGRAN 0 08/25/2022 08:23 AM    GRANULOCYTEABSOLUTECOUNT 0.0 05/17/2022 03:30 PM       Lab Results   Component Value Date/Time     08/25/2022 08:23 AM    K 3.9 08/25/2022 08:23 AM     08/25/2022 08:23 AM    CO2 30 08/25/2022 08:23 AM    ANIONGAP 2 08/25/2022 08:23 AM    GLUCOSE 118 08/25/2022 08:23 AM    BUN 18 08/25/2022 08:23 AM    CREATININE 1.10 08/25/2022 08:23 AM    GFRAA >60 08/25/2022 08:23 AM    LABGLOM 51 08/25/2022 08:23 AM    CALCIUM 9.4 08/25/2022 08:23 AM    BILITOT 0.9 08/25/2022 08:23 AM    ALT 21 08/25/2022 08:23 AM    AST 16 08/25/2022 08:23 AM    ALKPHOS 63 08/25/2022 08:23 AM    ALKPHOS 72 02/22/2022 08:13 AM    PROT 7.5 08/25/2022 08:23 AM    LABALBU 3.7 08/25/2022 08:23 AM    GLOB 3.8 08/25/2022 08:23 AM    ALBUMIN 1.0 08/25/2022 08:23 AM       Lab Results   Component Value Date/Time    CHOL 132 08/25/2022 08:23 AM    HDL 50 08/25/2022 08:23 AM    TRIG 67 08/25/2022 08:23 AM    LDLCALC 68.6 08/25/2022 08:23 AM    VLDL 12 02/22/2022 08:13 AM       Lab Results   Component Value Date/Time    LABA1C 6.8 08/25/2022 08:23 AM    LABA1C 6.6 02/22/2022 08:13 AM    LABA1C 6.7 08/18/2021 08:10 AM    LABA1C 6.6 04/13/2021 09:26 AM    LABA1C 6.6 09/24/2020 08:54 AM       Lab Results   Component Value Date/Time    TSH 2.660 02/22/2022 08:13 AM    TSH 1.430 04/13/2021 09:26 AM    TSH 0.641 09/24/2020 08:54 AM       No results found for: PSA    Lab Results   Component Value Date/Time    SPECGRAV 1.016 08/25/2022 08:23 AM    COLORU YELLOW/STRAW 08/25/2022 08:23 AM    LEUKOCYTESUR SMALL 08/25/2022 08:23 AM    PROTEINU 30 08/25/2022 08:23 AM    GLUCOSEU Negative 08/25/2022 08:23 AM    KETUA Negative 08/25/2022 08:23 AM    BLOODU SMALL 08/25/2022 08:23 AM    BILIRUBINUR Negative 08/25/2022 08:23 AM    UROBILINOGEN 1.0 08/25/2022 08:23 AM    NITRU Negative 08/25/2022 08:23 AM           Vitals:    08/31/22 0906   BP: 118/64   Site: Right Upper Arm   Position: Sitting   Cuff Size: Large Adult   Pulse: 58   Temp: 97.2 °F (36.2 °C)   TempSrc: Temporal   SpO2: 99%   Weight: 165 lb (74.8 kg)   Height: 5' 2\" (1.575 m)     Wt Readings from Last 3 Encounters:   08/31/22 165 lb (74.8 kg)   07/08/22 168 lb 3.2 oz (76.3 kg)   07/03/22 170 lb (77.1 kg)     BP Readings from Last 3 Encounters:   08/31/22 118/64   07/08/22 120/70   07/03/22 110/76     Physical Exam  Vitals and nursing note reviewed. Constitutional:       Appearance: Normal appearance. She is not ill-appearing.    HENT:      Head: Normocephalic and atraumatic. Eyes:      Extraocular Movements: Extraocular movements intact. Conjunctiva/sclera: Conjunctivae normal.   Cardiovascular:      Rate and Rhythm: Normal rate and regular rhythm. Pulmonary:      Effort: Pulmonary effort is normal.      Breath sounds: Normal breath sounds. Skin:     General: Skin is warm and dry. Neurological:      General: No focal deficit present. Mental Status: She is alert and oriented to person, place, and time. Mental status is at baseline.    Psychiatric:         Mood and Affect: Mood normal.         Behavior: Behavior normal.

## 2022-08-31 NOTE — RESULT ENCOUNTER NOTE
Additional mammographic imaging is indicated. Please let me know if you need any assistance with scheduling.       Keke Galan MD

## 2022-09-01 ENCOUNTER — TELEPHONE (OUTPATIENT)
Dept: MAMMOGRAPHY | Age: 77
End: 2022-09-01

## 2022-09-06 ENCOUNTER — TELEPHONE (OUTPATIENT)
Dept: CASE MANAGEMENT | Age: 77
End: 2022-09-06

## 2022-09-06 ENCOUNTER — CLINICAL DOCUMENTATION (OUTPATIENT)
Dept: MAMMOGRAPHY | Age: 77
End: 2022-09-06

## 2022-09-06 DIAGNOSIS — C50.911 MALIGNANT NEOPLASM OF RIGHT FEMALE BREAST, UNSPECIFIED ESTROGEN RECEPTOR STATUS, UNSPECIFIED SITE OF BREAST (HCC): Primary | ICD-10-CM

## 2022-09-06 SDOH — HEALTH STABILITY: MENTAL HEALTH
STRESS IS WHEN SOMEONE FEELS TENSE, NERVOUS, ANXIOUS, OR CAN'T SLEEP AT NIGHT BECAUSE THEIR MIND IS TROUBLED. HOW STRESSED ARE YOU?: NOT AT ALL

## 2022-09-06 SDOH — ECONOMIC STABILITY: INCOME INSECURITY: IN THE LAST 12 MONTHS, WAS THERE A TIME WHEN YOU WERE NOT ABLE TO PAY THE MORTGAGE OR RENT ON TIME?: NO

## 2022-09-06 SDOH — ECONOMIC STABILITY: FOOD INSECURITY: WITHIN THE PAST 12 MONTHS, YOU WORRIED THAT YOUR FOOD WOULD RUN OUT BEFORE YOU GOT MONEY TO BUY MORE.: NEVER TRUE

## 2022-09-06 SDOH — SOCIAL STABILITY: SOCIAL INSECURITY
WITHIN THE LAST YEAR, HAVE TO BEEN RAPED OR FORCED TO HAVE ANY KIND OF SEXUAL ACTIVITY BY YOUR PARTNER OR EX-PARTNER?: NO

## 2022-09-06 SDOH — SOCIAL STABILITY: SOCIAL NETWORK
DO YOU BELONG TO ANY CLUBS OR ORGANIZATIONS SUCH AS CHURCH GROUPS UNIONS, FRATERNAL OR ATHLETIC GROUPS, OR SCHOOL GROUPS?: NO

## 2022-09-06 SDOH — SOCIAL STABILITY: SOCIAL INSECURITY: WITHIN THE LAST YEAR, HAVE YOU BEEN HUMILIATED OR EMOTIONALLY ABUSED IN OTHER WAYS BY YOUR PARTNER OR EX-PARTNER?: NO

## 2022-09-06 SDOH — SOCIAL STABILITY: SOCIAL NETWORK: ARE YOU MARRIED, WIDOWED, DIVORCED, SEPARATED, NEVER MARRIED, OR LIVING WITH A PARTNER?: WIDOWED

## 2022-09-06 SDOH — ECONOMIC STABILITY: TRANSPORTATION INSECURITY
IN THE PAST 12 MONTHS, HAS THE LACK OF TRANSPORTATION KEPT YOU FROM MEDICAL APPOINTMENTS OR FROM GETTING MEDICATIONS?: NO

## 2022-09-06 SDOH — SOCIAL STABILITY: SOCIAL NETWORK: HOW OFTEN DO YOU ATTENT MEETINGS OF THE CLUB OR ORGANIZATION YOU BELONG TO?: NEVER

## 2022-09-06 SDOH — ECONOMIC STABILITY: TRANSPORTATION INSECURITY
IN THE PAST 12 MONTHS, HAS LACK OF TRANSPORTATION KEPT YOU FROM MEETINGS, WORK, OR FROM GETTING THINGS NEEDED FOR DAILY LIVING?: NO

## 2022-09-06 SDOH — ECONOMIC STABILITY: FOOD INSECURITY: WITHIN THE PAST 12 MONTHS, THE FOOD YOU BOUGHT JUST DIDN'T LAST AND YOU DIDN'T HAVE MONEY TO GET MORE.: NEVER TRUE

## 2022-09-06 SDOH — HEALTH STABILITY: MENTAL HEALTH: HOW OFTEN DO YOU HAVE A DRINK CONTAINING ALCOHOL?: NEVER

## 2022-09-06 SDOH — SOCIAL STABILITY: SOCIAL INSECURITY: WITHIN THE LAST YEAR, HAVE YOU BEEN AFRAID OF YOUR PARTNER OR EX-PARTNER?: NO

## 2022-09-06 SDOH — ECONOMIC STABILITY: HOUSING INSECURITY
IN THE LAST 12 MONTHS, WAS THERE A TIME WHEN YOU DID NOT HAVE A STEADY PLACE TO SLEEP OR SLEPT IN A SHELTER (INCLUDING NOW)?: NO

## 2022-09-06 SDOH — SOCIAL STABILITY: SOCIAL INSECURITY
WITHIN THE LAST YEAR, HAVE YOU BEEN KICKED, HIT, SLAPPED, OR OTHERWISE PHYSICALLY HURT BY YOUR PARTNER OR EX-PARTNER?: NO

## 2022-09-06 SDOH — ECONOMIC STABILITY: INCOME INSECURITY: HOW HARD IS IT FOR YOU TO PAY FOR THE VERY BASICS LIKE FOOD, HOUSING, MEDICAL CARE, AND HEATING?: NOT HARD AT ALL

## 2022-09-06 SDOH — HEALTH STABILITY: MENTAL HEALTH: HOW MANY STANDARD DRINKS CONTAINING ALCOHOL DO YOU HAVE ON A TYPICAL DAY?: PATIENT DOES NOT DRINK

## 2022-09-06 SDOH — SOCIAL STABILITY: SOCIAL NETWORK: HOW OFTEN DO YOU ATTEND CHURCH OR RELIGIOUS SERVICES?: NEVER

## 2022-09-06 ASSESSMENT — PATIENT HEALTH QUESTIONNAIRE - PHQ9
2. FEELING DOWN, DEPRESSED OR HOPELESS: 0
SUM OF ALL RESPONSES TO PHQ QUESTIONS 1-9: 0
SUM OF ALL RESPONSES TO PHQ9 QUESTIONS 1 & 2: 0
1. LITTLE INTEREST OR PLEASURE IN DOING THINGS: 0
SUM OF ALL RESPONSES TO PHQ QUESTIONS 1-9: 0

## 2022-09-06 NOTE — TELEPHONE ENCOUNTER
9/6/2022  Breast Navigation  intake complete for New Patient Breast Cancer. Reviewed role of navigation, gave contact information for navigators, discussed pathology report and  pending receptor status, reviewed upcoming appointments. Patient is retired from [employer] working in [employer]. Independent in self care no physical limitations. Barriers:  None noted. Lives alone. Verbal permission given to speak with Marcelo Angela her son. Family history of breast cancer:  2 sisters with breast cancer, patient with history of left breast cancer treated in 2001 with lumpectomy, radiation and hormone blocking pill  Type of cancer: Right breast IDC, ER/HI/Her 2 pending at this time  MRI   9/7  Social determinants of health and Depression screen complete. She has requested referral to our counselor. Referring Provider:  Dr Yesenia Alexis MD and Navigator to treatment team   Appointment with Oncology: Dr Alex Mireles 9/14  Appointment with Surgery: Dr Reji Tomlin 9/21  Breast Multidisciplinary Conference presentation date:  pending for 9/15  My Chart message to patient with navigation contact information and upcoming appointments. Routed note to referring provider regarding intake and upcoming appointments.

## 2022-09-06 NOTE — PROGRESS NOTES
Dr Fabiola Craft and Shilpa Patel (RT RM) spoke with patient regarding the results of her breast biopsy. Pathology: IDC. She had no post biopsy issues or concerns. She is scheduled for an MRI 9-7 @ 11:00. She is a breast cancer survivor and was quite worries. Dr Fabiola Craft prayed with her, gave her an information packet and will be contacted by oncology navigators for her next steps.

## 2022-09-07 ENCOUNTER — HOSPITAL ENCOUNTER (OUTPATIENT)
Dept: MRI IMAGING | Age: 77
Discharge: HOME | End: 2022-09-10
Payer: MEDICARE

## 2022-09-07 DIAGNOSIS — C50.911 INFILTRATING DUCTAL CARCINOMA OF BREAST, RIGHT (HCC): ICD-10-CM

## 2022-09-07 PROCEDURE — A9579 GAD-BASE MR CONTRAST NOS,1ML: HCPCS | Performed by: INTERNAL MEDICINE

## 2022-09-07 PROCEDURE — 6360000004 HC RX CONTRAST MEDICATION: Performed by: INTERNAL MEDICINE

## 2022-09-07 PROCEDURE — C8908 MRI W/O FOL W/CONT, BREAST,: HCPCS

## 2022-09-07 PROCEDURE — 2580000003 HC RX 258: Performed by: INTERNAL MEDICINE

## 2022-09-07 RX ORDER — SODIUM CHLORIDE 0.9 % (FLUSH) 0.9 %
30 SYRINGE (ML) INJECTION AS NEEDED
Status: DISCONTINUED | OUTPATIENT
Start: 2022-09-07 | End: 2022-09-11 | Stop reason: HOSPADM

## 2022-09-07 RX ADMIN — SODIUM CHLORIDE, PRESERVATIVE FREE 30 ML: 5 INJECTION INTRAVENOUS at 11:28

## 2022-09-07 RX ADMIN — GADOTERIDOL 15 ML: 279.3 INJECTION, SOLUTION INTRAVENOUS at 11:27

## 2022-09-12 NOTE — PROGRESS NOTES
cancer of 1.4 cm, no additional suspicious findings in either breast or no evidence of lymphadenopathy was noted. She has a surgical consult with Dr Khloe Conte on 9/21/22. A referral was placed to medical oncology for next steps in plan of care for her newly diagnosed right breast cancer. 8/17/22 Bilateral Breast Screening Mammogram  Impression   1. Right breast asymmetry. 2. Presumed skin fold on the right MLO view. A repeat MLO view would also    be   recommended when the patient returns     8/25/22 Right Breast Diagnostic Mammogram and Ultrasound  FINDINGS:        Mammogram: Digital diagnostic mammography was performed, and is    interpreted in   conjunction with a computer assisted detection (CAD) system. Additional right mediolateral and compression magnification views were   performed. In the slightly upper lateral breast at anterior-middle depth    (about   6 cm behind the nipple) there is a small focal asymmetry with associated    faint   indeterminate pleomorphic calcifications and subtle architectural changes. This   measures close to 1 cm but is not well defined. Further evaluation is   recommended by ultrasound. Elsewhere the right breast appears unchanged    from   older mammography. There are diffuse typically benign calcifications    elsewhere   without suspicious feature. The posterior upper outer quadrant benign    biopsy   clip is again noted. Ultrasound: Real time targeted sonography was performed of the right    lateral   breast carefully and extensively by the radiologist and sonographer. At    9:30   position there is a subcentimeter hypoechoic area which on careful    real-time   scanning could not be definitively reproduced. The surrounding lateral    tissues   otherwise demonstrate no concerning findings. Impression   Right lateral asymmetry without definite sonographic correlate,   indeterminate for malignancy. Recommend stereotactic biopsy.   Results and recommendations discussed in    full   with the patient at the time of interpretation, all questions were    answered and   she agrees. 22 Surgical Pathology  DIAGNOSIS        A:  \" RIGHT BREAST ASYMMETRY AT 9:30 POSITION, CORE BIOPSY\":         INFILTRATING DUCTAL CARCINOMA, LOW GRADE (WELL DIFFERENTIATED). DEFINITE IN SITU COMPONENT AND LYMPHOVASCULAR INVASION ARE NOT IDENTIFIED   Procedures/Addenda                     STF- ER/MI/[id number] BY IHC                          Interpretation                  [employer] IHC Quantitative Breast Panel Result: A1     TEST NAME:                                            RESULTS:                    INTERNAL CONTROLS:     ESTROGEN RECEPTOR:                     Positive (95%)             Present, positive   PROGESTERONE RECEPTOR:           Negative (0.0%)          Present, positive     HER-2/CHIQUITA:                                           Negative (1+)               Percentage of Cells with Uniform                                                                                                               Intense Complete Membrane                                                                                                               Stainin%  22 Bilateral Breast MRI  FINDINGS: The breasts demonstrate moderate glandularity and minimal    background   enhancement. Right breast at 9:30 centrally shows the biopsy clip within an irregular   heterogeneously enhancing 1.4 x 1.0 cm malignant mass. Mild curvilinear   enhancement adjacent to the biopsy tract is likely reactive, and there is    a   small hematoma. Scattered enhancing foci elsewhere in the right breast    have not   changed from older MRI exams. There is no other evidence of suspicious enhancing mass, and no dominant    or   unique nonmass enhancement, to suggest additional malignancy in either    breast.   There is no evidence of axillary or internal mammary lymphadenopathy. Multiple   small right axillary lymph nodes have not changed from older examinations. Elsewhere, limited visualization of the partially included thorax and    upper   abdomen shows no acute abnormality. Impression       1. Right 9:30 breast cancer measures up to 1.4 cm. 2. No additional suspicious finding elsewhere in either breast. No    evidence of   lymphadenopathy. History  11/8/2011 Pathology  DIAGNOSIS  RIGHT BREAST MASS, 10:00 POSITION, CORE BIOPSY: FIBROCYSTIC MASTOPATHY  WITH APOCRINE METAPLASIA.     Notes from Referring Provider: n/a    Other Pertinent Information: n/a    Presented at Tumor Board: Pending for presentation on 9/15/22

## 2022-09-14 ENCOUNTER — OFFICE VISIT (OUTPATIENT)
Dept: ONCOLOGY | Age: 77
End: 2022-09-14
Payer: MEDICARE

## 2022-09-14 ENCOUNTER — TELEPHONE (OUTPATIENT)
Dept: ONCOLOGY | Age: 77
End: 2022-09-14

## 2022-09-14 VITALS
OXYGEN SATURATION: 100 % | TEMPERATURE: 97.7 F | HEART RATE: 58 BPM | WEIGHT: 163.6 LBS | RESPIRATION RATE: 16 BRPM | HEIGHT: 62 IN | BODY MASS INDEX: 30.11 KG/M2 | DIASTOLIC BLOOD PRESSURE: 83 MMHG | SYSTOLIC BLOOD PRESSURE: 142 MMHG

## 2022-09-14 DIAGNOSIS — Z17.0 MALIGNANT NEOPLASM OF RIGHT BREAST IN FEMALE, ESTROGEN RECEPTOR POSITIVE, UNSPECIFIED SITE OF BREAST (HCC): Primary | ICD-10-CM

## 2022-09-14 DIAGNOSIS — C50.911 MALIGNANT NEOPLASM OF RIGHT BREAST IN FEMALE, ESTROGEN RECEPTOR POSITIVE, UNSPECIFIED SITE OF BREAST (HCC): Primary | ICD-10-CM

## 2022-09-14 PROCEDURE — 1123F ACP DISCUSS/DSCN MKR DOCD: CPT | Performed by: INTERNAL MEDICINE

## 2022-09-14 PROCEDURE — 99205 OFFICE O/P NEW HI 60 MIN: CPT | Performed by: INTERNAL MEDICINE

## 2022-09-14 ASSESSMENT — PATIENT HEALTH QUESTIONNAIRE - PHQ9
SUM OF ALL RESPONSES TO PHQ9 QUESTIONS 1 & 2: 0
SUM OF ALL RESPONSES TO PHQ QUESTIONS 1-9: 0
2. FEELING DOWN, DEPRESSED OR HOPELESS: 0
SUM OF ALL RESPONSES TO PHQ QUESTIONS 1-9: 0
SUM OF ALL RESPONSES TO PHQ QUESTIONS 1-9: 0
1. LITTLE INTEREST OR PLEASURE IN DOING THINGS: 0
SUM OF ALL RESPONSES TO PHQ QUESTIONS 1-9: 0

## 2022-09-14 NOTE — PATIENT INSTRUCTIONS
Patient Instructions from Today's Visit    Reason for Visit:  New Patient, Breast Cancer    Diagnosis Information:  [email or web address]      Plan:  -While the surgical decision is between you and the breast surgeon; Lumpectomy and mastectomy are two viable options. The hope is that you will only need oral medication after surgery.  -we are more than happy to refer you to our radiation oncologist here at the [facility]. You can at least discussion the need and different treatments available to you.   -while there is not diet that will help the cancer, we always suggest a heart healthy diet, with exercise as tolerated. This will help you recover from the surgery. Follow Up: You are scheduled to meet the surgeon on 9/21/22 at 3:40pm.  We will likely see you back after your surgery, to discuss pathology and subsequent treatment    Recent Lab Results:  No labs today. Treatment Summary has been discussed and given to patient: n/a        -------------------------------------------------------------------------------------------------------------------  Please call our office at [phone] if you have any  of the following symptoms:   Fever of 100.5 or greater  Chills  Shortness of breath  Swelling or pain in one leg    After office hours an answering service is available and will contact a provider for emergencies or if you are experiencing any of the above symptoms. Patient does express an interest in My Chart. My Chart log in information explained on the after visit summary printout at the [facility] [address] desk.     AV oRd

## 2022-09-14 NOTE — PROGRESS NOTES
[facility] Hematology and Oncology: Office Visit New Patient H & P    Chief Complaint:    Chief Complaint   Patient presents with    New Patient         History of Present Illness:  Ms. Taiwo Singleton is a 65 y.o. female who presents today for evaluation regarding breast cancer. She had a left breast cancer in 2001 for which she had a lumpectomy and radiation, she also took endocrine therapy of unknown type and duration. In August 2022 she had her routine screening mammogram that reported a right breast asymmetry. On 8/25/22 she had a right breast diagnostic mammogram and ultrasound which confirmed the findings and on 8/31/22 she underwent a core needle biopsy of the right breast mass. The pathology reported infiltrating ductal carcinoma, low grade, ER 95%, NH 0% and HER2 1+. On 9/7/22 she had a bilateral breast MRI that reported the known right breast cancer of 1.4 cm, no additional suspicious findings in either breast or no evidence of lymphadenopathy was noted. She has a surgical consult with Dr Preston Garcia on 9/21/22. A referral was placed to medical oncology for next steps in plan of care for her newly diagnosed right breast cancer. Review of Systems:  Constitutional: Negative. HENT: Negative. Eyes: Negative. Respiratory: Negative. Cardiovascular: Negative. Gastrointestinal: Negative. Genitourinary: Negative. Musculoskeletal: Negative. Skin: Negative. Neurological: Negative. Endo/Heme/Allergies: Negative. Psychiatric/Behavioral: Negative. All other systems reviewed and are negative.      Allergies   Allergen Reactions    Milk Protein Other (See Comments)     Increased mucus production     Past Medical History:   Diagnosis Date    Abdominal pain     Arthritis     Breast cancer ([address]     Breast cancer, left ([address] 2001    Breast pain in female     Bunion     Chronic pain of left knee     Corns and callosities     DDD (degenerative disc disease), lumbar     Diabetes mellitus type II, controlled ([address] Emotional disorder     Fungal infection of toenail     Hallux valgus     Hip pain, bilateral     Hyperglycemia     Hyperlipidemia     Hypertension     medication    Hypothyroidism     Lower GI bleed     Obesity     Other ill-defined conditions(799.89)     pt reports urinalysis shows blood - she is scheduled for CT pelvis and abdomen in August)    Postmenopausal     Right median nerve neuropathy     Stress incontinence     Traumatic arthropathy of knee      Past Surgical History:   Procedure Laterality Date    BREAST BIOPSY Right 2011, Stereo Core Bx 8-31-22    benign    BREAST LUMPECTOMY Left 2001    radiation for CA    COLONOSCOPY  2008    HYSTERECTOMY (CERVIX STATUS UNKNOWN)      VERN STEROTACTIC LOC BREAST BIOPSY RIGHT Right 8/31/2022    VERN STEROTACTIC LOC BREAST BIOPSY RIGHT 8/31/2022 Wilfredo Berry MD [facility]    ORTHOPEDIC SURGERY Left     ankle arthroscopy    ORTHOPEDIC SURGERY      left knee repair    ORTHOPEDIC SURGERY      right ankle    ORTHOPEDIC SURGERY  2005    Left TSA    SHOULDER ARTHROPLASTY Left     TUBAL LIGATION      UROLOGICAL SURGERY      cystoscopy    US GUIDED CORE BREAST BIOPSY Right      Family History   Problem Relation Age of Onset    Dementia Mother     Diabetes Mother     Cancer Mother 76        Colon    Diabetes Father     Breast Cancer Sister 78    Breast Cancer Sister 77    Hypertension Maternal Grandmother     Cancer Other         bone CA    Hypertension Other     Diabetes Other     Post-op Cognitive Dysfunction Neg Hx     Malig Hypertherm Neg Hx     Pseudochol.  Deficiency Neg Hx     Delayed Awakening Neg Hx     Post-op Nausea/Vomiting Neg Hx     Emergence Delirium Neg Hx     Other Neg Hx      Social History     Socioeconomic History    Marital status: Single     Spouse name: Not on file    Number of children: Not on file    Years of education: Not on file    Highest education level: Not on file   Occupational History    Not on file   Tobacco Use    Smoking status: Former     Packs/day: 0.25     Types: Cigarettes     Quit date: 2001     Years since quittin.1    Smokeless tobacco: Never   Vaping Use    Vaping Use: Never used   Substance and Sexual Activity    Alcohol use: No    Drug use: No    Sexual activity: Not on file   Other Topics Concern    Not on file   Social History Narrative    Not on file     Social Determinants of Health     Financial Resource Strain: Low Risk     Difficulty of Paying Living Expenses: Not hard at all   Food Insecurity: No Food Insecurity    Worried About [address] in the Last Year: Never true    [address] [employer] in the Last Year: Never true   Transportation Needs: No Transportation Needs    Lack of Transportation (Medical): No    Lack of Transportation (Non-Medical): No   Physical Activity: Not on file   Stress: No Stress Concern Present    Feeling of Stress : Not at all   Social Connections: Unknown    Frequency of Communication with Friends and Family: Not on file    Frequency of Social Gatherings with Friends and Family: Not on file    Attends Gnosticism Services: Never    Active Member of Clubs or Organizations: No    Attends Club or Organization Meetings: Never    Marital Status:     Intimate Partner Violence: Not At Risk    Fear of Current or Ex-Partner: No    Emotionally Abused: No    Physically Abused: No    Sexually Abused: No   Housing Stability: Unknown    Unable to Pay for Housing in the Last Year: No    Number of Places Lived in the Last Year: Not on file    Unstable Housing in the Last Year: No     Current Outpatient Medications   Medication Sig Dispense Refill    atorvastatin (LIPITOR) 40 MG tablet Take 1 tablet by mouth daily 90 tablet 3    irbesartan (AVAPRO) 150 MG tablet Take 1 tablet by mouth daily 90 tablet 3    levothyroxine (SYNTHROID) 75 MCG tablet Take 1 tablet by mouth Daily TK 1 T PO D Indications: a condition with low thyroid hormone levels 90 tablet 3    atenolol-chlorthalidone (TENORETIC) 50-25 MG per tablet Take 0.5 tablets by mouth daily      Cholecalciferol 50 MCG (2000 UT) TABS Take by mouth      clotrimazole-betamethasone (LOTRISONE) 1-0.05 % cream Apply topically 2 times daily      coenzyme Q10 100 MG CAPS capsule Take 100 mg by mouth daily      hydrocortisone (ANUSOL-HC) 25 MG suppository Place 25 mg rectally in the morning and 25 mg in the evening. hydrocortisone 2.5 % cream Place rectally 4 times daily (Patient not taking: No sig reported)       No current facility-administered medications for this visit. OBJECTIVE:  BP (!) 142/83   Pulse 58   Temp 97.7 °F (36.5 °C) (Oral)   Resp 16   Ht 5' 2\" (1.575 m)   Wt 163 lb 9.6 oz (74.2 kg)   SpO2 100%   BMI 29.92 kg/m²     Physical Exam:  Constitutional: Well developed, well nourished female in no acute distress, sitting comfortably on the examination table. HEENT: Normocephalic and atraumatic. Oropharynx is clear, mucous membranes are moist.  Pupils are equal, round, and reactive to light. Extraocular muscles are intact. Sclerae anicteric. Neck supple without JVD. No thyromegaly present. Lymph node   No palpable submandibular, cervical, supraclavicular, axillary or inguinal lymph nodes. Skin Warm and dry. No bruising and no rash noted. No erythema. No pallor. Respiratory Lungs are clear to auscultation bilaterally without wheezes, rales or rhonchi, normal air exchange without accessory muscle use. CVS Normal rate, regular rhythm and normal S1 and S2. No murmurs, gallops, or rubs. Abdomen Soft, nontender and nondistended, normoactive bowel sounds. No palpable mass. No hepatosplenomegaly. Neuro Grossly nonfocal with no obvious sensory or motor deficits. MSK Normal range of motion in general.  No edema and no tenderness. Psych Appropriate mood and affect. Labs:  No results found for this or any previous visit (from the past 24 hour(s)).     Imaging:  [facility] BREAST SPECIMEN    Result Date: sister had breast cancer. COMPARISON: Ultrasound and mammography 8/25/2022, 8/31/2022, 8/17/2022 as well as previous MRI breast 10/28/2013, 10/28/2011, 9/8/2009. TECHNIQUE: Standard MRI sequences were obtained through the breasts in multiple planes. Images were obtained before and after intravenous infusion of 15 mL of Prohance contrast. Images were reviewed with PACS and with Resermap CAD software. FINDINGS: The breasts demonstrate moderate glandularity and minimal background enhancement. Right breast at 9:30 centrally shows the biopsy clip within an irregular heterogeneously enhancing 1.4 x 1.0 cm malignant mass. Mild curvilinear enhancement adjacent to the biopsy tract is likely reactive, and there is a small hematoma. Scattered enhancing foci elsewhere in the right breast have not changed from older MRI exams. There is no other evidence of suspicious enhancing mass, and no dominant or unique nonmass enhancement, to suggest additional malignancy in either breast. There is no evidence of axillary or internal mammary lymphadenopathy. Multiple small right axillary lymph nodes have not changed from older examinations. Elsewhere, limited visualization of the partially included thorax and upper abdomen shows no acute abnormality. 1. Right 9:30 breast cancer measures up to 1.4 cm. 2. No additional suspicious finding elsewhere in either breast. No evidence of lymphadenopathy. Recommend continued malignancy management as directed clinically. BI-RADS Assessment Category 6: Known Biopsy Proven Malignancy     VERN DIGITAL SCREEN W OR WO CAD BILATERAL    Addendum Date: 8/25/2022    Addendum: Addendum by Dr. Elizabeth Patel on the report originally dictated by Dr. France Bradley. This patient returned on 8/25/2022 for the free technical repeat right MLO view. The skinfold in the lower central right breast resolves on that image.  Please see diagnostic mammogram and ultrasound report of 8/25/2022 for additional evaluation of the asymmetry. Result Date: 8/25/2022  STUDY:  Bilateral digital screening mammogram with CAD INDICATION:  Screening, history of left breast carcinoma status post lumpectomy in 2001. COMPARISON:  08/16/2021, 08/13/2020   BREAST DENSITY: There are scattered areas of fibroglandular density. (#BDB) FINDINGS: Bilateral digital mammography was performed, and is interpreted in conjunction with a computer assisted detection (CAD) system. There is an asymmetry within the slightly lateral right breast anterior depth. There is also a presumed skin fold on the right MLO view. Spot compression views in the CC and MLO projection as well as a full 90 degree mediolateral and repeat right MLO views are recommended for further evaluation. If this finding persists an ultrasound should be considered. There are no suspicious clustered microcalcifications. 1. Right breast asymmetry. 2. Presumed skin fold on the right MLO view. A repeat MLO view would also be recommended when the patient returns. BI-RADS Assessment Category 0: Incomplete: Needs additional imaging evaluation. We will attempt to contact the patient and arrange for this additional imaging. A reminder letter will be sent to the patient at the appropriate time pending additional views. VERN DIGITAL DIAGNOSTIC W OR WO CAD RIGHT    Result Date: 8/25/2022  DIAGNOSTIC DIGITAL RIGHT MAMMOGRAPHY AND RIGHT BREAST ULTRASOUND CLINICAL INDICATION: Additional evaluation of right lateral focal asymmetry. Prior contralateral malignant lumpectomy and radiation greater than 10 years ago, and prior right benign biopsy in 2011 demonstrating 10:00 fibrocystic changes. COMPARISON: 8/17/2022, others back to 10/12/2010 FINDINGS: Mammogram: Digital diagnostic mammography was performed, and is interpreted in conjunction with a computer assisted detection (CAD) system. Additional right mediolateral and compression magnification views were performed.  In the slightly upper lateral breast at anterior-middle depth (about 6 cm behind the nipple) there is a small focal asymmetry with associated faint indeterminate pleomorphic calcifications and subtle architectural changes. This measures close to 1 cm but is not well defined. Further evaluation is recommended by ultrasound. Elsewhere the right breast appears unchanged from older mammography. There are diffuse typically benign calcifications elsewhere without suspicious feature. The posterior upper outer quadrant benign biopsy clip is again noted. Ultrasound: Real time targeted sonography was performed of the right lateral breast carefully and extensively by the radiologist and sonographer. At 9:30 position there is a subcentimeter hypoechoic area which on careful real-time scanning could not be definitively reproduced. The surrounding lateral tissues otherwise demonstrate no concerning findings. Right lateral asymmetry without definite sonographic correlate, indeterminate for malignancy. Recommend stereotactic biopsy. Results and recommendations discussed in full with the patient at the time of interpretation, all questions were answered and she agrees. BI-RADS Assessment Category 4: Suspicious Finding- Biopsy should be considered. VERN STEREO BREAST BX W LOC DEVICE 1ST LESION RIGHT    Result Date: 8/31/2022  Stereotactic Biopsy right Breast, Surgical Specimen Radiograph, Postbiopsy Mammogram, 08/31/2022 HISTORY: Focal asymmetry with possible architectural distortion and microcalcifications at the 9:30 position right breast. STEREOTACTIC BIOPSY: The patient was explained the procedure and informed consent was obtained. The patient was then positioned on the prone Hologic stereotactic biopsy table. The right breast was placed in CC compression. The asymmetry was localized within the stereotactic window from a superior approach. The skin was cleansed and local anesthetic applied.  A 9-gauge Brevera needle was placed into the right breast. 5 core samples were obtained from about the periphery of the needle. The samples were imaged while in the Mammography Suite. The patient tolerated the procedure well. There were no immediate complications. SPECIMEN RADIOGRAPH: The specimen radiograph reveals scattered calcifications. The samples are placed into a formalin container and sent to the lab for analysis. A biopsy clip was then placed into the small created biopsy cavity. POSTBIOPSY MAMMOGRAM: CC and ML views of the right breast were obtained. There are expected postprocedural changes at the 9:30 position right breast. The clip is in satisfactory position. No definite residual microcalcifications are present, however, these could be obscured due to postprocedural change. Stereotactic biopsy of the asymmetry with associated architectural distortion and microcalcifications at the 9:30 position right breast with histologic results pending. US BREAST LIMITED RIGHT    Result Date: 8/25/2022  DIAGNOSTIC DIGITAL RIGHT MAMMOGRAPHY AND RIGHT BREAST ULTRASOUND CLINICAL INDICATION: Additional evaluation of right lateral focal asymmetry. Prior contralateral malignant lumpectomy and radiation greater than 10 years ago, and prior right benign biopsy in 2011 demonstrating 10:00 fibrocystic changes. COMPARISON: 8/17/2022, others back to 10/12/2010 FINDINGS: Mammogram: Digital diagnostic mammography was performed, and is interpreted in conjunction with a computer assisted detection (CAD) system. Additional right mediolateral and compression magnification views were performed. In the slightly upper lateral breast at anterior-middle depth (about 6 cm behind the nipple) there is a small focal asymmetry with associated faint indeterminate pleomorphic calcifications and subtle architectural changes. This measures close to 1 cm but is not well defined. Further evaluation is recommended by ultrasound. Elsewhere the right breast appears unchanged from older mammography.  There was then placed into the small created biopsy cavity. POSTBIOPSY MAMMOGRAM: CC and ML views of the right breast were obtained. There are expected postprocedural changes at the 9:30 position right breast. The clip is in satisfactory position. No definite residual microcalcifications are present, however, these could be obscured due to postprocedural change. Stereotactic biopsy of the asymmetry with associated architectural distortion and microcalcifications at the 9:30 position right breast with histologic results pending. ASSESSMENT:   Diagnosis Orders   1. Malignant neoplasm of right breast in female, estrogen receptor positive, unspecified site of breast [facility])          Patient Active Problem List   Diagnosis    History of breast cancer    Arthritis    Spondylosis of lumbosacral region without myelopathy or radiculopathy    Chronic pain of left knee    Hyperlipidemia    Anxiety associated with depression    Right median nerve neuropathy    Refused varicella vaccine    Hypertension    Abdominal wall hernia    Hip pain, bilateral    Adenomatous polyp of colon    Stress incontinence    Agatston coronary artery calcium score greater than 400    Controlled type 2 diabetes mellitus with microalbuminuria, without long-term current use of insulin (HCC)    Anxiety    Hypothyroidism    Chronic renal disease, stage III ([facility] [address] [facility]           PLAN:  Lab studies and imaging studies were personally reviewed. Pertinent old records were reviewed. Breast cancer: 1.4 cm, low grade, ER 95% and [place]/HER2 negative, right breast.  History of left breast cancer that was treated with lumpectomy/XRT/endocrine therapy in 2001. I discussed the pathophysiology and natural history of breast cancer with Ms. Andrew Chua. The usual treatment modalities employed for breast cancer include surgery, chemotherapy, radiation, or endocrine therapy in some combination.   She is seeing Dr. Mariana Perez on 9/21 and is interested in lumpectomy, this is appropriate. She inquires about sentinel node biopsy and I will defer to her discussion with surgery but I would support either biopsy or deferral per her preference. After surgery, we will discuss adjuvant therapy, she will definitely benefit from endocrine therapy but she is quite averse to the possibility of chemotherapy, so we will likely hold off on gene recurrence testing as it is unlikely to change our management. Radiation would also be recommended but optional because of her age >74 and planned endocrine therapy. We will discuss more after surgery. All questions were asked and answered to the best of my ability. In all, I spent 60 minutes in the care of Ms. Cheryl Cisneros today, over 50% of which was in direct counseling and coordination of care.           Kat Stratton MD, MD  [facility]ematology [facility] On[facility]  [address] [place] [address]  Office : [phone]  Fax : [phone]

## 2022-09-15 ENCOUNTER — OFFICE VISIT (OUTPATIENT)
Dept: ONCOLOGY | Age: 77
End: 2022-09-15
Payer: MEDICAID

## 2022-09-15 ENCOUNTER — CLINICAL DOCUMENTATION (OUTPATIENT)
Dept: CASE MANAGEMENT | Age: 77
End: 2022-09-15

## 2022-09-15 DIAGNOSIS — C50.911 MALIGNANT NEOPLASM OF RIGHT BREAST IN FEMALE, ESTROGEN RECEPTOR POSITIVE, UNSPECIFIED SITE OF BREAST (HCC): Primary | ICD-10-CM

## 2022-09-15 DIAGNOSIS — Z17.0 MALIGNANT NEOPLASM OF RIGHT BREAST IN FEMALE, ESTROGEN RECEPTOR POSITIVE, UNSPECIFIED SITE OF BREAST (HCC): Primary | ICD-10-CM

## 2022-09-15 DIAGNOSIS — Z91.89 ADJUSTMENT TO LIFE THREATENING ILLNESS: ICD-10-CM

## 2022-09-15 PROCEDURE — 90791 PSYCH DIAGNOSTIC EVALUATION: CPT | Performed by: SOCIAL WORKER

## 2022-09-15 PROCEDURE — 1123F ACP DISCUSS/DSCN MKR DOCD: CPT | Performed by: SOCIAL WORKER
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
## 2022-09-15 NOTE — PATIENT INSTRUCTIONS
To reschedule your appointment, please call [phone].    In case of emergency, please, call 911 or [email or web address] in [place], [place].

## 2022-09-19 ENCOUNTER — OFFICE VISIT (OUTPATIENT)
Dept: INTERNAL MEDICINE CLINIC | Facility: CLINIC | Age: 77
End: 2022-09-19
Payer: MEDICARE

## 2022-09-19 VITALS
TEMPERATURE: 97.7 F | BODY MASS INDEX: 30.18 KG/M2 | OXYGEN SATURATION: 100 % | HEART RATE: 74 BPM | WEIGHT: 164 LBS | HEIGHT: 62 IN | DIASTOLIC BLOOD PRESSURE: 76 MMHG | SYSTOLIC BLOOD PRESSURE: 124 MMHG

## 2022-09-19 DIAGNOSIS — E03.9 ACQUIRED HYPOTHYROIDISM: ICD-10-CM

## 2022-09-19 DIAGNOSIS — R80.9 CONTROLLED TYPE 2 DIABETES MELLITUS WITH MICROALBUMINURIA, WITHOUT LONG-TERM CURRENT USE OF INSULIN (HCC): Primary | ICD-10-CM

## 2022-09-19 DIAGNOSIS — I10 ESSENTIAL (PRIMARY) HYPERTENSION: ICD-10-CM

## 2022-09-19 DIAGNOSIS — C50.911 INFILTRATING DUCTAL CARCINOMA OF RIGHT BREAST (HCC): ICD-10-CM

## 2022-09-19 DIAGNOSIS — G57.93 NEUROPATHY OF BOTH FEET: ICD-10-CM

## 2022-09-19 DIAGNOSIS — E11.29 CONTROLLED TYPE 2 DIABETES MELLITUS WITH MICROALBUMINURIA, WITHOUT LONG-TERM CURRENT USE OF INSULIN (HCC): Primary | ICD-10-CM

## 2022-09-19 DIAGNOSIS — E78.5 HYPERLIPIDEMIA LDL GOAL <100: ICD-10-CM

## 2022-09-19 PROCEDURE — 3044F HG A1C LEVEL LT 7.0%: CPT | Performed by: INTERNAL MEDICINE

## 2022-09-19 PROCEDURE — 1123F ACP DISCUSS/DSCN MKR DOCD: CPT | Performed by: INTERNAL MEDICINE

## 2022-09-19 PROCEDURE — 99214 OFFICE O/P EST MOD 30 MIN: CPT | Performed by: INTERNAL MEDICINE

## 2022-09-19 RX ORDER — LEVOTHYROXINE SODIUM 0.07 MG/1
75 TABLET ORAL DAILY
Qty: 90 TABLET | Refills: 3 | Status: SHIPPED | OUTPATIENT
Start: 2022-09-19

## 2022-09-19 RX ORDER — IRBESARTAN 150 MG/1
150 TABLET ORAL DAILY
Qty: 90 TABLET | Refills: 3 | Status: SHIPPED | OUTPATIENT
Start: 2022-09-19

## 2022-09-19 RX ORDER — ATENOLOL AND CHLORTHALIDONE TABLET 50; 25 MG/1; MG/1
0.5 TABLET ORAL DAILY
Qty: 90 TABLET | Refills: 3 | Status: SHIPPED | OUTPATIENT
Start: 2022-09-19

## 2022-09-19 RX ORDER — ATORVASTATIN CALCIUM 40 MG/1
40 TABLET, FILM COATED ORAL DAILY
Qty: 90 TABLET | Refills: 3 | Status: SHIPPED | OUTPATIENT
Start: 2022-09-19

## 2022-09-19 ASSESSMENT — ENCOUNTER SYMPTOMS
CHEST TIGHTNESS: 0
SHORTNESS OF BREATH: 0
ABDOMINAL PAIN: 0
NAUSEA: 0
VOMITING: 0

## 2022-09-19 NOTE — ASSESSMENT & PLAN NOTE
Vaccine Information Statement(s) was given today. This has been reviewed, questions answered, and verbal consent given by Patient for injection(s) and administration of Influenza (Inactivated).             Well-controlled, continue current medications and GFR greater than 60

## 2022-09-19 NOTE — PROGRESS NOTES
ASSESSMENT/PLAN:    1. Controlled type 2 diabetes mellitus with microalbuminuria, without long-term current use of insulin ([facility] [address]  See media file for foot exam Rx faxed and additional Rx with documentation   Tinea pedis. Continue topical antifungal prescribed by podiatrist.  Warm soaks may help prior to application. -  DIABETES FOOT EXAM  - Diabetic Shoe MISC; by Does not apply route Diabetic shoe, custom orthoses, custom prosthetic filler, DM2, neuropathy  Dispense: 1 each; Refill: 0    2. Infiltrating ductal carcinoma of right breast [facility])  Surgical consultation scheduled. Oncology notes reviewed. MRI reviewed. Support provided. 3. Hyperlipidemia LDL goal <100  Medication refilled      - atorvastatin (LIPITOR) 40 MG tablet; Take 1 tablet by mouth daily  Dispense: 90 tablet; Refill: 3    4. Essential (primary) hypertension  Medication refilled      - irbesartan (AVAPRO) 150 MG tablet; Take 1 tablet by mouth daily  Dispense: 90 tablet; Refill: 3  - atenolol-chlorthalidone (TENORETIC) 50-25 MG per tablet; Take 0.5 tablets by mouth daily  Dispense: 90 tablet; Refill: 3    5. Acquired hypothyroidism  Medication refilled      - levothyroxine (SYNTHROID) 75 MCG tablet; Take 1 tablet by mouth Daily TK 1 T PO D Indications: a condition with low thyroid hormone levels  Dispense: 90 tablet; Refill: 3    6. Neuropathy of both feet     - Diabetic Shoe MISC; by Does not apply route Diabetic shoe, custom orthoses, custom prosthetic filler, DM2, neuropathy  Dispense: 1 each; Refill: 0        Evaluation and management of the chronic condition(s) delineated. No negative side effects reported. I have reviewed all the lab results. There are some abnormalities that are either expected or not critical to the patient's health, and are discussed in the office today and are addressed. Please refer to the above assessement and plan narrative and orders and follow up plan. Medication discussed and refilled as needed. Physical exam findings are stable unless otherwise indicated and this is addressed. The most recent lab work and imaging and consultant/urgent care visits and imaging are reviewed and discussed and considered during this visit encounter. 1. Controlled type 2 diabetes mellitus with microalbuminuria, without long-term current use of insulin ([facility]  -      DIABETES FOOT EXAM  -     Diabetic Shoe MISC; Starting Mon 2022, Disp-1 each, R-0, PrintDiabetic shoe, custom orthoses, custom prosthetic filler, DM2, neuropathy  2. Infiltrating ductal carcinoma of right breast ([facility] [address]  Comments:  low grade, ER 95%, AL 0% and HER2 1+. 3. Hyperlipidemia LDL goal <100  -     atorvastatin (LIPITOR) 40 MG tablet; Take 1 tablet by mouth daily, Disp-90 tablet, R-3Normal  4. Essential (primary) hypertension  -     irbesartan (AVAPRO) 150 MG tablet; Take 1 tablet by mouth daily, Disp-90 tablet, R-3Normal  -     atenolol-chlorthalidone (TENORETIC) 50-25 MG per tablet; Take 0.5 tablets by mouth daily, Disp-90 tablet, R-3Normal  5. Acquired hypothyroidism  -     levothyroxine (SYNTHROID) 75 MCG tablet; Take 1 tablet by mouth Daily TK 1 T PO D Indications: a condition with low thyroid hormone levels, Disp-90 tablet, R-3Normal  6. Neuropathy of both feet  -     Diabetic Shoe MISC; Starting Mon 2022, Disp-1 each, R-0, PrintDiabetic shoe, custom orthoses, custom prosthetic filler, DM2, neuropathy           On this date, 22, I have spent 39 minutes reviewing previous notes, test results and face to face with the patient discussing the diagnosis and importance of compliance with the treatment plan as well as documenting on the day of the visit. An electronic signature was used to authenticate this note. -- Jeff Gutierrez MD     Return in about 3 months (around 2022), or if symptoms worsen or fail to improve.     SUBJECTIVE/OBJECTIVE:      HPI:   Zulma Willams (:  is a 65 y.o. female, here for evaluation of the following chief complaint(s):   Chief Complaint   Patient presents with    Diabetes     6 month recheck    Hypertension    Discuss Labs       Patient is here for follow-up and management of chronic medical conditions, review of recent labs, review of any imaging completed since our last office visit and discuss any consultants opinions or management changes. Treatment Adherence:   Medication compliance:  compliant most of the time  Diet compliance:  compliant most of the time  Weight trend: stable  Current exercise: no regular exercise and active with ADLs  Barriers: financial, lack of support, and stress    Diabetes Mellitus Type 2: Current symptoms/problems include  mild neuropathy in feet and needs Rx for diabetic shoes . Home blood sugar records: trend: stable  Any episodes of hypoglycemia? no  Eye exam current (within one year): yes  Tobacco history: She  reports that she quit smoking about 21 years ago. Her smoking use included cigarettes. She smoked an average of .25 packs per day. She has never used smokeless tobacco.   Daily Aspirin? No: not currently    Hypertension:  Home blood pressure monitoring: Yes - . She is adherent to a low sodium diet. Patient denies chest pain, shortness of breath, headache, peripheral edema, and dry cough. Antihypertensive medication side effects: no medication side effects noted. Use of agents associated with hypertension: . Hyperlipidemia:  No new myalgias or GI upset on atorvastatin (Lipitor).        Lab Results   Component Value Date    LABA1C 6.8 (H) 08/25/2022    LABA1C 6.6 (H) 02/22/2022    LABA1C 6.7 (H) 08/18/2021     Lab Results   Component Value Date    CREATININE 1.10 (H) 08/25/2022     Lab Results   Component Value Date    ALT 21 08/25/2022    AST 16 08/25/2022     Lab Results   Component Value Date    CHOL 132 08/25/2022    TRIG 67 08/25/2022    HDL 50 08/25/2022    LDLCALC 68.6 08/25/2022        Breast Cancer  She had a left breast cancer in 2001 for which she had a lumpectomy and radiation, she also took endocrine therapy of unknown type and duration. In August 2022 she had her routine screening mammogram that reported a right breast asymmetry. On 8/25/22 she had a right breast diagnostic mammogram and ultrasound which confirmed the findings and on 8/31/22 she underwent a core needle biopsy of the right breast mass. The pathology reported infiltrating ductal carcinoma, low grade, ER 95%, IA 0% and HER2 1+. On 9/7/22 she had a bilateral breast MRI that reported the known right breast cancer of 1.4 cm, no additional suspicious findings in either breast or no evidence of lymphadenopathy was noted. She has a surgical consult with Dr Chloe Mccormick on 9/21/22. 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 Chronic anxiety. Has seen [facility]. Declines SSRI. CBT therapy. Recent covid uri. Resolved. She had some dehydration that improved with hydration. She was seen at ER. Labs reviewed and discussed and medication refilled as needed for chronic medications during ov or adjusted based on lab results and/or our discussion as appropriate. See discussion. The patient's available records and electronic chart records are reviewed. The PMH, PSH, medications, allergies, medications, FH, health maintenance and vaccination status are all reviewed and updated as appropriate. Records from outside providers have been reviewed, summarized, and considered as noted in the history of present illness, past medical history, and objective data of this note and encounter.           Health Maintenance   Topic Date Due    Shingles vaccine (1 of 2) Never done    COVID-19 Vaccine (4 - Booster for Pfizer series) 03/05/2022    Flu vaccine (1) 09/01/2022    DEXA (modify frequency per FRAX score)  02/28/2023 (Originally [birth year]/1999)    A1C test (Diabetic or Prediabetic)  02/25/2023    Annual Wellness Visit (AWV)  03/01/2023 Lipids  08/25/2023    Depression Monitoring  09/14/2023    Diabetic retinal exam  09/16/2023    Diabetic foot exam  09/19/2023    DTaP/Tdap/Td vaccine (2 - Td or Tdap) 03/31/2027    Pneumococcal 65+ years Vaccine  Completed    Hepatitis A vaccine  Aged Out    Hib vaccine  Aged Out    Meningococcal (ACWY) vaccine  Aged Out    Hepatitis C screen  Discontinued     Patient Active Problem List   Diagnosis    History of breast cancer    Arthritis    Spondylosis of lumbosacral region without myelopathy or radiculopathy    Chronic pain of left knee    Hyperlipidemia    Anxiety associated with depression    Right median nerve neuropathy    Refused varicella vaccine    Hypertension    Abdominal wall hernia    Hip pain, bilateral    Adenomatous polyp of colon    Stress incontinence    Agatston coronary artery calcium score greater than 400    Controlled type 2 diabetes mellitus with microalbuminuria, without long-term current use of insulin (HCC)    Anxiety    Hypothyroidism    Infiltrating ductal carcinoma of right breast ([facility] [address]       Review of Systems   Constitutional:  Negative for activity change and fatigue. Eyes:  Negative for visual disturbance. Respiratory:  Negative for chest tightness and shortness of breath. Cardiovascular:  Negative for chest pain, palpitations and leg swelling. Gastrointestinal:  Negative for abdominal pain, nausea and vomiting. Endocrine: Negative for polydipsia and polyuria. Genitourinary:  Negative for dysuria. Musculoskeletal:  Negative for myalgias. Skin:  Negative for wound. Neurological:  Negative for headaches. Psychiatric/Behavioral:  Negative for confusion. The patient is nervous/anxious.       Lab Results   Component Value Date/Time    WBC 5.5 08/25/2022 08:23 AM    HGB 12.9 08/25/2022 08:23 AM    HCT 40.0 08/25/2022 08:23 AM    MCV 87.7 08/25/2022 08:23 AM    RDW 13.8 08/25/2022 08:23 AM     08/25/2022 08:23 AM    NEUTOPHILPCT 68 05/17/2022 03:30 PM LYMPHOPCT 22 08/25/2022 08:23 AM    LYMPHOPCT 22 05/17/2022 03:30 PM    MONOPCT 9 08/25/2022 08:23 AM    MONOPCT 9 05/17/2022 03:30 PM    EOSRELPCT 1 08/25/2022 08:23 AM    BASOPCT 1 08/25/2022 08:23 AM    BASOPCT 1 05/17/2022 03:30 PM    LYMPHSABS 1.2 08/25/2022 08:23 AM    LYMPHSABS 1.4 05/17/2022 03:30 PM    MONOSABS 0.5 08/25/2022 08:23 AM    MONOSABS 0.6 05/17/2022 03:30 PM    EOSABS 0.1 08/25/2022 08:23 AM    EOSABS 0.1 05/17/2022 03:30 PM    BASOSABS 0.0 08/25/2022 08:23 AM    IMMGRAN 0 08/25/2022 08:23 AM    GRANULOCYTEABSOLUTECOUNT 0.0 05/17/2022 03:30 PM       Lab Results   Component Value Date/Time     08/25/2022 08:23 AM    K 3.9 08/25/2022 08:23 AM     08/25/2022 08:23 AM    CO2 30 08/25/2022 08:23 AM    ANIONGAP 2 08/25/2022 08:23 AM    GLUCOSE 118 08/25/2022 08:23 AM    BUN 18 08/25/2022 08:23 AM    CREATININE 1.10 08/25/2022 08:23 AM    GFRAA >60 08/25/2022 08:23 AM    LABGLOM 51 08/25/2022 08:23 AM    CALCIUM 9.4 08/25/2022 08:23 AM    BILITOT 0.9 08/25/2022 08:23 AM    ALT 21 08/25/2022 08:23 AM    AST 16 08/25/2022 08:23 AM    ALKPHOS 63 08/25/2022 08:23 AM    ALKPHOS 72 02/22/2022 08:13 AM    PROT 7.5 08/25/2022 08:23 AM    LABALBU 3.7 08/25/2022 08:23 AM    GLOB 3.8 08/25/2022 08:23 AM    ALBUMIN 1.0 08/25/2022 08:23 AM       Lab Results   Component Value Date/Time    CHOL 132 08/25/2022 08:23 AM    HDL 50 08/25/2022 08:23 AM    TRIG 67 08/25/2022 08:23 AM    LDLCALC 68.6 08/25/2022 08:23 AM    VLDL 12 02/22/2022 08:13 AM       Lab Results   Component Value Date/Time    LABA1C 6.8 08/25/2022 08:23 AM    LABA1C 6.6 02/22/2022 08:13 AM    LABA1C 6.7 08/18/2021 08:10 AM    LABA1C 6.6 04/13/2021 09:26 AM    LABA1C 6.6 09/24/2020 08:54 AM       Lab Results   Component Value Date/Time    TSH 2.660 02/22/2022 08:13 AM    TSH 1.430 04/13/2021 09:26 AM    TSH 0.641 09/24/2020 08:54 AM       No results found for: PSA    Lab Results   Component Value Date/Time    SPECGRAV 1.016 08/25/2022 08:23 AM    COLORU YELLOW/STRAW 08/25/2022 08:23 AM    LEUKOCYTESUR SMALL 08/25/2022 08:23 AM    PROTEINU 30 08/25/2022 08:23 AM    GLUCOSEU Negative 08/25/2022 08:23 AM    KETUA Negative 08/25/2022 08:23 AM    BLOODU SMALL 08/25/2022 08:23 AM    BILIRUBINUR Negative 08/25/2022 08:23 AM    UROBILINOGEN 1.0 08/25/2022 08:23 AM    NITRU Negative 08/25/2022 08:23 AM           Vitals:    09/19/22 1304   BP: 124/76   Site: Right Upper Arm   Position: Sitting   Cuff Size: Small Adult   Pulse: 74   Temp: 97.7 °F (36.5 °C)   TempSrc: Temporal   SpO2: 100%   Weight: 164 lb (74.4 kg)   Height: 5' 2\" (1.575 m)     Wt Readings from Last 3 Encounters:   09/19/22 164 lb (74.4 kg)   09/14/22 163 lb 9.6 oz (74.2 kg)   09/07/22 165 lb (74.8 kg)     BP Readings from Last 3 Encounters:   09/19/22 124/76   09/14/22 (!) 142/83   08/31/22 123/70     Physical Exam  Vitals and nursing note reviewed. Constitutional:       Appearance: Normal appearance. She is not ill-appearing. HENT:      Head: Normocephalic and atraumatic. Eyes:      Extraocular Movements: Extraocular movements intact. Conjunctiva/sclera: Conjunctivae normal.   Cardiovascular:      Rate and Rhythm: Normal rate and regular rhythm. Pulses:           Dorsalis pedis pulses are 2+ on the right side and 2+ on the left side. Posterior tibial pulses are 1+ on the right side and 1+ on the left side. Pulmonary:      Effort: Pulmonary effort is normal.      Breath sounds: Normal breath sounds. Musculoskeletal:      Right foot: Decreased range of motion. Deformity, bunion and prominent metatarsal heads present. Left foot: Decreased range of motion. Deformity, bunion and prominent metatarsal heads present. Feet:      Right foot:      Protective Sensation: 2 sites tested. 1 site sensed. Skin integrity: Callus and dry skin present. No ulcer, blister, skin breakdown or erythema. Toenail Condition: Fungal disease present.      Left foot: Protective Sensation: 2 sites tested. 1 site sensed. Skin integrity: Callus and dry skin present. No ulcer, blister, skin breakdown or erythema. Toenail Condition: Fungal disease present. Comments: See media file for complete exam  Skin:     General: Skin is warm and dry. Neurological:      General: No focal deficit present. Mental Status: She is alert and oriented to person, place, and time. Mental status is at baseline.    Psychiatric:         Mood and Affect: Mood normal.         Behavior: Behavior normal.

## 2022-09-21 ENCOUNTER — OFFICE VISIT (OUTPATIENT)
Dept: SURGERY | Age: 77
End: 2022-09-21
Payer: MEDICARE

## 2022-09-21 VITALS
DIASTOLIC BLOOD PRESSURE: 82 MMHG | SYSTOLIC BLOOD PRESSURE: 131 MMHG | WEIGHT: 164.7 LBS | HEART RATE: 64 BPM | BODY MASS INDEX: 30.12 KG/M2

## 2022-09-21 DIAGNOSIS — C50.911 INFILTRATING DUCTAL CARCINOMA OF RIGHT BREAST (HCC): Primary | ICD-10-CM

## 2022-09-21 PROCEDURE — 1123F ACP DISCUSS/DSCN MKR DOCD: CPT | Performed by: STUDENT IN AN ORGANIZED HEALTH CARE EDUCATION/TRAINING PROGRAM

## 2022-09-21 PROCEDURE — 99204 OFFICE O/P NEW MOD 45 MIN: CPT | Performed by: STUDENT IN AN ORGANIZED HEALTH CARE EDUCATION/TRAINING PROGRAM

## 2022-09-21 NOTE — H&P (VIEW-ONLY)
General Surgery New Patient Office Note:    8/39/1499    Teddy Stewart  MRN: [id number]      CHIEF COMPLAINT: Right infiltrating ductal carcinoma      PRIMARY CARE PHYSICIAN: Horacio Brambila MD      HISTORY: Pt presents with a right breast mass. Patient presents with a right breast mass found on screening mammogram.  Patient has a history of previous left breast cancer 21 years ago. She underwent a lumpectomy with radiation and endocrine therapy. Patient ultimately found to have a new breast cancer on the right side. This is infiltrating ductal carcinoma ER positive, MI negative, HER2 negative. Patient does have a strong family history of breast cancer.       REVIEW OF SYSTEMS: 10 Point ROS negative except what is in HPI      Past Medical History:   Diagnosis Date    Abdominal pain     Arthritis     Breast cancer ([address]     Breast cancer, left ([address] 2001    Breast pain in female     Bunion     Chronic pain of left knee     Corns and callosities     DDD (degenerative disc disease), lumbar     Diabetes mellitus type II, controlled ([address]     Emotional disorder     Fungal infection of toenail     Hallux valgus     Hip pain, bilateral     Hyperglycemia     Hyperlipidemia     Hypertension     medication    Hypothyroidism     Lower GI bleed     Obesity     Other ill-defined conditions(799.89)     pt reports urinalysis shows blood - she is scheduled for CT pelvis and abdomen in August)    Postmenopausal     Right median nerve neuropathy     Stress incontinence     Traumatic arthropathy of knee        Current Outpatient Medications   Medication Sig Dispense Refill    atorvastatin (LIPITOR) 40 MG tablet Take 1 tablet by mouth daily 90 tablet 3    irbesartan (AVAPRO) 150 MG tablet Take 1 tablet by mouth daily 90 tablet 3    levothyroxine (SYNTHROID) 75 MCG tablet Take 1 tablet by mouth Daily TK 1 T PO D Indications: a condition with low thyroid hormone levels 90 tablet 3    atenolol-chlorthalidone (TENORETIC) 50-25 MG per tablet Take 0.5 tablets by mouth daily 90 tablet 3    Diabetic Shoe MISC by Does not apply route Diabetic shoe, custom orthoses, custom prosthetic filler, DM2, neuropathy 1 each 0    Cholecalciferol 50 MCG (2000) TABS Take by mouth      clotrimazole-betamethasone (LOTRISONE) 1-0.05 % cream Apply topically 2 times daily      coenzyme Q10 100 MG CAPS capsule Take 100 mg by mouth daily      hydrocortisone 2.5 % cream Place rectally 4 times daily      hydrocortisone (ANUSOL-HC) 25 MG suppository Place 25 mg rectally in the morning and 25 mg in the evening. No current facility-administered medications for this visit. Family History   Problem Relation Age of Onset    Dementia Mother     Diabetes Mother     Cancer Mother 76        Colon    Diabetes Father     Breast Cancer Sister 78    Breast Cancer Sister 77    Hypertension Maternal Grandmother     Cancer Other         bone CA    Hypertension Other     Diabetes Other     Post-op Cognitive Dysfunction Neg Hx     Malig Hypertherm Neg Hx     Pseudochol.  Deficiency Neg Hx     Delayed Awakening Neg Hx     Post-op Nausea/Vomiting Neg Hx     Emergence Delirium Neg Hx     Other Neg Hx        Social History     Socioeconomic History    Marital status: Single     Spouse name: None    Number of children: None    Years of education: None    Highest education level: None   Tobacco Use    Smoking status: Former     Packs/day: 0.25     Types: Cigarettes     Quit date: 2001     Years since quittin.2    Smokeless tobacco: Never   Vaping Use    Vaping Use: Never used   Substance and Sexual Activity    Alcohol use: No    Drug use: No     Social Determinants of Health     Financial Resource Strain: Low Risk     Difficulty of Paying Living Expenses: Not hard at all   Food Insecurity: No Food Insecurity    Worried About Running Out of Food in the Last Year: Never true    Ran Out of Food in the Last Year: Never true   Transportation Needs: No Transportation Needs Lack of Transportation (Medical): No    Lack of Transportation (Non-Medical): No   Stress: No Stress Concern Present    Feeling of Stress : Not at all   Social Connections: Unknown    Attends Scientologist Services: Never    Active Member of Clubs or Organizations: No    Attends Club or Organization Meetings: Never    Marital Status:    Intimate Partner Violence: Not At Risk    Fear of Current or Ex-Partner: No    Emotionally Abused: No    Physically Abused: No    Sexually Abused: No   Housing Stability: Unknown    Unable to Pay for Housing in the Last Year: No    Unstable Housing in the Last Year: No         PHYSICAL EXAMINATION:    /82   Pulse 64   Wt 164 lb 11.2 oz (74.7 kg)   BMI 30.12 kg/m²     General Appearance AOx3, in no acute distress  Eyes Conjunctivae/corneas clear. PERRL, EOM's intact. No scleral icterus  Ears, Nose, Throat ENT exam normal, no neck nodes or sinus tenderness  Neck supple, no significant adenopathy. No notable JVD  Breast: No palpable masses or lymphadenopathy, previous lumpectomy scar seen on the left side  Respiratory No chest wall deformities or tenderness, respiratory effort normal, no use of accessory muscles. Cardiovascular RRR. No chest wall tenderness. Gastrointestinal Abdomen soft, nontender, nondistended. BS x4. No rebound, guarding, or rigidity present. No palpable masses. No CVA tenderness  Lymphatics No palpable lymphadenopathy, no hepatosplenomegaly  Musculoskeletal No joint tenderness, deformity or swelling. Full ROM UE/LE. Distal pulses intact UE/LE. No edema, cyanosis, or venous stasis changes.   Skin Normal coloration and turgor, no rashes, no suspicious skin lesions noted  Neurological alert, oriented, normal speech, no focal findings or movement disorder noted, CN II-XII intact  Psychiatric Alert and oriented, appropriate affect      STUDIES: MRI Result (most recent):  MRI BREAST BILATERAL W WO CONTRAST 09/07/2022    Addendum 9/14/2022  3:50 PM  Addendum: Patient returned on 9/8/2022 for free technical repeat postcontrast  images. These demonstrate no suspicious changes. The original impression and recommendations remain. Narrative  MRI of the Breasts with and without contrast    CLINICAL INDICATION:  New diagnosis of right 9:30 infiltrating ductal carcinoma  undergoing evaluation for extent of disease, staging, therapy planning. Previous  left lumpectomy and radiation for breast cancer in 2001 and right benign biopsy  greater than 10 years ago. Her sister had breast cancer. COMPARISON: Ultrasound and mammography 8/25/2022, 8/31/2022, 8/17/2022 as well  as previous MRI breast 10/28/2013, 10/28/2011, 9/8/2009. TECHNIQUE: Standard MRI sequences were obtained through the breasts in multiple  planes. Images were obtained before and after intravenous infusion of 15 mL of  Prohance contrast. Images were reviewed with PACS and with [employer] CAD software. FINDINGS: The breasts demonstrate moderate glandularity and minimal background  enhancement. Right breast at 9:30 centrally shows the biopsy clip within an irregular  heterogeneously enhancing 1.4 x 1.0 cm malignant mass. Mild curvilinear  enhancement adjacent to the biopsy tract is likely reactive, and there is a  small hematoma. Scattered enhancing foci elsewhere in the right breast have not  changed from older MRI exams. There is no other evidence of suspicious enhancing mass, and no dominant or  unique nonmass enhancement, to suggest additional malignancy in either breast.  There is no evidence of axillary or internal mammary lymphadenopathy. Multiple  small right axillary lymph nodes have not changed from older examinations. Elsewhere, limited visualization of the partially included thorax and upper  abdomen shows no acute abnormality. Impression  1. Right 9:30 breast cancer measures up to 1.4 cm.     2. No additional suspicious finding elsewhere in either breast. No evidence There  are expected postprocedural changes at the 9:30 position right breast. The clip  is in satisfactory position. No definite residual microcalcifications are  present, however, these could be obscured due to postprocedural change. Impression  Stereotactic biopsy of the asymmetry with associated architectural  distortion and microcalcifications at the 9:30 position right breast with  histologic results pending. IMPRESSION:  Right infiltrating ductal carcinoma    PLAN:  Discussed pathology in detail with pt. ER/TN/Jtn7Let status also reviewed and we discussed how this may modify her pre and post-surgical management including treatment with neoadjuvant chemo, adjuvant chemo, anti-estrogen agents or the need to stop any hormonal supplementation she may be taking. Discussed family history as it may relate to any value to investigation of a genetic basis for her cancer. She is going to contemplate genetic testing, she only has sons and we did discuss the small chance of breast cancer in men with a genetic predisposition     Discussed management options. Initial treatment should be surgical. Reviewed total vs. Partial mastectomy (w/ XRT) including different local recurrence rates but equivalent long term survival rates. Discussed potential indications for post-mastectomy XRT as well. Had a long discussion about the patient about whether or not she would like to proceed forward with a sentinel lymph node biopsy as she is over the age where he is at risk required. She states that she would like to proceed forward with sentinel lymph node biopsy in order to know for sure whether or not this has spread. After discussion, we will proceed with right lumpectomy with sentinel lymph node biopsy utilizing seed localization. We have discussed the technical details of the procedure(s) in detail.   Risks reviewed include anesthetic risks, bleeding, infection, wound healing problems and cosmetic abnormalities, need for further surgical intervention depending on pathology reports, lymphedema if axillary procedure planned, and recurrence. All questions are answered.

## 2022-09-21 NOTE — PROGRESS NOTES
General Surgery New Patient Office Note:    8/96/7557    Reji Key  MRN: [id number]      CHIEF COMPLAINT: Right infiltrating ductal carcinoma      PRIMARY CARE PHYSICIAN: Crissy Patel MD      HISTORY: Pt presents with a right breast mass. Patient presents with a right breast mass found on screening mammogram.  Patient has a history of previous left breast cancer 21 years ago. She underwent a lumpectomy with radiation and endocrine therapy. Patient ultimately found to have a new breast cancer on the right side. This is infiltrating ductal carcinoma ER positive, ND negative, HER2 negative. Patient does have a strong family history of breast cancer.       REVIEW OF SYSTEMS: 10 Point ROS negative except what is in HPI      Past Medical History:   Diagnosis Date    Abdominal pain     Arthritis     Breast cancer ([address]     Breast cancer, left ([address] 2001    Breast pain in female     Bunion     Chronic pain of left knee     Corns and callosities     DDD (degenerative disc disease), lumbar     Diabetes mellitus type II, controlled ([address]     Emotional disorder     Fungal infection of toenail     Hallux valgus     Hip pain, bilateral     Hyperglycemia     Hyperlipidemia     Hypertension     medication    Hypothyroidism     Lower GI bleed     Obesity     Other ill-defined conditions(799.89)     pt reports urinalysis shows blood - she is scheduled for CT pelvis and abdomen in August)    Postmenopausal     Right median nerve neuropathy     Stress incontinence     Traumatic arthropathy of knee        Current Outpatient Medications   Medication Sig Dispense Refill    atorvastatin (LIPITOR) 40 MG tablet Take 1 tablet by mouth daily 90 tablet 3    irbesartan (AVAPRO) 150 MG tablet Take 1 tablet by mouth daily 90 tablet 3    levothyroxine (SYNTHROID) 75 MCG tablet Take 1 tablet by mouth Daily TK 1 T PO D Indications: a condition with low thyroid hormone levels 90 tablet 3    atenolol-chlorthalidone (TENORETIC) 50-25 MG per tablet Take 0.5 tablets by mouth daily 90 tablet 3    Diabetic Shoe MISC by Does not apply route Diabetic shoe, custom orthoses, custom prosthetic filler, DM2, neuropathy 1 each 0    Cholecalciferol 50 MCG (2000) TABS Take by mouth      clotrimazole-betamethasone (LOTRISONE) 1-0.05 % cream Apply topically 2 times daily      coenzyme Q10 100 MG CAPS capsule Take 100 mg by mouth daily      hydrocortisone 2.5 % cream Place rectally 4 times daily      hydrocortisone (ANUSOL-HC) 25 MG suppository Place 25 mg rectally in the morning and 25 mg in the evening. No current facility-administered medications for this visit. Family History   Problem Relation Age of Onset    Dementia Mother     Diabetes Mother     Cancer Mother 76        Colon    Diabetes Father     Breast Cancer Sister 78    Breast Cancer Sister 77    Hypertension Maternal Grandmother     Cancer Other         bone CA    Hypertension Other     Diabetes Other     Post-op Cognitive Dysfunction Neg Hx     Malig Hypertherm Neg Hx     Pseudochol.  Deficiency Neg Hx     Delayed Awakening Neg Hx     Post-op Nausea/Vomiting Neg Hx     Emergence Delirium Neg Hx     Other Neg Hx        Social History     Socioeconomic History    Marital status: Single     Spouse name: None    Number of children: None    Years of education: None    Highest education level: None   Tobacco Use    Smoking status: Former     Packs/day: 0.25     Types: Cigarettes     Quit date: 2001     Years since quittin.2    Smokeless tobacco: Never   Vaping Use    Vaping Use: Never used   Substance and Sexual Activity    Alcohol use: No    Drug use: No     Social Determinants of Health     Financial Resource Strain: Low Risk     Difficulty of Paying Living Expenses: Not hard at all   Food Insecurity: No Food Insecurity    Worried About Running Out of Food in the Last Year: Never true    Ran Out of Food in the Last Year: Never true   Transportation Needs: No Transportation Needs Lack of Transportation (Medical): No    Lack of Transportation (Non-Medical): No   Stress: No Stress Concern Present    Feeling of Stress : Not at all   Social Connections: Unknown    Attends Congregation Services: Never    Active Member of Clubs or Organizations: No    Attends Club or Organization Meetings: Never    Marital Status:    Intimate Partner Violence: Not At Risk    Fear of Current or Ex-Partner: No    Emotionally Abused: No    Physically Abused: No    Sexually Abused: No   Housing Stability: Unknown    Unable to Pay for Housing in the Last Year: No    Unstable Housing in the Last Year: No         PHYSICAL EXAMINATION:    /82   Pulse 64   Wt 164 lb 11.2 oz (74.7 kg)   BMI 30.12 kg/m²     General Appearance AOx3, in no acute distress  Eyes Conjunctivae/corneas clear. PERRL, EOM's intact. No scleral icterus  Ears, Nose, Throat ENT exam normal, no neck nodes or sinus tenderness  Neck supple, no significant adenopathy. No notable JVD  Breast: No palpable masses or lymphadenopathy, previous lumpectomy scar seen on the left side  Respiratory No chest wall deformities or tenderness, respiratory effort normal, no use of accessory muscles. Cardiovascular RRR. No chest wall tenderness. Gastrointestinal Abdomen soft, nontender, nondistended. BS x4. No rebound, guarding, or rigidity present. No palpable masses. No CVA tenderness  Lymphatics No palpable lymphadenopathy, no hepatosplenomegaly  Musculoskeletal No joint tenderness, deformity or swelling. Full ROM UE/LE. Distal pulses intact UE/LE. No edema, cyanosis, or venous stasis changes.   Skin Normal coloration and turgor, no rashes, no suspicious skin lesions noted  Neurological alert, oriented, normal speech, no focal findings or movement disorder noted, CN II-XII intact  Psychiatric Alert and oriented, appropriate affect      STUDIES: MRI Result (most recent):  MRI BREAST BILATERAL W WO CONTRAST 09/07/2022    Addendum 9/14/2022  3:50 PM  Addendum: Patient returned on 9/8/2022 for free technical repeat postcontrast  images. These demonstrate no suspicious changes. The original impression and recommendations remain. Narrative  MRI of the Breasts with and without contrast    CLINICAL INDICATION:  New diagnosis of right 9:30 infiltrating ductal carcinoma  undergoing evaluation for extent of disease, staging, therapy planning. Previous  left lumpectomy and radiation for breast cancer in 2001 and right benign biopsy  greater than 10 years ago. Her sister had breast cancer. COMPARISON: Ultrasound and mammography 8/25/2022, 8/31/2022, 8/17/2022 as well  as previous MRI breast 10/28/2013, 10/28/2011, 9/8/2009. TECHNIQUE: Standard MRI sequences were obtained through the breasts in multiple  planes. Images were obtained before and after intravenous infusion of 15 mL of  Prohance contrast. Images were reviewed with PACS and with [employer] CAD software. FINDINGS: The breasts demonstrate moderate glandularity and minimal background  enhancement. Right breast at 9:30 centrally shows the biopsy clip within an irregular  heterogeneously enhancing 1.4 x 1.0 cm malignant mass. Mild curvilinear  enhancement adjacent to the biopsy tract is likely reactive, and there is a  small hematoma. Scattered enhancing foci elsewhere in the right breast have not  changed from older MRI exams. There is no other evidence of suspicious enhancing mass, and no dominant or  unique nonmass enhancement, to suggest additional malignancy in either breast.  There is no evidence of axillary or internal mammary lymphadenopathy. Multiple  small right axillary lymph nodes have not changed from older examinations. Elsewhere, limited visualization of the partially included thorax and upper  abdomen shows no acute abnormality. Impression  1. Right 9:30 breast cancer measures up to 1.4 cm.     2. No additional suspicious finding elsewhere in either breast. No evidence of  lymphadenopathy. Recommend continued malignancy management as directed clinically. BI-RADS Assessment Category 6: Known Biopsy Proven Malignancy       Mammogram Result (most recent): MAMMOGRAM POST BX CLIP PLACEMENT RIGHT 08/31/2022    Addendum 9/15/2022  2:03 AM  Addendum: Rohith Arrieta, accession number: [id number]  Date: 8/31/2022 Pathology was noted as A: Right breast asymmetry at 9:30  position, core biopsy: Infiltrating ductal carcinoma, low grade (well  differentiated). Definite in situ component and lymphovascular invasion are not  identified. Results concordant with imaging. Pathology report was called to  Dr. Jensen Hutton' office on 9/1/2022 by JOSE Her)(ARTIS). Patient was referred to  Oncology services on 9/1/2022. Narrative  Stereotactic Biopsy right Breast, Surgical Specimen Radiograph, Postbiopsy  Mammogram, 08/31/2022    HISTORY: Focal asymmetry with possible architectural distortion and  microcalcifications at the 9:30 position right breast.    STEREOTACTIC BIOPSY: The patient was explained the procedure and informed  consent was obtained. The patient was then positioned on the prone Hologic  stereotactic biopsy table. The right breast was placed in CC compression. The  asymmetry was localized within the stereotactic window from a superior approach. The skin was cleansed and local anesthetic applied. A 9-gauge Brevera needle was  placed into the right breast. 5 core samples were obtained from about the  periphery of the needle. The samples were imaged while in the Mammography Suite. The patient tolerated the procedure well. There were no immediate complications. SPECIMEN RADIOGRAPH: The specimen radiograph reveals scattered calcifications. The samples are placed into a formalin container and sent to the lab for  analysis. A biopsy clip was then placed into the small created biopsy cavity. POSTBIOPSY MAMMOGRAM: CC and ML views of the right breast were obtained. need for further surgical intervention depending on pathology reports, lymphedema if axillary procedure planned, and recurrence. All questions are answered.

## 2022-09-22 ENCOUNTER — OFFICE VISIT (OUTPATIENT)
Dept: ONCOLOGY | Age: 77
End: 2022-09-22
Payer: MEDICAID

## 2022-09-22 DIAGNOSIS — Z91.89 ADJUSTMENT TO LIFE THREATENING ILLNESS: Primary | ICD-10-CM

## 2022-09-22 DIAGNOSIS — Z17.0 MALIGNANT NEOPLASM OF RIGHT BREAST IN FEMALE, ESTROGEN RECEPTOR POSITIVE, UNSPECIFIED SITE OF BREAST (HCC): ICD-10-CM

## 2022-09-22 DIAGNOSIS — C50.911 MALIGNANT NEOPLASM OF RIGHT BREAST IN FEMALE, ESTROGEN RECEPTOR POSITIVE, UNSPECIFIED SITE OF BREAST (HCC): ICD-10-CM

## 2022-09-22 PROCEDURE — 90837 PSYTX W PT 60 MINUTES: CPT | Performed by: SOCIAL WORKER

## 2022-09-22 PROCEDURE — 1123F ACP DISCUSS/DSCN MKR DOCD: CPT | Performed by: SOCIAL WORKER

## 2022-09-22 NOTE — PROGRESS NOTES
Behavioral Health - Progress Note          Name: Celestina Carter  :   MRN: [id number]  Date of Service: 2022  Location of Service: [place] for both provider and patient        Type of Service: Individual Therapy    Reason for Visit: Follow Up     Chief Complaint: Anxiety and its commodities due to distress caused by cancer diagnosis and treatment. Subjective:  LISW-CP used Dialectical Behavioral and Cognitive Behavioral Therapy, tailored to patient's need. Patient denied any plan or intent to hurt self or others. Patient was instructed, in case of emergency, to call 911 or [facility] in [place], [place]. Patient verbalized agreement. CBT Material discussed and reviewed during assessment:  9/15/2022  Introduction   Parasympathetic and Sympathetic System  Window of Tolerance  Neuroplasticity, Mindfulness, and Neurotransmitters   The Effects of Stress on The Brain and Body  Assignment   Serene Smile and Twinkle Eyes  Daily Mindfulness #7 - How is your now? Half Smile  2022   Patient did not have time to practice last week assignment. TIP  _________________________________________________    Clinical Impression: Celestina Carter is a 65 y.o. female . Mental Status Exam, patient was dressed properly. No abnormal psychomotor movements observed. Intellectual functioning appeared to be intact. Mood and affect were congruent. Insight was adequate. Judgment was adequate. Speech was normal, clear. Thought process was coherent. Patient did not report suicidal or homicidal ideations, intent or plans. Patient denied self-injury behaviors. Patient denied alcohol and other substance abuse. Patient was oriented to the four spheres: self, place, time and situation. Patient response to intervention was appropriate. Patient progress was adequate. Protective factor: self-determination.   __________________________________________________    Session scheduled for: 1:00  pm/am, patient was on time Session started:  1:00    Session ended:   2:00  _________________________________________________    Patient Diagnosis:         PHQ 9:   14  - to be scanned into EMR.  0-4 Suggests the patient may not need depression treatment  5-14 Mild major depressive disorder. 15-19 Moderate-major depressive disorder. 20+ Severe major depressive disorders.      SONIDO 7:   9  - to be scanned into EMR.  0-4: minimal anxiety   5-9: mild anxiety   10-14: moderate anxiety   15-21: severe anxiety   __________________________________________________    Frequency: Weekly or bi-weekly appointments as schedule permits    Patient's Primary Goal: Decrease anxiety   __________________________________________________    Plans:  Continue to use Cognitive Behavioral Approach  Continue to work with patient on primary goal.    Continue to see patient, weekly or bi-weekly  __________________________________________________    Laurel Ac

## 2022-09-23 ENCOUNTER — OFFICE VISIT (OUTPATIENT)
Dept: INTERNAL MEDICINE CLINIC | Facility: CLINIC | Age: 77
End: 2022-09-23
Payer: MEDICARE

## 2022-09-23 VITALS
TEMPERATURE: 97.3 F | OXYGEN SATURATION: 97 % | BODY MASS INDEX: 30.55 KG/M2 | SYSTOLIC BLOOD PRESSURE: 115 MMHG | HEIGHT: 62 IN | HEART RATE: 64 BPM | WEIGHT: 166 LBS | DIASTOLIC BLOOD PRESSURE: 70 MMHG

## 2022-09-23 DIAGNOSIS — B35.3 TINEA PEDIS OF BOTH FEET: Primary | ICD-10-CM

## 2022-09-23 DIAGNOSIS — H61.22 IMPACTED CERUMEN OF LEFT EAR: ICD-10-CM

## 2022-09-23 PROCEDURE — 99214 OFFICE O/P EST MOD 30 MIN: CPT | Performed by: NURSE PRACTITIONER

## 2022-09-23 PROCEDURE — 1123F ACP DISCUSS/DSCN MKR DOCD: CPT | Performed by: NURSE PRACTITIONER

## 2022-09-23 RX ORDER — NYSTATIN 100000 U/G
CREAM TOPICAL
Qty: 1 EACH | Refills: 1 | Status: SHIPPED | OUTPATIENT
Start: 2022-09-23

## 2022-09-23 ASSESSMENT — ENCOUNTER SYMPTOMS
DIARRHEA: 0
NAUSEA: 0
ABDOMINAL DISTENTION: 0
APNEA: 0
BACK PAIN: 0
SINUS PAIN: 0
EYE PAIN: 0
ABDOMINAL PAIN: 0
CONSTIPATION: 0
COUGH: 0
COLOR CHANGE: 0
SHORTNESS OF BREATH: 0
EYE ITCHING: 0
EYE DISCHARGE: 0
EYE REDNESS: 0

## 2022-09-23 ASSESSMENT — PATIENT HEALTH QUESTIONNAIRE - PHQ9
SUM OF ALL RESPONSES TO PHQ QUESTIONS 1-9: 0
2. FEELING DOWN, DEPRESSED OR HOPELESS: 0
SUM OF ALL RESPONSES TO PHQ QUESTIONS 1-9: 0
SUM OF ALL RESPONSES TO PHQ9 QUESTIONS 1 & 2: 0
SUM OF ALL RESPONSES TO PHQ QUESTIONS 1-9: 0
SUM OF ALL RESPONSES TO PHQ QUESTIONS 1-9: 0
1. LITTLE INTEREST OR PLEASURE IN DOING THINGS: 0

## 2022-09-23 NOTE — PROGRESS NOTES
[unfilled]  [address] [place] [id number]      PROGRESS NOTE    SUBJECTIVE:   Nasim Santana is a 65 y.o. female seen for a follow up visit regarding the following chief complaint:     Chief Complaint   Patient presents with    Otalgia       Otalgia   Associated symptoms include a rash (on bottom of both feet). Pertinent negatives include no abdominal pain, coughing, diarrhea, headaches or neck pain. Patient presents with left ear fullness for a few days. Denies drainage on pain. Additional concerns of bilateral foot fungus on bottom of feet for several weeks. She has tried OTC medication without help. Current Outpatient Medications   Medication Sig Dispense Refill    carbamide peroxide (DEBROX) 6.5 % otic solution Place 5 drops into the left ear 2 times daily 1 each 0    nystatin (MYCOSTATIN) 287140 UNIT/GM cream Apply topically 2 times daily. 1 each 1    atorvastatin (LIPITOR) 40 MG tablet Take 1 tablet by mouth daily 90 tablet 3    irbesartan (AVAPRO) 150 MG tablet Take 1 tablet by mouth daily 90 tablet 3    levothyroxine (SYNTHROID) 75 MCG tablet Take 1 tablet by mouth Daily TK 1 T PO D Indications: a condition with low thyroid hormone levels 90 tablet 3    atenolol-chlorthalidone (TENORETIC) 50-25 MG per tablet Take 0.5 tablets by mouth daily 90 tablet 3    Diabetic Shoe MISC by Does not apply route Diabetic shoe, custom orthoses, custom prosthetic filler, DM2, neuropathy 1 each 0    Cholecalciferol 50 MCG (2000 UT) TABS Take by mouth      clotrimazole-betamethasone (LOTRISONE) 1-0.05 % cream Apply topically 2 times daily      coenzyme Q10 100 MG CAPS capsule Take 100 mg by mouth daily      hydrocortisone 2.5 % cream Place rectally 4 times daily      hydrocortisone (ANUSOL-HC) 25 MG suppository Place 25 mg rectally in the morning and 25 mg in the evening. No current facility-administered medications for this visit.      Allergies   Allergen Reactions    Milk Protein Other (See Comments)     Increased mucus production       Past Medical History:   Diagnosis Date    Abdominal pain     Arthritis     Breast cancer ([address]     Breast cancer, left ([address] 2001    Breast pain in female     Bunion     Chronic pain of left knee     Corns and callosities     DDD (degenerative disc disease), lumbar     Diabetes mellitus type II, controlled ([address]     Emotional disorder     Fungal infection of toenail     Hallux valgus     Hip pain, bilateral     Hyperglycemia     Hyperlipidemia     Hypertension     medication    Hypothyroidism     Lower GI bleed     Obesity     Other ill-defined conditions(799.89)     pt reports urinalysis shows blood - she is scheduled for CT pelvis and abdomen in August)    Postmenopausal     Right median nerve neuropathy     Stress incontinence     Traumatic arthropathy of knee      Past Surgical History:   Procedure Laterality Date    BREAST BIOPSY Right 2011, Stereo Core Bx 8-31-22    benign    BREAST LUMPECTOMY Left 2001    radiation for CA    COLONOSCOPY  2008    HYSTERECTOMY (CERVIX STATUS UNKNOWN)      VERN STEROTACTIC LOC BREAST BIOPSY RIGHT Right 8/31/2022    VERN STEROTACTIC LOC BREAST BIOPSY RIGHT 8/31/2022 Delilah Alvarez MD [facility]    ORTHOPEDIC SURGERY Left     ankle arthroscopy    ORTHOPEDIC SURGERY      left knee repair    ORTHOPEDIC SURGERY      right ankle    ORTHOPEDIC SURGERY  2005    Left TSA    SHOULDER ARTHROPLASTY Left     TUBAL LIGATION      UROLOGICAL SURGERY      cystoscopy    US GUIDED CORE BREAST BIOPSY Right      Family History   Problem Relation Age of Onset    Dementia Mother     Diabetes Mother     Cancer Mother 76        Colon    Diabetes Father     Breast Cancer Sister 78    Breast Cancer Sister 77    Hypertension Maternal Grandmother     Cancer Other         bone CA    Hypertension Other     Diabetes Other     Post-op Cognitive Dysfunction Neg Hx     Malig Hypertherm Neg Hx     Pseudochol.  Deficiency Neg Hx     Delayed Awakening Neg Hx Post-op Nausea/Vomiting Neg Hx     Emergence Delirium Neg Hx     Other Neg Hx      Social History     Tobacco Use    Smoking status: Former     Packs/day: 0.25     Types: Cigarettes     Quit date: 2001     Years since quittin.2    Smokeless tobacco: Never   Substance Use Topics    Alcohol use: No       Review of Systems   Constitutional:  Negative for activity change, appetite change, chills, fatigue and fever. HENT:  Negative for congestion, ear pain and sinus pain. Ear fullness in left ear with trouble hearing   Eyes:  Negative for pain, discharge, redness and itching. Respiratory:  Negative for apnea, cough and shortness of breath. Cardiovascular:  Negative for chest pain, palpitations and leg swelling. Gastrointestinal:  Negative for abdominal distention, abdominal pain, constipation, diarrhea and nausea. Endocrine: Negative for cold intolerance and heat intolerance. Genitourinary:  Negative for difficulty urinating, dysuria, enuresis and urgency. Musculoskeletal:  Negative for arthralgias, back pain, joint swelling, myalgias and neck pain. Skin:  Positive for rash (on bottom of both feet). Negative for color change. Neurological:  Negative for dizziness, weakness and headaches. Psychiatric/Behavioral:  Negative for behavioral problems and sleep disturbance. The patient is not nervous/anxious. OBJECTIVE:  /70 (Site: Left Upper Arm, Position: Sitting, Cuff Size: Small Adult)   Pulse 64   Temp 97.3 °F (36.3 °C) (Temporal)   Ht 5' 2\" (1.575 m)   Wt 166 lb (75.3 kg)   SpO2 97%   BMI 30.36 kg/m²      Physical Exam  Constitutional:       General: She is not in acute distress. Appearance: Normal appearance. She is not ill-appearing or toxic-appearing. HENT:      Ears:      Comments: Left ear canal with cerumen impaction; right ear canal clear  Cardiovascular:      Rate and Rhythm: Normal rate and regular rhythm.    Pulmonary:      Effort: Pulmonary effort is normal. No respiratory distress. Skin:     General: Skin is warm and dry. Comments: Mild dry whitish rash on bottom of both feet   Neurological:      Mental Status: She is alert. Mental status is at baseline. Psychiatric:         Mood and Affect: Mood normal.         Behavior: Behavior normal.         Thought Content: Thought content normal.         ASSESSMENT and PLAN  Margarita Riddle was seen today for otalgia. Diagnoses and all orders for this visit:    Tinea pedis of both feet  -     nystatin (MYCOSTATIN) 216541 UNIT/GM cream; Apply topically 2 times daily. Impacted cerumen of left ear  -     carbamide peroxide (DEBROX) 6.5 % otic solution; Place 5 drops into the left ear 2 times daily    Start on medications as prescribed. Greater than 50% of this 25 min visit was spent counseling the patient about test results, prognosis, importance of compliance, education about disease process, benefits of medications, instructions for management of acute symptoms, and follow up plans. Follow-up and Dispositions    Return for Nurse visit left ear irrigation 1-2 weeks. Daren German NP, APRN - CNP

## 2022-09-26 ENCOUNTER — PREP FOR PROCEDURE (OUTPATIENT)
Dept: SURGERY | Age: 77
End: 2022-09-26

## 2022-09-26 DIAGNOSIS — C50.911 INFILTRATING DUCTAL CARCINOMA OF RIGHT BREAST (HCC): Primary | ICD-10-CM

## 2022-09-27 DIAGNOSIS — C50.911 INFILTRATING DUCTAL CARCINOMA OF RIGHT BREAST (HCC): Primary | ICD-10-CM

## 2022-09-28 ENCOUNTER — OFFICE VISIT (OUTPATIENT)
Dept: ONCOLOGY | Age: 77
End: 2022-09-28
Payer: MEDICAID

## 2022-09-28 DIAGNOSIS — Z17.0 MALIGNANT NEOPLASM OF RIGHT BREAST IN FEMALE, ESTROGEN RECEPTOR POSITIVE, UNSPECIFIED SITE OF BREAST (HCC): ICD-10-CM

## 2022-09-28 DIAGNOSIS — C50.911 MALIGNANT NEOPLASM OF RIGHT BREAST IN FEMALE, ESTROGEN RECEPTOR POSITIVE, UNSPECIFIED SITE OF BREAST (HCC): ICD-10-CM

## 2022-09-28 DIAGNOSIS — Z91.89 ADJUSTMENT TO LIFE THREATENING ILLNESS: Primary | ICD-10-CM

## 2022-09-28 PROCEDURE — 90837 PSYTX W PT 60 MINUTES: CPT | Performed by: SOCIAL WORKER

## 2022-09-28 PROCEDURE — 1123F ACP DISCUSS/DSCN MKR DOCD: CPT | Performed by: SOCIAL WORKER

## 2022-09-28 NOTE — PROGRESS NOTES
Behavioral Health - Progress Note        Name: Brigida Segura  :   MRN: [id number]  Date of Service: 2022  Location of Service: [place] for both provider and patient        Type of Service: Individual Therapy       Reason for Visit: Follow Up     Chief Complaint: Anxiety and its commodities due to distress caused by cancer diagnosis and treatment. Subjective:  LISW-CP used Dialectical Behavioral and Cognitive Behavioral Therapy, tailored to patient's need. Patient denied any plan or intent to hurt self or others. Patient was instructed, in case of emergency, to call 911 or [facility] in [place], [place]. Patient verbalized agreement. CBT Material discussed and reviewed during assessment:  9/15/2022  Introduction   Parasympathetic and Sympathetic System  Window of Tolerance  Neuroplasticity, Mindfulness, and Neurotransmitters   The Effects of Stress on The Brain and Body  Assignment   Serene Smimayte and [employer]  Daily Mindfulness #7 - How is your now? Half Smile  2022   Patient did not have time to practice last week assignment. TIP  2022   Daily Mindfulness - 17  Daily Mindfulness - 18  Anxiety, stress, worry and depression symptoms   Benefits of Diaphragmatic Breathing  Assignement:  Diaphragmatic Breathing Exercises     _________________________________________________    Clinical Impression: Brigida Segura is a 65 y.o. female . Mental Status Exam, patient was dressed properly. No abnormal psychomotor movements observed. Intellectual functioning appeared to be intact. Mood and affect were congruent. Insight was adequate. Judgment was adequate. Speech was normal, clear. Thought process was coherent. Patient did not report suicidal or homicidal ideations, intent or plans. Patient denied self-injury behaviors. Patient denied alcohol and other substance abuse. Patient was oriented to the four spheres: self, place, time and situation.  Patient response to intervention was appropriate. Patient progress was adequate. Protective factor: self-determination. __________________________________________________    Session scheduled for: 11:00  pm/am, patient was on time     Session started:  11:00    Session ended:  12:00  _________________________________________________    Patient Diagnosis:         PHQ 9:   7  - to be scanned into EMR.  0-4 Suggests the patient may not need depression treatment  5-14 Mild major depressive disorder. 15-19 Moderate-major depressive disorder. 20+ Severe major depressive disorders.      SONIDO 7:  9- to be scanned into EMR.  0-4: minimal anxiety   5-9: mild anxiety   10-14: moderate anxiety   15-21: severe anxiety   __________________________________________________    Frequency: Weekly or bi-weekly appointments as schedule permits    Patient's Primary Goal: Decrease anxiety   __________________________________________________    Plans:  Continue to use Cognitive Behavioral Approach  Continue to work with patient on primary goal.    Continue to see patient, weekly or bi-weekly  __________________________________________________    Aurelio Nicholson

## 2022-09-30 RX ORDER — MAGNESIUM OXIDE 400 MG/1
400 TABLET ORAL DAILY
COMMUNITY

## 2022-09-30 RX ORDER — CALCIUM CARBONATE 500(1250)
500 TABLET ORAL DAILY
COMMUNITY

## 2022-09-30 NOTE — PERIOP NOTE
Patient verified name and . Order for consent not found in EHR. Type 1B surgery, PAT phone assessment complete. Orders not received. Labs per surgeon: no orders received. Labs per anesthesia protocol: K+ DOS     Patient answered medical/surgical history questions at their best of ability. All prior to admission medications documented in [facility]. Patient instructed to take the following medications the day of surgery according to anesthesia guidelines with a small sip of water: atenolol-chlorthalidone, levothyroxine. On the day before surgery please take Acetaminophen 1000mg in the morning and then again before bed. You may substitute for Tylenol 650 mg. Hold all vitamins/supplements 7 days prior to surgery and NSAIDS 5 days prior to surgery. Patient instructed on the following:    > Arrive at A Entrance, time of arrival to be called the day before by 1700  > NPO after midnight, unless otherwise indicated, including gum, mints, and ice chips  > Responsible adult must drive patient to the hospital, stay during surgery, and patient will need supervision 24 hours after anesthesia  > Use antibacterial soap in shower the night before surgery and on the morning of surgery  > All piercings must be removed prior to arrival.    > Leave all valuables (money and jewelry) at home but bring insurance card and ID on DOS.   > You may be required to pay a deductible or co-pay on the day of your procedure. You can pre-pay by calling [phone] if your surgery is at the [facility] or [phone] if your surgery is at the [facility]. > Do not wear make-up, nail polish, lotions, cologne, perfumes, powders, or oil on skin. Artificial nails are not permitted. E-mail with surgical instructions sent to [email or web address]

## 2022-10-03 ENCOUNTER — HOSPITAL ENCOUNTER (OUTPATIENT)
Dept: MAMMOGRAPHY | Age: 77
Discharge: HOME | End: 2022-10-06
Payer: MEDICARE

## 2022-10-03 DIAGNOSIS — C50.911 INFILTRATING DUCTAL CARCINOMA OF RIGHT BREAST (HCC): ICD-10-CM

## 2022-10-03 PROCEDURE — 19281 PERQ DEVICE BREAST 1ST IMAG: CPT

## 2022-10-03 PROCEDURE — 2500000003 HC RX 250 WO HCPCS: Performed by: STUDENT IN AN ORGANIZED HEALTH CARE EDUCATION/TRAINING PROGRAM

## 2022-10-03 RX ORDER — LIDOCAINE HYDROCHLORIDE 10 MG/ML
5 INJECTION, SOLUTION INFILTRATION; PERINEURAL ONCE
Status: COMPLETED | OUTPATIENT
Start: 2022-10-03 | End: 2022-10-03

## 2022-10-03 RX ADMIN — LIDOCAINE HYDROCHLORIDE 4 ML: 10 INJECTION, SOLUTION INFILTRATION; PERINEURAL at 09:49

## 2022-10-03 ASSESSMENT — PAIN - FUNCTIONAL ASSESSMENT: PAIN_FUNCTIONAL_ASSESSMENT: 0-10

## 2022-10-04 ENCOUNTER — TELEPHONE (OUTPATIENT)
Dept: SURGERY | Age: 77
End: 2022-10-04

## 2022-10-04 ENCOUNTER — ANESTHESIA EVENT (OUTPATIENT)
Dept: SURGERY | Age: 77
End: 2022-10-04
Payer: MEDICARE

## 2022-10-04 NOTE — TELEPHONE ENCOUNTER
Patient called inquiring about using her hemorrhoid cream due to a flare up this morning. I instructed the patient to contact Pre-Assessment [ at [phone] ] regarding medication questions prior to surgery. Patient verbalized understanding.

## 2022-10-05 ENCOUNTER — APPOINTMENT (OUTPATIENT)
Dept: NUCLEAR MEDICINE | Age: 77
End: 2022-10-05
Payer: MEDICARE

## 2022-10-05 ENCOUNTER — HOSPITAL ENCOUNTER (OUTPATIENT)
Age: 77
Setting detail: OUTPATIENT SURGERY
Discharge: HOME | End: 2022-10-05
Attending: STUDENT IN AN ORGANIZED HEALTH CARE EDUCATION/TRAINING PROGRAM | Admitting: STUDENT IN AN ORGANIZED HEALTH CARE EDUCATION/TRAINING PROGRAM
Payer: MEDICARE

## 2022-10-05 ENCOUNTER — ANESTHESIA (OUTPATIENT)
Dept: SURGERY | Age: 77
End: 2022-10-05
Payer: MEDICARE

## 2022-10-05 ENCOUNTER — APPOINTMENT (OUTPATIENT)
Dept: MAMMOGRAPHY | Age: 77
End: 2022-10-05
Attending: STUDENT IN AN ORGANIZED HEALTH CARE EDUCATION/TRAINING PROGRAM
Payer: MEDICARE

## 2022-10-05 VITALS
SYSTOLIC BLOOD PRESSURE: 163 MMHG | DIASTOLIC BLOOD PRESSURE: 95 MMHG | RESPIRATION RATE: 16 BRPM | HEIGHT: 65 IN | TEMPERATURE: 97.4 F | WEIGHT: 165.8 LBS | OXYGEN SATURATION: 97 % | BODY MASS INDEX: 27.63 KG/M2 | HEART RATE: 58 BPM

## 2022-10-05 DIAGNOSIS — C50.911 INFILTRATING DUCTAL CARCINOMA OF RIGHT BREAST (HCC): ICD-10-CM

## 2022-10-05 DIAGNOSIS — C50.911 INFILTRATING DUCTAL CARCINOMA OF BREAST, RIGHT (HCC): ICD-10-CM

## 2022-10-05 LAB
GLUCOSE BLD STRIP.AUTO-MCNC: 105 MG/DL (ref 65–100)
POTASSIUM BLD-SCNC: 3.8 MMOL/L (ref 3.5–5.1)
SERVICE CMNT-IMP: ABNORMAL

## 2022-10-05 PROCEDURE — 3600000014 HC SURGERY LEVEL 4 ADDTL 15MIN: Performed by: STUDENT IN AN ORGANIZED HEALTH CARE EDUCATION/TRAINING PROGRAM

## 2022-10-05 PROCEDURE — 3700000001 HC ADD 15 MINUTES (ANESTHESIA): Performed by: STUDENT IN AN ORGANIZED HEALTH CARE EDUCATION/TRAINING PROGRAM

## 2022-10-05 PROCEDURE — 76098 X-RAY EXAM SURGICAL SPECIMEN: CPT

## 2022-10-05 PROCEDURE — 6360000002 HC RX W HCPCS: Performed by: STUDENT IN AN ORGANIZED HEALTH CARE EDUCATION/TRAINING PROGRAM

## 2022-10-05 PROCEDURE — 2500000003 HC RX 250 WO HCPCS: Performed by: NURSE ANESTHETIST, CERTIFIED REGISTERED

## 2022-10-05 PROCEDURE — 7100000011 HC PHASE II RECOVERY - ADDTL 15 MIN: Performed by: STUDENT IN AN ORGANIZED HEALTH CARE EDUCATION/TRAINING PROGRAM

## 2022-10-05 PROCEDURE — 6360000002 HC RX W HCPCS: Performed by: NURSE ANESTHETIST, CERTIFIED REGISTERED

## 2022-10-05 PROCEDURE — 84132 ASSAY OF SERUM POTASSIUM: CPT

## 2022-10-05 PROCEDURE — 2709999900 HC NON-CHARGEABLE SUPPLY: Performed by: STUDENT IN AN ORGANIZED HEALTH CARE EDUCATION/TRAINING PROGRAM

## 2022-10-05 PROCEDURE — 88307 TISSUE EXAM BY PATHOLOGIST: CPT

## 2022-10-05 PROCEDURE — 3430000000 HC RX DIAGNOSTIC RADIOPHARMACEUTICAL: Performed by: STUDENT IN AN ORGANIZED HEALTH CARE EDUCATION/TRAINING PROGRAM

## 2022-10-05 PROCEDURE — 3700000000 HC ANESTHESIA ATTENDED CARE: Performed by: STUDENT IN AN ORGANIZED HEALTH CARE EDUCATION/TRAINING PROGRAM

## 2022-10-05 PROCEDURE — 7100000000 HC PACU RECOVERY - FIRST 15 MIN: Performed by: STUDENT IN AN ORGANIZED HEALTH CARE EDUCATION/TRAINING PROGRAM

## 2022-10-05 PROCEDURE — 38525 BIOPSY/REMOVAL LYMPH NODES: CPT | Performed by: STUDENT IN AN ORGANIZED HEALTH CARE EDUCATION/TRAINING PROGRAM

## 2022-10-05 PROCEDURE — 2500000003 HC RX 250 WO HCPCS: Performed by: STUDENT IN AN ORGANIZED HEALTH CARE EDUCATION/TRAINING PROGRAM

## 2022-10-05 PROCEDURE — 19301 PARTIAL MASTECTOMY: CPT | Performed by: STUDENT IN AN ORGANIZED HEALTH CARE EDUCATION/TRAINING PROGRAM

## 2022-10-05 PROCEDURE — 2580000003 HC RX 258: Performed by: ANESTHESIOLOGY

## 2022-10-05 PROCEDURE — 7100000001 HC PACU RECOVERY - ADDTL 15 MIN: Performed by: STUDENT IN AN ORGANIZED HEALTH CARE EDUCATION/TRAINING PROGRAM

## 2022-10-05 PROCEDURE — 7100000010 HC PHASE II RECOVERY - FIRST 15 MIN: Performed by: STUDENT IN AN ORGANIZED HEALTH CARE EDUCATION/TRAINING PROGRAM

## 2022-10-05 PROCEDURE — 82962 GLUCOSE BLOOD TEST: CPT

## 2022-10-05 PROCEDURE — 78195 LYMPH SYSTEM IMAGING: CPT

## 2022-10-05 PROCEDURE — A9541 TC99M SULFUR COLLOID: HCPCS | Performed by: STUDENT IN AN ORGANIZED HEALTH CARE EDUCATION/TRAINING PROGRAM

## 2022-10-05 PROCEDURE — 3600000004 HC SURGERY LEVEL 4 BASE: Performed by: STUDENT IN AN ORGANIZED HEALTH CARE EDUCATION/TRAINING PROGRAM

## 2022-10-05 RX ORDER — FENTANYL CITRATE 50 UG/ML
100 INJECTION, SOLUTION INTRAMUSCULAR; INTRAVENOUS
Status: DISCONTINUED | OUTPATIENT
Start: 2022-10-05 | End: 2022-10-05 | Stop reason: HOSPADM

## 2022-10-05 RX ORDER — FENTANYL CITRATE 50 UG/ML
INJECTION, SOLUTION INTRAMUSCULAR; INTRAVENOUS PRN
Status: DISCONTINUED | OUTPATIENT
Start: 2022-10-05 | End: 2022-10-05 | Stop reason: SDUPTHER

## 2022-10-05 RX ORDER — EPHEDRINE SULFATE/0.9% NACL/PF 50 MG/5 ML
SYRINGE (ML) INTRAVENOUS PRN
Status: DISCONTINUED | OUTPATIENT
Start: 2022-10-05 | End: 2022-10-05 | Stop reason: SDUPTHER

## 2022-10-05 RX ORDER — SODIUM CHLORIDE 0.9 % (FLUSH) 0.9 %
5-40 SYRINGE (ML) INJECTION EVERY 12 HOURS SCHEDULED
Status: DISCONTINUED | OUTPATIENT
Start: 2022-10-05 | End: 2022-10-05 | Stop reason: HOSPADM

## 2022-10-05 RX ORDER — ONDANSETRON 2 MG/ML
INJECTION INTRAMUSCULAR; INTRAVENOUS PRN
Status: DISCONTINUED | OUTPATIENT
Start: 2022-10-05 | End: 2022-10-05 | Stop reason: SDUPTHER

## 2022-10-05 RX ORDER — TRAMADOL HYDROCHLORIDE 50 MG/1
50 TABLET ORAL EVERY 4 HOURS PRN
Qty: 15 TABLET | Refills: 0 | Status: SHIPPED | OUTPATIENT
Start: 2022-10-05 | End: 2022-10-08

## 2022-10-05 RX ORDER — DEXTROSE MONOHYDRATE 100 MG/ML
INJECTION, SOLUTION INTRAVENOUS CONTINUOUS PRN
Status: DISCONTINUED | OUTPATIENT
Start: 2022-10-05 | End: 2022-10-05 | Stop reason: HOSPADM

## 2022-10-05 RX ORDER — SODIUM CHLORIDE 0.9 % (FLUSH) 0.9 %
5-40 SYRINGE (ML) INJECTION PRN
Status: DISCONTINUED | OUTPATIENT
Start: 2022-10-05 | End: 2022-10-05 | Stop reason: HOSPADM

## 2022-10-05 RX ORDER — DIPHENHYDRAMINE HYDROCHLORIDE 50 MG/ML
12.5 INJECTION INTRAMUSCULAR; INTRAVENOUS
Status: DISCONTINUED | OUTPATIENT
Start: 2022-10-05 | End: 2022-10-05 | Stop reason: HOSPADM

## 2022-10-05 RX ORDER — BUPIVACAINE HYDROCHLORIDE 5 MG/ML
INJECTION, SOLUTION EPIDURAL; INTRACAUDAL PRN
Status: DISCONTINUED | OUTPATIENT
Start: 2022-10-05 | End: 2022-10-05 | Stop reason: HOSPADM

## 2022-10-05 RX ORDER — PROCHLORPERAZINE EDISYLATE 5 MG/ML
5 INJECTION INTRAMUSCULAR; INTRAVENOUS
Status: DISCONTINUED | OUTPATIENT
Start: 2022-10-05 | End: 2022-10-05 | Stop reason: HOSPADM

## 2022-10-05 RX ORDER — SODIUM CHLORIDE, SODIUM LACTATE, POTASSIUM CHLORIDE, CALCIUM CHLORIDE 600; 310; 30; 20 MG/100ML; MG/100ML; MG/100ML; MG/100ML
INJECTION, SOLUTION INTRAVENOUS CONTINUOUS
Status: DISCONTINUED | OUTPATIENT
Start: 2022-10-05 | End: 2022-10-05 | Stop reason: HOSPADM

## 2022-10-05 RX ORDER — SODIUM CHLORIDE 9 MG/ML
INJECTION, SOLUTION INTRAVENOUS PRN
Status: DISCONTINUED | OUTPATIENT
Start: 2022-10-05 | End: 2022-10-05 | Stop reason: HOSPADM

## 2022-10-05 RX ORDER — OXYCODONE HYDROCHLORIDE 5 MG/1
5 TABLET ORAL
Status: DISCONTINUED | OUTPATIENT
Start: 2022-10-05 | End: 2022-10-05 | Stop reason: HOSPADM

## 2022-10-05 RX ORDER — HEPARIN SODIUM 5000 [USP'U]/ML
5000 INJECTION, SOLUTION INTRAVENOUS; SUBCUTANEOUS ONCE
Status: COMPLETED | OUTPATIENT
Start: 2022-10-05 | End: 2022-10-05

## 2022-10-05 RX ORDER — DEXAMETHASONE SODIUM PHOSPHATE 10 MG/ML
INJECTION INTRAMUSCULAR; INTRAVENOUS PRN
Status: DISCONTINUED | OUTPATIENT
Start: 2022-10-05 | End: 2022-10-05 | Stop reason: SDUPTHER

## 2022-10-05 RX ORDER — DOCUSATE SODIUM 100 MG/1
100 CAPSULE, LIQUID FILLED ORAL 2 TIMES DAILY
Qty: 60 CAPSULE | Refills: 0 | Status: SHIPPED | OUTPATIENT
Start: 2022-10-05 | End: 2022-11-04

## 2022-10-05 RX ORDER — LIDOCAINE HYDROCHLORIDE 20 MG/ML
INJECTION, SOLUTION EPIDURAL; INFILTRATION; INTRACAUDAL; PERINEURAL PRN
Status: DISCONTINUED | OUTPATIENT
Start: 2022-10-05 | End: 2022-10-05 | Stop reason: SDUPTHER

## 2022-10-05 RX ORDER — MIDAZOLAM HYDROCHLORIDE 2 MG/2ML
2 INJECTION, SOLUTION INTRAMUSCULAR; INTRAVENOUS
Status: DISCONTINUED | OUTPATIENT
Start: 2022-10-05 | End: 2022-10-05 | Stop reason: HOSPADM

## 2022-10-05 RX ORDER — PROPOFOL 10 MG/ML
INJECTION, EMULSION INTRAVENOUS PRN
Status: DISCONTINUED | OUTPATIENT
Start: 2022-10-05 | End: 2022-10-05 | Stop reason: SDUPTHER

## 2022-10-05 RX ORDER — LIDOCAINE HYDROCHLORIDE AND EPINEPHRINE 20; 5 MG/ML; UG/ML
INJECTION, SOLUTION EPIDURAL; INFILTRATION; INTRACAUDAL; PERINEURAL PRN
Status: DISCONTINUED | OUTPATIENT
Start: 2022-10-05 | End: 2022-10-05 | Stop reason: HOSPADM

## 2022-10-05 RX ORDER — LIDOCAINE HYDROCHLORIDE 10 MG/ML
1 INJECTION, SOLUTION INFILTRATION; PERINEURAL
Status: DISCONTINUED | OUTPATIENT
Start: 2022-10-05 | End: 2022-10-05 | Stop reason: HOSPADM

## 2022-10-05 RX ORDER — ACETAMINOPHEN 500 MG
1000 TABLET ORAL ONCE
Status: DISCONTINUED | OUTPATIENT
Start: 2022-10-05 | End: 2022-10-05 | Stop reason: HOSPADM

## 2022-10-05 RX ORDER — HYDROMORPHONE HYDROCHLORIDE 1 MG/ML
0.5 INJECTION, SOLUTION INTRAMUSCULAR; INTRAVENOUS; SUBCUTANEOUS EVERY 10 MIN PRN
Status: DISCONTINUED | OUTPATIENT
Start: 2022-10-05 | End: 2022-10-05 | Stop reason: HOSPADM

## 2022-10-05 RX ORDER — ACETAMINOPHEN 500 MG
1000 TABLET ORAL EVERY 6 HOURS PRN
COMMUNITY

## 2022-10-05 RX ADMIN — PHENYLEPHRINE HYDROCHLORIDE 50 MCG: 0.1 INJECTION, SOLUTION INTRAVENOUS at 11:33

## 2022-10-05 RX ADMIN — DEXAMETHASONE SODIUM PHOSPHATE 7 MG: 10 INJECTION INTRAMUSCULAR; INTRAVENOUS at 11:09

## 2022-10-05 RX ADMIN — FENTANYL CITRATE 25 MCG: 50 INJECTION, SOLUTION INTRAMUSCULAR; INTRAVENOUS at 11:43

## 2022-10-05 RX ADMIN — PHENYLEPHRINE HYDROCHLORIDE 50 MCG: 0.1 INJECTION, SOLUTION INTRAVENOUS at 11:56

## 2022-10-05 RX ADMIN — Medication 482 MICRO CURIE: at 08:00

## 2022-10-05 RX ADMIN — PROPOFOL 150 MG: 10 INJECTION, EMULSION INTRAVENOUS at 10:54

## 2022-10-05 RX ADMIN — Medication 10 MG: at 11:10

## 2022-10-05 RX ADMIN — SODIUM CHLORIDE, SODIUM LACTATE, POTASSIUM CHLORIDE, AND CALCIUM CHLORIDE: 600; 310; 30; 20 INJECTION, SOLUTION INTRAVENOUS at 11:06

## 2022-10-05 RX ADMIN — PHENYLEPHRINE HYDROCHLORIDE 50 MCG: 0.1 INJECTION, SOLUTION INTRAVENOUS at 11:18

## 2022-10-05 RX ADMIN — PHENYLEPHRINE HYDROCHLORIDE 50 MCG: 0.1 INJECTION, SOLUTION INTRAVENOUS at 11:27

## 2022-10-05 RX ADMIN — PHENYLEPHRINE HYDROCHLORIDE 50 MCG: 0.1 INJECTION, SOLUTION INTRAVENOUS at 11:41

## 2022-10-05 RX ADMIN — Medication 5 MG: at 11:56

## 2022-10-05 RX ADMIN — HEPARIN SODIUM 5000 UNITS: 5000 INJECTION INTRAVENOUS; SUBCUTANEOUS at 08:43

## 2022-10-05 RX ADMIN — ONDANSETRON 4 MG: 2 INJECTION INTRAMUSCULAR; INTRAVENOUS at 11:09

## 2022-10-05 RX ADMIN — Medication 2000 MG: at 11:04

## 2022-10-05 RX ADMIN — Medication 10 MG: at 10:57

## 2022-10-05 RX ADMIN — FENTANYL CITRATE 25 MCG: 50 INJECTION, SOLUTION INTRAMUSCULAR; INTRAVENOUS at 11:03

## 2022-10-05 RX ADMIN — SODIUM CHLORIDE, SODIUM LACTATE, POTASSIUM CHLORIDE, AND CALCIUM CHLORIDE: 600; 310; 30; 20 INJECTION, SOLUTION INTRAVENOUS at 09:10

## 2022-10-05 RX ADMIN — PHENYLEPHRINE HYDROCHLORIDE 50 MCG: 0.1 INJECTION, SOLUTION INTRAVENOUS at 11:12

## 2022-10-05 RX ADMIN — Medication 5 MG: at 11:41

## 2022-10-05 RX ADMIN — Medication 5 MG: at 11:33

## 2022-10-05 RX ADMIN — Medication 10 MG: at 11:27

## 2022-10-05 RX ADMIN — Medication 5 MG: at 11:18

## 2022-10-05 RX ADMIN — LIDOCAINE HYDROCHLORIDE 80 MG: 20 INJECTION, SOLUTION EPIDURAL; INFILTRATION; INTRACAUDAL; PERINEURAL at 10:53

## 2022-10-05 ASSESSMENT — PAIN SCALES - GENERAL
PAINLEVEL_OUTOF10: 0

## 2022-10-05 ASSESSMENT — PAIN - FUNCTIONAL ASSESSMENT: PAIN_FUNCTIONAL_ASSESSMENT: 0-10

## 2022-10-05 NOTE — ANESTHESIA PRE PROCEDURE
Department of Anesthesiology  Preprocedure Note       Name:  Romell Jeans   Age:  65 y.o.  :  1945                                          MRN:  [id number]         Date:  10/5/2022      Surgeon: Ana Coleman):  Luan Kennedy DO    Procedure: Procedure(s):  RIGHT BREAST LUMPECTOMY WITH MAGSEED LOCALIZATION  BREAST BIOPSY SENTINEL NODE DISSECTION  Pre-op : 7:00, Lympho:8:00, LOC:  seed placed 10-3-22 , Surgery:  10:30am    Medications prior to admission:   Prior to Admission medications    Medication Sig Start Date End Date Taking? Authorizing Provider   acetaminophen (TYLENOL) 500 MG tablet Take 1,000 mg by mouth every 6 hours as needed for Pain   Yes Historical Provider, MD   magnesium oxide (MAG-OX) 400 MG tablet Take 400 mg by mouth daily   Yes Historical Provider, MD   Multiple Vitamin (MULTIVITAMIN ADULT PO) Take by mouth daily   Yes Historical Provider, MD   calcium carbonate (OSCAL) 500 MG TABS tablet Take 500 mg by mouth daily   Yes Historical Provider, MD   carbamide peroxide (DEBROX) 6.5 % otic solution Place 5 drops into the left ear 2 times daily 9/23/22 10/23/22  RUBEN Brunner CNP   nystatin (MYCOSTATIN) 190124 UNIT/GM cream Apply topically 2 times daily.  22   RUBEN Brunner CNP   atorvastatin (LIPITOR) 40 MG tablet Take 1 tablet by mouth daily  Patient taking differently: Take 40 mg by mouth at bedtime 22   Hermelinda Greenwood MD   irbesartan (AVAPRO) 150 MG tablet Take 1 tablet by mouth daily  Patient taking differently: Take 150 mg by mouth at bedtime 22   Hermelinda Greenwood MD   levothyroxine (SYNTHROID) 75 MCG tablet Take 1 tablet by mouth Daily TK 1 T PO D Indications: a condition with low thyroid hormone levels 22   Hermelinda Greenwood MD   atenolol-chlorthalidone (TENORETIC) 50-25 MG per tablet Take 0.5 tablets by mouth daily 22   Hermelinda Greenwood MD   Diabetic Shoe MISC by Does not apply route Diabetic shoe, custom orthoses, custom prosthetic filler, DM2, neuropathy 9/19/22   Fernando Lin MD   Cholecalciferol 50 MCG (2000 UT) TABS Take by mouth    Ar Automatic Reconciliation   coenzyme Q10 100 MG CAPS capsule Take 100 mg by mouth daily    Ar Automatic Reconciliation   hydrocortisone 2.5 % cream Place rectally 4 times daily 2/17/22   Ar Automatic Reconciliation       Current medications:    Current Facility-Administered Medications   Medication Dose Route Frequency Provider Last Rate Last Admin    ceFAZolin (ANCEF) 2000 mg in sterile water 20 mL IV syringe  2,000 mg IntraVENous Once Guy Whitman DO        lidocaine 1 % injection 1 mL  1 mL IntraDERmal Once PRN Sangeeta Roberson MD        acetaminophen (TYLENOL) tablet 1,000 mg  1,000 mg Oral Once Sangeeta Roberson MD        fentaNYL (SUBLIMAZE) injection 100 mcg  100 mcg IntraVENous Once PRN Sangeeta Roberson MD        lactated ringers infusion   IntraVENous Continuous Sangeeta Roberson  mL/hr at 10/05/22 0910 New Bag at 10/05/22 0910    sodium chloride flush 0.9 % injection 5-40 mL  5-40 mL IntraVENous 2 times per day Sangeeta Roberson MD        sodium chloride flush 0.9 % injection 5-40 mL  5-40 mL IntraVENous PRN Sangeeta Roberson MD        0.9 % sodium chloride infusion   IntraVENous PRN Sangeeta Roberson MD        midazolam PF (VERSED) injection 2 mg  2 mg IntraVENous Once PRN Sangeeta Roberson MD           Allergies:     Allergies   Allergen Reactions    Milk Protein Other (See Comments)     Increased mucus production       Problem List:    Patient Active Problem List   Diagnosis Code    History of breast cancer Z85.3    Arthritis M19.90    Spondylosis of lumbosacral region without myelopathy or radiculopathy M47.817    Chronic pain of left knee M25.562, G89.29    Hyperlipidemia E78.5    Anxiety associated with depression F41.8    Right median nerve neuropathy G56.11    Refused varicella vaccine Z28.21    Hypertension I10    Abdominal wall hernia K43.9    Hip pain, bilateral M25.551, M25.552    Adenomatous polyp of colon D12.6    Stress incontinence N39.3    Agatston coronary artery calcium score greater than 400 R93.1    Controlled type 2 diabetes mellitus with microalbuminuria, without long-term current use of insulin (HCC) E11.29, R80.9    Anxiety F41.9    Hypothyroidism E03.9    Infiltrating ductal carcinoma of right breast ([address] C50.911       Past Medical History:        Diagnosis Date    Abdominal pain     Arthritis     Breast cancer ([address]     Breast cancer, left ([address] 2001    radiation, surgery    Breast pain in female     Bunion     Chronic pain of left knee     Corns and callosities     DDD (degenerative disc disease), lumbar     Diabetes mellitus type II, controlled ([address]     Diet controlled    Emotional disorder     Fungal infection of toenail     Hallux valgus     Hip pain, bilateral     Hyperglycemia     Hyperlipidemia     Hypertension     medication    Hypothyroidism     Lower GI bleed     Obesity     Other ill-defined conditions(799.89)     pt reports urinalysis shows blood - she is scheduled for CT pelvis and abdomen in August)    Postmenopausal     Right median nerve neuropathy     Stress incontinence     Traumatic arthropathy of knee        Past Surgical History:        Procedure Laterality Date    BREAST BIOPSY Right 2011, Stereo Core Bx 8-31-22    benign    BREAST LUMPECTOMY Left 2001    radiation for CA    COLONOSCOPY  2008    HYSTERECTOMY (CERVIX STATUS UNKNOWN)      VERN STEROTACTIC LOC BREAST BIOPSY RIGHT Right 8/31/2022    VERN STEROTACTIC LOC BREAST BIOPSY RIGHT 8/31/2022 Terese Andrade MD [facility]    ORTHOPEDIC SURGERY Left     ankle arthroscopy    ORTHOPEDIC SURGERY      left knee repair    ORTHOPEDIC SURGERY      right ankle    ORTHOPEDIC SURGERY  2005    Left TSA    SHOULDER ARTHROPLASTY Left     TUBAL LIGATION      UROLOGICAL SURGERY      cystoscopy    US GUIDED CORE BREAST BIOPSY Right        Social History:    Social History     Tobacco Use    Smoking status: Former     Packs/day: 0.25     Types: Cigarettes     Quit date: 2001     Years since quittin.2    Smokeless tobacco: Never   Substance Use Topics    Alcohol use: No                                Counseling given: Not Answered      Vital Signs (Current):   Vitals:    22 0736 10/05/22 0730   BP:  (!) 144/92   Pulse:  53   Resp:  17   Temp:  97.5 °F (36.4 °C)   TempSrc:  Temporal   SpO2:  99%   Weight: 165 lb (74.8 kg) 165 lb 12.8 oz (75.2 kg)   Height: 5' 2\" (1.575 m) 5' 5\" (1.651 m)                                              BP Readings from Last 3 Encounters:   10/05/22 (!) 144/92   22 115/70   22 131/82       NPO Status: Time of last liquid consumption: 0                        Time of last solid consumption:                         Date of last liquid consumption: 10/04/22                        Date of last solid food consumption: 10/04/22    BMI:   Wt Readings from Last 3 Encounters:   10/05/22 165 lb 12.8 oz (75.2 kg)   22 166 lb (75.3 kg)   22 164 lb 11.2 oz (74.7 kg)     Body mass index is 27.59 kg/m².     CBC:   Lab Results   Component Value Date/Time    WBC 5.5 2022 08:23 AM    RBC 4.56 2022 08:23 AM    HGB 12.9 2022 08:23 AM    HCT 40.0 2022 08:23 AM    MCV 87.7 2022 08:23 AM    RDW 13.8 2022 08:23 AM     2022 08:23 AM       CMP:   Lab Results   Component Value Date/Time     2022 08:23 AM    K 3.9 2022 08:23 AM     2022 08:23 AM    CO2 30 2022 08:23 AM    BUN 18 2022 08:23 AM    CREATININE 1.10 2022 08:23 AM    GFRAA >60 2022 08:23 AM    AGRATIO 1.5 2022 08:13 AM    LABGLOM 51 2022 08:23 AM    GLUCOSE 118 2022 08:23 AM    PROT 7.5 2022 08:23 AM    CALCIUM 9.4 2022 08:23 AM    BILITOT 0.9 2022 08:23 AM    ALKPHOS 63 2022 08:23 AM    ALKPHOS 72 02/22/2022 08:13 AM    AST 16 08/25/2022 08:23 AM    ALT 21 08/25/2022 08:23 AM       POC Tests:   Recent Labs     10/05/22  0905 10/05/22  0912   POCGLU 105*  --    POCK  --  3.8       Coags: No results found for: PROTIME, INR, APTT    HCG (If Applicable): No results found for: PREGTESTUR, PREGSERUM, HCG, HCGQUANT     ABGs: No results found for: PHART, PO2ART, ZMQ1HBD, QKT8SMB, BEART, B4RMECYP     Type & Screen (If Applicable):  No results found for: LABABO, LABRH    Drug/Infectious Status (If Applicable):  No results found for: HIV, HEPCAB    COVID-19 Screening (If Applicable):   Lab Results   Component Value Date/Time    COVID19 Detected 07/03/2022 10:23 AM           Anesthesia Evaluation  Patient summary reviewed  Airway: Mallampati: II          Dental:    (+) upper dentures, lower dentures and partials      Pulmonary:Negative Pulmonary ROS and normal exam  breath sounds clear to auscultation                             Cardiovascular:    (+) hypertension:,         Rhythm: regular  Rate: normal                    Neuro/Psych:   (+) depression/anxiety             GI/Hepatic/Renal: Neg GI/Hepatic/Renal ROS            Endo/Other:    (+) DiabetesType II DM, , hypothyroidism::., .                 Abdominal:   (+) obese,           Vascular: negative vascular ROS. Other Findings:           Anesthesia Plan      general     ASA 2       Induction: intravenous. MIPS: Postoperative opioids intended and Prophylactic antiemetics administered. Anesthetic plan and risks discussed with patient. Plan discussed with CRNA.                     Umberto Lopez DO   10/5/2022

## 2022-10-05 NOTE — ANESTHESIA POSTPROCEDURE EVALUATION
Department of Anesthesiology  Postprocedure Note    Patient: Luac Selby  MRN: [id number]  YOB: 1945  Date of evaluation: 10/5/2022      Procedure Summary     Date: 10/05/22 [place] [facility] [place] [facility] [place]    Anesthesia Start: 1046 Anesthesia Stop: 9625    Procedures:       RIGHT BREAST LUMPECTOMY WITH MAGSEED LOCALIZATION (Right)      BREAST BIOPSY SENTINEL NODE DISSECTION  Pre-op : 7:00, Lympho:8:00, LOC:  seed placed 10-3-22 , Surgery:  10:30am (Right) Diagnosis:       Infiltrating ductal carcinoma of breast, right (HCC)      (Infiltrating ductal carcinoma of breast, right ([facility] [C50.911])    [facility] Responsible Provider: Kathryn Best DO    Anesthesia Type: General ASA Status: 2          Anesthesia Type: General    Jean-Paul Phase I: Jean-Paul Score: 10    Jean-Paul Phase II: Jean-Paul Score: 10      Anesthesia Post Evaluation    Patient location during evaluation: PACU  Level of consciousness: awake and alert  Airway patency: patent  Nausea & Vomiting: no nausea  Complications: no  Cardiovascular status: blood pressure returned to baseline and hemodynamically stable  Respiratory status: acceptable  Hydration status: euvolemic

## 2022-10-05 NOTE — INTERVAL H&P NOTE
Update History & Physical    The patient's History and Physical of September 21, 2022 was reviewed with the patient and I examined the patient. There was no change. The surgical site was confirmed by the patient and me. Plan: The risks, benefits, expected outcome, and alternative to the recommended procedure have been discussed with the patient. Patient understands and wants to proceed with the procedure.      Electronically signed by Myriam Ford DO on 10/5/2022 at 10:36 AM

## 2022-10-05 NOTE — OP NOTE
Seed Localization Partial Mastectomy Procedure Note      Name:  Lori Herbert Date/Time of Admission: 10/5/2022  7:18 AM  CSN: [id number]  Attending Provider: Jodee Bergeron, *  MRN:  [id number]  : 1945 65 y.o. Pre-operative Diagnosis: R IDC    Post-operative Diagnosis: Same    Procedure:   1. right Breast Seed Localization Partial Mastectomy  2. right [place] Lymph Node Biopsy    Surgeon: Jodee Bergeron, *    Anesthesia: General endotracheal anesthesia    INDICATION: The patient is a 65y.o.-year-old female with known IDC. They are here for seed localized excision of the mass. Patient is correctly identified in preoperative holding area. Consent was confirmed and placed on the chart. Preoperative antibiotics were administered per SCIP protocol. The patient was then taken back to the operative suite and placed in the supine position. General anesthesia was performed per anesthesia via an endotracheal tube. The patient's breast was then prepped and draped in the usual sterile fashion. A timeout was performed and all members of the team that were present were in agreement. Attention was turned to the axilla. Using the gamma probe an area of radiotracer uptake was identified. Next the skin overlying this area was locally anesthetized. Using a 15 blade scalpel incision was made through skin and subcutaneous tissues below next Bovie electrocautery was used to further dissection into the axilla. Next a focused dissection was performed with the gamma probe to identify the sentinel lymph nodes. Using combination of blunt and sharp dissection with a right angle as well as Bovie electrocautery, sentinel lymph nodes were dissected free and evaluated individually. [place] lymph node No. 1 was resected from surrounding tissues and had an ex vivo radioactivity count of 1350. [place] node No. 2 had a radioactivity count of 800 and number 3 was 500.  All the sentinel nodes were submitted for pathological analysis. Pathological analysis of the sentinel nodes revealed that neither of the sentinel nodes were positive for metastatic disease. A 15-blade scalpel was then used to make a curvilinear incision within the areola. Electrocautery was used to dissect down through the subcuticular fat. The seed was localized. Allis was then used to grasp the lesion and electrocautery was then used to dissect circumferentially around the mass. The dissection was carried around the seed and extended tissue for good surgical margins. The specimen was marked short superior and long lateral with 2-0 silk. It was then placed in the Faxitron and specimen noted to have the lesion, seed, and clip. Hemostasis was achieved with electrocautery. The wound was copiously irrigated with normal saline. The skin was approximated with 3-0 Monocryl in a simple interrupted subcuticular buried fashion. Dermabond was applied over the incision. All counts were correct.       Meagan Michelle [address], DO

## 2022-10-05 NOTE — DISCHARGE INSTRUCTIONS
DC Breast instructions:    What are my post-op activity restrictions? Do not drive or operate power equipment or engage in activities that require coordination or the ability to respond quickly for 24 hours. · Walking: You may walk without help   · Lifting: Limit lifting to 10 pounds. · Straining:  Avoid activities that cause you to strain. · Stairs: You may climb stairs. · Strenuous activities:  Strenuous, repetitive activities are to be avoided with the affected arm. · Bathing: You may shower in 48 hours. · Weight bearing: Full weight bearing on the affected side. · Elevate: Elevate arm on the surgery side when at rest  · Driving: If you drove prior to surgery, you may resume driving when you are not taking pain medication and able to move the affected arm freely. Do not drive while taking pain medications. · Special Exercises: Light stretching of the affected arm is fine. What care does my wound require? Remove your ace wrap or surgical bra in 48 hours. You may get your incision wet in 48 hours. You may replace your dressing if needed. Wear bra at all times,when not in the shower, if you have had a mastectomy. What can I eat? You can resume eating your previous diet. What do I need to know concerning my pain medication? Avoid alcohol while taking pain medications. Pain medications may cause constipation. You  may increase fluids. You may increase  fiber intake. You  may take an over the counter laxative or stool softener per package instructions if needed. Special Instructions:    Empty drains 2-3 times/day and record amount. Support drains at all times. Remove pain ball when empty, if applicable. 10 deep breaths every hour while awake. You may experience some of the following which are normal:     · It is normal to have pain after surgery. Take medications as ordered to reduce the pain to a tolerable level. · Pain medications may make you sleepy.   · Incision- you may expect a minimal amount of blood or drainage. · Bruising at the operative site   · If you have had general anesthesia, you may have a sore throat for the first 24 hours due to the airway placed in your windpipe. You may also experience dizziness, drowsiness, or lightheadedness after anesthesia or sedation. · You may feel tired, rest as needed        Report the following signs and symptoms of complications to your surgeon:  · Fever above 101 degrees not responding to Tylenol and lasting 4 hours. · Persistent nausea and vomiting. · Excessive pain not controlled with prescribed medication. · Difficulty breathing or unusual shortness of breath. · Unexpected amount of blood/drainage,   · New numbness or tingling. · Difficulty urinating. Call Alice Montenegro DO or the physician on-call by contacting the office to report concerns or for after hours contact information. If the doctor is not available, please go to your local emergency room. MEDICATION INTERACTION:    During your procedure you potentially received a medication or medications which may reduce the effectiveness of oral contraceptives. Please consider other forms of contraception for 1 month following your procedure if you are currently using oral contraceptives as your primary form of birth control. In addition to this, we recommend continuing your oral contraceptive as prescribed, unless otherwise instructed by your physician, during this time. After general anesthesia or intravenous sedation, for 24 hours or while taking prescription Narcotics:  Limit your activities  A responsible adult needs to be with you for the next 24 hours  Do not drive and operate hazardous machinery  Do not make important personal or business decisions  Do not drink alcoholic beverages  If you have not urinated within 8 hours after discharge, and you are experiencing discomfort from urinary retention, please go to the nearest ED.   If you have sleep apnea and have a CPAP machine, please use it for all naps and sleeping. Please use caution when taking narcotics and any of your home medications that may cause drowsiness. *  Please give a list of your current medications to your Primary Care Provider. *  Please update this list whenever your medications are discontinued, doses are      changed, or new medications (including over-the-counter products) are added. *  Please carry medication information at all times in case of emergency situations. These are general instructions for a healthy lifestyle:  No smoking/ No tobacco products/ Avoid exposure to second hand smoke  Surgeon General's Warning:  Quitting smoking now greatly reduces serious risk to your health. Obesity, smoking, and sedentary lifestyle greatly increases your risk for illness  A healthy diet, regular physical exercise & weight monitoring are important for maintaining a healthy lifestyle    You may be retaining fluid if you have a history of heart failure or if you experience any of the following symptoms:  Weight gain of 3 pounds or more overnight or 5 pounds in a week, increased swelling in our hands or feet or shortness of breath while lying flat in bed. Please call your doctor as soon as you notice any of these symptoms; do not wait until your next office visit.

## 2022-10-06 ENCOUNTER — TELEPHONE (OUTPATIENT)
Dept: ONCOLOGY | Age: 77
End: 2022-10-06

## 2022-10-06 NOTE — TELEPHONE ENCOUNTER
Phone Contact PORFIRIO Note       Name: María Green  : [birth year]  MRN: [id number]  Date of Service: 10/6/2022      PORFIRIO called the patient to check in. Patient had surgery this week. Per patient is emotionally doing well. Will follow up with patient in the office, face-to-face.       LAWRENCE Garcia

## 2022-10-07 ENCOUNTER — NURSE ONLY (OUTPATIENT)
Dept: INTERNAL MEDICINE CLINIC | Facility: CLINIC | Age: 77
End: 2022-10-07
Payer: MEDICARE

## 2022-10-07 DIAGNOSIS — H61.23 BILATERAL IMPACTED CERUMEN: Primary | ICD-10-CM

## 2022-10-07 PROCEDURE — 99211 OFF/OP EST MAY X REQ PHY/QHP: CPT | Performed by: INTERNAL MEDICINE

## 2022-10-10 ENCOUNTER — TELEPHONE (OUTPATIENT)
Dept: SURGERY | Age: 77
End: 2022-10-10

## 2022-10-10 NOTE — TELEPHONE ENCOUNTER
Pt left a voicemail regarding post surgical questions. I called her back. She asked if the bruising was normal and I replied yes. She voiced understanding.

## 2022-10-12 ENCOUNTER — PREP FOR PROCEDURE (OUTPATIENT)
Dept: SURGERY | Age: 77
End: 2022-10-12

## 2022-10-12 ENCOUNTER — OFFICE VISIT (OUTPATIENT)
Dept: SURGERY | Age: 77
End: 2022-10-12

## 2022-10-12 DIAGNOSIS — Z09 POSTOPERATIVE EXAMINATION: Primary | ICD-10-CM

## 2022-10-12 PROCEDURE — 99024 POSTOP FOLLOW-UP VISIT: CPT | Performed by: STUDENT IN AN ORGANIZED HEALTH CARE EDUCATION/TRAINING PROGRAM

## 2022-10-12 NOTE — PROGRESS NOTES
General Surgery Office Visit:    Pt presents s/p right lumpectomy with sentinel lymph node biopsy. Pt overall doing well, minimal pain. Tolerating diet and having bowel function. Medications:   Current Outpatient Medications   Medication Sig Dispense Refill    acetaminophen (TYLENOL) 500 MG tablet Take 1,000 mg by mouth every 6 hours as needed for Pain      docusate sodium (COLACE) 100 MG capsule Take 1 capsule by mouth 2 times daily 60 capsule 0    magnesium oxide (MAG-OX) 400 MG tablet Take 400 mg by mouth daily      Multiple Vitamin (MULTIVITAMIN ADULT PO) Take by mouth daily      calcium carbonate (OSCAL) 500 MG TABS tablet Take 500 mg by mouth daily      carbamide peroxide (DEBROX) 6.5 % otic solution Place 5 drops into the left ear 2 times daily 1 each 0    nystatin (MYCOSTATIN) 126801 UNIT/GM cream Apply topically 2 times daily. 1 each 1    atorvastatin (LIPITOR) 40 MG tablet Take 1 tablet by mouth daily (Patient taking differently: Take 40 mg by mouth at bedtime) 90 tablet 3    irbesartan (AVAPRO) 150 MG tablet Take 1 tablet by mouth daily (Patient taking differently: Take 150 mg by mouth at bedtime) 90 tablet 3    levothyroxine (SYNTHROID) 75 MCG tablet Take 1 tablet by mouth Daily TK 1 T PO D Indications: a condition with low thyroid hormone levels 90 tablet 3    atenolol-chlorthalidone (TENORETIC) 50-25 MG per tablet Take 0.5 tablets by mouth daily 90 tablet 3    Diabetic Shoe MISC by Does not apply route Diabetic shoe, custom orthoses, custom prosthetic filler, DM2, neuropathy 1 each 0    Cholecalciferol 50 MCG (2000 UT) TABS Take by mouth      coenzyme Q10 100 MG CAPS capsule Take 100 mg by mouth daily      hydrocortisone 2.5 % cream Place rectally 4 times daily       No current facility-administered medications for this visit. Allergies:    Allergies   Allergen Reactions    Milk Protein Other (See Comments)     Increased mucus production        Past History:  Past Medical History:   Diagnosis Date    Abdominal pain     Arthritis     Breast cancer ([address]     Breast cancer, left ([address] 2001    radiation, surgery    Breast pain in female     Bunion     Chronic pain of left knee     Corns and callosities     DDD (degenerative disc disease), lumbar     Diabetes mellitus type II, controlled ([address]     Diet controlled    Emotional disorder     Fungal infection of toenail     Hallux valgus     Hip pain, bilateral     Hyperglycemia     Hyperlipidemia     Hypertension     medication    Hypothyroidism     Lower GI bleed     Obesity     Other ill-defined conditions(799.89)     pt reports urinalysis shows blood - she is scheduled for CT pelvis and abdomen in August)    Postmenopausal     Right median nerve neuropathy     Stress incontinence     Traumatic arthropathy of knee       Past Surgical History:   Procedure Laterality Date    BREAST BIOPSY Right 2011, Stereo Core Bx 8-31-22    benign    BREAST BIOPSY Right 10/5/2022    RIGHT BREAST LUMPECTOMY WITH MAGSEED LOCALIZATION performed by Patricia Sheikh DO at [facility] Right 10/5/2022    BREAST BIOPSY SENTINEL NODE DISSECTION  Pre-op : 7:00, Lympho:8:00, LOC:  seed placed 10-3-22 , Surgery:  10:30am performed by Patricia Sheikh DO at [address] BREAST LUMPECTOMY Left 2001    radiation for CA    COLONOSCOPY  2008    HYSTERECTOMY (CERVIX STATUS UNKNOWN)      VERN STEROTACTIC LOC BREAST BIOPSY RIGHT Right 8/31/2022    VERN STEROTACTIC LOC BREAST BIOPSY RIGHT 8/31/2022 Jalen Fermin MD [facility]    ORTHOPEDIC SURGERY Left     ankle arthroscopy    ORTHOPEDIC SURGERY      left knee repair    ORTHOPEDIC SURGERY      right ankle    ORTHOPEDIC SURGERY  2005    Left TSA    SHOULDER ARTHROPLASTY Left     TUBAL LIGATION      UROLOGICAL SURGERY      cystoscopy    US GUIDED CORE BREAST BIOPSY Right         Family and Social History:  Family History   Problem Relation Age of Onset    Dementia Mother     Diabetes Mother     Cancer Mother 76        Colon    Diabetes Father     Breast Cancer Sister 78    Breast Cancer Sister 77    Hypertension Maternal Grandmother     Cancer Other         bone CA    Hypertension Other     Diabetes Other     Post-op Cognitive Dysfunction Neg Hx     Malig Hypertherm Neg Hx     Pseudochol. Deficiency Neg Hx     Delayed Awakening Neg Hx     Post-op Nausea/Vomiting Neg Hx     Emergence Delirium Neg Hx     Other Neg Hx      Social History     Socioeconomic History    Marital status: Single     Spouse name: Not on file    Number of children: Not on file    Years of education: Not on file    Highest education level: Not on file   Occupational History    Not on file   Tobacco Use    Smoking status: Former     Packs/day: 0.25     Types: Cigarettes     Quit date: 2001     Years since quittin.2    Smokeless tobacco: Never   Vaping Use    Vaping Use: Never used   Substance and Sexual Activity    Alcohol use: No    Drug use: No    Sexual activity: Not on file   Other Topics Concern    Not on file   Social History Narrative    Not on file     Social Determinants of Health     Financial Resource Strain: Low Risk     Difficulty of Paying Living Expenses: Not hard at all   Food Insecurity: No Food Insecurity    Worried About Running Out of Food in the Last Year: Never true    Francis of Food in the Last Year: Never true   Transportation Needs: No Transportation Needs    Lack of Transportation (Medical): No    Lack of Transportation (Non-Medical):  No   Physical Activity: Not on file   Stress: No Stress Concern Present    Feeling of Stress : Not at all   Social Connections: Unknown    Frequency of Communication with Friends and Family: Not on file    Frequency of Social Gatherings with Friends and Family: Not on file    Attends Anglican Services: Never    Active Member of Clubs or Organizations: No    Attends Club or Organization Meetings: Never    Marital Status:    Intimate Partner Violence: Not At Risk    Fear of Current or Ex-Partner: No    Emotionally Abused: No    Physically Abused: No    Sexually Abused: No   Housing Stability: Unknown    Unable to Pay for Housing in the Last Year: No    Number of Places Lived in the Last Year: Not on file    Unstable Housing in the Last Year: No        REVIEW OF SYSTEMS: 10 Point ROS negative except what is in HPI    PHYSICAL EXAMINATION:    There were no vitals taken for this visit. General Appearance AOx3, in no acute distress  Respiratory No chest wall deformities or tenderness, respiratory effort normal, no use of accessory muscles. Cardiovascular RRR. No chest wall tenderness. Gastrointestinal Abdomen soft, nontender, nondistended. BS x4. No rebound, guarding, or rigidity present. No palpable masses. No CVA tenderness   Incisions: healing appropriately     DIAGNOSIS        A:  \"RIGHT BREAST MASS\":        INFILTRATING DUCTAL CARCINOMA, LOW-GRADE (WELL DIFFERENTIATED)   GROSSLY MEASURING APPROXIMATELY 1.6 X 1.5 X 1.5 CM. MICROSCOPICALLY TUMOR   APPEARS TO EXTEND TO MARKED INFERIOR MARGIN. OTHER MARGINS ARE NEGATIVE   FOR MALIGNANT TUMOR. FOCAL INTERMEDIATE GRADE DUCTAL CARCINOMA IN SITU. DEFINITE LYMPHOVASCULAR INVASION IS NOT IDENTIFIED. SEE COMMENT          B:  \"SENTINEL LYMPH NODES\":        THREE LYMPH NODES NEGATIVE FOR METASTATIC TUMOR     Assessment:  Right infiltrating ductal    Plan:  Pt overall doing well. Did have a discussion with her that unfortunately her inferior margin is positive and therefore we need to do a reexcision of her inferior margins. I will plan on doing this next week. I did explain to her that her sentinel lymph nodes were negative.

## 2022-10-13 ENCOUNTER — CLINICAL DOCUMENTATION (OUTPATIENT)
Dept: CASE MANAGEMENT | Age: 77
End: 2022-10-13

## 2022-10-13 ENCOUNTER — TELEPHONE (OUTPATIENT)
Dept: INTERNAL MEDICINE CLINIC | Facility: CLINIC | Age: 77
End: 2022-10-13

## 2022-10-13 RX ORDER — TRAMADOL HYDROCHLORIDE 50 MG/1
50 TABLET ORAL EVERY 6 HOURS PRN
COMMUNITY

## 2022-10-13 NOTE — TELEPHONE ENCOUNTER
Please contact pt to schedule an appointment with Dr. Dereje Pacheco to discuss breast procedure. Thank you.

## 2022-10-13 NOTE — TELEPHONE ENCOUNTER
----- Message from Tiffanie Lau sent at 10/13/2022  8:13 AM EDT -----  Subject: Message to Provider    QUESTIONS  Information for Provider? patient had breast surgery and was informed that   a piece of cancer was left in, and the lymph nodes came back negative, and   the surgeon is wanting to go back in, patient requesting feedback from Dr. Daniela Malik, please contact patient to advise.   ---------------------------------------------------------------------------  --------------  [id number]  [phone]; OK to leave message on voicemail  ---------------------------------------------------------------------------  --------------  SCRIPT ANSWERS  Relationship to Patient?  Self

## 2022-10-13 NOTE — PERIOP NOTE
Patient verified name and . Order for consent IS NOT found in EHR; patient verifies procedure. Type 1b surgery, phone assessment complete. Orders not received. Labs per surgeon: none  Labs per anesthesia protocol: none    Patient answered medical/surgical history questions at their best of ability. All prior to admission medications documented in [facility]. Patient instructed to take the following medications the day of surgery according to anesthesia guidelines with a small sip of water: Atenolol-chlorthalidone, Levothyroxine, Tramadol if needed. On the day before surgery please take Acetaminophen 1000mg in the morning and then again before bed. You may substitute for Tylenol 650 mg. Hold all vitamins 7 days prior to surgery and NSAIDS 5 days prior to surgery. Patient instructed on the following:    > Arrive at [facility], time of arrival to be called the day before by 1700  > NPO after midnight, unless otherwise indicated, including gum, mints, and ice chips  > Responsible adult must drive patient to the hospital, stay during surgery, and patient will need supervision 24 hours after anesthesia  > Use antibacterial soap in shower the night before surgery and on the morning of surgery  > All piercings must be removed prior to arrival.    > Leave all valuables (money and jewelry) at home but bring insurance card and ID on DOS.   > You may be required to pay a deductible or co-pay on the day of your procedure. You can pre-pay by calling [phone] if your surgery is at the [facility] or [phone] if your surgery is at the [facility]. > Do not wear make-up, nail polish, lotions, cologne, perfumes, powders, or oil on skin. Artificial nails are not permitted.

## 2022-10-14 ENCOUNTER — TELEPHONE (OUTPATIENT)
Dept: INTERNAL MEDICINE CLINIC | Facility: CLINIC | Age: 77
End: 2022-10-14

## 2022-10-14 NOTE — TELEPHONE ENCOUNTER
Called patient to check on her after she came in to office upset about outcome of recent breast cancer surgery. She states they will have to go back in to do re-excision of area. Patient states she wanted Dr Yasir Meyer' opinion. After speaking with Dr Yasir Meyer about patient,he states patient should follow recommendation of her surgeon. Patient voiced understanding. Patient was told try not to worry and we would be keeping her in our prayers. Patient was very appreciative.

## 2022-10-18 ENCOUNTER — ANESTHESIA EVENT (OUTPATIENT)
Dept: SURGERY | Age: 77
End: 2022-10-18
Payer: MEDICARE

## 2022-10-19 ENCOUNTER — ANESTHESIA (OUTPATIENT)
Dept: SURGERY | Age: 77
End: 2022-10-19
Payer: MEDICARE

## 2022-10-19 ENCOUNTER — HOSPITAL ENCOUNTER (OUTPATIENT)
Age: 77
Setting detail: OUTPATIENT SURGERY
Discharge: HOME | End: 2022-10-19
Attending: STUDENT IN AN ORGANIZED HEALTH CARE EDUCATION/TRAINING PROGRAM | Admitting: STUDENT IN AN ORGANIZED HEALTH CARE EDUCATION/TRAINING PROGRAM
Payer: MEDICARE

## 2022-10-19 VITALS
BODY MASS INDEX: 27.77 KG/M2 | SYSTOLIC BLOOD PRESSURE: 137 MMHG | WEIGHT: 166.7 LBS | DIASTOLIC BLOOD PRESSURE: 90 MMHG | OXYGEN SATURATION: 97 % | HEIGHT: 65 IN | HEART RATE: 54 BPM | RESPIRATION RATE: 16 BRPM | TEMPERATURE: 97.2 F

## 2022-10-19 LAB
GLUCOSE BLD STRIP.AUTO-MCNC: 94 MG/DL (ref 65–100)
SERVICE CMNT-IMP: NORMAL

## 2022-10-19 PROCEDURE — 3700000001 HC ADD 15 MINUTES (ANESTHESIA): Performed by: STUDENT IN AN ORGANIZED HEALTH CARE EDUCATION/TRAINING PROGRAM

## 2022-10-19 PROCEDURE — 2580000003 HC RX 258: Performed by: ANESTHESIOLOGY

## 2022-10-19 PROCEDURE — 3600000012 HC SURGERY LEVEL 2 ADDTL 15MIN: Performed by: STUDENT IN AN ORGANIZED HEALTH CARE EDUCATION/TRAINING PROGRAM

## 2022-10-19 PROCEDURE — 82962 GLUCOSE BLOOD TEST: CPT

## 2022-10-19 PROCEDURE — 7100000000 HC PACU RECOVERY - FIRST 15 MIN: Performed by: STUDENT IN AN ORGANIZED HEALTH CARE EDUCATION/TRAINING PROGRAM

## 2022-10-19 PROCEDURE — 6360000002 HC RX W HCPCS: Performed by: NURSE ANESTHETIST, CERTIFIED REGISTERED

## 2022-10-19 PROCEDURE — 2709999900 HC NON-CHARGEABLE SUPPLY: Performed by: STUDENT IN AN ORGANIZED HEALTH CARE EDUCATION/TRAINING PROGRAM

## 2022-10-19 PROCEDURE — 2500000003 HC RX 250 WO HCPCS: Performed by: STUDENT IN AN ORGANIZED HEALTH CARE EDUCATION/TRAINING PROGRAM

## 2022-10-19 PROCEDURE — 6360000002 HC RX W HCPCS: Performed by: STUDENT IN AN ORGANIZED HEALTH CARE EDUCATION/TRAINING PROGRAM

## 2022-10-19 PROCEDURE — 19301 PARTIAL MASTECTOMY: CPT | Performed by: STUDENT IN AN ORGANIZED HEALTH CARE EDUCATION/TRAINING PROGRAM

## 2022-10-19 PROCEDURE — 3700000000 HC ANESTHESIA ATTENDED CARE: Performed by: STUDENT IN AN ORGANIZED HEALTH CARE EDUCATION/TRAINING PROGRAM

## 2022-10-19 PROCEDURE — 3600000002 HC SURGERY LEVEL 2 BASE: Performed by: STUDENT IN AN ORGANIZED HEALTH CARE EDUCATION/TRAINING PROGRAM

## 2022-10-19 PROCEDURE — 2500000003 HC RX 250 WO HCPCS: Performed by: NURSE ANESTHETIST, CERTIFIED REGISTERED

## 2022-10-19 PROCEDURE — 7100000010 HC PHASE II RECOVERY - FIRST 15 MIN: Performed by: STUDENT IN AN ORGANIZED HEALTH CARE EDUCATION/TRAINING PROGRAM

## 2022-10-19 PROCEDURE — 7100000001 HC PACU RECOVERY - ADDTL 15 MIN: Performed by: STUDENT IN AN ORGANIZED HEALTH CARE EDUCATION/TRAINING PROGRAM

## 2022-10-19 PROCEDURE — 88307 TISSUE EXAM BY PATHOLOGIST: CPT

## 2022-10-19 PROCEDURE — 2500000003 HC RX 250 WO HCPCS: Performed by: ANESTHESIOLOGY

## 2022-10-19 RX ORDER — SODIUM CHLORIDE, SODIUM LACTATE, POTASSIUM CHLORIDE, CALCIUM CHLORIDE 600; 310; 30; 20 MG/100ML; MG/100ML; MG/100ML; MG/100ML
INJECTION, SOLUTION INTRAVENOUS CONTINUOUS
Status: DISCONTINUED | OUTPATIENT
Start: 2022-10-19 | End: 2022-10-19 | Stop reason: HOSPADM

## 2022-10-19 RX ORDER — SODIUM CHLORIDE 9 MG/ML
INJECTION, SOLUTION INTRAVENOUS PRN
Status: DISCONTINUED | OUTPATIENT
Start: 2022-10-19 | End: 2022-10-19 | Stop reason: HOSPADM

## 2022-10-19 RX ORDER — LIDOCAINE HYDROCHLORIDE 20 MG/ML
INJECTION, SOLUTION EPIDURAL; INFILTRATION; INTRACAUDAL; PERINEURAL PRN
Status: DISCONTINUED | OUTPATIENT
Start: 2022-10-19 | End: 2022-10-19 | Stop reason: SDUPTHER

## 2022-10-19 RX ORDER — BUPIVACAINE HYDROCHLORIDE 5 MG/ML
INJECTION, SOLUTION EPIDURAL; INTRACAUDAL PRN
Status: DISCONTINUED | OUTPATIENT
Start: 2022-10-19 | End: 2022-10-19 | Stop reason: HOSPADM

## 2022-10-19 RX ORDER — HYDROMORPHONE HYDROCHLORIDE 1 MG/ML
0.5 INJECTION, SOLUTION INTRAMUSCULAR; INTRAVENOUS; SUBCUTANEOUS EVERY 5 MIN PRN
Status: DISCONTINUED | OUTPATIENT
Start: 2022-10-19 | End: 2022-10-19 | Stop reason: HOSPADM

## 2022-10-19 RX ORDER — IPRATROPIUM BROMIDE AND ALBUTEROL SULFATE 2.5; .5 MG/3ML; MG/3ML
1 SOLUTION RESPIRATORY (INHALATION)
Status: DISCONTINUED | OUTPATIENT
Start: 2022-10-19 | End: 2022-10-19 | Stop reason: HOSPADM

## 2022-10-19 RX ORDER — HYDROMORPHONE HYDROCHLORIDE 1 MG/ML
0.25 INJECTION, SOLUTION INTRAMUSCULAR; INTRAVENOUS; SUBCUTANEOUS EVERY 5 MIN PRN
Status: DISCONTINUED | OUTPATIENT
Start: 2022-10-19 | End: 2022-10-19 | Stop reason: HOSPADM

## 2022-10-19 RX ORDER — OXYCODONE HYDROCHLORIDE 5 MG/1
5 TABLET ORAL PRN
Status: DISCONTINUED | OUTPATIENT
Start: 2022-10-19 | End: 2022-10-19 | Stop reason: HOSPADM

## 2022-10-19 RX ORDER — SODIUM CHLORIDE 0.9 % (FLUSH) 0.9 %
5-40 SYRINGE (ML) INJECTION PRN
Status: DISCONTINUED | OUTPATIENT
Start: 2022-10-19 | End: 2022-10-19 | Stop reason: HOSPADM

## 2022-10-19 RX ORDER — ONDANSETRON 2 MG/ML
INJECTION INTRAMUSCULAR; INTRAVENOUS PRN
Status: DISCONTINUED | OUTPATIENT
Start: 2022-10-19 | End: 2022-10-19 | Stop reason: SDUPTHER

## 2022-10-19 RX ORDER — LIDOCAINE HYDROCHLORIDE AND EPINEPHRINE 10; 10 MG/ML; UG/ML
INJECTION, SOLUTION INFILTRATION; PERINEURAL PRN
Status: DISCONTINUED | OUTPATIENT
Start: 2022-10-19 | End: 2022-10-19 | Stop reason: HOSPADM

## 2022-10-19 RX ORDER — MIDAZOLAM HYDROCHLORIDE 2 MG/2ML
1 INJECTION, SOLUTION INTRAMUSCULAR; INTRAVENOUS
Status: DISCONTINUED | OUTPATIENT
Start: 2022-10-19 | End: 2022-10-19 | Stop reason: HOSPADM

## 2022-10-19 RX ORDER — LIDOCAINE HYDROCHLORIDE 10 MG/ML
1 INJECTION, SOLUTION INFILTRATION; PERINEURAL
Status: COMPLETED | OUTPATIENT
Start: 2022-10-19 | End: 2022-10-19

## 2022-10-19 RX ORDER — DEXTROSE MONOHYDRATE 100 MG/ML
INJECTION, SOLUTION INTRAVENOUS CONTINUOUS PRN
Status: DISCONTINUED | OUTPATIENT
Start: 2022-10-19 | End: 2022-10-19 | Stop reason: HOSPADM

## 2022-10-19 RX ORDER — SODIUM CHLORIDE 0.9 % (FLUSH) 0.9 %
5-40 SYRINGE (ML) INJECTION EVERY 12 HOURS SCHEDULED
Status: DISCONTINUED | OUTPATIENT
Start: 2022-10-19 | End: 2022-10-19 | Stop reason: HOSPADM

## 2022-10-19 RX ORDER — ONDANSETRON 2 MG/ML
4 INJECTION INTRAMUSCULAR; INTRAVENOUS
Status: DISCONTINUED | OUTPATIENT
Start: 2022-10-19 | End: 2022-10-19 | Stop reason: HOSPADM

## 2022-10-19 RX ORDER — PROPOFOL 10 MG/ML
INJECTION, EMULSION INTRAVENOUS PRN
Status: DISCONTINUED | OUTPATIENT
Start: 2022-10-19 | End: 2022-10-19 | Stop reason: SDUPTHER

## 2022-10-19 RX ORDER — LABETALOL HYDROCHLORIDE 5 MG/ML
10 INJECTION, SOLUTION INTRAVENOUS
Status: DISCONTINUED | OUTPATIENT
Start: 2022-10-19 | End: 2022-10-19 | Stop reason: HOSPADM

## 2022-10-19 RX ORDER — OXYCODONE HYDROCHLORIDE 5 MG/1
10 TABLET ORAL PRN
Status: DISCONTINUED | OUTPATIENT
Start: 2022-10-19 | End: 2022-10-19 | Stop reason: HOSPADM

## 2022-10-19 RX ADMIN — PROPOFOL 150 MG: 10 INJECTION, EMULSION INTRAVENOUS at 07:28

## 2022-10-19 RX ADMIN — ONDANSETRON 4 MG: 2 INJECTION INTRAMUSCULAR; INTRAVENOUS at 07:35

## 2022-10-19 RX ADMIN — Medication 2000 MG: at 07:33

## 2022-10-19 RX ADMIN — LIDOCAINE HYDROCHLORIDE 60 MG: 20 INJECTION, SOLUTION EPIDURAL; INFILTRATION; INTRACAUDAL; PERINEURAL at 07:28

## 2022-10-19 RX ADMIN — LIDOCAINE HYDROCHLORIDE 1 ML: 10 INJECTION, SOLUTION INFILTRATION; PERINEURAL at 06:37

## 2022-10-19 RX ADMIN — SODIUM CHLORIDE, POTASSIUM CHLORIDE, SODIUM LACTATE AND CALCIUM CHLORIDE: 600; 310; 30; 20 INJECTION, SOLUTION INTRAVENOUS at 06:36

## 2022-10-19 ASSESSMENT — PAIN - FUNCTIONAL ASSESSMENT: PAIN_FUNCTIONAL_ASSESSMENT: 0-10

## 2022-10-19 ASSESSMENT — PAIN DESCRIPTION - DESCRIPTORS: DESCRIPTORS: NAGGING

## 2022-10-19 NOTE — ANESTHESIA PRE PROCEDURE
Department of Anesthesiology  Preprocedure Note       Name:  Lilly Markham   Age:  65 y.o.  :  1945                                          MRN:  [id number]         Date:  10/19/2022      Surgeon: Ahsan Carlos):  Sara Gonzalez DO    Procedure: Procedure(s):  RE-EXCISION RIGHT INFERIOR MARGINS    Medications prior to admission:   Prior to Admission medications    Medication Sig Start Date End Date Taking? Authorizing Provider   traMADol (ULTRAM) 50 MG tablet Take 50 mg by mouth every 6 hours as needed for Pain. Historical Provider, MD   acetaminophen (TYLENOL) 500 MG tablet Take 1,000 mg by mouth every 6 hours as needed for Pain    Historical Provider, MD   docusate sodium (COLACE) 100 MG capsule Take 1 capsule by mouth 2 times daily 10/5/22 11/4/22  Sara Gonzalez DO   magnesium oxide (MAG-OX) 400 MG tablet Take 400 mg by mouth daily    Historical Provider, MD   Multiple Vitamin (MULTIVITAMIN ADULT PO) Take by mouth daily    Historical Provider, MD   calcium carbonate (OSCAL) 500 MG TABS tablet Take 500 mg by mouth daily    Historical Provider, MD   carbamide peroxide (DEBROX) 6.5 % otic solution Place 5 drops into the left ear 2 times daily  Patient not taking: Reported on 10/19/2022 9/23/22 10/23/22  RUBEN Ratliff CNP   nystatin (MYCOSTATIN) 912156 UNIT/GM cream Apply topically 2 times daily.  22   RUBEN Ratliff CNP   atorvastatin (LIPITOR) 40 MG tablet Take 1 tablet by mouth daily  Patient taking differently: Take 40 mg by mouth at bedtime 22   Carlie Doherty MD   irbesartan (AVAPRO) 150 MG tablet Take 1 tablet by mouth daily  Patient taking differently: Take 150 mg by mouth at bedtime 22   Carlie Doherty MD   levothyroxine (SYNTHROID) 75 MCG tablet Take 1 tablet by mouth Daily TK 1 T PO D Indications: a condition with low thyroid hormone levels 22   Carlie Doherty MD   atenolol-chlorthalidone (TENORETIC) 50-25 MG per tablet Abdominal wall hernia K43.9    Hip pain, bilateral M25.551, M25.552    Adenomatous polyp of colon D12.6    Stress incontinence N39.3    Agatston coronary artery calcium score greater than 400 R93.1    Controlled type 2 diabetes mellitus with microalbuminuria, without long-term current use of insulin (HCC) E11.29, R80.9    Anxiety F41.9    Hypothyroidism E03.9    Infiltrating ductal carcinoma of right breast ([address] C50.911       Past Medical History:        Diagnosis Date    Abdominal pain     Arthritis     [facility]     Breast cancer, left ([address] 2001    radiation, surgery    Breast pain in female     Bunion     Chronic pain of left knee     Corns and callosities     DDD (degenerative disc disease), lumbar     Diabetes mellitus type II, controlled ([address]     Diet controlled    Emotional disorder     Fungal infection of toenail     Hallux valgus     Hip pain, bilateral     Hyperglycemia     Hyperlipidemia     Hypertension     medication    Hypothyroidism     Lower GI bleed     Obesity     Other ill-defined conditions(799.89)     pt reports urinalysis shows blood - she is scheduled for CT pelvis and abdomen in August)    Postmenopausal     Right median nerve neuropathy     Stress incontinence     Traumatic arthropathy of knee        Past Surgical History:        Procedure Laterality Date    BREAST BIOPSY Right 2011, Stereo Core Bx 8-31-22    benign    BREAST BIOPSY Right 10/5/2022    RIGHT BREAST LUMPECTOMY WITH MAGSEED LOCALIZATION performed by Lars Rehman DO at [address] [place] [address] Right 10/5/2022    BREAST BIOPSY SENTINEL NODE DISSECTION  Pre-op : 7:00, Lympho:8:00, LOC:  seed placed 10-3-22 , Surgery:  10:30am performed by Lars Rehman DO at [facility] LUMPECTOMY Left 2001    radiation for CA    COLONOSCOPY  2008    HYSTERECTOMY (CERVIX STATUS UNKNOWN)      VERN STEROTACTIC LOC BREAST BIOPSY RIGHT Right 8/31/2022    VERN STEROTACTIC LOC BREAST BIOPSY RIGHT 2022 Bobbette Cushing, MD [facility]    ORTHOPEDIC SURGERY Left     ankle arthroscopy    ORTHOPEDIC SURGERY      left knee repair    ORTHOPEDIC SURGERY      right ankle    ORTHOPEDIC SURGERY  2005    Left TSA    SHOULDER ARTHROPLASTY Left     TUBAL LIGATION      UROLOGICAL SURGERY      cystoscopy    US GUIDED CORE BREAST BIOPSY Right        Social History:    Social History     Tobacco Use    Smoking status: Former     Packs/day: 0.25     Types: Cigarettes     Quit date: 2001     Years since quittin.2    Smokeless tobacco: Never   Substance Use Topics    Alcohol use: No                                Counseling given: Not Answered      Vital Signs (Current): There were no vitals filed for this visit.                                            BP Readings from Last 3 Encounters:   10/19/22 (!) 138/92   10/05/22 (!) 163/95   22 115/70       NPO Status:                                                                                 BMI:   Wt Readings from Last 3 Encounters:   10/19/22 166 lb 11.2 oz (75.6 kg)   10/05/22 165 lb 12.8 oz (75.2 kg)   22 166 lb (75.3 kg)     There is no height or weight on file to calculate BMI.    CBC:   Lab Results   Component Value Date/Time    WBC 5.5 2022 08:23 AM    RBC 4.56 2022 08:23 AM    HGB 12.9 2022 08:23 AM    HCT 40.0 2022 08:23 AM    MCV 87.7 2022 08:23 AM    RDW 13.8 2022 08:23 AM     2022 08:23 AM       CMP:   Lab Results   Component Value Date/Time     2022 08:23 AM    K 3.9 2022 08:23 AM     2022 08:23 AM    CO2 30 2022 08:23 AM    BUN 18 2022 08:23 AM    CREATININE 1.10 2022 08:23 AM    GFRAA >60 2022 08:23 AM    AGRATIO 1.5 2022 08:13 AM    LABGLOM 51 2022 08:23 AM    GLUCOSE 118 2022 08:23 AM    PROT 7.5 2022 08:23 AM    CALCIUM 9.4 2022 08:23 AM BILITOT 0.9 08/25/2022 08:23 AM    ALKPHOS 63 08/25/2022 08:23 AM    ALKPHOS 72 02/22/2022 08:13 AM    AST 16 08/25/2022 08:23 AM    ALT 21 08/25/2022 08:23 AM       POC Tests:   No results for input(s): POCGLU, POCNA, POCK, POCCL, POCBUN, POCHEMO, POCHCT in the last 72 hours. Coags: No results found for: PROTIME, INR, APTT    HCG (If Applicable): No results found for: PREGTESTUR, PREGSERUM, HCG, HCGQUANT     ABGs: No results found for: PHART, PO2ART, UHV7TLN, ZNZ6SPL, BEART, A7XRFTQU     Type & Screen (If Applicable):  No results found for: LABABO, LABRH    Drug/Infectious Status (If Applicable):  No results found for: HIV, HEPCAB    COVID-19 Screening (If Applicable):   Lab Results   Component Value Date/Time    COVID19 Detected 07/03/2022 10:23 AM           Anesthesia Evaluation  Patient summary reviewed  Airway: Mallampati: II          Dental:    (+) upper dentures, lower dentures and partials      Pulmonary:Negative Pulmonary ROS and normal exam  breath sounds clear to auscultation                             Cardiovascular:  Exercise tolerance: no interval change,   (+) hypertension:, CAD (Calcium score 400):, hyperlipidemia        Rhythm: regular  Rate: normal                 ROS comment: SPECT from 2017:  1. Stress EKG: Normal.   2. SPECT Perfusion Imaging: Normal Perfusion. 3. LV Systolic Function is  normal.   4. Risk Assessment:  Normal.     Neuro/Psych:   (+) depression/anxiety             GI/Hepatic/Renal: Neg GI/Hepatic/Renal ROS            Endo/Other:    (+) DiabetesType II DM, , hypothyroidism::., .                 Abdominal:   (+) obese,           Vascular: negative vascular ROS. Other Findings:             Anesthesia Plan      general     ASA 2       Induction: intravenous. MIPS: Postoperative opioids intended and Prophylactic antiemetics administered. Anesthetic plan and risks discussed with patient. Plan discussed with CRNA.                     Noreen Shah MD 10/19/2022

## 2022-10-19 NOTE — OP NOTE
Operative Note    Date of Surgery: 10/19/22     Preoperative Diagnosis: Malignant neoplasm of right breast (HCC) [C50.911] , positive inferior margins    Postoperative Diagnosis: same     Surgeon(s):  Meagan KYLE DO    Anesthesia: General    Estimated Blood Loss: 5cc    Procedure:   Procedure(s):  RE-EXCISION RIGHT INFERIOR MARGINS    Indications:  As detailed in the H&P. Procedure in Detail:  The patient was then brought to the operating room and placed on the table in a supine position with adequate padding to all pressure points and the arm extended laterally. After induction of anesthesia, the right breast was prepped and draped in the usual fashion. The prior incision was reopened sharply. Cautery was used to dissect to the readily identified prior surgical cavity which was entered and the residual seroma drained. Scissors were then used to re-excise the inferior  margin(s) to a thickness of approximately 5 millimeters. Suture was used to orient the specimen. The cavity was irrigated and  hemostasis was achieved with cautery. The area was infiltrated with local and closed with interrupted subcutaneous 3-0 monocryl subcuticular sutures. dermabond were applied to the wound. A gauze pressure dressing was applied to the breast. Sponge and needle counts were correct times two. The patient tolerated the procedure well and was transferred to recovery room in stable condition.                Specimens:   ID Type Source Tests Collected by Time Destination   A : Right breast inferior margins  Tissue Breast SURGICAL PATHOLOGY Glen Wong DO 10/19/2022 3374         Signed By: Luis Corbin DO     October 19, 2022

## 2022-10-19 NOTE — ANESTHESIA POSTPROCEDURE EVALUATION
Department of Anesthesiology  Postprocedure Note    Patient: Debbie Vaughn  MRN: [id number]  YOB: 1945  Date of evaluation: 10/19/2022      Procedure Summary     Date: 10/19/22 Room / Location: [facility] MAIN OR 07 / [facility] MAIN OR    Anesthesia Start: [facility] Anesthesia Stop: 0805    Procedure: RE-EXCISION RIGHT INFERIOR MARGINS (Right: Breast) Diagnosis:       Malignant neoplasm of right breast ([address]      (Malignant neoplasm of right breast ([address] Abdi Awad)    Surgeons: Nita Zapata DO Responsible Provider: Alexander Cole MD    Anesthesia Type: general ASA Status: 2          Anesthesia Type: No value filed.     Jean-Paul Phase I: Jean-Paul Score: 9    Jean-Paul Phase II:        Anesthesia Post Evaluation    Patient location during evaluation: PACU  Patient participation: complete - patient participated  Level of consciousness: sleepy but conscious and responsive to verbal stimuli  Pain score: 0  Airway patency: patent  Nausea & Vomiting: no nausea and no vomiting  Complications: no  Cardiovascular status: hemodynamically stable  Respiratory status: acceptable, nonlabored ventilation and spontaneous ventilation  Hydration status: euvolemic  Comments: BP (!) 144/70   Pulse 52   Temp 97.2 °F (36.2 °C) (Temporal)   Resp 16   Ht 5' 5\" (1.651 m)   Wt 166 lb 11.2 oz (75.6 kg)   SpO2 97%   BMI 27.74 kg/m²     Multimodal analgesia pain management approach

## 2022-10-19 NOTE — DISCHARGE INSTRUCTIONS
DC Breast instructions:    What are my post-op activity restrictions? Do not drive or operate power equipment or engage in activities that require coordination or the ability to respond quickly for 24 hours. · Walking: You may walk without help   · Lifting: Limit lifting to 10 pounds. · Straining:  Avoid activities that cause you to strain. · Stairs: You may climb stairs. · Strenuous activities:  Strenuous, repetitive activities are to be avoided with the affected arm. · Bathing: You may shower in 48 hours. · Weight bearing: Full weight bearing on the affected side. · Elevate: Elevate arm on the surgery side when at rest  · Driving: If you drove prior to surgery, you may resume driving when you are not taking pain medication and able to move the affected arm freely. Do not drive while taking pain medications. · Special Exercises: Light stretching of the affected arm is fine. What care does my wound require? Remove your ace wrap or surgical bra in 48 hours. You may get your incision wet in 48 hours. You may replace your dressing if needed. Wear bra at all times,when not in the shower, if you have had a mastectomy. What can I eat? You can resume eating your previous diet. What do I need to know concerning my pain medication? Avoid alcohol while taking pain medications. Pain medications may cause constipation. You  may increase fluids. You may increase  fiber intake. You  may take an over the counter laxative or stool softener per package instructions if needed. Special Instructions:    Empty drains 2-3 times/day and record amount. Support drains at all times. Remove pain ball when empty, if applicable. 10 deep breaths every hour while awake. You may experience some of the following which are normal:     · It is normal to have pain after surgery. Take medications as ordered to reduce the pain to a tolerable level. · Pain medications may make you sleepy.   · Incision- you may expect a minimal amount of blood or drainage. · Bruising at the operative site   · If you have had general anesthesia, you may have a sore throat for the first 24 hours due to the airway placed in your windpipe. You may also experience dizziness, drowsiness, or lightheadedness after anesthesia or sedation. · You may feel tired, rest as needed        Report the following signs and symptoms of complications to your surgeon:  · Fever above 101 degrees not responding to Tylenol and lasting 4 hours. · Persistent nausea and vomiting. · Excessive pain not controlled with prescribed medication. · Difficulty breathing or unusual shortness of breath. · Unexpected amount of blood/drainage,   · New numbness or tingling. · Difficulty urinating. Call Winston Le DO or the physician on-call by contacting the office to report concerns or for after hours contact information. If the doctor is not available, please go to your local emergency room. MEDICATION INTERACTION:    During your procedure you potentially received a medication or medications which may reduce the effectiveness of oral contraceptives. Please consider other forms of contraception for 1 month following your procedure if you are currently using oral contraceptives as your primary form of birth control. In addition to this, we recommend continuing your oral contraceptive as prescribed, unless otherwise instructed by your physician, during this time. After general anesthesia or intravenous sedation, for 24 hours or while taking prescription Narcotics:  Limit your activities  A responsible adult needs to be with you for the next 24 hours  Do not drive and operate hazardous machinery  Do not make important personal or business decisions  Do not drink alcoholic beverages  If you have not urinated within 8 hours after discharge, and you are experiencing discomfort from urinary retention, please go to the nearest ED.   If you have sleep apnea and have a CPAP machine, please use it for all naps and sleeping. Please use caution when taking narcotics and any of your home medications that may cause drowsiness. *  Please give a list of your current medications to your Primary Care Provider. *  Please update this list whenever your medications are discontinued, doses are      changed, or new medications (including over-the-counter products) are added. *  Please carry medication information at all times in case of emergency situations. These are general instructions for a healthy lifestyle:  No smoking/ No tobacco products/ Avoid exposure to second hand smoke  Surgeon General's Warning:  Quitting smoking now greatly reduces serious risk to your health. Obesity, smoking, and sedentary lifestyle greatly increases your risk for illness  A healthy diet, regular physical exercise & weight monitoring are important for maintaining a healthy lifestyle    You may be retaining fluid if you have a history of heart failure or if you experience any of the following symptoms:  Weight gain of 3 pounds or more overnight or 5 pounds in a week, increased swelling in our hands or feet or shortness of breath while lying flat in bed. Please call your doctor as soon as you notice any of these symptoms; do not wait until your next office visit.

## 2022-10-21 ENCOUNTER — OFFICE VISIT (OUTPATIENT)
Dept: ONCOLOGY | Age: 77
End: 2022-10-21

## 2022-10-21 DIAGNOSIS — Z91.89 ADJUSTMENT TO LIFE THREATENING ILLNESS: Primary | ICD-10-CM

## 2022-10-21 DIAGNOSIS — Z17.0 MALIGNANT NEOPLASM OF RIGHT BREAST IN FEMALE, ESTROGEN RECEPTOR POSITIVE, UNSPECIFIED SITE OF BREAST (HCC): ICD-10-CM

## 2022-10-21 DIAGNOSIS — C50.911 MALIGNANT NEOPLASM OF RIGHT BREAST IN FEMALE, ESTROGEN RECEPTOR POSITIVE, UNSPECIFIED SITE OF BREAST (HCC): ICD-10-CM

## 2022-10-21 PROCEDURE — 90837 PSYTX W PT 60 MINUTES: CPT | Performed by: SOCIAL WORKER

## 2022-10-21 PROCEDURE — 1123F ACP DISCUSS/DSCN MKR DOCD: CPT | Performed by: SOCIAL WORKER

## 2022-10-21 NOTE — PATIENT INSTRUCTIONS
To reschedule your appointment, please call [phone].    In case of emergency, please, call 911 or [phone] in [place], [place].

## 2022-10-21 NOTE — PROGRESS NOTES
Behavioral Health - Progress Note      Name: Fidelia Gallo  : [birth year]  MRN: [id number]  Date of Service: 10/21/2022  Location of Service: [place] for both provider and patient        Type of Service: Individual Therapy    Reason for Visit: Follow Up     Chief Complaint: Anxiety and its commodities due to distress caused by cancer diagnosis and treatment. Subjective:  LISW-CP discussed \"the body needs a break\" and \"Christopher can cook\". Patient verbalized understanding. LISW-CP used Dialectical Behavioral and Cognitive Behavioral Therapy, tailored to patient's need. Patient denied any plan or intent to hurt self or others. Patient was instructed, in case of emergency, to call 911 or [facility] [phone] in [place], [place]. Patient verbalized agreement.   _________________________________________________    Clinical Impression: Fidelia Gallo is a 65 y.o. female . Mental Status Exam, patient was dressed properly. No abnormal psychomotor movements observed. Intellectual functioning appeared to be intact. Mood and affect were congruent. Insight was adequate. Judgment was adequate. Speech was normal, clear. Thought process was coherent. Patient did not report suicidal or homicidal ideations, intent or plans. Patient denied self-injury behaviors. Patient denied alcohol and other substance abuse. Patient was oriented to the four spheres: self, place, time and situation. Patient response to intervention was appropriate. Patient progress was adequate. Protective factor: self-determination. __________________________________________________    Session scheduled for: 8:00  pm/am, patient was on time     Session started:  8:00    Session ended:    9:00  _________________________________________________    Patient Diagnosis:         PHQ 9:  5   - to be scanned into EMR.  0-4 Suggests the patient may not need depression treatment  5-14 Mild major depressive disorder. 15-19 Moderate-major depressive disorder. 20+ Severe major depressive disorders.      SONIDO 7:  10   - to be scanned into EMR.  0-4: minimal anxiety   5-9: mild anxiety   10-14: moderate anxiety   15-21: severe anxiety   __________________________________________________    Frequency: Weekly or bi-weekly appointments as schedule permits    Patient's Primary Goal: Decrease anxiety   __________________________________________________    Plans:  Continue to use Cognitive Behavioral Approach  Continue to work with patient on primary goal.    Continue to see patient, weekly or bi-weekly  __________________________________________________    Anderson Jones

## 2022-10-26 ENCOUNTER — OFFICE VISIT (OUTPATIENT)
Dept: ONCOLOGY | Age: 77
End: 2022-10-26
Payer: MEDICARE

## 2022-10-26 VITALS
SYSTOLIC BLOOD PRESSURE: 128 MMHG | HEART RATE: 61 BPM | HEIGHT: 62 IN | OXYGEN SATURATION: 97 % | DIASTOLIC BLOOD PRESSURE: 73 MMHG | WEIGHT: 164.4 LBS | TEMPERATURE: 98.2 F | RESPIRATION RATE: 14 BRPM | BODY MASS INDEX: 30.25 KG/M2

## 2022-10-26 DIAGNOSIS — Z79.811 USE OF AROMATASE INHIBITORS: ICD-10-CM

## 2022-10-26 DIAGNOSIS — C50.911 MALIGNANT NEOPLASM OF RIGHT BREAST IN FEMALE, ESTROGEN RECEPTOR POSITIVE, UNSPECIFIED SITE OF BREAST (HCC): Primary | ICD-10-CM

## 2022-10-26 DIAGNOSIS — Z17.0 MALIGNANT NEOPLASM OF RIGHT BREAST IN FEMALE, ESTROGEN RECEPTOR POSITIVE, UNSPECIFIED SITE OF BREAST (HCC): Primary | ICD-10-CM

## 2022-10-26 PROCEDURE — 1123F ACP DISCUSS/DSCN MKR DOCD: CPT | Performed by: INTERNAL MEDICINE

## 2022-10-26 PROCEDURE — 3074F SYST BP LT 130 MM HG: CPT | Performed by: INTERNAL MEDICINE

## 2022-10-26 PROCEDURE — 3078F DIAST BP <80 MM HG: CPT | Performed by: INTERNAL MEDICINE

## 2022-10-26 PROCEDURE — 99214 OFFICE O/P EST MOD 30 MIN: CPT | Performed by: INTERNAL MEDICINE

## 2022-10-26 RX ORDER — ANASTROZOLE 1 MG/1
1 TABLET ORAL DAILY
Qty: 30 TABLET | Refills: 3 | Status: SHIPPED | OUTPATIENT
Start: 2022-10-26

## 2022-10-26 ASSESSMENT — PATIENT HEALTH QUESTIONNAIRE - PHQ9
SUM OF ALL RESPONSES TO PHQ QUESTIONS 1-9: 0
1. LITTLE INTEREST OR PLEASURE IN DOING THINGS: 0
SUM OF ALL RESPONSES TO PHQ9 QUESTIONS 1 & 2: 0
SUM OF ALL RESPONSES TO PHQ QUESTIONS 1-9: 0
SUM OF ALL RESPONSES TO PHQ QUESTIONS 1-9: 0
2. FEELING DOWN, DEPRESSED OR HOPELESS: 0
SUM OF ALL RESPONSES TO PHQ QUESTIONS 1-9: 0

## 2022-10-26 NOTE — PATIENT INSTRUCTIONS
Patient Instructions from Today's Visit    Reason for Visit:  Follow up    Diagnosis Information:  [email or web address]      Plan:  Stage 1 breast cancer is what the pathology shows. No lymph node involvement. Instead of chemotherapy, we would recommend an oral medication that is an estrogen blocker. Arimidex is what we would start you one. This is a once daily medication for five years if tolerated. Additional information is included in this printout     Follow Up: We will send the prescription for the medication and plan to see you back in the office in in about 2-3 to see how you are doing with the medication    Recent Lab Results:  N/a    Treatment Summary has been discussed and given to patient: n/a        -------------------------------------------------------------------------------------------------------------------  Please call our office at [phone] if you have any  of the following symptoms:   Fever of 100.5 or greater  Chills  Shortness of breath  Swelling or pain in one leg    After office hours an answering service is available and will contact a provider for emergencies or if you are experiencing any of the above symptoms. Patient does express an interest in My Chart. My Chart log in information explained on the after visit summary printout at the [facility] [address] desk. [address]      Anastrozole     Select Language?   (an AS troe zole)  Trade Name: Trena August  Anastrozole is the generic name for the trade name drug Arimidex®. In some cases, health care professionals may use the trade name Trena Mata when referring to the generic drug name Anastrozole. Drug Type:  Anastrozole is an anti-cancer hormone therapy. This medication is classified as a \"non-steroidal aromatase inhibitor\" (For more detail see \"How Anastrozole Works\" below).   What Anastrozole Is Used For:  Anastrozole is used to treat breast cancer in postmenopausal women. Note: If a drug has been approved for one use, physicians sometimes elect to use this same drug for other problems if they believe it might be helpful. How Anastrozole Is Given:  Anastrozole is a pill, taken by mouth. You should take anastrozole at about the same time each day. You may take anastrozole with or without food. If you miss a dose of anastrozole, take it as soon as you remember unless it is almost time for the next dose. Do not take a double dose to make up a missed dose. You should not stop taking anastozole without discussing with your physician, even if you feel well. The amount of anastrozole that you will receive depends on many factors, including your general health or other health problems, and the type of cancer or condition being treated. Your doctor will determine your dose and how long you will be taking anastrozole. Side Effects:  Important things to remember about the side effects of anastrozole:  Most people do not experience all of the anastrozole side effects listed. Anastrozole side effects are often predictable in terms of their onset, duration, and severity. Anastrozole side effects will improve after therapy is complete. Anastrozole side effects may be quite manageable: There are many options to help minimize or prevent the side effects of anastrozole. The following side effects are common (occurring in greater than 30%) for patients taking Anastrozole:   Hot Flashes   Muscle/Joint pain   Stomach upset  The following side effects are less common (occurring in 10-29%) for patients taking anastrozole:  Decreased energy   Mood disturbances   Sore throat   High blood pressure   Depression   Nausea   Vomiting   Rash   Osteoporosis (Weak bones)   Fractures   Back pain   Insomnia (Trouble sleeping)   Headache   Peripheral edema and lymphedema (fluid build-up)   Dyspnea (difficulty breathing)   Increased cough  Not all side effects are listed above, some that are rare (occurring in less than 10% of patients) are not listed here. However, you should always inform your health care provider if you experience any unusual symptoms. When To Contact Your Doctor or Health Care Provider:  Contact your health care provider immediately, day or night and go to the nearest emergency room, if you should experience any of the following symptoms:  New or worsening chest pain   Shortness of breath   Swelling of the face, lips, tongue and/or throat   Difficulty swallowing and/or breathing. The following symptoms require medical attention, but are not emergency situations. Contact your health care provider within 24 hours of noticing any of the following:  Vaginal bleeding (similar to a period)   Tickling, Tingling, or Numbness of your skin   Nausea (interfering with ability to eat and unrelieved with prescribed medication)   Vomiting (vomiting more than 4-5 times in a 24 hour period)   Yellowing of your skin or the whites of your eyes   Pain on the right side of your stomach-area  Always inform your health care provider if you experience any unusual symptoms. Precautions:  Before starting anastrozole treatment, make sure you tell your doctor about any other medications you are taking (including over-the-counter drugs, vitamins, or herbal remedies)   Anastrozole interacts with certain medications. Make sure you tell your doctor if you are taking these medications:   Tamozifen   Estrogen   Warfarin   Estrogen/estradiol-containing medications (commonly used for menopause and birth-control)  Before starting anastrozole treatment, make sure you tell your doctor about health conditions you have including: heart conditions, osteoporosis, and abnormal cholesterol. Anastrozole may cause increased risk for heart disease. Talk with your healthcare provider to weigh the benefit and risks. Inform your health care professional if you have not had menopause yet (premenopausal).    Inform your health care professional if you are pregnant or may be pregnant prior to starting this treatment. Anastrozole is pregnancy category X (anastrozole may be hazardous to the fetus. Anastrozole is contraindicted in women who are pregnant or may become pregnant). Anastrozole may enter breast milk. It is unclear what effect this may have on babies. Tell your doctor if you are breastfeeding or plan to breastfeed. Self Care Tips:  If you experience hot flashes, wearing light clothing, staying in a cool environment, and putting cool cloths on your head may reduce symptoms. Consult your health care provider if these worsen, or become intolerable. Acetaminophen or ibuprofen may help relieve discomfort from generalized aches and pains. However, be sure to talk with your doctor before taking it. Anastrozole causes little nausea. However, to reduce nausea, take anti-nausea medications as prescribed by your doctor before taking it. Good health practices such as getting plenty of rest and eating a healthy diet along with regular exercise are recommended   If you experience symptoms or side effects, be sure to discuss them with your health care team. They can prescribe medications and/or offer other suggestions that are effective in managing such problems. Monitoring and Testing While Taking Anastrozole: You will be checked regularly by your doctor while you are taking anastrozole, to monitor side effects and check your response to therapy. No additional blood work or tests are required for anastrozole. How Anastrozole Works:  Hormones are chemical substances that are produced by glands in the body, which enter the bloodstream and can cause effects in other parts of the body. For example, the hormone testosterone is made in the testicles and is responsible for male characteristics such as deepening voice and increased body hair.  The use of hormone therapy to treat cancer is based on the observation that cancer cell

## 2022-10-26 NOTE — PROGRESS NOTES
[facility] Hematology and Oncology: Office Visit Established Patient    Chief Complaint:    Chief Complaint   Patient presents with    Follow-up           History of Present Illness:  Ms. Irma Delgado is a 65 y.o. female who presents today for follow-up regarding breast cancer. She had a left breast cancer in 2001 for which she had a lumpectomy and radiation, she also took endocrine therapy of unknown type and duration. In August 2022 she had her routine screening mammogram that reported a right breast asymmetry. On 8/25/22 she had a right breast diagnostic mammogram and ultrasound which confirmed the findings and on 8/31/22 she underwent a core needle biopsy of the right breast mass. The pathology reported infiltrating ductal carcinoma, low grade, ER 95%, TX 0% and HER2 1+. On 9/7/22 she had a bilateral breast MRI that reported the known right breast cancer of 1.4 cm, no additional suspicious findings in either breast or no evidence of lymphadenopathy was noted. She was recommended to see surgery for upfront management, then return to us for adjuvant therapy recommendations. She is here for follow-up. No complaints, feeling OK. She had to have re-excision because of a positive margin, still with some discomfort as expected. Review of Systems:  Constitutional: Negative. HENT: Negative. Eyes: Negative. Respiratory: Negative. Cardiovascular: Negative. Gastrointestinal: Negative. Genitourinary: Negative. Musculoskeletal: Negative. Skin: Negative. Neurological: Negative. Endo/Heme/Allergies: Negative. Psychiatric/Behavioral: Negative. All other systems reviewed and are negative.      Allergies   Allergen Reactions    Milk Protein Other (See Comments)     Increased mucus production     Past Medical History:   Diagnosis Date    Abdominal pain     Arthritis     Breast cancer ([address]     Breast cancer, left [address] [address] 2001    radiation, surgery    Breast pain in female     Bunion     Chronic pain of left knee     Corns and callosities     DDD (degenerative disc disease), lumbar     Diabetes mellitus type II, controlled ([address]     Diet controlled    Emotional disorder     Fungal infection of toenail     Hallux valgus     Hip pain, bilateral     Hyperglycemia     Hyperlipidemia     Hypertension     medication    Hypothyroidism     Lower GI bleed     Obesity     Other ill-defined conditions(799.89)     pt reports urinalysis shows blood - she is scheduled for CT pelvis and abdomen in August)    Postmenopausal     Right median nerve neuropathy     Stress incontinence     Traumatic arthropathy of knee      Past Surgical History:   Procedure Laterality Date    BREAST BIOPSY Right 2011, Stereo Core Bx 8-31-22    benign    BREAST BIOPSY Right 10/5/2022    RIGHT BREAST LUMPECTOMY WITH MAGSEED LOCALIZATION performed by Brisa Jimenez DO at [address] [facility] Right 10/5/2022    BREAST BIOPSY SENTINEL NODE DISSECTION  Pre-op : 7:00, Lympho:8:00, LOC:  seed placed 10-3-22 , Surgery:  10:30am performed by Brisa Jimenez DO at [id number] [facility] LUMPECTOMY Left 2001    radiation for CA    BREAST SURGERY Right 10/19/2022    RE-EXCISION RIGHT INFERIOR MARGINS performed by Brisa Jimenez DO at [address] [facility]   2008    HYSTERECTOMY (CERVIX STATUS UNKNOWN)      VERN STEROTACTIC LOC BREAST BIOPSY RIGHT Right 8/31/2022    VERN STEROTACTIC LOC BREAST BIOPSY RIGHT 8/31/2022 Monique Austin MD [facility]    ORTHOPEDIC SURGERY Left     ankle arthroscopy    ORTHOPEDIC SURGERY      left knee repair    ORTHOPEDIC SURGERY      right ankle    ORTHOPEDIC SURGERY  2005    Left TSA    SHOULDER ARTHROPLASTY Left     TUBAL LIGATION      UROLOGICAL SURGERY      cystoscopy    US GUIDED CORE BREAST BIOPSY Right      Family History   Problem Relation Age of Onset    Dementia Mother     Diabetes Mother     Cancer Mother 76        Colon    Diabetes Father     Breast Cancer Sister 78    Breast Cancer Sister 77    Hypertension Maternal Grandmother     Cancer Other         bone CA    Hypertension Other     Diabetes Other     Post-op Cognitive Dysfunction Neg Hx     Malig Hypertherm Neg Hx     Pseudochol. Deficiency Neg Hx     Delayed Awakening Neg Hx     Post-op Nausea/Vomiting Neg Hx     Emergence Delirium Neg Hx     Other Neg Hx      Social History     Socioeconomic History    Marital status: Single     Spouse name: Not on file    Number of children: Not on file    Years of education: Not on file    Highest education level: Not on file   Occupational History    Not on file   Tobacco Use    Smoking status: Former     Packs/day: 0.25     Types: Cigarettes     Quit date: 2001     Years since quittin.3    Smokeless tobacco: Never   Vaping Use    Vaping Use: Never used   Substance and Sexual Activity    Alcohol use: No    Drug use: No    Sexual activity: Not on file   Other Topics Concern    Not on file   Social History Narrative    Not on file     Social Determinants of Health     Financial Resource Strain: Low Risk     Difficulty of Paying Living Expenses: Not hard at all   Food Insecurity: No Food Insecurity    Worried About Running Out of Food in the Last Year: Never true    Ran Out of Food in the Last Year: Never true   Transportation Needs: No Transportation Needs    Lack of Transportation (Medical): No    Lack of Transportation (Non-Medical): No   Physical Activity: Not on file   Stress: No Stress Concern Present    Feeling of Stress : Not at all   Social Connections: Unknown    Frequency of Communication with Friends and Family: Not on file    Frequency of Social Gatherings with Friends and Family: Not on file    Attends Holiness Services: Never    Active Member of Clubs or Organizations: No    Attends Club or Organization Meetings: Never    Marital Status:    Intimate Partner Violence: Not At Risk    Fear of Current or Ex-Partner: No    Emotionally Abused: No    Physically Abused:  No Sexually Abused: No   Housing Stability: Unknown    Unable to Pay for Housing in the Last Year: No    Number of Places Lived in the Last Year: Not on file    Unstable Housing in the Last Year: No     Current Outpatient Medications   Medication Sig Dispense Refill    traMADol (ULTRAM) 50 MG tablet Take 50 mg by mouth every 6 hours as needed for Pain. acetaminophen (TYLENOL) 500 MG tablet Take 1,000 mg by mouth every 6 hours as needed for Pain      docusate sodium (COLACE) 100 MG capsule Take 1 capsule by mouth 2 times daily 60 capsule 0    magnesium oxide (MAG-OX) 400 MG tablet Take 400 mg by mouth daily      Multiple Vitamin (MULTIVITAMIN ADULT PO) Take by mouth daily      calcium carbonate (OSCAL) 500 MG TABS tablet Take 500 mg by mouth daily      nystatin (MYCOSTATIN) 019933 UNIT/GM cream Apply topically 2 times daily. 1 each 1    atorvastatin (LIPITOR) 40 MG tablet Take 1 tablet by mouth daily (Patient taking differently: Take 40 mg by mouth at bedtime) 90 tablet 3    irbesartan (AVAPRO) 150 MG tablet Take 1 tablet by mouth daily (Patient taking differently: Take 150 mg by mouth at bedtime) 90 tablet 3    levothyroxine (SYNTHROID) 75 MCG tablet Take 1 tablet by mouth Daily TK 1 T PO D Indications: a condition with low thyroid hormone levels 90 tablet 3    atenolol-chlorthalidone (TENORETIC) 50-25 MG per tablet Take 0.5 tablets by mouth daily 90 tablet 3    Diabetic Shoe MISC by Does not apply route Diabetic shoe, custom orthoses, custom prosthetic filler, DM2, neuropathy 1 each 0    Cholecalciferol 50 MCG (2000 UT) TABS Take by mouth      coenzyme Q10 100 MG CAPS capsule Take 100 mg by mouth daily      hydrocortisone 2.5 % cream Place rectally 4 times daily       No current facility-administered medications for this visit.        OBJECTIVE:  /73 Comment: sitting  Pulse 61   Temp 98.2 °F (36.8 °C) (Oral)   Resp 14   Ht 5' 2\" (1.575 m)   Wt 164 lb 6.4 oz (74.6 kg)   SpO2 97%   BMI 30.07 kg/m² Physical Exam:  Constitutional: Well developed, well nourished female in no acute distress, sitting comfortably on the examination table. HEENT: Normocephalic and atraumatic. Sclerae anicteric. Neck supple without JVD. No thyromegaly present. Lymph node   No palpable submandibular, cervical, supraclavicular lymph nodes. Skin Warm and dry. No bruising and no rash noted. No erythema. No pallor. Respiratory Lungs are clear to auscultation bilaterally without wheezes, rales or rhonchi, normal air exchange without accessory muscle use. CVS Normal rate, regular rhythm and normal S1 and S2. No murmurs, gallops, or rubs. Neuro Grossly nonfocal with no obvious sensory or motor deficits. MSK Normal range of motion in general.  No edema and no tenderness. Psych Appropriate mood and affect. Labs:  No results found for this or any previous visit (from the past 24 hour(s)). Imaging:  [facility] BREAST SPECIMEN    Result Date: 8/31/2022  Stereotactic Biopsy right Breast, Surgical Specimen Radiograph, Postbiopsy Mammogram, 08/31/2022 HISTORY: Focal asymmetry with possible architectural distortion and microcalcifications at the 9:30 position right breast. STEREOTACTIC BIOPSY: The patient was explained the procedure and informed consent was obtained. The patient was then positioned on the prone Hologic stereotactic biopsy table. The right breast was placed in CC compression. The asymmetry was localized within the stereotactic window from a superior approach. The skin was cleansed and local anesthetic applied. A 9-gauge Brevera needle was placed into the right breast. 5 core samples were obtained from about the periphery of the needle. The samples were imaged while in the Mammography Suite. The patient tolerated the procedure well. There were no immediate complications. SPECIMEN RADIOGRAPH: The specimen radiograph reveals scattered calcifications.  The samples are placed into a formalin container and sent to the lab for analysis. A biopsy clip was then placed into the small created biopsy cavity. POSTBIOPSY MAMMOGRAM: CC and ML views of the right breast were obtained. There are expected postprocedural changes at the 9:30 position right breast. The clip is in satisfactory position. No definite residual microcalcifications are present, however, these could be obscured due to postprocedural change. Stereotactic biopsy of the asymmetry with associated architectural distortion and microcalcifications at the 9:30 position right breast with histologic results pending. 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 Previous left lumpectomy and radiation for breast cancer in 2001 and right benign biopsy greater than 10 years ago. Her sister had breast cancer. COMPARISON: Ultrasound and mammography 8/25/2022, 8/31/2022, 8/17/2022 as well as previous MRI breast 10/28/2013, 10/28/2011, 9/8/2009. TECHNIQUE: Standard MRI sequences were obtained through the breasts in multiple planes. Images were obtained before and after intravenous infusion of 15 mL of Prohance contrast. Images were reviewed with PACS and with Rain CAD software. FINDINGS: The breasts demonstrate moderate glandularity and minimal background enhancement. Right breast at 9:30 centrally shows the biopsy clip within an irregular heterogeneously enhancing 1.4 x 1.0 cm malignant mass. Mild curvilinear enhancement adjacent to the biopsy tract is likely reactive, and there is a small hematoma. Scattered enhancing foci elsewhere in the right breast have not changed from older MRI exams.  There is no other evidence of suspicious enhancing mass, and no dominant or unique nonmass enhancement, to suggest additional malignancy in either breast. There is no evidence of axillary or internal mammary lymphadenopathy. Multiple small right axillary lymph nodes have not changed from older examinations. Elsewhere, limited visualization of the partially included thorax and upper abdomen shows no acute abnormality. 1. Right 9:30 breast cancer measures up to 1.4 cm. 2. No additional suspicious finding elsewhere in either breast. No evidence of lymphadenopathy. Recommend continued malignancy management as directed clinically. BI-RADS Assessment Category 6: Known Biopsy Proven Malignancy     VERN DIGITAL SCREEN W OR WO CAD BILATERAL    Addendum Date: 8/25/2022    Addendum: Addendum by Dr. Raquel Perdue on the report originally dictated by Dr. Meghan Barnett. This patient returned on 8/25/2022 for the free technical repeat right MLO view. The skinfold in the lower central right breast resolves on that image. Please see diagnostic mammogram and ultrasound report of 8/25/2022 for additional evaluation of the asymmetry. Result Date: 8/25/2022  STUDY:  Bilateral digital screening mammogram with CAD INDICATION:  Screening, history of left breast carcinoma status post lumpectomy in 2001. COMPARISON:  08/16/2021, 08/13/2020   BREAST DENSITY: There are scattered areas of fibroglandular density. (#BDB) FINDINGS: Bilateral digital mammography was performed, and is interpreted in conjunction with a computer assisted detection (CAD) system. There is an asymmetry within the slightly lateral right breast anterior depth. There is also a presumed skin fold on the right MLO view. Spot compression views in the CC and MLO projection as well as a full 90 degree mediolateral and repeat right MLO views are recommended for further evaluation. If this finding persists an ultrasound should be considered. There are no suspicious clustered microcalcifications. 1. Right breast asymmetry. 2. Presumed skin fold on the right MLO view. A repeat MLO view would also be recommended when the patient returns.  BI-RADS Assessment Category 0: Incomplete: Needs additional imaging evaluation. We will attempt to contact the patient and arrange for this additional imaging. A reminder letter will be sent to the patient at the appropriate time pending additional views. VERN DIGITAL DIAGNOSTIC W OR WO CAD RIGHT    Result Date: 8/25/2022  DIAGNOSTIC DIGITAL RIGHT MAMMOGRAPHY AND RIGHT BREAST ULTRASOUND CLINICAL INDICATION: Additional evaluation of right lateral focal asymmetry. Prior contralateral malignant lumpectomy and radiation greater than 10 years ago, and prior right benign biopsy in 2011 demonstrating 10:00 fibrocystic changes. COMPARISON: 8/17/2022, others back to 10/12/2010 FINDINGS: Mammogram: Digital diagnostic mammography was performed, and is interpreted in conjunction with a computer assisted detection (CAD) system. Additional right mediolateral and compression magnification views were performed. In the slightly upper lateral breast at anterior-middle depth (about 6 cm behind the nipple) there is a small focal asymmetry with associated faint indeterminate pleomorphic calcifications and subtle architectural changes. This measures close to 1 cm but is not well defined. Further evaluation is recommended by ultrasound. Elsewhere the right breast appears unchanged from older mammography. There are diffuse typically benign calcifications elsewhere without suspicious feature. The posterior upper outer quadrant benign biopsy clip is again noted. Ultrasound: Real time targeted sonography was performed of the right lateral breast carefully and extensively by the radiologist and sonographer. At 9:30 position there is a subcentimeter hypoechoic area which on careful real-time scanning could not be definitively reproduced. The surrounding lateral tissues otherwise demonstrate no concerning findings. Right lateral asymmetry without definite sonographic correlate, indeterminate for malignancy. Recommend stereotactic biopsy. Results and recommendations discussed in full with the patient at the time of interpretation, all questions were answered and she agrees. BI-RADS Assessment Category 4: Suspicious Finding- Biopsy should be considered. VERN STEREO BREAST BX W LOC DEVICE 1ST LESION RIGHT    Result Date: 8/31/2022  Stereotactic Biopsy right Breast, Surgical Specimen Radiograph, Postbiopsy Mammogram, 08/31/2022 HISTORY: Focal asymmetry with possible architectural distortion and microcalcifications at the 9:30 position right breast. STEREOTACTIC BIOPSY: The patient was explained the procedure and informed consent was obtained. The patient was then positioned on the prone Hologic stereotactic biopsy table. The right breast was placed in CC compression. The asymmetry was localized within the stereotactic window from a superior approach. The skin was cleansed and local anesthetic applied. A 9-gauge Brevera needle was placed into the right breast. 5 core samples were obtained from about the periphery of the needle. The samples were imaged while in the Mammography Suite. The patient tolerated the procedure well. There were no immediate complications. SPECIMEN RADIOGRAPH: The specimen radiograph reveals scattered calcifications. The samples are placed into a formalin container and sent to the lab for analysis. A biopsy clip was then placed into the small created biopsy cavity. POSTBIOPSY MAMMOGRAM: CC and ML views of the right breast were obtained. There are expected postprocedural changes at the 9:30 position right breast. The clip is in satisfactory position. No definite residual microcalcifications are present, however, these could be obscured due to postprocedural change. Stereotactic biopsy of the asymmetry with associated architectural distortion and microcalcifications at the 9:30 position right breast with histologic results pending.      US BREAST LIMITED RIGHT    Result Date: 8/25/2022  DIAGNOSTIC DIGITAL RIGHT MAMMOGRAPHY AND RIGHT BREAST ULTRASOUND CLINICAL INDICATION: Additional evaluation of right lateral focal asymmetry. Prior contralateral malignant lumpectomy and radiation greater than 10 years ago, and prior right benign biopsy in 2011 demonstrating 10:00 fibrocystic changes. COMPARISON: 8/17/2022, others back to 10/12/2010 FINDINGS: Mammogram: Digital diagnostic mammography was performed, and is interpreted in conjunction with a computer assisted detection (CAD) system. Additional right mediolateral and compression magnification views were performed. In the slightly upper lateral breast at anterior-middle depth (about 6 cm behind the nipple) there is a small focal asymmetry with associated faint indeterminate pleomorphic calcifications and subtle architectural changes. This measures close to 1 cm but is not well defined. Further evaluation is recommended by ultrasound. Elsewhere the right breast appears unchanged from older mammography. There are diffuse typically benign calcifications elsewhere without suspicious feature. The posterior upper outer quadrant benign biopsy clip is again noted. Ultrasound: Real time targeted sonography was performed of the right lateral breast carefully and extensively by the radiologist and sonographer. At 9:30 position there is a subcentimeter hypoechoic area which on careful real-time scanning could not be definitively reproduced. The surrounding lateral tissues otherwise demonstrate no concerning findings. Right lateral asymmetry without definite sonographic correlate, indeterminate for malignancy. Recommend stereotactic biopsy. Results and recommendations discussed in full with the patient at the time of interpretation, all questions were answered and she agrees. BI-RADS Assessment Category 4: Suspicious Finding- Biopsy should be considered.       MAMMOGRAM POST BX CLIP PLACEMENT RIGHT    Result Date: 8/31/2022  Stereotactic Biopsy right Breast, Surgical Specimen Radiograph, Postbiopsy Mammogram, 08/31/2022 HISTORY: Focal asymmetry with possible architectural distortion and microcalcifications at the 9:30 position right breast. STEREOTACTIC BIOPSY: The patient was explained the procedure and informed consent was obtained. The patient was then positioned on the prone Hologic stereotactic biopsy table. The right breast was placed in CC compression. The asymmetry was localized within the stereotactic window from a superior approach. The skin was cleansed and local anesthetic applied. A 9-gauge Brevera needle was placed into the right breast. 5 core samples were obtained from about the periphery of the needle. The samples were imaged while in the Mammography Suite. The patient tolerated the procedure well. There were no immediate complications. SPECIMEN RADIOGRAPH: The specimen radiograph reveals scattered calcifications. The samples are placed into a formalin container and sent to the lab for analysis. A biopsy clip was then placed into the small created biopsy cavity. POSTBIOPSY MAMMOGRAM: CC and ML views of the right breast were obtained. There are expected postprocedural changes at the 9:30 position right breast. The clip is in satisfactory position. No definite residual microcalcifications are present, however, these could be obscured due to postprocedural change. Stereotactic biopsy of the asymmetry with associated architectural distortion and microcalcifications at the 9:30 position right breast with histologic results pending. Pathology:  DIAGNOSIS        A:  \"RIGHT BREAST MASS\":        INFILTRATING DUCTAL CARCINOMA, LOW-GRADE (WELL DIFFERENTIATED)   GROSSLY MEASURING APPROXIMATELY 1.6 X 1.5 X 1.5 CM. MICROSCOPICALLY TUMOR   APPEARS TO EXTEND TO MARKED INFERIOR MARGIN. OTHER MARGINS ARE NEGATIVE   FOR MALIGNANT TUMOR. FOCAL INTERMEDIATE GRADE DUCTAL CARCINOMA IN SITU. DEFINITE LYMPHOVASCULAR INVASION IS NOT IDENTIFIED.  SEE COMMENT          B:  \"SENTINEL LYMPH NODES\":        THREE LYMPH NODES NEGATIVE FOR METASTATIC TUMOR       DIAGNOSIS        A:  \" RIGHT BREAST ASYMMETRY AT 9:30 POSITION, CORE BIOPSY\":         INFILTRATING DUCTAL CARCINOMA, LOW GRADE (WELL DIFFERENTIATED). DEFINITE IN SITU COMPONENT AND LYMPHOVASCULAR INVASION ARE NOT IDENTIFIED               Procedures/Addenda                     STF- ER/AL/[id number] BY IHC                                                                                                                                         Status:  Signed Out    Demarcus Jim MD on 2022                                 Interpretation                  [facility] IHC Quantitative Breast Panel Result: A1     TEST NAME:                         RESULTS:               INTERNAL   CONTROLS:     ESTROGEN RECEPTOR:               Positive (95%)               Present,   positive   PROGESTERONE RECEPTOR:          Negative (0.0%)          Present, positive     HER-2/CHIQUITA:                         Negative (1+)               Percentage   of Cells with Uniform        Intense Complete Membrane   Stainin%             ASSESSMENT:   Diagnosis Orders   1.  Malignant neoplasm of right breast in female, estrogen receptor positive, unspecified site of breast [facility])            Patient Active Problem List   Diagnosis    History of breast cancer    Arthritis    Spondylosis of lumbosacral region without myelopathy or radiculopathy    Chronic pain of left knee    Hyperlipidemia    Anxiety associated with depression    Right median nerve neuropathy    Refused varicella vaccine    Hypertension    Abdominal wall hernia    Hip pain, bilateral    Adenomatous polyp of colon    Stress incontinence    Agatston coronary artery calcium score greater than 400    Controlled type 2 diabetes mellitus with microalbuminuria, without long-term current use of insulin (HCC)    Anxiety    Hypothyroidism    Infiltrating ductal carcinoma of right breast ([facility] [address]           PLAN:  Lab studies and imaging studies were personally reviewed. Pertinent old records were reviewed. Breast cancer: 1.4 cm, low grade, ER 95% and TX/HER2 negative, right breast.  History of left breast cancer that was treated with lumpectomy/XRT/endocrine therapy in 2001. We recommended proceeding with surgery, then we will discuss adjuvant therapy, she will definitely benefit from endocrine therapy but she is quite averse to the possibility of chemotherapy, so we will likely hold off on gene recurrence testing as it is unlikely to change our management. Radiation would also be recommended but optional because of her age >73 and planned endocrine therapy. She is here for follow-up. No complaints, feeling OK. She had to have re-excision because of a positive margin, still with some discomfort as expected. Path reviewed and confirms the tumor to be 1.6 cm, tumor extended to inferior margin but re-excision was negative. 3 sentinel nodes were also negative for tumor. We discussed options, including gene recurrence testing for risk stratification, but she admits that this would not change her mind about potential for chemotherapy. Therefore, we will recommend antiestrogen therapy alone. She is post-menopausal, so a prescription was given for anastrozole. Side effects were reviewed, including the potential for joint aches and pains, menopausal type symptoms including hot flashes, mood swings, diaphoresis, and fluid retention, as well as the possibility of accelerated bone loss. We will update her bone density scan. We also will start prophylactic calcium and vitamin D replacement. We also discussed radiation referral for risk reduction but she does not seem interested in this discussion given our explanation of the data, which suggests no mortality benefit for patients over 70 who are on endocrine therapy. All questions were asked and answered to the best of my ability. F/u in 2-3 months for recheck on Arimidex.           Hortencia Vazquez, MD, MD [facility]  [address] [place] [address]  Office : [phone]  Fax : [phone]

## 2022-10-27 ENCOUNTER — OFFICE VISIT (OUTPATIENT)
Dept: SURGERY | Age: 77
End: 2022-10-27

## 2022-10-27 VITALS — BODY MASS INDEX: 30.18 KG/M2 | WEIGHT: 164 LBS | HEIGHT: 62 IN

## 2022-10-27 DIAGNOSIS — Z09 POSTOPERATIVE EXAMINATION: Primary | ICD-10-CM

## 2022-10-27 DIAGNOSIS — Z12.31 SCREENING MAMMOGRAM FOR HIGH-RISK PATIENT: ICD-10-CM

## 2022-10-27 PROCEDURE — 99024 POSTOP FOLLOW-UP VISIT: CPT | Performed by: STUDENT IN AN ORGANIZED HEALTH CARE EDUCATION/TRAINING PROGRAM

## 2022-10-27 NOTE — PROGRESS NOTES
General Surgery Office Visit:    Pt presents s/p reexcision of positive margins. Pt overall doing well, minimal pain. Tolerating diet and having bowel function. Medications:   Current Outpatient Medications   Medication Sig Dispense Refill    anastrozole (ARIMIDEX) 1 MG tablet Take 1 tablet by mouth daily 30 tablet 3    traMADol (ULTRAM) 50 MG tablet Take 50 mg by mouth every 6 hours as needed for Pain. acetaminophen (TYLENOL) 500 MG tablet Take 1,000 mg by mouth every 6 hours as needed for Pain      docusate sodium (COLACE) 100 MG capsule Take 1 capsule by mouth 2 times daily 60 capsule 0    magnesium oxide (MAG-OX) 400 MG tablet Take 400 mg by mouth daily      Multiple Vitamin (MULTIVITAMIN ADULT PO) Take by mouth daily      calcium carbonate (OSCAL) 500 MG TABS tablet Take 500 mg by mouth daily      nystatin (MYCOSTATIN) 171177 UNIT/GM cream Apply topically 2 times daily. 1 each 1    atorvastatin (LIPITOR) 40 MG tablet Take 1 tablet by mouth daily (Patient taking differently: Take 40 mg by mouth at bedtime) 90 tablet 3    irbesartan (AVAPRO) 150 MG tablet Take 1 tablet by mouth daily (Patient taking differently: Take 150 mg by mouth at bedtime) 90 tablet 3    levothyroxine (SYNTHROID) 75 MCG tablet Take 1 tablet by mouth Daily TK 1 T PO D Indications: a condition with low thyroid hormone levels 90 tablet 3    atenolol-chlorthalidone (TENORETIC) 50-25 MG per tablet Take 0.5 tablets by mouth daily 90 tablet 3    Diabetic Shoe MISC by Does not apply route Diabetic shoe, custom orthoses, custom prosthetic filler, DM2, neuropathy 1 each 0    Cholecalciferol 50 MCG (2000 UT) TABS Take by mouth      coenzyme Q10 100 MG CAPS capsule Take 100 mg by mouth daily      hydrocortisone 2.5 % cream Place rectally 4 times daily       No current facility-administered medications for this visit. Allergies:    Allergies   Allergen Reactions    Milk Protein Other (See Comments)     Increased mucus production        Past History:  Past Medical History:   Diagnosis Date    Abdominal pain     Arthritis     Breast cancer ([address]     Breast cancer, left ([address] 2001    radiation, surgery    Breast pain in female     Bunion     Chronic pain of left knee     Corns and callosities     DDD (degenerative disc disease), lumbar     Diabetes mellitus type II, controlled ([address]     Diet controlled    Emotional disorder     Fungal infection of toenail     Hallux valgus     Hip pain, bilateral     Hyperglycemia     Hyperlipidemia     Hypertension     medication    Hypothyroidism     Lower GI bleed     Obesity     Other ill-defined conditions(799.89)     pt reports urinalysis shows blood - she is scheduled for CT pelvis and abdomen in August)    Postmenopausal     Right median nerve neuropathy     Stress incontinence     Traumatic arthropathy of knee       Past Surgical History:   Procedure Laterality Date    BREAST BIOPSY Right 2011, Stereo Core Bx 8-31-22    benign    BREAST BIOPSY Right 10/5/2022    RIGHT BREAST LUMPECTOMY WITH MAGSEED LOCALIZATION performed by Sebastian Bhardwaj DO at [address] [facility] Right 10/5/2022    BREAST BIOPSY SENTINEL NODE DISSECTION  Pre-op : 7:00, Lympho:8:00, LOC:  seed placed 10-3-22 , Surgery:  10:30am performed by Sebastian Bhardwaj DO at [address] [facility] LUMPECTOMY Left 2001    radiation for CA    BREAST SURGERY Right 10/19/2022    RE-EXCISION RIGHT INFERIOR MARGINS performed by Sebastian Bhardwaj DO at [address] [facility]   2008    HYSTERECTOMY (CERVIX STATUS UNKNOWN)      VERN STEROTACTIC LOC BREAST BIOPSY RIGHT Right 8/31/2022    VERN STEROTACTIC LOC BREAST BIOPSY RIGHT 8/31/2022 Sam Hayes MD [facility]    ORTHOPEDIC SURGERY Left     ankle arthroscopy    ORTHOPEDIC SURGERY      left knee repair    ORTHOPEDIC SURGERY      right ankle    ORTHOPEDIC SURGERY  2005    Left TSA    SHOULDER ARTHROPLASTY Left     TUBAL LIGATION      UROLOGICAL SURGERY      cystoscopy US GUIDED CORE BREAST BIOPSY Right         Family and Social History:  Family History   Problem Relation Age of Onset    Dementia Mother     Diabetes Mother     Cancer Mother 76        Colon    Diabetes Father     Breast Cancer Sister 78    Breast Cancer Sister 77    Hypertension Maternal Grandmother     Cancer Other         bone CA    Hypertension Other     Diabetes Other     Post-op Cognitive Dysfunction Neg Hx     Malig Hypertherm Neg Hx     Pseudochol. Deficiency Neg Hx     Delayed Awakening Neg Hx     Post-op Nausea/Vomiting Neg Hx     Emergence Delirium Neg Hx     Other Neg Hx      Social History     Socioeconomic History    Marital status: Single     Spouse name: Not on file    Number of children: Not on file    Years of education: Not on file    Highest education level: Not on file   Occupational History    Not on file   Tobacco Use    Smoking status: Former     Packs/day: 0.25     Types: Cigarettes     Quit date: 2001     Years since quittin.3    Smokeless tobacco: Never   Vaping Use    Vaping Use: Never used   Substance and Sexual Activity    Alcohol use: No    Drug use: No    Sexual activity: Not on file   Other Topics Concern    Not on file   Social History Narrative    Not on file     Social Determinants of Health     Financial Resource Strain: Low Risk     Difficulty of Paying Living Expenses: Not hard at all   Food Insecurity: No Food Insecurity    Worried About Running Out of Food in the Last Year: Never true    Ran Out of Food in the Last Year: Never true   Transportation Needs: No Transportation Needs    Lack of Transportation (Medical): No    Lack of Transportation (Non-Medical):  No   Physical Activity: Not on file   Stress: No Stress Concern Present    Feeling of Stress : Not at all   Social Connections: Unknown    Frequency of Communication with Friends and Family: Not on file    Frequency of Social Gatherings with Friends and Family: Not on file    Attends Mu-ism Services: Never Active Member of Clubs or Organizations: No    Attends Club or Organization Meetings: Never    Marital Status:    Intimate Partner Violence: Not At Risk    Fear of Current or Ex-Partner: No    Emotionally Abused: No    Physically Abused: No    Sexually Abused: No   Housing Stability: Unknown    Unable to Pay for Housing in the Last Year: No    Number of Places Lived in the Last Year: Not on file    Unstable Housing in the Last Year: No        REVIEW OF SYSTEMS: 10 Point ROS negative except what is in HPI    PHYSICAL EXAMINATION:    Ht 5' 2\" (1.575 m)   Wt 164 lb (74.4 kg)   BMI 30.00 kg/m²     General Appearance AOx3, in no acute distress  Respiratory No chest wall deformities or tenderness, respiratory effort normal, no use of accessory muscles. Cardiovascular RRR. No chest wall tenderness. Gastrointestinal Abdomen soft, nontender, nondistended. BS x4. No rebound, guarding, or rigidity present. No palpable masses. No CVA tenderness   Incisions: healing appropriately     DIAGNOSIS        \"RIGHT BREAST INFERIOR MARGINS\":  CHANGES CONSISTENT WITH PRIOR   SURGERY; FIBROCYSTIC MASTOPATHY; NO DEFINITE RESIDUAL CARCINOMA   IDENTIFIED. Assessment:  Infiltrating ductal carcinoma    Plan:  Pt overall doing well. Continue to slowly increase their activities of daily living. No concerns at this time. Pt can see me on an as needed basis. Will order her screening mammogram for 6 months as a new baseline.

## 2022-11-02 ENCOUNTER — OFFICE VISIT (OUTPATIENT)
Dept: ONCOLOGY | Age: 77
End: 2022-11-02
Payer: MEDICAID

## 2022-11-02 DIAGNOSIS — Z91.89 ADJUSTMENT TO LIFE THREATENING ILLNESS: ICD-10-CM

## 2022-11-02 DIAGNOSIS — C50.911 MALIGNANT NEOPLASM OF RIGHT BREAST IN FEMALE, ESTROGEN RECEPTOR POSITIVE, UNSPECIFIED SITE OF BREAST (HCC): Primary | ICD-10-CM

## 2022-11-02 DIAGNOSIS — Z17.0 MALIGNANT NEOPLASM OF RIGHT BREAST IN FEMALE, ESTROGEN RECEPTOR POSITIVE, UNSPECIFIED SITE OF BREAST (HCC): Primary | ICD-10-CM

## 2022-11-02 PROCEDURE — 90837 PSYTX W PT 60 MINUTES: CPT | Performed by: SOCIAL WORKER

## 2022-11-02 PROCEDURE — 1123F ACP DISCUSS/DSCN MKR DOCD: CPT | Performed by: SOCIAL WORKER

## 2022-11-02 NOTE — PATIENT INSTRUCTIONS
To reschedule your appointment, please call [phone].    In case of emergency, please, call 911 or [address] in [place], [place].
Spontaneous, unlabored and symmetrical

## 2022-11-02 NOTE — PROGRESS NOTES
Behavioral Health - Progress Note          Name: Christiano Carreno  : [birth year]  MRN: [id number]  Date of Service: 2022  Location of Service: [place] for both provider and patient        Type of Service: Individual Therapy       Reason for Visit: Follow Up     Chief Complaint: Anxiety and its commodities due to distress caused by cancer diagnosis and treatment. Subjective:  LISW-CP used Dialectical Behavioral and Cognitive Behavioral Therapy, tailored to patient's need. Patient denied any plan or intent to hurt self or others. Patient was instructed, in case of emergency, to call 911 or Crisisline 988 in [facility]. Patient verbalized agreement. CBT Material discussed and reviewed in Session:  Relationship with her children and self-care  _________________________________________________    Clinical Impression: Christiano Carreno is a 65 y.o. female . Mental Status Exam, patient was dressed properly. No abnormal psychomotor movements observed. Intellectual functioning appeared to be intact. Mood and affect were congruent. Insight was adequate. Judgment was adequate. Speech was normal, clear. Thought process was coherent. Patient did not report suicidal or homicidal ideations, intent or plans. Patient denied self-injury behaviors. Patient denied alcohol and other substance abuse. Patient was oriented to the four spheres: self, place, time and situation. Patient response to intervention was appropriate. Patient progress was adequate. Protective factor: self-determination. __________________________________________________    Session scheduled for: 9:00  pm/am     Session started:  9:00    Session ended: 10:00  _________________________________________________    Patient Diagnosis:         PHQ 9:   3  - to be scanned into EMR.  0-4 Suggests the patient may not need depression treatment  5-14 Mild major depressive disorder. 15-19 Moderate-major depressive disorder.    20+ Severe major depressive disorders.      SONIDO 7:  7   - to be scanned into EMR.  0-4: minimal anxiety   5-9: mild anxiety   10-14: moderate anxiety   15-21: severe anxiety   __________________________________________________    Frequency: Weekly or bi-weekly appointments as schedule permits    Patient's Primary Goal: Decrease anxiety   __________________________________________________    Plans:  Continue to use Cognitive Behavioral Approach  Continue to work with patient on primary goal.    Continue to see patient, weekly or bi-weekly  __________________________________________________    Anderson Jones

## 2022-11-10 ENCOUNTER — OFFICE VISIT (OUTPATIENT)
Dept: CARDIOLOGY CLINIC | Age: 77
End: 2022-11-10
Payer: MEDICARE

## 2022-11-10 VITALS
WEIGHT: 163.8 LBS | BODY MASS INDEX: 30.14 KG/M2 | HEART RATE: 59 BPM | DIASTOLIC BLOOD PRESSURE: 60 MMHG | SYSTOLIC BLOOD PRESSURE: 116 MMHG | HEIGHT: 62 IN

## 2022-11-10 DIAGNOSIS — E78.00 PURE HYPERCHOLESTEROLEMIA, UNSPECIFIED: ICD-10-CM

## 2022-11-10 DIAGNOSIS — E78.00 PURE HYPERCHOLESTEROLEMIA: ICD-10-CM

## 2022-11-10 DIAGNOSIS — R93.1 AGATSTON CORONARY ARTERY CALCIUM SCORE GREATER THAN 400: ICD-10-CM

## 2022-11-10 DIAGNOSIS — I10 ESSENTIAL (PRIMARY) HYPERTENSION: Primary | ICD-10-CM

## 2022-11-10 DIAGNOSIS — Z85.3 HISTORY OF BREAST CANCER: ICD-10-CM

## 2022-11-10 DIAGNOSIS — I10 PRIMARY HYPERTENSION: ICD-10-CM

## 2022-11-10 PROCEDURE — 93000 ELECTROCARDIOGRAM COMPLETE: CPT | Performed by: INTERNAL MEDICINE

## 2022-11-10 PROCEDURE — 3074F SYST BP LT 130 MM HG: CPT | Performed by: INTERNAL MEDICINE

## 2022-11-10 PROCEDURE — 1123F ACP DISCUSS/DSCN MKR DOCD: CPT | Performed by: INTERNAL MEDICINE

## 2022-11-10 PROCEDURE — 99214 OFFICE O/P EST MOD 30 MIN: CPT | Performed by: INTERNAL MEDICINE

## 2022-11-10 PROCEDURE — 3078F DIAST BP <80 MM HG: CPT | Performed by: INTERNAL MEDICINE

## 2022-11-10 ASSESSMENT — ENCOUNTER SYMPTOMS
SHORTNESS OF BREATH: 0
ABDOMINAL PAIN: 0

## 2022-11-10 NOTE — PROGRESS NOTES
[address], [address] [facility], [address] [place] [address]  PHONE: [phone]  [id number]      SUBJECTIVE:   July Crenshaw is a 65 y.o. female seen for a follow up visit regarding the following:     Chief Complaint   Patient presents with    6 Month Follow-Up    Hypertension    Hyperlipidemia       HPI:    78-year-old female comes back for follow-up she has history of elevated calcium score of about 800 range some years ago with a negative nuclear study. Cardiac standpoint she is been doing well and has no complaints of shortness of breath or chest pain. Since I saw her last she discovered she had some breast cancer on the right side and had surgery twice and not had any follow-up radiation chemotherapy. She has been doing well otherwise      Past Medical History, Past Surgical History, Family history, Social History, and Medications were all reviewed with the patient today and updated as necessary.        Allergies   Allergen Reactions    Milk Protein Other (See Comments)     Increased mucus production     Past Medical History:   Diagnosis Date    Abdominal pain     Arthritis     Breast cancer ([address]     Breast cancer, left ([address] 2001    radiation, surgery    Breast pain in female     Bunion     Chronic pain of left knee     Corns and callosities     DDD (degenerative disc disease), lumbar     Diabetes mellitus type II, controlled ([address]     Diet controlled    Emotional disorder     Fungal infection of toenail     Hallux valgus     Hip pain, bilateral     Hyperglycemia     Hyperlipidemia     Hypertension     medication    Hypothyroidism     Lower GI bleed     Obesity     Other ill-defined conditions(799.89)     pt reports urinalysis shows blood - she is scheduled for CT pelvis and abdomen in August)    Postmenopausal     Right median nerve neuropathy     Stress incontinence     Traumatic arthropathy of knee      Past Surgical History:   Procedure Laterality Date    BREAST BIOPSY Right 2011, Stereo Core Bx 22    benign    BREAST BIOPSY Right 10/5/2022    RIGHT BREAST LUMPECTOMY WITH MAGSEED LOCALIZATION performed by Letcher Osler, DO at  [address] Right 10/5/2022    BREAST BIOPSY SENTINEL NODE DISSECTION  Pre-op : 7:00, Lympho:8:00, LOC:  seed placed 10-3-22 , Surgery:  10:30am performed by Letcher Osler, DO at [address] [facility] LUMPECTOMY Left     radiation for CA    BREAST SURGERY Right 10/19/2022    RE-EXCISION RIGHT INFERIOR MARGINS performed by Letcher Osler, DO at [address] [facility]       HYSTERECTOMY (CERVIX STATUS UNKNOWN)      VERN STEROTACTIC LOC BREAST BIOPSY RIGHT Right 2022    VERN STEROTACTIC LOC BREAST BIOPSY RIGHT 2022 Patt Hester MD [facility]    ORTHOPEDIC SURGERY Left     ankle arthroscopy    ORTHOPEDIC SURGERY      left knee repair    ORTHOPEDIC SURGERY      right ankle    ORTHOPEDIC SURGERY      Left TSA    SHOULDER ARTHROPLASTY Left     TUBAL LIGATION      UROLOGICAL SURGERY      cystoscopy    US GUIDED CORE BREAST BIOPSY Right      Family History   Problem Relation Age of Onset    Dementia Mother     Diabetes Mother     Cancer Mother 76        Colon    Diabetes Father     Breast Cancer Sister 78    Breast Cancer Sister 77    Hypertension Maternal Grandmother     Cancer Other         bone CA    Hypertension Other     Diabetes Other     Post-op Cognitive Dysfunction Neg Hx     Malig Hypertherm Neg Hx     Pseudochol. Deficiency Neg Hx     Delayed Awakening Neg Hx     Post-op Nausea/Vomiting Neg Hx     Emergence Delirium Neg Hx     Other Neg Hx       Social History     Tobacco Use    Smoking status: Former     Packs/day: 0.25     Types: Cigarettes     Quit date: 2001     Years since quittin.3    Smokeless tobacco: Never   Substance Use Topics    Alcohol use: No       ROS:    Review of Systems   Constitutional: Negative for decreased appetite.    Cardiovascular:  Negative for chest pain, dyspnea on exertion, irregular heartbeat and palpitations. Respiratory:  Negative for shortness of breath. Gastrointestinal:  Negative for abdominal pain. PHYSICAL EXAM:    /60   Pulse 59   Ht 5' 2\" (1.575 m)   Wt 163 lb 12.8 oz (74.3 kg)   BMI 29.96 kg/m²        Wt Readings from Last 3 Encounters:   11/10/22 163 lb 12.8 oz (74.3 kg)   10/27/22 164 lb (74.4 kg)   10/26/22 164 lb 6.4 oz (74.6 kg)     BP Readings from Last 3 Encounters:   11/10/22 116/60   10/26/22 128/73   10/19/22 (!) 137/90         Physical Exam  Constitutional:       General: She is not in acute distress. Cardiovascular:      Rate and Rhythm: Normal rate and regular rhythm. Heart sounds:     No gallop. Pulmonary:      Effort: Pulmonary effort is normal.      Breath sounds: Normal breath sounds. Musculoskeletal:         General: No swelling. Neurological:      Mental Status: She is alert. Medical problems and test results were reviewed with the patient today. Results for orders placed or performed in visit on 11/10/22   EKG 12 Lead    Impression    Normal sinus rhythm rate of 59. Normal intervals. Nonspecific T wave change     Lab Results   Component Value Date/Time     08/25/2022 08:23 AM    K 3.9 08/25/2022 08:23 AM     08/25/2022 08:23 AM    CO2 30 08/25/2022 08:23 AM    BUN 18 08/25/2022 08:23 AM    GFRAA >60 08/25/2022 08:23 AM     Lab Results   Component Value Date/Time    CHOL 132 08/25/2022 08:23 AM    HDL 50 08/25/2022 08:23 AM    VLDL 12 02/22/2022 08:13 AM         ASSESSMENT and Staci Keith was seen today for 6 month follow-up, hypertension and hyperlipidemia. Diagnoses and all orders for this visit:    Essential (primary) hypertension blood pressures been currently stable on multiple meds including her Avapro and atenolol hydrochlorothiazide  -     EKG 12 Lead    Pure hypercholesterolemia, unspecified been doing very well earlier this year cholesterol was 128 LDL 64.   She will stay on atorvastatin 40  -     EKG 12 Lead    Agatston coronary artery calcium score greater than 400 she not have any current symptoms EKG shows some nonspecific changes before      History of breast cancer she has had surgery recently and she is overall doing okay at this point    Overall think she is stable she will come back see 1 my partners in 6 months      [unfilled]      No follow-up provider specified.     Vangie Mo MD  11/10/2022  9:40 PM

## 2022-11-16 ENCOUNTER — OFFICE VISIT (OUTPATIENT)
Dept: ONCOLOGY | Age: 77
End: 2022-11-16
Payer: MEDICAID

## 2022-11-16 DIAGNOSIS — Z17.0 MALIGNANT NEOPLASM OF RIGHT BREAST IN FEMALE, ESTROGEN RECEPTOR POSITIVE, UNSPECIFIED SITE OF BREAST (HCC): ICD-10-CM

## 2022-11-16 DIAGNOSIS — C50.911 MALIGNANT NEOPLASM OF RIGHT BREAST IN FEMALE, ESTROGEN RECEPTOR POSITIVE, UNSPECIFIED SITE OF BREAST (HCC): ICD-10-CM

## 2022-11-16 DIAGNOSIS — Z91.89 ADJUSTMENT TO LIFE THREATENING ILLNESS: Primary | ICD-10-CM

## 2022-11-16 PROCEDURE — 1123F ACP DISCUSS/DSCN MKR DOCD: CPT | Performed by: SOCIAL WORKER

## 2022-11-16 PROCEDURE — 90837 PSYTX W PT 60 MINUTES: CPT | Performed by: SOCIAL WORKER

## 2022-11-16 NOTE — PROGRESS NOTES
Behavioral Health - Progress Note          Name: Fredo Griffin  : [birth year]  MRN: [id number]  Date of Service: 2022  Location of Service: [place] for both provider and patient        Type of Service: Individual Therapy       Reason for Visit: Follow Up     Chief Complaint: Anxiety and its commodities due to distress caused by cancer diagnosis and treatment. Subjective:  Discussed healthy relationship with her children. LISW-CP used Dialectical Behavioral and Cognitive Behavioral Therapy, tailored to patient's need. Patient denied any plan or intent to hurt self or others. Patient was instructed, in case of emergency, to call 911 or Crisisline 988 in [place], [place]. Patient verbalized agreement.   _________________________________________________    Clinical Impression: Fredo Griffin is a 65 y.o. female . Mental Status Exam, patient was dressed properly. No abnormal psychomotor movements observed. Intellectual functioning appeared to be intact. Mood and affect were congruent. Insight was adequate. Judgment was adequate. Speech was normal, clear. Thought process was coherent. Patient did not report suicidal or homicidal ideations, intent or plans. Patient denied self-injury behaviors. Patient denied alcohol and other substance abuse. Patient was oriented to the four spheres: self, place, time and situation. Patient response to intervention was appropriate. Patient progress was adequate. Protective factor: self-determination. __________________________________________________    Session scheduled for: 8:00  pm/am, patient was on time     Session started:  8:00    Session ended:   9:00  _________________________________________________    Patient Diagnosis:         PHQ 9:   6  - to be scanned into EMR.  0-4 Suggests the patient may not need depression treatment  5-14 Mild major depressive disorder. 15-19 Moderate-major depressive disorder. 20+ Severe major depressive disorders.      SONIDO 7: 4   - to be scanned into EMR.  0-4: minimal anxiety   5-9: mild anxiety   10-14: moderate anxiety   15-21: severe anxiety   __________________________________________________    Frequency: Weekly or bi-weekly appointments as schedule permits    Patient's Primary Goal: Decrease anxiety   __________________________________________________    Plans:  Continue to use Cognitive Behavioral Approach  Continue to work with patient on primary goal.    Continue to see patient, weekly or bi-weekly  __________________________________________________    Yancy Acuna

## 2022-11-16 NOTE — PATIENT INSTRUCTIONS
To reschedule your appointment, please call [phone].    In case of emergency, please, call 911 or [address] in [place], [place].

## 2022-11-22 ENCOUNTER — CLINICAL DOCUMENTATION (OUTPATIENT)
Dept: ONCOLOGY | Age: 77
End: 2022-11-22

## 2022-11-22 ENCOUNTER — OFFICE VISIT (OUTPATIENT)
Dept: ONCOLOGY | Age: 77
End: 2022-11-22

## 2022-11-22 ENCOUNTER — TELEPHONE (OUTPATIENT)
Dept: CASE MANAGEMENT | Age: 77
End: 2022-11-22

## 2022-11-22 DIAGNOSIS — Z91.89 ADJUSTMENT TO LIFE THREATENING ILLNESS: Primary | ICD-10-CM

## 2022-11-22 DIAGNOSIS — Z17.0 MALIGNANT NEOPLASM OF RIGHT BREAST IN FEMALE, ESTROGEN RECEPTOR POSITIVE, UNSPECIFIED SITE OF BREAST (HCC): ICD-10-CM

## 2022-11-22 DIAGNOSIS — C50.911 MALIGNANT NEOPLASM OF RIGHT BREAST IN FEMALE, ESTROGEN RECEPTOR POSITIVE, UNSPECIFIED SITE OF BREAST (HCC): ICD-10-CM

## 2022-11-22 NOTE — TELEPHONE ENCOUNTER
11/22/2022 The patient was in a visit with [facility] and stated she was having pain and some blood in urine. I called the patient and she was seen at [facility] urgent care last evening. The patient states she is not running a fever. She also states the Dr at urgent care did not start her on antibiotics. When I asked her about the blood in her urine, she states she cannot see blood but that there was blood noted in her report from urgent care and the Dr there told her this may be from kidney stones. I have encouraged the patient to go to call her PCP which she states she did and they will see her on 12/2. I have instructed her if pain increases or anything changes over the holiday week that she would need to go to the ER. She verbalized understanding.

## 2022-11-22 NOTE — PROGRESS NOTES
Name: Florence Lopez    : [birth year]    MRN: [id number]    Date of Service: 2022    Patient did not come in. No 24 hrs cancellation message was given.  did call patient to remind of appointment.        Electronically Signed By:  LAWRENCE Moy

## 2022-11-22 NOTE — PROGRESS NOTES
Clinical Social Work Note  Name: Karey Valdes    : [birth year]    MRN: [id number]    Date of Service: 2022    Patient was late 27 minutes today. Patient told that  she had blood in her urine and she was in pain. She also went to Urgent Care yesterday at night. They prescribed steroids but she is afraid to take it. Undersigned sent an (important) email to navigator to ask how to procede. Denied any other suicidal or homicidal ideations.        Electronically Signed By:  LAWRENCE Esquivel

## 2022-11-23 ENCOUNTER — OFFICE VISIT (OUTPATIENT)
Dept: ENT CLINIC | Age: 77
End: 2022-11-23
Payer: MEDICARE

## 2022-11-23 VITALS
DIASTOLIC BLOOD PRESSURE: 80 MMHG | WEIGHT: 165 LBS | SYSTOLIC BLOOD PRESSURE: 110 MMHG | HEIGHT: 62 IN | BODY MASS INDEX: 30.36 KG/M2

## 2022-11-23 DIAGNOSIS — H90.3 SENSORINEURAL HEARING LOSS (SNHL) OF BOTH EARS: Primary | ICD-10-CM

## 2022-11-23 DIAGNOSIS — H90.3 SENSORINEURAL HEARING LOSS, BILATERAL: Primary | ICD-10-CM

## 2022-11-23 PROCEDURE — 3078F DIAST BP <80 MM HG: CPT | Performed by: STUDENT IN AN ORGANIZED HEALTH CARE EDUCATION/TRAINING PROGRAM

## 2022-11-23 PROCEDURE — 92557 COMPREHENSIVE HEARING TEST: CPT | Performed by: AUDIOLOGIST

## 2022-11-23 PROCEDURE — 3074F SYST BP LT 130 MM HG: CPT | Performed by: STUDENT IN AN ORGANIZED HEALTH CARE EDUCATION/TRAINING PROGRAM

## 2022-11-23 PROCEDURE — 92567 TYMPANOMETRY: CPT | Performed by: AUDIOLOGIST

## 2022-11-23 PROCEDURE — 99214 OFFICE O/P EST MOD 30 MIN: CPT | Performed by: STUDENT IN AN ORGANIZED HEALTH CARE EDUCATION/TRAINING PROGRAM

## 2022-11-23 PROCEDURE — 1123F ACP DISCUSS/DSCN MKR DOCD: CPT | Performed by: STUDENT IN AN ORGANIZED HEALTH CARE EDUCATION/TRAINING PROGRAM

## 2022-11-23 ASSESSMENT — ENCOUNTER SYMPTOMS
ABDOMINAL PAIN: 0
EYE DISCHARGE: 0
COUGH: 0

## 2022-11-23 NOTE — PROGRESS NOTES
Ashkan Beltran Mati [address] had Tympanometry and Audiometry performed today. The patient reports tinnitus and hearing loss. Results as follows:    Tympanometry    Type A -  bilaterally    Audiometry    Test Performed - Comprehensive Audiogram    Type of Loss - Right Ear: abnormal hearing: degree of loss is mild to moderately severe sensorineural hearing loss                           Left Ear: abnormal hearing: degree of loss is mild to moderately severe sensorineural hearing loss     SRT   Measurement Right Ear Left Ear   Value 35 35   Unit dB dB     Discrimination  Measurement Right Ear Left Ear   Value 88% 92%   Unit dB dB     Recommend  Binaural amplification and annual audios    [address] [employer]  Audiologist

## 2022-11-23 NOTE — PROGRESS NOTES
calcium carbonate (OSCAL) 500 MG TABS tablet Take 500 mg by mouth daily    nystatin (MYCOSTATIN) 910128 UNIT/GM cream Apply topically 2 times daily. atorvastatin (LIPITOR) 40 MG tablet Take 1 tablet by mouth daily (Patient taking differently: Take 40 mg by mouth at bedtime)    irbesartan (AVAPRO) 150 MG tablet Take 1 tablet by mouth daily (Patient taking differently: Take 150 mg by mouth at bedtime)    levothyroxine (SYNTHROID) 75 MCG tablet Take 1 tablet by mouth Daily TK 1 T PO D Indications: a condition with low thyroid hormone levels    atenolol-chlorthalidone (TENORETIC) 50-25 MG per tablet Take 0.5 tablets by mouth daily    Diabetic Shoe MISC by Does not apply route Diabetic shoe, custom orthoses, custom prosthetic filler, DM2, neuropathy    Cholecalciferol 50 MCG (2000 UT) TABS Take by mouth    coenzyme Q10 100 MG CAPS capsule Take 100 mg by mouth daily    hydrocortisone 2.5 % cream Place rectally 4 times daily     No current facility-administered medications for this visit.      Past Medical History:   Diagnosis Date    Abdominal pain     Arthritis     Breast cancer ([facility] [address]     Breast cancer, left ([facility] [address] 2001    radiation, surgery    Breast pain in female     Bunion     Chronic pain of left knee     Corns and callosities     DDD (degenerative disc disease), lumbar     Diabetes mellitus type II, controlled ([facility] [address]     Diet controlled    Emotional disorder     Fungal infection of toenail     Hallux valgus     Hip pain, bilateral     Hyperglycemia     Hyperlipidemia     Hypertension     medication    Hypothyroidism     Lower GI bleed     Obesity     Other ill-defined conditions(799.89)     pt reports urinalysis shows blood - she is scheduled for CT pelvis and abdomen in August)    Postmenopausal     Right median nerve neuropathy     Stress incontinence     Traumatic arthropathy of knee      Social History     Tobacco Use    Smoking status: Former     Packs/day: 0.25     Types: Cigarettes     Quit date: 7/13/2001 Years since quittin.3    Smokeless tobacco: Never   Substance Use Topics    Alcohol use: No     Past Surgical History:   Procedure Laterality Date    BREAST BIOPSY Right , Stereo Core Bx 22    benign    BREAST BIOPSY Right 10/5/2022    RIGHT BREAST LUMPECTOMY WITH MAGSEED LOCALIZATION performed by Patricia Sheihk DO at  [facility] Right 10/5/2022    BREAST BIOPSY SENTINEL NODE DISSECTION  Pre-op : 7:00, Lympho:8:00, LOC:  seed placed 10-3-22 , Surgery:  10:30am performed by Patricia Sheikh DO at [address] [facility] LUMPECTOMY Left     radiation for CA    BREAST SURGERY Right 10/19/2022    RE-EXCISION RIGHT INFERIOR MARGINS performed by Patricia Sheikh DO at [facility]      HYSTERECTOMY (CERVIX STATUS UNKNOWN)      VERN STEROTACTIC LOC BREAST BIOPSY RIGHT Right 2022    VERN STEROTACTIC LOC BREAST BIOPSY RIGHT 2022 Jalen Fermin MD [facility]    ORTHOPEDIC SURGERY Left     ankle arthroscopy    ORTHOPEDIC SURGERY      left knee repair    ORTHOPEDIC SURGERY      right ankle    ORTHOPEDIC SURGERY  2005    Left TSA    SHOULDER ARTHROPLASTY Left     TUBAL LIGATION      UROLOGICAL SURGERY      cystoscopy    US GUIDED CORE BREAST BIOPSY Right      Family History   Problem Relation Age of Onset    Dementia Mother     Diabetes Mother     Cancer Mother 76        Colon    Diabetes Father     Breast Cancer Sister 78    Breast Cancer Sister 77    Hypertension Maternal Grandmother     Cancer Other         bone CA    Hypertension Other     Diabetes Other     Post-op Cognitive Dysfunction Neg Hx     Malig Hypertherm Neg Hx     Pseudochol. Deficiency Neg Hx     Delayed Awakening Neg Hx     Post-op Nausea/Vomiting Neg Hx     Emergence Delirium Neg Hx     Other Neg Hx         ROS:    Review of Systems   Constitutional:  Negative for fever. HENT:  Negative for ear discharge and ear pain. Eyes:  Negative for discharge. Respiratory:  Negative for cough. Cardiovascular:  Negative for chest pain. Gastrointestinal:  Negative for abdominal pain. Genitourinary:  Negative for difficulty urinating. Musculoskeletal:  Negative for neck pain. Skin:  Negative for rash. Allergic/Immunologic: Negative for environmental allergies. Neurological:  Negative for dizziness. Hematological:  Negative for adenopathy. Psychiatric/Behavioral:  Negative for agitation. PHYSICAL EXAM:    /80 (Site: Right Calf, Position: Sitting)   Ht 5' 2\" (1.575 m)   Wt 165 lb (74.8 kg)   BMI 30.18 kg/m²     General: NAD, well-appearing  Neuro: No gross neuro deficits. CN's II-XII intact. No facial weakness. Eyes: EOMI. Pupils reactive. No periorbital edema/ecchymosis. Skin: No facial erythema, rashes or concerning lesions. Nose: No external deviations or saddling. Intranasally, septum is midline without perforations, nasal mucosa appears healthy with no erythema, mucopurulence, or polyps. Mouth: Moist mucus membranes, normal tongue/palate mobility, no concerning mucosal lesions. Oropharynx: clear with no erythema/exudate, no tonsillar hypertrophy. Ears: Normal appearing auricles, no hematomas. EACs clear with no cerumen impaction, healthy canal skin, TM's intact with no perforations or retraction pockets. No middle ear effusions. Neck: Soft, supple, no palpable lateral neck masses. No parotid or submandibular masses. No thyromegaly or palpable thyroid nodules. No surgical scars. Lymphatics: No palpable cervical LAD. Resp/Lungs: No audible stridor or wheezing, CTAB  Heart: RRR  Extremities: No clubbing or cyanosis.     Lab Results   Component Value Date    WBC 5.5 08/25/2022    HGB 12.9 08/25/2022    HCT 40.0 08/25/2022    MCV 87.7 08/25/2022     08/25/2022     Lab Results   Component Value Date/Time     08/25/2022 08:23 AM    K 3.9 08/25/2022 08:23 AM     08/25/2022 08:23 AM    CO2 30 08/25/2022 08:23 AM BUN 18 08/25/2022 08:23 AM    CREATININE 1.10 08/25/2022 08:23 AM    GLUCOSE 118 08/25/2022 08:23 AM    CALCIUM 9.4 08/25/2022 08:23 AM        ASSESSMENT and PLAN  67-year-old female with bilateral sensorineural hearing loss. Hearing stable over the past year. She will call back when she is ready to consider hearing aids. ICD-10-CM    1. Sensorineural hearing loss (SNHL) of both ears  H90.3             Javy Sanderson MD  11/23/2022  Electronically signed    Note dictated using voice recognition software. Please excuse any typos.

## 2022-11-28 ENCOUNTER — HOSPITAL ENCOUNTER (OUTPATIENT)
Dept: MAMMOGRAPHY | Age: 77
Discharge: HOME | End: 2022-12-01
Payer: MEDICARE

## 2022-11-28 DIAGNOSIS — Z79.811 USE OF AROMATASE INHIBITORS: ICD-10-CM

## 2022-11-28 PROCEDURE — 77080 DXA BONE DENSITY AXIAL: CPT

## 2022-12-02 ENCOUNTER — OFFICE VISIT (OUTPATIENT)
Dept: INTERNAL MEDICINE CLINIC | Facility: CLINIC | Age: 77
End: 2022-12-02
Payer: MEDICARE

## 2022-12-02 VITALS
TEMPERATURE: 97.9 F | DIASTOLIC BLOOD PRESSURE: 80 MMHG | WEIGHT: 159 LBS | RESPIRATION RATE: 16 BRPM | HEART RATE: 59 BPM | BODY MASS INDEX: 29.26 KG/M2 | HEIGHT: 62 IN | OXYGEN SATURATION: 99 % | SYSTOLIC BLOOD PRESSURE: 117 MMHG

## 2022-12-02 DIAGNOSIS — R31.9 HEMATURIA, UNSPECIFIED TYPE: ICD-10-CM

## 2022-12-02 DIAGNOSIS — M54.50 ACUTE RIGHT-SIDED LOW BACK PAIN WITHOUT SCIATICA: Primary | ICD-10-CM

## 2022-12-02 DIAGNOSIS — M25.551 RIGHT HIP PAIN: ICD-10-CM

## 2022-12-02 LAB
BILIRUBIN, URINE, POC: NEGATIVE
BLOOD URINE, POC: NORMAL
GLUCOSE URINE, POC: NEGATIVE
KETONES, URINE, POC: NEGATIVE
LEUKOCYTE ESTERASE, URINE, POC: NEGATIVE
NITRITE, URINE, POC: NEGATIVE
PH, URINE, POC: 6 (ref 4.6–8)
PROTEIN,URINE, POC: NEGATIVE
SPECIFIC GRAVITY, URINE, POC: 1.02 (ref 1–1.03)
URINALYSIS CLARITY, POC: NORMAL
URINALYSIS COLOR, POC: YELLOW
UROBILINOGEN, POC: 0.2

## 2022-12-02 PROCEDURE — 81003 URINALYSIS AUTO W/O SCOPE: CPT | Performed by: NURSE PRACTITIONER

## 2022-12-02 PROCEDURE — 99213 OFFICE O/P EST LOW 20 MIN: CPT | Performed by: NURSE PRACTITIONER

## 2022-12-02 PROCEDURE — 1123F ACP DISCUSS/DSCN MKR DOCD: CPT | Performed by: NURSE PRACTITIONER

## 2022-12-02 PROCEDURE — 3078F DIAST BP <80 MM HG: CPT | Performed by: NURSE PRACTITIONER

## 2022-12-02 PROCEDURE — 3074F SYST BP LT 130 MM HG: CPT | Performed by: NURSE PRACTITIONER

## 2022-12-02 ASSESSMENT — PATIENT HEALTH QUESTIONNAIRE - PHQ9
1. LITTLE INTEREST OR PLEASURE IN DOING THINGS: 0
SUM OF ALL RESPONSES TO PHQ QUESTIONS 1-9: 0
SUM OF ALL RESPONSES TO PHQ9 QUESTIONS 1 & 2: 0
SUM OF ALL RESPONSES TO PHQ QUESTIONS 1-9: 0
SUM OF ALL RESPONSES TO PHQ QUESTIONS 1-9: 0
2. FEELING DOWN, DEPRESSED OR HOPELESS: 0
SUM OF ALL RESPONSES TO PHQ QUESTIONS 1-9: 0

## 2022-12-02 ASSESSMENT — ENCOUNTER SYMPTOMS
DIARRHEA: 0
NAUSEA: 0
SHORTNESS OF BREATH: 0
ABDOMINAL PAIN: 0
VOMITING: 0
BACK PAIN: 1
COUGH: 0

## 2022-12-02 NOTE — PROGRESS NOTES
[facility]      2022    Patient Name: Huy Greene  :        Chief Complaint:  Chief Complaint   Patient presents with    Back Pain         HPI  Patient presents today with complaint of right-sided low back and hip pain. Symptoms started about one month ago after she raked leaves in her yard. She was seen at [facility] urgent care for this problem on 22. X-ray was completed which showed degenerative spondylosis; negative for fracture or other acute abnormality. Aggravating factors include lying on her right side, bending and lifting. She has been taking Tylenol PRN and applying a heating pad which helps somewhat. She denies any numbness, weakness or bowel/bladder incontinence. No pain radiating down her leg. She reports seeing Dr. Alice Walton for similar symptoms in 2019. Per patient, a spinal injection was done which provided significant relief. She is requesting referral back to Dr. Alice Walton today. While at urgent care, patient was noted to have blood in her urine. Urine culture was negative for infection. She denies any urinary symptoms.        Past Medical History:   Diagnosis Date    Abdominal pain     Arthritis     Breast cancer ([address]     Breast cancer, left ([address]     radiation, surgery    Breast pain in female     Bunion     Chronic pain of left knee     Corns and callosities     DDD (degenerative disc disease), lumbar     Diabetes mellitus type II, controlled ([address]     Diet controlled    Emotional disorder     Fungal infection of toenail     Hallux valgus     Hip pain, bilateral     Hyperglycemia     Hyperlipidemia     Hypertension     medication    Hypothyroidism     Lower GI bleed     Obesity     Other ill-defined conditions(799.89)     pt reports urinalysis shows blood - she is scheduled for CT pelvis and abdomen in August)    Postmenopausal     Right median nerve neuropathy     Stress incontinence     Traumatic arthropathy of knee        Past Surgical History:   Procedure Laterality Date    BREAST BIOPSY Right , Stereo Core Bx 22    benign    BREAST BIOPSY Right 10/5/2022    RIGHT BREAST LUMPECTOMY WITH MAGSEED LOCALIZATION [facility]22    BREAST BIOPSY SENTINEL NODE DISSECTION  Pre-op : 7:00, Lympho:8:00, LOC:  seed placed 10-3-22 , Surgery:  10:30am performed by Sebastian Bhardwaj DO at [address] [facility] LUMPECTOMY Left     radiation for CA    BREAST SURGERY Right 10/19/2022    RE-EXCISION RIGHT INFERIOR MARGINS performed by Sebastian Bhardwaj DO at [address] [facility]       HYSTERECTOMY (CERVIX STATUS UNKNOWN)      VERN STEROTACTIC LOC BREAST BIOPSY RIGHT Right 2022    VERN STEROTACTIC LOC BREAST BIOPSY RIGHT 2022 Sam Hayes MD [facility]    ORTHOPEDIC SURGERY Left     ankle arthroscopy    ORTHOPEDIC SURGERY      left knee repair    ORTHOPEDIC SURGERY      right ankle    ORTHOPEDIC SURGERY      Left TSA    SHOULDER ARTHROPLASTY Left     TUBAL LIGATION      UROLOGICAL SURGERY      cystoscopy    US GUIDED CORE BREAST BIOPSY Right        Family History   Problem Relation Age of Onset    Dementia Mother     Diabetes Mother     Cancer Mother 76        Colon    Diabetes Father     Breast Cancer Sister 78    Breast Cancer Sister 77    Hypertension Maternal Grandmother     Cancer Other         bone CA    Hypertension Other     Diabetes Other     Post-op Cognitive Dysfunction Neg Hx     Malig Hypertherm Neg Hx     Pseudochol.  Deficiency Neg Hx     Delayed Awakening Neg Hx     Post-op Nausea/Vomiting Neg Hx     Emergence Delirium Neg Hx     Other Neg Hx        Social History     Tobacco Use    Smoking status: Former     Packs/day: 0.25     Types: Cigarettes     Quit date: 2001     Years since quittin.4    Smokeless tobacco: Never   Vaping Use    Vaping Use: Never used   Substance Use Topics    Alcohol use: No    Drug use: No       Current Outpatient Medications:     anastrozole (ARIMIDEX) 1 MG tablet, Take 1 tablet by mouth daily, Disp: 30 tablet, Rfl: 3    acetaminophen (TYLENOL) 500 MG tablet, Take 1,000 mg by mouth every 6 hours as needed for Pain, Disp: , Rfl:     magnesium oxide (MAG-OX) 400 MG tablet, Take 400 mg by mouth daily, Disp: , Rfl:     Multiple Vitamin (MULTIVITAMIN ADULT PO), Take by mouth daily, Disp: , Rfl:     calcium carbonate (OSCAL) 500 MG TABS tablet, Take 500 mg by mouth daily, Disp: , Rfl:     nystatin (MYCOSTATIN) 792473 UNIT/GM cream, Apply topically 2 times daily. , Disp: 1 each, Rfl: 1    atorvastatin (LIPITOR) 40 MG tablet, Take 1 tablet by mouth daily (Patient taking differently: Take 40 mg by mouth at bedtime), Disp: 90 tablet, Rfl: 3    irbesartan (AVAPRO) 150 MG tablet, Take 1 tablet by mouth daily (Patient taking differently: Take 150 mg by mouth at bedtime), Disp: 90 tablet, Rfl: 3    levothyroxine (SYNTHROID) 75 MCG tablet, Take 1 tablet by mouth Daily TK 1 T PO D Indications: a condition with low thyroid hormone levels, Disp: 90 tablet, Rfl: 3    atenolol-chlorthalidone (TENORETIC) 50-25 MG per tablet, Take 0.5 tablets by mouth daily, Disp: 90 tablet, Rfl: 3    Diabetic Shoe MISC, by Does not apply route Diabetic shoe, custom orthoses, custom prosthetic filler, DM2, neuropathy, Disp: 1 each, Rfl: 0    Cholecalciferol 50 MCG (2000 UT) TABS, Take by mouth, Disp: , Rfl:     coenzyme Q10 100 MG CAPS capsule, Take 100 mg by mouth daily, Disp: , Rfl:     hydrocortisone 2.5 % cream, Place rectally 4 times daily, Disp: , Rfl:     traMADol (ULTRAM) 50 MG tablet, Take 50 mg by mouth every 6 hours as needed for Pain. (Patient not taking: Reported on 12/2/2022), Disp: , Rfl:     Allergies   Allergen Reactions    Milk Protein Other (See Comments)     Increased mucus production       Review of Systems   Constitutional:  Negative for chills and fever. Respiratory:  Negative for cough and shortness of breath. Cardiovascular:  Negative for chest pain. Gastrointestinal:  Negative for abdominal pain, diarrhea, nausea and vomiting. Genitourinary:  Negative for dysuria and hematuria. Musculoskeletal:  Positive for arthralgias, back pain and gait problem. Neurological:  Negative for weakness and numbness. Objective:  /80 (Site: Left Upper Arm, Position: Sitting, Cuff Size: Large Adult)   Pulse 59   Temp 97.9 °F (36.6 °C) (Temporal)   Resp 16   Ht 5' 2\" (1.575 m)   Wt 159 lb (72.1 kg)   SpO2 99%   BMI 29.08 kg/m²     Examination:  Physical Exam  Vitals and nursing note reviewed. Constitutional:       General: She is not in acute distress. Appearance: Normal appearance. Cardiovascular:      Rate and Rhythm: Normal rate and regular rhythm. Pulmonary:      Effort: Pulmonary effort is normal. No respiratory distress. Breath sounds: Normal breath sounds. Abdominal:      General: Bowel sounds are normal.      Palpations: Abdomen is soft. Musculoskeletal:      Lumbar back: Tenderness (right side) present. Decreased range of motion. Right lower leg: No edema. Left lower leg: No edema. Skin:     General: Skin is warm and dry. Neurological:      Mental Status: She is alert and oriented to person, place, and time. Psychiatric:         Mood and Affect: Mood normal.         Assessment/Plan:    Ave Faith was seen today for back pain. Diagnoses and all orders for this visit:    Acute right-sided low back pain without sciatica  -     External Referral To Pain Medicine  -     [facility]  Office note and x-ray from urgent care reviewed and to be scanned into patient's chart. Continue Tylenol and heat. Referral for PT placed. Referral back to Dr. Yanely Clarke placed as requested.      Right hip pain  -     External Referral To Pain Medicine  -     [facility]    Hematuria, unspecified type  -     AMB POC URINALYSIS DIP STICK AUTO W/O MICRO  Repeat urine in the office today shows moderate blood. Referral to urology recommended for further evaluation. On this date, 12/2/22, I have spent 25 minutes reviewing previous notes, test results and face to face with the patient discussing the diagnosis and importance of compliance with the treatment plan as well as documenting on the day of the visit. An electronic signature was used to authenticate this note.   RANDI David

## 2022-12-05 DIAGNOSIS — R31.9 HEMATURIA, UNSPECIFIED TYPE: Primary | ICD-10-CM

## 2022-12-09 ENCOUNTER — HOSPITAL ENCOUNTER (OUTPATIENT)
Dept: PHYSICAL THERAPY | Age: 77
Setting detail: RECURRING SERIES
Discharge: HOME | End: 2022-12-12
Payer: MEDICARE

## 2022-12-09 PROCEDURE — 97161 PT EVAL LOW COMPLEX 20 MIN: CPT

## 2022-12-09 PROCEDURE — 97110 THERAPEUTIC EXERCISES: CPT

## 2022-12-09 ASSESSMENT — PAIN SCALES - GENERAL: PAINLEVEL_OUTOF10: 5

## 2022-12-09 NOTE — PLAN OF CARE
Lamont Villalpando  :   [place]  Secondary: [address] [facility]  [address] [facility] [address]  Phone: [phone]  Fax: [phone] Plan Frequency: 2x per week for 12 weeks  Plan of Care/Certification Expiration Date: 22    PT Visit Info:         Visit Count:  1                OUTPATIENT PHYSICAL THERAPY:             OP NOTE TYPE: Initial Assessment 2022               Episode  Appt Desk         Treatment Diagnosis:  Pain in Right Hip (M25.551)  Difficulty in walking, Not elsewhere classified (R26.2), Acute right-sided low back pain without sciatica [M54.50]  Right hip pain [M25.551]  Medical/Referring Diagnosis:  Acute right-sided low back pain without sciatica [M54.50]  Right hip pain [M25.551]  Referring Physician:  RUBEN Perez CNP, MD Orders:  PT Eval and Treat   Return MD Appt:  as needed  Date of Onset:  Onset Date: 22  -roughly 3 weeks  Allergies:  Milk protein  Restrictions/Precautions:    Restrictions/Precautions: None      Medications Last Reviewed:  2022     SUBJECTIVE   History of Injury/Illness (Reason for Referral):  Pt with 3 week history of lower back pain and right hip pain with increased difficulty with walking. She went to urgent care and had back x-rayed. Determined she has DDD, but also has some blood in her urine and will be seeing urologist.  She does have a past history of lower back pain with positive treatment with therapy. She reports difficulty walking, standing and performing general ADL's in the home and community.   Patient Stated Goal(s):  \"to decrease pain\"  Initial:     5/10 Post Session:     5/10  Past Medical History/Comorbidities:   Ms. Jessica Moyer  has a past medical history of Abdominal pain, Arthritis, Breast cancer ([address] Breast cancer, left ([address] Breast pain in female, Bunion, Chronic pain of left knee, Corns and callosities, DDD (degenerative disc disease), lumbar, Diabetes mellitus type II, controlled ([facility] [address] Emotional disorder, Fungal infection of toenail, Hallux valgus, Hip pain, bilateral, Hyperglycemia, Hyperlipidemia, Hypertension, Hypothyroidism, Lower GI bleed, Obesity, Other ill-defined conditions(799.89), Postmenopausal, Right median nerve neuropathy, Stress incontinence, and Traumatic arthropathy of knee. Ms. Idris Reveles  has a past surgical history that includes Breast lumpectomy (Left, 2001); Urological Surgery; Colonoscopy (2008); Tubal ligation; Hysterectomy; us guided core breast biopsy (Right); Breast biopsy (Right, 2011, Stereo Core Bx 8-31-22); orthopedic surgery (Left); orthopedic surgery; Total shoulder arthroplasty (Left); orthopedic surgery; orthopedic surgery (2005); [facility] STEREO BREAST BX W LOC DEVICE 1ST LESION RIGHT (Right, 8/31/2022); Breast biopsy (Right, 10/5/2022); Breast biopsy (Right, 10/5/2022); and Breast surgery (Right, 10/19/2022). Social History/Living Environment:   Lives With: Alone  Home Layout: One level     Prior Level of Function/Work/Activity:   Occupation: Retired  Type of Occupation: [employer]:   Does the patient/guardian have any barriers to learning?: No barriers  Will there be a co-learner?: No  What is the preferred language of the patient/guardian?: English  Is an  required?: No  How does the patient/guardian prefer to learn new concepts?: Listening; Reading; Demonstration; Pictures/Videos     Fall Risk Scale: Snell Total Score: 45  Snell Fall Risk: High (45 and higher)           OBJECTIVE   LE strength measures:     R LE L LE   Hip flexors   4-/5     4/5   Glut medius   2+/5   3/5   quad  4- /5   4/5   hamstrings  4- /5   4-/5   Ankle DF's  5 /5  5 /5   Ankle PF's  5 /5  5 /5     General standing posture is with forward flexed trunk. Pain increases with lumbar extension and FB.   LROM WFL  Bilateral hip motion WFL except for ER: R hip ER 23 °, L hip ER 36 °  Pt also reporting some mild pain in the groin region-possible hip OA  ASSESSMENT   Initial Assessment:  Pt with history of lower back pain with new onset three weeks ago after raking leaves. She reports increased difficulty with walking, standing upright and generalized standing. She was recently diagnosed with right breast cancer, stage one, and underwent lumpectomy. She is taking oral drug for maintenance. No chemo or radiation required. She would like to decrease overall pain and return to walking, normal and pain free ADL's and hobbies. Problem List: (Impacting functional limitations): Body Structures, Functions, Activity Limitations Requiring Skilled Therapeutic Intervention: Decreased functional mobility ; Decreased ADL status; Decreased ROM; Decreased strength; Increased pain; Decreased posture     Therapy Prognosis:   Therapy Prognosis: Good     Initial Assessment Complexity:   Decision Making: Low Complexity    PLAN   Effective Dates: 12/9/22 TO Plan of Care/Certification Expiration Date: 03/09/22   Frequency/Duration: Plan Frequency: 2x per week for 12 weeks   Interventions Planned (Treatment may consist of any combination of the following):    Current Treatment Recommendations: Strengthening; ROM; Manual; Home exercise program; Modalities; Therapeutic activities; Aquatics     Goals: (Goals have been discussed and agreed upon with patient.)  Short-Term Functional Goals: Time Frame: 6 weeks  Pt will be independent with HEP. Pt will be able to walk up to 20 minutes without increased pain. Pt will be able to report lying on right side up to one hour at night. Pt will be able to report 4/10 pain rating or less with ADL's. Discharge Goals: Time Frame: 12 weeks   Pt will be able to walk up to 30 minutes without increased pain. Pt will be able to increase strength by 1/2 grade for improved ADL's. Pt will be able to drive without increased pain.   Pt will be able to return to prior ADL's with 2/10 pain rating or less. Outcome Measure: Tool Used: Modified Oswestry Low Back Pain Questionnaire  Score:  Initial: 10/50  Most Recent: X/50 (Date: -- )   Interpretation of Score: Each section is scored on a 0-5 scale, 5 representing the greatest disability. The scores of each section are added together for a total score of 50. Medical Necessity:   > Skilled intervention continues to be required due to increased pain in the lower back preventing her from prolonged standing, walking normal pain free ADL's. Reason For Services/Other Comments:  > Patient continues to require skilled intervention due to increased pain in lower back preventing her from prolonged standing, walking, normal pain free ADL's. Total Duration: 40 min  Time In: 1025  Time Out: 9401    Regarding Shalini Rush's therapy, I certify that the treatment plan above will be carried out by a therapist or under their direction.   Thank you for this referral,  Jessica Jones, PT     Referring Physician Signature: RUBEN Kuo - * _______________________________ Date _____________        Post Session Pain  Charge Capture  PT Visit Info MD Guidelines  MyChart

## 2022-12-09 NOTE — PROGRESS NOTES
Lilly Markham  : [birth year]  Primary: [employer] Medicare Hmo/p*  Secondary: [address] @ [address] [address]  Phone: [phone]  Fax: [phone] Plan Frequency: 2x per week for 12 weeks    Plan of Care/Certification Expiration Date: 22      PT Visit Info:  No data recorded   Visit Count:  1   OUTPATIENT PHYSICAL THERAPY:OP NOTE TYPE: Treatment Note 2022       Episode  }Appt Desk             Treatment Diagnosis:  Pain in Right Hip (M25.551)  Difficulty in walking, Not elsewhere classified (R26.2), low back pain M54.5  Medical/Referring Diagnosis:  Acute right-sided low back pain without sciatica [M54.50]  Right hip pain [M25.551]  Referring Physician:  RUBEN Girard CNP, MD Orders:  PT Eval and Treat   Date of Onset:  Onset Date: 22     Allergies:   Milk protein  Restrictions/Precautions:  Restrictions/Precautions: None  No data recorded     Interventions Planned (Treatment may consist of any combination of the following):    Current Treatment Recommendations: Strengthening; ROM; Manual; Home exercise program; Modalities; Therapeutic activities; Aquatics     Subjective Comments:  Pt reports increased pain with standing and walking  Initial:}    5/10Post Session:       5/10  Medications Last Reviewed:  2022  Updated Objective Findings:  See evaluation note from today  Treatment   THERAPEUTIC EXERCISE: (15 minutes):    Exercises per grid below to improve mobility and strength. Required minimal verbal cues to promote proper body mechanics. Progressed resistance, range, and repetitions as indicated. LTR x 10  Bridging x 5  Knee to chest 3-4x    Treatment/Session Summary:    Treatment Assessment:  Pt seen for eval.  She demonstrate proper HEP .   Communication/Consultation:  None today  Equipment provided today:  None  Recommendations/Intent for next treatment session: Next visit will focus on continued strengthening, ROM.     Total Treatment Billable Duration:  40  minutes  Time In: 5086  Time Out: [facility], PT       Charge Capture  }Post Session Pain  PT Visit Info  MedBridge Portal  MD Guidelines  Scanned Media  Benefits  MyChart    Future Appointments   Date Time Provider Sheree Betty   12/13/2022 11:40 AM Darrell Sumner MD [facility] AM[facility]   12/14/2022 10:15 AM Lo Raisin, PTA [facility]   12/16/2022 12:30 PM Lo Raisin, PTA [facility]   12/20/2022 11:45 AM Melvin Douglas, PT [facility]   12/22/2022  9:30 AM Melvin Douglas, PT [facility]   12/28/2022 11:45 AM Melvin Douglas, PT [facility]   1/4/2023 11:20 AM Darrell Sumner MD [facility] AMB   1/18/2023 10:40 AM [facility] [address] [place] [address]   1/18/2023 11:15 AM Maryellen Toledo MD [facility]   5/15/2023  1:00 PM Anu Price MD [facility] AMB

## 2022-12-13 ENCOUNTER — OFFICE VISIT (OUTPATIENT)
Dept: INTERNAL MEDICINE CLINIC | Facility: CLINIC | Age: 77
End: 2022-12-13
Payer: MEDICARE

## 2022-12-13 VITALS
BODY MASS INDEX: 30 KG/M2 | HEART RATE: 62 BPM | TEMPERATURE: 97.9 F | HEIGHT: 62 IN | OXYGEN SATURATION: 99 % | DIASTOLIC BLOOD PRESSURE: 80 MMHG | SYSTOLIC BLOOD PRESSURE: 120 MMHG | WEIGHT: 163 LBS

## 2022-12-13 DIAGNOSIS — E78.5 HYPERLIPIDEMIA LDL GOAL <100: ICD-10-CM

## 2022-12-13 DIAGNOSIS — E11.29 CONTROLLED TYPE 2 DIABETES MELLITUS WITH MICROALBUMINURIA, WITHOUT LONG-TERM CURRENT USE OF INSULIN (HCC): ICD-10-CM

## 2022-12-13 DIAGNOSIS — R80.9 CONTROLLED TYPE 2 DIABETES MELLITUS WITH MICROALBUMINURIA, WITHOUT LONG-TERM CURRENT USE OF INSULIN (HCC): ICD-10-CM

## 2022-12-13 DIAGNOSIS — E78.00 PURE HYPERCHOLESTEROLEMIA: ICD-10-CM

## 2022-12-13 DIAGNOSIS — C50.911 INFILTRATING DUCTAL CARCINOMA OF RIGHT BREAST (HCC): ICD-10-CM

## 2022-12-13 DIAGNOSIS — E03.9 ACQUIRED HYPOTHYROIDISM: ICD-10-CM

## 2022-12-13 DIAGNOSIS — R31.9 HEMATURIA, UNSPECIFIED TYPE: Primary | ICD-10-CM

## 2022-12-13 DIAGNOSIS — I10 ESSENTIAL (PRIMARY) HYPERTENSION: ICD-10-CM

## 2022-12-13 PROCEDURE — 99214 OFFICE O/P EST MOD 30 MIN: CPT | Performed by: INTERNAL MEDICINE

## 2022-12-13 PROCEDURE — 3074F SYST BP LT 130 MM HG: CPT | Performed by: INTERNAL MEDICINE

## 2022-12-13 PROCEDURE — 1123F ACP DISCUSS/DSCN MKR DOCD: CPT | Performed by: INTERNAL MEDICINE

## 2022-12-13 PROCEDURE — 3044F HG A1C LEVEL LT 7.0%: CPT | Performed by: INTERNAL MEDICINE

## 2022-12-13 PROCEDURE — 3078F DIAST BP <80 MM HG: CPT | Performed by: INTERNAL MEDICINE

## 2022-12-13 ASSESSMENT — ENCOUNTER SYMPTOMS
NAUSEA: 0
SHORTNESS OF BREATH: 0
VOMITING: 0
ABDOMINAL PAIN: 0
CHEST TIGHTNESS: 0

## 2022-12-13 NOTE — PROGRESS NOTES
ASSESSMENT/PLAN:    1. Hematuria, unspecified type  Proceed with urology check up. Cysto? On anti estrogen. Check renal US. CT 2/17/22  left kidney subcentimeter mildly hyperdense lesion in the   lower pole the right kidney. This is similar to the prior exam. This is too   small to accurately characterize but is statistically most likely to be a   proteinaceous or hemorrhagic cyst.     - US RETROPERITONEAL LIMITED; Future    2. Infiltrating ductal carcinoma of right breast [facility])  Per oncology    3. Controlled type 2 diabetes mellitus with microalbuminuria, without long-term current use of insulin ([facility] [address]  Labs ordered. - Lipid Panel  - Hemoglobin X7H  - Basic Metabolic Panel    4. Pure hypercholesterolemia       5. Acquired hypothyroidism     - TSH    6. Essential (primary) hypertension  Treatment regimen is effective. - Lipid Panel  - Hemoglobin U1D  - Basic Metabolic Panel    7. Hyperlipidemia LDL goal <100     - Lipid Panel        Evaluation and management of the chronic condition(s) delineated. No negative side effects reported. I have reviewed all the lab results. There are some abnormalities that are either expected or not critical to the patient's health, and are discussed in the office today and are addressed. Please refer to the above assessement and plan narrative and orders and follow up plan. Medication discussed and refilled as needed. Physical exam findings are stable unless otherwise indicated and this is addressed. The most recent lab work and imaging and consultant/urgent care visits and imaging are reviewed and discussed and considered during this visit encounter. 1. Hematuria, unspecified type  -     US RETROPERITONEAL LIMITED; Future  2. Infiltrating ductal carcinoma of right breast ([facility] [address]  3. Controlled type 2 diabetes mellitus with microalbuminuria, without long-term current use of insulin ([facility])  -     Lipid Panel  -     Hemoglobin A1C  -     Basic Metabolic Panel  4. Pure hypercholesterolemia  5. Acquired hypothyroidism  6. Essential (primary) hypertension  -     Lipid Panel  -     Hemoglobin A1C  -     Basic Metabolic Panel  7. Hyperlipidemia LDL goal <100  -     Lipid Panel           On this date, 22, I have spent 30 minutes reviewing previous notes, test results and face to face with the patient discussing the diagnosis and importance of compliance with the treatment plan as well as documenting on the day of the visit. An electronic signature was used to authenticate this note. -- Denise Rosario MD     No follow-ups on file. SUBJECTIVE/OBJECTIVE:      HPI:   Thai Rossi (:  is a 65 y.o. female, here for evaluation of the following chief complaint(s):   Chief Complaint   Patient presents with    Diabetes     3 month recheck    Hypertension    Cholesterol Problem       Patient is here for follow-up and management of chronic medical conditions, review of recent labs, review of any imaging completed since our last office visit and discuss any consultants opinions or management changes. Treatment Adherence:   Medication compliance:  compliant most of the time  Diet compliance:  compliant most of the time  Weight trend: stable  Current exercise: no regular exercise and active with ADLs  Barriers: financial, lack of support, and stress    Diabetes Mellitus Type 2: Current symptoms/problems include  mild neuropathy in feet and needs Rx for diabetic shoes . Home blood sugar records: trend: stable  Any episodes of hypoglycemia? no  Eye exam current (within one year): yes  Tobacco history: She  reports that she quit smoking about 21 years ago. Her smoking use included cigarettes. She smoked an average of .25 packs per day. She has never used smokeless tobacco.   Daily Aspirin? No: not currently    Hypertension:  Home blood pressure monitoring: Yes - . She is adherent to a low sodium diet.  Patient denies chest pain, shortness of breath, headache, peripheral edema, and dry cough. Antihypertensive medication side effects: no medication side effects noted. Use of agents associated with hypertension: . Hyperlipidemia:  No new myalgias or GI upset on atorvastatin (Lipitor). Lab Results   Component Value Date    LABA1C 6.8 (H) 08/25/2022    LABA1C 6.6 (H) 02/22/2022    LABA1C 6.7 (H) 08/18/2021     Lab Results   Component Value Date    CREATININE 1.10 (H) 08/25/2022     Lab Results   Component Value Date    ALT 21 08/25/2022    AST 16 08/25/2022     Lab Results   Component Value Date    CHOL 132 08/25/2022    TRIG 67 08/25/2022    HDL 50 08/25/2022    LDLCALC 68.6 08/25/2022        Breast Cancer  She had a left breast cancer in 2001 for which she had a lumpectomy and radiation, she also took endocrine therapy of unknown type and duration. In August 2022 she had her routine screening mammogram that reported a right breast asymmetry. On 8/25/22 she had a right breast diagnostic mammogram and ultrasound which confirmed the findings and on 8/31/22 she underwent a core needle biopsy of the right breast mass. The pathology reported infiltrating ductal carcinoma, low grade, ER 95%, CA 0% and HER2 1+. On 9/7/22 she had a bilateral breast MRI that reported the known right breast cancer of 1.4 cm, no additional suspicious findings in either breast or no evidence of lymphadenopathy was noted. She has a surgical consult with Dr Madhuri Perez on 9/21/22. Treatment notes reviewed. 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 Chronic anxiety. Has seen LISW. Declines SSRI. CBT therapy. Seeing counselor. Recent covid uri. Resolved. She had some dehydration that improved with hydration. She was seen at ER. Labs reviewed and discussed and medication refilled as needed for chronic medications during ov or adjusted based on lab results and/or our discussion as appropriate. See discussion.     The patient's available records and electronic chart records are reviewed. The PMH, PSH, medications, allergies, medications, FH, health maintenance and vaccination status are all reviewed and updated as appropriate. Records from outside providers have been reviewed, summarized, and considered as noted in the history of present illness, past medical history, and objective data of this note and encounter. Health Maintenance   Topic Date Due    Shingles vaccine (1 of 2) Never done    COVID-19 Vaccine (4 - Booster for Pfizer series) 12/31/2021    Flu vaccine (1) 08/01/2022    A1C test (Diabetic or Prediabetic)  02/25/2023    Annual Wellness Visit (AWV)  03/01/2023    Lipids  08/25/2023    Diabetic retinal exam  09/16/2023    Diabetic foot exam  09/19/2023    Depression Monitoring  12/02/2023    DTaP/Tdap/Td vaccine (2 - Td or Tdap) 03/31/2027    DEXA (modify frequency per FRAX score)  Completed    Pneumococcal 65+ years Vaccine  Completed    Hepatitis A vaccine  Aged Out    Hib vaccine  Aged Out    Meningococcal (ACWY) vaccine  Aged Out    Hepatitis C screen  Discontinued     Patient Active Problem List   Diagnosis    History of breast cancer    Arthritis    Spondylosis of lumbosacral region without myelopathy or radiculopathy    Chronic pain of left knee    Hyperlipidemia    Anxiety associated with depression    Right median nerve neuropathy    Refused varicella vaccine    Hypertension    Abdominal wall hernia    Hip pain, bilateral    Adenomatous polyp of colon    Stress incontinence    Agatston coronary artery calcium score greater than 400    Controlled type 2 diabetes mellitus with microalbuminuria, without long-term current use of insulin (HCC)    Anxiety    Hypothyroidism    Infiltrating ductal carcinoma of right breast ([facility] [address]       Review of Systems   Constitutional:  Negative for activity change and fatigue. Eyes:  Negative for visual disturbance.    Respiratory:  Negative for chest tightness and shortness of breath. Cardiovascular:  Negative for chest pain, palpitations and leg swelling. Gastrointestinal:  Negative for abdominal pain, nausea and vomiting. Endocrine: Negative for polydipsia and polyuria. Genitourinary:  Negative for dysuria and hematuria (no gross. Seeing urology for opinion. ). Musculoskeletal:  Negative for myalgias. Skin:  Negative for wound. Neurological:  Negative for headaches. Psychiatric/Behavioral:  Negative for confusion. The patient is nervous/anxious.       Lab Results   Component Value Date/Time    WBC 5.5 08/25/2022 08:23 AM    HGB 12.9 08/25/2022 08:23 AM    HCT 40.0 08/25/2022 08:23 AM    MCV 87.7 08/25/2022 08:23 AM    RDW 13.8 08/25/2022 08:23 AM     08/25/2022 08:23 AM    NEUTOPHILPCT 68 05/17/2022 03:30 PM    LYMPHOPCT 22 08/25/2022 08:23 AM    LYMPHOPCT 22 05/17/2022 03:30 PM    MONOPCT 9 08/25/2022 08:23 AM    MONOPCT 9 05/17/2022 03:30 PM    EOSRELPCT 1 08/25/2022 08:23 AM    BASOPCT 1 08/25/2022 08:23 AM    BASOPCT 1 05/17/2022 03:30 PM    LYMPHSABS 1.2 08/25/2022 08:23 AM    LYMPHSABS 1.4 05/17/2022 03:30 PM    MONOSABS 0.5 08/25/2022 08:23 AM    MONOSABS 0.6 05/17/2022 03:30 PM    EOSABS 0.1 08/25/2022 08:23 AM    EOSABS 0.1 05/17/2022 03:30 PM    BASOSABS 0.0 08/25/2022 08:23 AM    IMMGRAN 0 08/25/2022 08:23 AM    GRANULOCYTEABSOLUTECOUNT 0.0 05/17/2022 03:30 PM       Lab Results   Component Value Date/Time     08/25/2022 08:23 AM    K 3.9 08/25/2022 08:23 AM     08/25/2022 08:23 AM    CO2 30 08/25/2022 08:23 AM    ANIONGAP 2 08/25/2022 08:23 AM    GLUCOSE 118 08/25/2022 08:23 AM    BUN 18 08/25/2022 08:23 AM    CREATININE 1.10 08/25/2022 08:23 AM    GFRAA >60 08/25/2022 08:23 AM    LABGLOM 51 08/25/2022 08:23 AM    CALCIUM 9.4 08/25/2022 08:23 AM    BILITOT 0.9 08/25/2022 08:23 AM    ALT 21 08/25/2022 08:23 AM    AST 16 08/25/2022 08:23 AM    ALKPHOS 63 08/25/2022 08:23 AM    ALKPHOS 72 02/22/2022 08:13 AM    PROT 7.5 08/25/2022 08:23 AM LABALBU 3.7 08/25/2022 08:23 AM    GLOB 3.8 08/25/2022 08:23 AM    ALBUMIN 1.0 08/25/2022 08:23 AM       Lab Results   Component Value Date/Time    CHOL 132 08/25/2022 08:23 AM    HDL 50 08/25/2022 08:23 AM    TRIG 67 08/25/2022 08:23 AM    LDLCALC 68.6 08/25/2022 08:23 AM    VLDL 12 02/22/2022 08:13 AM       Lab Results   Component Value Date/Time    LABA1C 6.8 08/25/2022 08:23 AM    LABA1C 6.6 02/22/2022 08:13 AM    LABA1C 6.7 08/18/2021 08:10 AM    LABA1C 6.6 04/13/2021 09:26 AM    LABA1C 6.6 09/24/2020 08:54 AM       Lab Results   Component Value Date/Time    TSH 2.660 02/22/2022 08:13 AM    TSH 1.430 04/13/2021 09:26 AM    TSH 0.641 09/24/2020 08:54 AM           Lab Results   Component Value Date/Time    SPECGRAV 1.016 08/25/2022 08:23 AM    COLORU YELLOW/STRAW 08/25/2022 08:23 AM    LEUKOCYTESUR SMALL 08/25/2022 08:23 AM    PROTEINU 30 08/25/2022 08:23 AM    GLUCOSEU Negative 08/25/2022 08:23 AM    KETUA Negative 08/25/2022 08:23 AM    BLOODU SMALL 08/25/2022 08:23 AM    BILIRUBINUR Negative 08/25/2022 08:23 AM    UROBILINOGEN 1.0 08/25/2022 08:23 AM    NITRU Negative 08/25/2022 08:23 AM     Results for orders placed or performed in visit on 12/02/22 (from the past 2160 hour(s))   AMB POC URINALYSIS DIP STICK AUTO W/O MICRO   Result Value Ref Range    Color, Urine, POC yellow     Clarity, Urine, POC slightly cloudy     Glucose, Urine, POC Negative Negative    Bilirubin, Urine, POC Negative Negative    Ketones, Urine, POC Negative Negative    Specific Gravity, Urine, POC 1.020 1.001 - 1.035    Blood, Urine, POC Moderate Negative    pH, Urine, POC 6.0 4.6 - 8.0    Protein, Urine, POC Negative Negative    Urobilinogen, POC 0.2     Nitrate, Urine, POC negative Negative    Leukocyte Esterase, Urine, POC Negative Negative   Results for orders placed or performed during the hospital encounter of 11/28/22 (from the past 2160 hour(s))   DEXA BONE DENSITY AXIAL SKELETON    Impression    NEWLY LOW BONE DENSITY AFTER SIGNIFICANT INTERVAL DENSITY LOSS IN THE HIPS SINCE  2013. FOLLOW-UP SCAN SHOULD BE CONSIDERED IN 2 YEARS. Results for orders placed or performed in visit on 11/10/22 (from the past 2160 hour(s))   EKG 12 Lead    Impression    Normal sinus rhythm rate of 59. Normal intervals. Nonspecific T wave change   Results for orders placed or performed during the hospital encounter of 10/19/22 (from the past 2160 hour(s))   POCT Glucose   Result Value Ref Range    POC Glucose 94 65 - 100 mg/dL    Performed by: Robbie Rich    Results for orders placed or performed during the hospital encounter of 10/03/22 (from the past 2160 hour(s))   [facility] BROWN DEER NDL/WIRE/CLIP/SEED BREAST RT    Impression    Status post mammographic guided Magseed placement in satisfactory  position. Results for orders placed or performed during the hospital encounter of 10/05/22 (from the past 2160 hour(s))   NM LYMPHOSCINTIGRAM    Impression    1. Uptake within right axillary lymph nodes labeled as as \"RLAT\" on images  obtained. [facility] BREAST SPECIMEN    Impression    SUCCESSFUL WIRE LOCALIZATION AND EXCISION OF THE MASS, MAGSEED, AND BIOPSY  MARKER CLIP. POCT Glucose   Result Value Ref Range    POC Glucose 105 (H) 65 - 100 mg/dL    Performed by: Bienvenido    POC Sodium and Potassium   Result Value Ref Range    POC Potassium 3.8 3.5 - 5.1 MMOL/L           Vitals:    12/13/22 1149 12/13/22 1248   BP: (!) 146/85 120/80   Site: Left Wrist Left Upper Arm   Position: Sitting Sitting   Cuff Size: Small Adult Medium Adult   Pulse: 62    Temp: 97.9 °F (36.6 °C)    TempSrc: Skin    SpO2: 99%    Weight: 163 lb (73.9 kg)    Height: 5' 2\" (1.575 m)      Wt Readings from Last 3 Encounters:   12/13/22 163 lb (73.9 kg)   12/02/22 159 lb (72.1 kg)   11/23/22 165 lb (74.8 kg)     BP Readings from Last 3 Encounters:   12/13/22 120/80   12/02/22 117/80   11/23/22 110/80     Physical Exam  Vitals and nursing note reviewed.    Constitutional: Appearance: Normal appearance. She is not ill-appearing. HENT:      Head: Normocephalic and atraumatic. Eyes:      Extraocular Movements: Extraocular movements intact. Conjunctiva/sclera: Conjunctivae normal.   Cardiovascular:      Rate and Rhythm: Normal rate. Pulmonary:      Effort: Pulmonary effort is normal.   Neurological:      General: No focal deficit present. Mental Status: She is alert and oriented to person, place, and time. Mental status is at baseline. Psychiatric:         Mood and Affect: Mood normal. Affect is tearful.          Behavior: Behavior normal.

## 2022-12-14 ENCOUNTER — HOSPITAL ENCOUNTER (OUTPATIENT)
Dept: PHYSICAL THERAPY | Age: 77
Setting detail: RECURRING SERIES
Discharge: HOME | End: 2022-12-17
Payer: MEDICARE

## 2022-12-14 LAB
ANION GAP SERPL CALC-SCNC: 5 MMOL/L (ref 2–11)
BUN SERPL-MCNC: 21 MG/DL (ref 8–23)
CALCIUM SERPL-MCNC: 9.9 MG/DL (ref 8.3–10.4)
CHLORIDE SERPL-SCNC: 106 MMOL/L (ref 101–110)
CHOLEST SERPL-MCNC: 134 MG/DL
CO2 SERPL-SCNC: 30 MMOL/L (ref 21–32)
CREAT SERPL-MCNC: 1 MG/DL (ref 0.6–1)
EST. AVERAGE GLUCOSE BLD GHB EST-MCNC: 146 MG/DL
GLUCOSE SERPL-MCNC: 101 MG/DL (ref 65–100)
HBA1C MFR BLD: 6.7 % (ref 4.8–5.6)
HDLC SERPL-MCNC: 59 MG/DL (ref 40–60)
HDLC SERPL: 2.3 {RATIO}
LDLC SERPL CALC-MCNC: 57.8 MG/DL
POTASSIUM SERPL-SCNC: 4 MMOL/L (ref 3.5–5.1)
SODIUM SERPL-SCNC: 141 MMOL/L (ref 133–143)
TRIGL SERPL-MCNC: 86 MG/DL (ref 35–150)
TSH, 3RD GENERATION: 0.46 UIU/ML (ref 0.36–3.74)
VLDLC SERPL CALC-MCNC: 17.2 MG/DL (ref 6–23)

## 2022-12-14 PROCEDURE — 97110 THERAPEUTIC EXERCISES: CPT

## 2022-12-14 ASSESSMENT — PAIN SCALES - GENERAL: PAINLEVEL_OUTOF10: 5

## 2022-12-14 NOTE — PROGRESS NOTES
[address]  :   Primary: [employer] Medicare Hmo/p*  Secondary: [address] @ [facility]  [address] [place] [id number]  Phone: [phone]  Fax: [phone] Plan Frequency: 2x per week for 12 weeks    Plan of Care/Certification Expiration Date: 22      PT Visit Info:  No data recorded   Visit Count:  2   OUTPATIENT PHYSICAL THERAPY:OP NOTE TYPE: Treatment Note 2022       Episode  }Appt Desk             Treatment Diagnosis:  Pain in Right Hip (M25.551)  Difficulty in walking, Not elsewhere classified (R26.2), low back pain M54.5  Medical/Referring Diagnosis:  No admission diagnoses are documented for this encounter. Referring Physician:  RUBEN Cunningham CNP, MD Orders:  PT Eval and Treat   Date of Onset:  Onset Date: 22     Allergies:   Milk protein  Restrictions/Precautions:  Restrictions/Precautions: None  No data recorded     Interventions Planned (Treatment may consist of any combination of the following):    Current Treatment Recommendations: Strengthening; ROM; Manual; Home exercise program; Modalities; Therapeutic activities; Aquatics     Subjective Comments:  Patient reports having more pain on the right side today. Initial:}    5/10Post Session:       4/10  Medications Last Reviewed:  2022  Updated Objective Findings:  None Today  Treatment   THERAPEUTIC EXERCISE: (45 minutes):    Exercises per grid below to improve mobility and strength. Required minimal verbal cues to promote proper body mechanics. Progressed resistance, range, and repetitions as indicated. LTR x 10  Bridging x 5  Knee to chest 3-4x  Trunk rotation  SLR  Sit to stand  Trunk ext  Hamstring stretching   Hip flexor stretching        Treatment/Session Summary:    Treatment Assessment:  Patient tolereated treatment well. Needs to continue to work on core strength. Instructed patient to continue home exercises as directed. Communication/Consultation:  None today  Equipment provided today:  None  Recommendations/Intent for next treatment session: Next visit will focus on continued strengthening, ROM.     Total Treatment Billable Duration:  45  minutes  Time In: 5628  Time Out: [address] [place], [place]       Charge Capture  }Post Session Pain  PT Visit Info  MedBridge Portal  MD Guidelines  Scanned Media  Benefits  MyChart    Future Appointments   Date Time Provider Sheree Hernández   12/15/2022  2:00 PM LAWRENCE PARK [facility]   12/16/2022 12:30 PM Izzy Leone, PTA [facility]   12/20/2022 11:45 AM Rhunette Blend, PT [facility]   12/22/2022  9:30 AM Rhunette Blend, PT [facility]   12/28/2022 11:45 AM Rhunette Blend, PT [facility]   12/30/2022 11:15 AM Collette Altes, MD [id number] [facility] AMB   1/4/2023 11:20 AM Pia Clarke MD [facility] AMB   1/18/2023 10:40 AM [facility] OUTREACH INSURANCE [facility] [address] [place] [address]   1/18/2023 11:15 AM Tangela Mills MD [facility] AM[facility]   5/15/2023  1:00 PM Shalini Herron MD [facility] AMB

## 2022-12-16 ENCOUNTER — HOSPITAL ENCOUNTER (OUTPATIENT)
Dept: PHYSICAL THERAPY | Age: 77
Setting detail: RECURRING SERIES
Discharge: HOME | End: 2022-12-19
Payer: MEDICARE

## 2022-12-16 PROCEDURE — 97110 THERAPEUTIC EXERCISES: CPT

## 2022-12-16 ASSESSMENT — PAIN SCALES - GENERAL: PAINLEVEL_OUTOF10: 4

## 2022-12-16 NOTE — PROGRESS NOTES
Bibiana Oconnor  :   Primary: [facility] Medicare Hmo/p*  Secondary: [address] @ [facility]  [facility] [address] [address]  Phone: [phone]  Fax: [phone] Plan Frequency: 2x per week for 12 weeks    Plan of Care/Certification Expiration Date: 22      PT Visit Info:  No data recorded   Visit Count:  3   OUTPATIENT PHYSICAL THERAPY:OP NOTE TYPE: Treatment Note 2022       Episode  }Appt Desk             Treatment Diagnosis:  Pain in Right Hip (M25.551)  Difficulty in walking, Not elsewhere classified (R26.2), low back pain M54.5  Medical/Referring Diagnosis:  No admission diagnoses are documented for this encounter. Referring Physician:  RUBEN Duvall CNP, MD Orders:  PT Eval and Treat   Date of Onset:  Onset Date: 22     Allergies:   Milk protein  Restrictions/Precautions:  Restrictions/Precautions: None  No data recorded     Interventions Planned (Treatment may consist of any combination of the following):    Current Treatment Recommendations: Strengthening; ROM; Manual; Home exercise program; Modalities; Therapeutic activities; Aquatics     Subjective Comments:  Patient reports doing about the same. Initial:}    4/10Post Session:       3/10  Medications Last Reviewed:  2022  Updated Objective Findings:  None Today  Treatment   THERAPEUTIC EXERCISE: (45 minutes):    Exercises per grid below to improve mobility and strength. Required minimal verbal cues to promote proper body mechanics. Progressed resistance, range, and repetitions as indicated. LTR x 10  Bridging x 5  Knee to chest -   Trunk rotation  SLR  Sit to stand  Trunk ext  Hamstring stretching   Hip flexor stretching  Nu step x 10 min  Standing trunk ext  Clams with red TB 3 x 10  marching        Treatment/Session Summary:    Treatment Assessment:  Patient demonstrated good effort with all exercises.  Instructed patient to continue home exercises as directed. Communication/Consultation:  None today  Equipment provided today:  None  Recommendations/Intent for next treatment session: Next visit will focus on continued strengthening, ROM.     Total Treatment Billable Duration:  45  minutes  Time In: 2143  Time Out: Jem Haley PTA       Charge Capture  }Post Session Pain  PT Visit Info  MedBridge Portal  MD Guidelines  Scanned Media  Benefits  MyChart    Future Appointments   Date Time Provider Sheree Hernández   12/20/2022 10:00 AM [place], LISW [facility] AMB   12/20/2022 11:45 AM Marilyn Mtz, PT [facility]   12/21/2022 10:30 AM [facility] US LOGIC 7 [facility]US [facility]FE   12/22/2022  9:30 AM Marilyn Mtz, PT [facility]   12/28/2022 11:45 AM Marilyn Mtz, PT [facility]   12/30/2022 11:15 AM Rodo Schmidt MD [id number] GVL AMB   1/4/2023 11:20 AM Jann Waggoner MD [facility] AMB   1/18/2023 10:40 AM [facility] OUTREACH INSURANCE [facility]   1/18/2023 11:15 AM Tony Acevedo MD [facility] AMB   5/15/2023  1:00 PM Ramón Mccoy MD [facility] AM[facility]

## 2022-12-20 ENCOUNTER — HOSPITAL ENCOUNTER (OUTPATIENT)
Dept: PHYSICAL THERAPY | Age: 77
Setting detail: RECURRING SERIES
Discharge: HOME | End: 2022-12-23
Payer: MEDICARE

## 2022-12-20 ENCOUNTER — OFFICE VISIT (OUTPATIENT)
Dept: ONCOLOGY | Age: 77
End: 2022-12-20

## 2022-12-20 DIAGNOSIS — Z17.0 MALIGNANT NEOPLASM OF RIGHT BREAST IN FEMALE, ESTROGEN RECEPTOR POSITIVE, UNSPECIFIED SITE OF BREAST (HCC): ICD-10-CM

## 2022-12-20 DIAGNOSIS — C50.911 MALIGNANT NEOPLASM OF RIGHT BREAST IN FEMALE, ESTROGEN RECEPTOR POSITIVE, UNSPECIFIED SITE OF BREAST (HCC): ICD-10-CM

## 2022-12-20 DIAGNOSIS — Z91.89 ADJUSTMENT TO LIFE THREATENING ILLNESS: Primary | ICD-10-CM

## 2022-12-20 PROCEDURE — 97110 THERAPEUTIC EXERCISES: CPT

## 2022-12-20 ASSESSMENT — PAIN SCALES - GENERAL: PAINLEVEL_OUTOF10: 4

## 2022-12-20 NOTE — PATIENT INSTRUCTIONS
To reschedule your appointment, please call [phone].    In case of emergency, please, call 911 or Reynold Dalal in [place], [place].

## 2022-12-20 NOTE — PROGRESS NOTES
Behavioral Health - Progress Note        Name: Tere Castano  : [id number]  MRN: [id number]  Date of Service: 2022  Location of Service: [place] for both provider and patient        Type of Service: Individual Therapy       Reason for Visit: Follow Up     Chief Complaint: Anxiety and its commodities due to distress caused by cancer diagnosis and treatment. Subjective:  Undersigned provided supportive psychotherapy. Undersigned discussed self-care. Patient verbalized understanding. LISW-CP used Dialectical Behavioral and Cognitive Behavioral Therapy, tailored to patient's need. Patient denied any plan or intent to hurt self or others. Patient was instructed, in case of emergency, to call 911 or Crisisline 988 in [place], [place]. Patient verbalized agreement.     _________________________________________________    Clinical Impression: Tere Castano is a 65 y.o. female . Mental Status Exam, patient was dressed properly. No abnormal psychomotor movements observed. Intellectual functioning appeared to be intact. Mood and affect were congruent. Insight was adequate. Judgment was adequate. Speech was normal, clear. Thought process was coherent. Patient did not report suicidal or homicidal ideations, intent or plans. Patient denied self-injury behaviors. Patient denied alcohol and other substance abuse. Patient was oriented to the four spheres: self, place, time and situation. Patient response to intervention was appropriate. Patient progress was adequate. Protective factor: self-determination. __________________________________________________    Session scheduled for: 10:00  pm/am, patient was on time     Session started:  10:00    Session ended:  11:00  _________________________________________________    Patient Diagnosis:         PHQ 9:  5   - to be scanned into EMR.  0-4 Suggests the patient may not need depression treatment  5-14 Mild major depressive disorder.    15-19 Moderate-major depressive disorder. 20+ Severe major depressive disorders.      SONIDO 7:  8   - to be scanned into EMR.  0-4: minimal anxiety   5-9: mild anxiety   10-14: moderate anxiety   15-21: severe anxiety   __________________________________________________    Frequency: Weekly or bi-weekly appointments as schedule permits    Patient's Primary Goal: Decrease anxiety   __________________________________________________    Plans:  Continue to use Cognitive Behavioral Approach  Continue to work with patient on primary goal.    Continue to see patient, weekly or bi-weekly  __________________________________________________    [facility]

## 2022-12-20 NOTE — PROGRESS NOTES
Vernon Or  :   Primary: [employer] Hmo/p*  Secondary: Matt Garcia @ [place] [address] [place] [address]  Phone: [phone]  Fax: [phone] Plan Frequency: 2x per week for 12 weeks    Plan of Care/Certification Expiration Date: 22      PT Visit Info:  No data recorded   Visit Count:  4   OUTPATIENT PHYSICAL THERAPY:OP NOTE TYPE: Treatment Note 2022       Episode  }Appt Desk             Treatment Diagnosis:  Pain in Right Hip (M25.551)  Difficulty in walking, Not elsewhere classified (R26.2), low back pain M54.5  Medical/Referring Diagnosis:  No admission diagnoses are documented for this encounter. Referring Physician:  RUBEN Flores CNP, MD Orders:  PT Eval and Treat   Date of Onset:  Onset Date: 22     Allergies:   Milk protein  Restrictions/Precautions:  Restrictions/Precautions: None  No data recorded     Interventions Planned (Treatment may consist of any combination of the following):    Current Treatment Recommendations: Strengthening; ROM; Manual; Home exercise program; Modalities; Therapeutic activities; Aquatics     Subjective Comments:  Pt reports feeling some better  Initial:}    4/10Post Session:       3/10  Medications Last Reviewed:  2022  Updated Objective Findings:  None Today  Treatment   THERAPEUTIC EXERCISE: (40 minutes):    Exercises per grid below to improve mobility and strength. Required minimal verbal cues to promote proper body mechanics. Progressed resistance, range, and repetitions as indicated.   Nu-step level 4 x 10 min  Standing bilateral hip abduction 4# x 20  Standing hip flexion 4# x 20  Bridging x 20  Trunk rotation  SLR x 15  Sit to stand-held  Trunk ext x 10 hold 5 sec  Hip flexor stretching manually  Long axis traction of hip x 3 hold 30 sec  Clamshells x 20 bilaterally          Treatment/Session Summary:    Treatment Assessment:  Pt completed new exercises with mild hip and groin soreness. Overall feels better  Communication/Consultation:  None today  Equipment provided today:  None  Recommendations/Intent for next treatment session: Next visit will focus on continued strengthening, ROM.     Total Treatment Billable Duration:  40 minutes  Time In: 5343  Time Out: [address], PT       Charge Capture  }Post Session Pain  PT Visit Info  [employer] Portal  MD Guidelines  Scanned Media  Benefits  MyChart    Future Appointments   Date Time Provider Sheree Betty   12/21/2022 10:30 AM S[facility] US LOGIC 7 SFERUS SFE   12/22/2022  9:30 AM Claven Fitoumbrubina, PT SFOST SFO   12/27/2022 10:00 AM LAWRENCE Pettit [facility] AMB   12/28/2022 11:45 AM Florida Shoemaker, PT SFOST SFO   12/30/2022 11:15 AM Gino Macario MD [id number] GVL AMB   1/4/2023 11:20 AM Nena Soriano MD MAT [facility] AMB   1/18/2023 10:40 AM [facility] [address] [place] [address]   1/18/2023 11:15 AM Carmina Jaramillo MD [facility] AMB   5/15/2023  1:00 PM Freddie García MD UCDG GVL AMB

## 2022-12-21 ENCOUNTER — HOSPITAL ENCOUNTER (OUTPATIENT)
Dept: ULTRASOUND IMAGING | Age: 77
Discharge: HOME | End: 2022-12-24
Payer: MEDICARE

## 2022-12-21 DIAGNOSIS — R31.9 HEMATURIA, UNSPECIFIED TYPE: ICD-10-CM

## 2022-12-21 PROCEDURE — 76775 US EXAM ABDO BACK WALL LIM: CPT

## 2022-12-22 ENCOUNTER — HOSPITAL ENCOUNTER (OUTPATIENT)
Dept: PHYSICAL THERAPY | Age: 77
Setting detail: RECURRING SERIES
Discharge: HOME | End: 2022-12-25
Payer: MEDICARE

## 2022-12-22 PROCEDURE — 97110 THERAPEUTIC EXERCISES: CPT

## 2022-12-22 ASSESSMENT — PAIN SCALES - GENERAL: PAINLEVEL_OUTOF10: 4

## 2022-12-22 NOTE — PROGRESS NOTES
Mary Hansen  : [id number]  Primary: [facility] Medicare Hmo/p*  Secondary: [address] Garcia @ [facility]  [address] [place] [address]  Phone: [phone]  Fax: [phone] Plan Frequency: 2x per week for 12 weeks    Plan of Care/Certification Expiration Date: 22      PT Visit Info:  No data recorded   Visit Count:  5   OUTPATIENT PHYSICAL THERAPY:OP NOTE TYPE: Treatment Note 2022       Episode  }Appt Desk             Treatment Diagnosis:  Pain in Right Hip (M25.551)  Difficulty in walking, Not elsewhere classified (R26.2), low back pain M54.5  Medical/Referring Diagnosis:  No admission diagnoses are documented for this encounter. Referring Physician:  RUBEN Rodriguez CNP, MD Orders:  PT Eval and Treat   Date of Onset:  Onset Date: 22     Allergies:   Milk protein  Restrictions/Precautions:  Restrictions/Precautions: None  No data recorded     Interventions Planned (Treatment may consist of any combination of the following):    Current Treatment Recommendations: Strengthening; ROM; Manual; Home exercise program; Modalities; Therapeutic activities; Aquatics     Subjective Comments:  Pt reports she is resting better  Initial:}    4/10Post Session:       3/10  Medications Last Reviewed:  2022  Updated Objective Findings:  None Today  Treatment   THERAPEUTIC EXERCISE: (40 minutes):    Exercises per grid below to improve mobility and strength. Required minimal verbal cues to promote proper body mechanics. Progressed resistance, range, and repetitions as indicated.   Nu-step level 4 x 10 min  Standing bilateral hip abduction 4# x 20  Standing hip flexion 4# x 20  Bridging x 20  Trunk rotation x 15  SLR x 15  Sit to stand-held  Trunk ext x 10 hold 5 sec  Hip flexor stretching manually  Long axis traction of hip x 3 hold 30 sec  Clamshells x 20 bilaterally  Standing swimmer x 10 each side          Treatment/Session Summary:    Treatment Assessment:  Pt sore with stretching and exercises. She continues to have right groin pain from hip OA. It's unclear how much improvement she will have as she does have significant right hip OA-based upon prior imaging. Communication/Consultation:  None today  Equipment provided today:  None  Recommendations/Intent for next treatment session: Next visit will focus on continued strengthening, ROM.     Total Treatment Billable Duration:  40 minutes  Time In: 1492  Time Out: [address], PT       Charge Capture  }Post Session Pain  PT Visit [facility] Portal  MD Guidelines  Scanned Media  Benefits  MyChart    Future Appointments   Date Time Provider Sheree Hernández   12/27/2022 10:00 AM LAWRENCE Dorsey [facility]B   12/28/2022 11:45 AM Luli Castillo PT SFOST SFO   12/30/2022 11:15 AM Yana Robledo MD [id number] GVL AMB   1/4/2023 11:20 AM Jacinto Garces MD MAT [facility] AMB   1/18/2023 10:40 AM [facility]REACH INSURANCE [facility]   1/18/2023 11:15 AM Hortencia Vazquez MD [facility] AMB   5/15/2023  1:00 PM Lulu Ashton MD UCDG GVL AMB

## 2022-12-28 ENCOUNTER — HOSPITAL ENCOUNTER (OUTPATIENT)
Dept: PHYSICAL THERAPY | Age: 77
Setting detail: RECURRING SERIES
Discharge: HOME | End: 2022-12-31
Payer: MEDICARE

## 2022-12-28 PROCEDURE — 97110 THERAPEUTIC EXERCISES: CPT

## 2022-12-28 ASSESSMENT — PAIN SCALES - GENERAL: PAINLEVEL_OUTOF10: 3

## 2022-12-28 NOTE — PROGRESS NOTES
Evelyn Salmeron  :   Primary: [employer] Hmo/p*  Secondary: [address] Garcia @ [address] [facility]  [address] [facility] [place] [address]  Phone: [phone]  Fax: [phone] Plan Frequency: 2x per week for 12 weeks    Plan of Care/Certification Expiration Date: 22      PT Visit Info:  No data recorded   Visit Count:  6   OUTPATIENT PHYSICAL THERAPY:OP NOTE TYPE: Treatment Note 2022       Episode  }Appt Desk             Treatment Diagnosis:  Pain in Right Hip (M25.551)  Difficulty in walking, Not elsewhere classified (R26.2), low back pain M54.5  Medical/Referring Diagnosis:  No admission diagnoses are documented for this encounter. Referring Physician:  RUBEN Varela CNP, MD Orders:  PT Eval and Treat   Date of Onset:  Onset Date: 22     Allergies:   Milk protein  Restrictions/Precautions:  Restrictions/Precautions: None  No data recorded     Interventions Planned (Treatment may consist of any combination of the following):    Current Treatment Recommendations: Strengthening; ROM; Manual; Home exercise program; Modalities; Therapeutic activities; Aquatics     Subjective Comments:  Pt reports feeling better overall. Still not pushing herself as much. As she would normally. Initial:}    3/10Post Session:       2/10  Medications Last Reviewed:  2022  Updated Objective Findings:  None Today  Treatment   THERAPEUTIC EXERCISE: (40 minutes):    Exercises per grid below to improve mobility and strength. Required minimal verbal cues to promote proper body mechanics. Progressed resistance, range, and repetitions as indicated.   Nu-step level 4 x 10 min  Standing bilateral hip abduction 4# x 20  Standing hip flexion 4# x 20  Bridging x 20  Trunk rotation x 15-supine  SLR x 15  Sit to stand-held  Trunk ext x 10 hold 5 sec  Hip flexor stretching manually  Long axis traction of hip x 3 hold 30 sec  Clamshells x 20 bilaterally blue TB  Standing swimmer x 20 each side  Supine clamshells blue TB x 20  Standing trunk rotation blue TB x 15      Treatment/Session Summary:    Treatment Assessment:  Pt able to complete exercises. She does still have difficulty with weightbearing through the right hip with standing hip exercises. She is reporting some overall improvement  Communication/Consultation:  None today  Equipment provided today:  None  Recommendations/Intent for next treatment session: Next visit will focus on continued strengthening, ROM.     Total Treatment Billable Duration:  40 minutes  Time In: 7307  Time Out: 1415 [facility], PT       Charge Capture  }Post Session Pain  PT Visit Info  [employer] Portal  MD Guidelines  Scanned Media  Benefits  MyChart    Future Appointments   Date Time Provider Sheree Betty   12/30/2022 11:15 AM Deanna Rojas MD [id number] [facility] AMB   1/3/2023 10:00 AM LAWRENCE Boston [facility] AMB   1/4/2023 11:20 AM Gunjan Sánchez MD [facility] AMB   1/18/2023 10:40 AM [facility] OUTREACH INSURANCE [facility]   1/18/2023 11:15 AM Edel Lozano MD [facility] AMB   5/15/2023  1:00 PM Maday Cantu MD UC GVL AMB

## 2023-01-04 ENCOUNTER — OFFICE VISIT (OUTPATIENT)
Dept: INTERNAL MEDICINE CLINIC | Facility: CLINIC | Age: 78
End: 2023-01-04
Payer: MEDICARE

## 2023-01-04 VITALS
HEART RATE: 61 BPM | WEIGHT: 163 LBS | OXYGEN SATURATION: 100 % | DIASTOLIC BLOOD PRESSURE: 68 MMHG | HEIGHT: 62 IN | SYSTOLIC BLOOD PRESSURE: 118 MMHG | BODY MASS INDEX: 30 KG/M2 | TEMPERATURE: 97.2 F

## 2023-01-04 DIAGNOSIS — R80.9 CONTROLLED TYPE 2 DIABETES MELLITUS WITH MICROALBUMINURIA, WITHOUT LONG-TERM CURRENT USE OF INSULIN (HCC): Primary | ICD-10-CM

## 2023-01-04 DIAGNOSIS — R31.9 HEMATURIA, UNSPECIFIED TYPE: ICD-10-CM

## 2023-01-04 DIAGNOSIS — Z17.0 MALIGNANT NEOPLASM OF RIGHT BREAST IN FEMALE, ESTROGEN RECEPTOR POSITIVE, UNSPECIFIED SITE OF BREAST (HCC): ICD-10-CM

## 2023-01-04 DIAGNOSIS — F41.9 ANXIETY: ICD-10-CM

## 2023-01-04 DIAGNOSIS — E03.9 ACQUIRED HYPOTHYROIDISM: ICD-10-CM

## 2023-01-04 DIAGNOSIS — Z23 NEEDS FLU SHOT: ICD-10-CM

## 2023-01-04 DIAGNOSIS — C50.911 MALIGNANT NEOPLASM OF RIGHT BREAST IN FEMALE, ESTROGEN RECEPTOR POSITIVE, UNSPECIFIED SITE OF BREAST (HCC): ICD-10-CM

## 2023-01-04 DIAGNOSIS — E11.29 CONTROLLED TYPE 2 DIABETES MELLITUS WITH MICROALBUMINURIA, WITHOUT LONG-TERM CURRENT USE OF INSULIN (HCC): Primary | ICD-10-CM

## 2023-01-04 DIAGNOSIS — E78.00 PURE HYPERCHOLESTEROLEMIA: ICD-10-CM

## 2023-01-04 DIAGNOSIS — I10 PRIMARY HYPERTENSION: ICD-10-CM

## 2023-01-04 PROCEDURE — 3074F SYST BP LT 130 MM HG: CPT | Performed by: INTERNAL MEDICINE

## 2023-01-04 PROCEDURE — G0008 ADMIN INFLUENZA VIRUS VAC: HCPCS | Performed by: INTERNAL MEDICINE

## 2023-01-04 PROCEDURE — 99214 OFFICE O/P EST MOD 30 MIN: CPT | Performed by: INTERNAL MEDICINE

## 2023-01-04 PROCEDURE — 3078F DIAST BP <80 MM HG: CPT | Performed by: INTERNAL MEDICINE

## 2023-01-04 PROCEDURE — 1123F ACP DISCUSS/DSCN MKR DOCD: CPT | Performed by: INTERNAL MEDICINE

## 2023-01-04 PROCEDURE — 90662 IIV NO PRSV INCREASED AG IM: CPT | Performed by: INTERNAL MEDICINE

## 2023-01-04 RX ORDER — BUSPIRONE HYDROCHLORIDE 7.5 MG/1
7.5 TABLET ORAL DAILY PRN
Qty: 30 TABLET | Refills: 0 | Status: SHIPPED | OUTPATIENT
Start: 2023-01-04 | End: 2023-02-03

## 2023-01-04 ASSESSMENT — ENCOUNTER SYMPTOMS
NAUSEA: 0
CHEST TIGHTNESS: 0
VOMITING: 0
SHORTNESS OF BREATH: 0
ABDOMINAL PAIN: 0

## 2023-01-04 NOTE — PROGRESS NOTES
ASSESSMENT/PLAN:    1. Controlled type 2 diabetes mellitus with microalbuminuria, without long-term current use of insulin (HCC)  Treatment regimen is effective. A1c 6.7%  LDL excellent control. Continue Atorvastatin 40 mg daily. On ARB. BP and log controlled. 1.  Rx changes: none  2. Education: Reviewed ABCs of diabetes management (respective goals in parentheses):  A1C (<7), blood pressure (<130/80), and cholesterol (LDL <100). 3.  Compliance at present is estimated to be good. Efforts to improve compliance (if necessary) will be directed at  continuing to manage with healthy low carb diet and exercise and no recommended Rx medications at this time . 4. Follow up: 6 months  - Hemoglobin A1C; Future  - Lipid Panel; Future  - Basic Metabolic Panel; Future  - ALT; Future  - AST; Future    2. Malignant neoplasm of right breast in female, estrogen receptor positive, unspecified site of breast [facility])  Oncology following - on Arimidex 1 mg daily. 3. Anxiety  Continue with therapist/ counseling  Buspar prn for anxiety. Declines ssri. - busPIRone (BUSPAR) 7.5 MG tablet; Take 1 tablet by mouth daily as needed (anxiety)  Dispense: 30 tablet; Refill: 0    4. Needs flu shot   Updated Flu shot. Covid vaccine should be considered    Shingles vaccine should be considered as well. - Influenza, FLUZONE HIGH-DOSE, (age 72 y+), IM, Preservative Free, 0.7 mL    5. Hematuria, unspecified type  Renal US ok. Reviewed and discussed. Keep follow up with urology as I think this will help her to have some of her anxiety alleviated. Proceed with urology check up. Cysto? On anti estrogen. Check renal US. CT 2/17/22  left kidney subcentimeter mildly hyperdense lesion in the   lower pole the right kidney.  This is similar to the prior exam. This is too   small to accurately characterize but is statistically most likely to be a   proteinaceous or hemorrhagic cyst.     Ultrasound Result (most recent):  US RETROPERITONEAL LIMITED 12/21/2022    Narrative  RENAL ULTRASOUND. INDICATION:  Microscopic hematuria. .    COMPARISON: None. TECHNIQUE: Direct skin contact sector 3-5 MHz images of the kidneys are  obtained. FINDINGS: Renal echogenicity normal, the right kidney is hypoechoic compared to  the liver. Right kidney: 9.8 cm in length. No hydronephrosis. 1-2 cm simple cyst.    Left kidney: 9.5 cm in length. No hydronephrosis. 1 cm cyst.    Urinary bladder: Appears empty    Vasculature: Aorta: Normal caliber. IVC:  Patent. Impression  Small simple cysts, otherwise unremarkable bilateral renal  ultrasound. 6. Pure hypercholesterolemia  LDL and lipid profile well controlled on Statin. Monitor in six months. See orders. 7. Primary hypertension  Well controlled log. Continue ARB. 8. Acquired hypothyroidism  Treatment regimen is effective. - TSH; Future      Evaluation and management of the chronic condition(s) delineated. No negative side effects reported. I have reviewed all the lab results. There are some abnormalities that are either expected or not critical to the patient's health, and are discussed in the office today and are addressed. Please refer to the above assessement and plan narrative and orders and follow up plan. Medication discussed and refilled as needed. Physical exam findings are stable unless otherwise indicated and this is addressed. The most recent lab work and imaging and consultant/urgent care visits and imaging are reviewed and discussed and considered during this visit encounter. On this date, 1/4/23, I have spent 61 minutes reviewing previous notes, test results and face to face with the patient discussing the diagnosis and importance of compliance with the treatment plan as well as documenting on the day of the visit. An electronic signature was used to authenticate this note. -- Jackelyn Kramer MD   1.  Controlled type 2 diabetes mellitus with microalbuminuria, without long-term current use of insulin (HCC)  Assessment & Plan:   Well-controlled, continue current medications    Orders:  -     Hemoglobin A1C; Future  -     Lipid Panel; Future  -     Basic Metabolic Panel; Future  -     ALT; Future  -     AST; Future  2. Malignant neoplasm of right breast in female, estrogen receptor positive, unspecified site of breast ([facility] [address]  3. Anxiety  -     busPIRone (BUSPAR) 7.5 MG tablet; Take 1 tablet by mouth daily as needed (anxiety), Disp-30 tablet, R-0Print  4. Needs flu shot  -     Influenza, FLUZONE HIGH-DOSE, (age 72 y+), IM, Preservative Free, 0.7 mL  5. Hematuria, unspecified type  6. Pure hypercholesterolemia  7. Primary hypertension  8. Acquired hypothyroidism  -     TSH; Future           On this date, 23, I have spent 30 minutes reviewing previous notes, test results and face to face with the patient discussing the diagnosis and importance of compliance with the treatment plan as well as documenting on the day of the visit. An electronic signature was used to authenticate this note. -- Jackelyn Kramer MD     Return in about 6 months (around 2023). SUBJECTIVE/OBJECTIVE:      HPI:   Christen Morales (:  is a 65 y.o. female, here for evaluation of the following chief complaint(s):   Chief Complaint   Patient presents with    Diabetes     3 month recheck    Hypertension    Discuss Labs       Patient is here for follow-up and management of chronic medical conditions, review of recent labs, review of any imaging completed since our last office visit and discuss any consultants opinions or management changes.      Treatment Adherence:   Medication compliance:  compliant most of the time  Diet compliance:  compliant most of the time  Weight trend: stable  Current exercise: no regular exercise and active with ADLs  Barriers: financial, lack of support, and stress    Diabetes Mellitus Type 2: Current symptoms/problems include  mild neuropathy in feet and needs Rx for diabetic shoes . Home blood sugar records: trend: stable  Any episodes of hypoglycemia? no  Eye exam current (within one year): yes  Tobacco history: She  reports that she quit smoking about 21 years ago. Her smoking use included cigarettes. She smoked an average of .25 packs per day. She has never used smokeless tobacco.   Daily Aspirin? No: not currently    Hypertension:  Home blood pressure monitoring: Yes - . She is adherent to a low sodium diet. Patient denies chest pain, shortness of breath, headache, peripheral edema, and dry cough. Antihypertensive medication side effects: no medication side effects noted. Use of agents associated with hypertension: .   Key Anti-Hypertensive Meds            irbesartan (AVAPRO) 150 MG tablet (Taking)    Sig - Route: Take 1 tablet by mouth daily - Oral    Patient taking differently: Take 150 mg by mouth at bedtime    atenolol-chlorthalidone (TENORETIC) 50-25 MG per tablet (Taking)    Sig - Route: Take 0.5 tablets by mouth daily - Oral            Hyperlipidemia:  No new myalgias or GI upset on atorvastatin (Lipitor). Lab Results   Component Value Date    LABA1C 6.7 (H) 12/13/2022    LABA1C 6.8 (H) 08/25/2022    LABA1C 6.6 (H) 02/22/2022     Lab Results   Component Value Date    CREATININE 1.00 12/13/2022     Lab Results   Component Value Date    ALT 21 08/25/2022    AST 16 08/25/2022     Lab Results   Component Value Date    CHOL 134 12/13/2022    TRIG 86 12/13/2022    HDL 59 12/13/2022    LDLCALC 57.8 12/13/2022        Key Hyperlipidemia Meds            atorvastatin (LIPITOR) 40 MG tablet (Taking)    Sig - Route: Take 1 tablet by mouth daily - Oral    Patient taking differently: Take 40 mg by mouth at bedtime            Breast Cancer  She had a left breast cancer in 2001 for which she had a lumpectomy and radiation, she also took endocrine therapy of unknown type and duration.     In August 2022 she had her routine screening mammogram that reported a right breast asymmetry. On 8/25/22 she had a right breast diagnostic mammogram and ultrasound which confirmed the findings and on 8/31/22 she underwent a core needle biopsy of the right breast mass. The pathology reported infiltrating ductal carcinoma, low grade, ER 95%, SD 0% and HER2 1+. On 9/7/22 she had a bilateral breast MRI that reported the known right breast cancer of 1.4 cm, no additional suspicious findings in either breast or no evidence of lymphadenopathy was noted. She has a surgical consult with Dr Angelica Santoyo on 9/21/22. Treatment notes reviewed. 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 Chronic anxiety. Has seen [facility]. Declines SSRI. CBT therapy. Seeing counselor. Notes reviewed from 12/20/22     Current Outpatient Medications on File Prior to Visit   Medication Sig Dispense Refill    anastrozole (ARIMIDEX) 1 MG tablet Take 1 tablet by mouth daily 30 tablet 3    acetaminophen (TYLENOL) 500 MG tablet Take 1,000 mg by mouth every 6 hours as needed for Pain      magnesium oxide (MAG-OX) 400 MG tablet Take 400 mg by mouth daily      Multiple Vitamin (MULTIVITAMIN ADULT PO) Take by mouth daily      calcium carbonate (OSCAL) 500 MG TABS tablet Take 500 mg by mouth daily      nystatin (MYCOSTATIN) 685701 UNIT/GM cream Apply topically 2 times daily.  1 each 1    atorvastatin (LIPITOR) 40 MG tablet Take 1 tablet by mouth daily (Patient taking differently: Take 40 mg by mouth at bedtime) 90 tablet 3    irbesartan (AVAPRO) 150 MG tablet Take 1 tablet by mouth daily (Patient taking differently: Take 150 mg by mouth at bedtime) 90 tablet 3    levothyroxine (SYNTHROID) 75 MCG tablet Take 1 tablet by mouth Daily TK 1 T PO D Indications: a condition with low thyroid hormone levels 90 tablet 3    atenolol-chlorthalidone (TENORETIC) 50-25 MG per tablet Take 0.5 tablets by mouth daily 90 tablet 3    Diabetic Shoe MISC by Does not apply route Diabetic shoe, custom orthoses, custom prosthetic filler, DM2, neuropathy 1 each 0    Cholecalciferol 50 MCG (2000 UT) TABS Take by mouth      coenzyme Q10 100 MG CAPS capsule Take 100 mg by mouth daily      hydrocortisone 2.5 % cream Place rectally 4 times daily       No current facility-administered medications on file prior to visit. Labs reviewed and discussed and medication refilled as needed for chronic medications during ov or adjusted based on lab results and/or our discussion as appropriate. See discussion. The patient's available records and electronic chart records are reviewed. The PMH, PSH, medications, allergies, medications, FH, health maintenance and vaccination status are all reviewed and updated as appropriate. Records from outside providers have been reviewed, summarized, and considered as noted in the history of present illness, past medical history, and objective data of this note and encounter.           Health Maintenance   Topic Date Due    Shingles vaccine (1 of 2) Never done    COVID-19 Vaccine (4 - Booster for [employer] series) 12/31/2021    Annual Wellness Visit (AWV)  03/01/2023    A1C test (Diabetic or Prediabetic)  06/13/2023    Diabetic retinal exam  09/16/2023    Diabetic foot exam  09/19/2023    Depression Monitoring  12/02/2023    Lipids  12/13/2023    DTaP/Tdap/Td vaccine (2 - Td or Tdap) 03/31/2027    DEXA (modify frequency per FRAX score)  Completed    Flu vaccine  Completed    Pneumococcal 65+ years Vaccine  Completed    Hepatitis A vaccine  Aged Out    Hib vaccine  Aged Out    Meningococcal (ACWY) vaccine  Aged Out    Hepatitis C screen  Discontinued     Patient Active Problem List   Diagnosis    History of breast cancer    Arthritis    Spondylosis of lumbosacral region without myelopathy or radiculopathy    Chronic pain of left knee    Hyperlipidemia    Anxiety associated with depression    Right median nerve neuropathy    Refused varicella vaccine    Hypertension    Abdominal wall hernia    Hip pain, bilateral    Adenomatous polyp of colon    Stress incontinence    Agatston coronary artery calcium score greater than 400    Controlled type 2 diabetes mellitus with microalbuminuria, without long-term current use of insulin (HCC)    Anxiety    Hypothyroidism    Infiltrating ductal carcinoma of right breast (HCC)    Hematuria       Review of Systems   Constitutional:  Negative for activity change and fatigue. Eyes:  Negative for visual disturbance. Respiratory:  Negative for chest tightness and shortness of breath. Cardiovascular:  Negative for chest pain, palpitations and leg swelling. Gastrointestinal:  Negative for abdominal pain, nausea and vomiting. Endocrine: Negative for polydipsia and polyuria. Genitourinary:  Negative for dysuria and hematuria (no gross. Seeing urology for opinion. ). Musculoskeletal:  Negative for myalgias. Skin:  Negative for wound. Neurological:  Negative for headaches. Psychiatric/Behavioral:  Negative for confusion. The patient is nervous/anxious.       Lab Results   Component Value Date/Time    WBC 5.5 08/25/2022 08:23 AM    HGB 12.9 08/25/2022 08:23 AM    HCT 40.0 08/25/2022 08:23 AM    MCV 87.7 08/25/2022 08:23 AM    RDW 13.8 08/25/2022 08:23 AM     08/25/2022 08:23 AM    NEUTOPHILPCT 68 05/17/2022 03:30 PM    LYMPHOPCT 22 08/25/2022 08:23 AM    LYMPHOPCT 22 05/17/2022 03:30 PM    MONOPCT 9 08/25/2022 08:23 AM    MONOPCT 9 05/17/2022 03:30 PM    EOSRELPCT 1 08/25/2022 08:23 AM    BASOPCT 1 08/25/2022 08:23 AM    BASOPCT 1 05/17/2022 03:30 PM    LYMPHSABS 1.2 08/25/2022 08:23 AM    LYMPHSABS 1.4 05/17/2022 03:30 PM    MONOSABS 0.5 08/25/2022 08:23 AM    MONOSABS 0.6 05/17/2022 03:30 PM    EOSABS 0.1 08/25/2022 08:23 AM    EOSABS 0.1 05/17/2022 03:30 PM    BASOSABS 0.0 08/25/2022 08:23 AM    IMMGRAN 0 08/25/2022 08:23 AM    GRANULOCYTEABSOLUTECOUNT 0.0 05/17/2022 03:30 PM       Lab Results   Component Value Date/Time     12/13/2022 01:22 PM    K 4.0 12/13/2022 01:22 PM     12/13/2022 01:22 PM    CO2 30 12/13/2022 01:22 PM    ANIONGAP 5 12/13/2022 01:22 PM    GLUCOSE 101 12/13/2022 01:22 PM    BUN 21 12/13/2022 01:22 PM    CREATININE 1.00 12/13/2022 01:22 PM    GFRAA >60 08/25/2022 08:23 AM    LABGLOM 58 12/13/2022 01:22 PM    CALCIUM 9.9 12/13/2022 01:22 PM    BILITOT 0.9 08/25/2022 08:23 AM    ALT 21 08/25/2022 08:23 AM    AST 16 08/25/2022 08:23 AM    ALKPHOS 63 08/25/2022 08:23 AM    ALKPHOS 72 02/22/2022 08:13 AM    PROT 7.5 08/25/2022 08:23 AM    LABALBU 3.7 08/25/2022 08:23 AM    GLOB 3.8 08/25/2022 08:23 AM    ALBUMIN 1.0 08/25/2022 08:23 AM       Lab Results   Component Value Date/Time    CHOL 134 12/13/2022 01:22 PM    HDL 59 12/13/2022 01:22 PM    TRIG 86 12/13/2022 01:22 PM    LDLCALC 57.8 12/13/2022 01:22 PM    VLDL 12 02/22/2022 08:13 AM       Lab Results   Component Value Date/Time    LABA1C 6.7 12/13/2022 01:22 PM    LABA1C 6.8 08/25/2022 08:23 AM    LABA1C 6.6 02/22/2022 08:13 AM    LABA1C 6.7 08/18/2021 08:10 AM    LABA1C 6.6 04/13/2021 09:26 AM       Lab Results   Component Value Date/Time    TSH 2.660 02/22/2022 08:13 AM    TSH 1.430 04/13/2021 09:26 AM    TSH 0.641 09/24/2020 08:54 AM           Lab Results   Component Value Date/Time    SPECGRAV 1.016 08/25/2022 08:23 AM    COLORU YELLOW/STRAW 08/25/2022 08:23 AM    LEUKOCYTESUR SMALL 08/25/2022 08:23 AM    PROTEINU 30 08/25/2022 08:23 AM    GLUCOSEU Negative 08/25/2022 08:23 AM    KETUA Negative 08/25/2022 08:23 AM    BLOODU SMALL 08/25/2022 08:23 AM    BILIRUBINUR Negative 08/25/2022 08:23 AM    UROBILINOGEN 1.0 08/25/2022 08:23 AM    NITRU Negative 08/25/2022 08:23 AM     Results for orders placed or performed during the hospital encounter of 12/21/22 (from the past 2160 hour(s))   US RETROPERITONEAL LIMITED    Impression    Small simple cysts, otherwise unremarkable bilateral renal  ultrasound.         Results for orders placed or performed in visit on 12/13/22 (from the past 2160 hour(s))   TSH   Result Value Ref Range    TSH, 3RD GENERATION 0.464 0.358 - 3.740 uIU/mL   Lipid Panel   Result Value Ref Range    Cholesterol, Total 134 <200 MG/DL    Triglycerides 86 35 - 150 MG/DL    HDL 59 40 - 60 MG/DL    LDL Calculated 57.8 <100 MG/DL    VLDL Cholesterol Calculated 17.2 6.0 - 23.0 MG/DL    Chol/HDL Ratio 2.3     Hemoglobin A1C   Result Value Ref Range    Hemoglobin A1C 6.7 (H) 4.8 - 5.6 %    eAG 146 mg/dL   Basic Metabolic Panel   Result Value Ref Range    Sodium 141 133 - 143 mmol/L    Potassium 4.0 3.5 - 5.1 mmol/L    Chloride 106 101 - 110 mmol/L    CO2 30 21 - 32 mmol/L    Anion Gap 5 2 - 11 mmol/L    Glucose 101 (H) 65 - 100 mg/dL    BUN 21 8 - 23 MG/DL    Creatinine 1.00 0.6 - 1.0 MG/DL    Est, Glom Filt Rate 58 (L) >60 ml/min/1.73m2    Calcium 9.9 8.3 - 10.4 MG/DL   Results for orders placed or performed in visit on 12/02/22 (from the past 2160 hour(s))   AMB POC URINALYSIS DIP STICK AUTO W/O MICRO   Result Value Ref Range    Color, Urine, POC yellow     Clarity, Urine, POC slightly cloudy     Glucose, Urine, POC Negative Negative    Bilirubin, Urine, POC Negative Negative    Ketones, Urine, POC Negative Negative    Specific Gravity, Urine, POC 1.020 1.001 - 1.035    Blood, Urine, POC Moderate Negative    pH, Urine, POC 6.0 4.6 - 8.0    Protein, Urine, POC Negative Negative    Urobilinogen, POC 0.2     Nitrate, Urine, POC negative Negative    Leukocyte Esterase, Urine, POC Negative Negative   Results for orders placed or performed during the hospital encounter of 11/28/22 (from the past 2160 hour(s))   DEXA BONE DENSITY AXIAL SKELETON    Impression    NEWLY LOW BONE DENSITY AFTER SIGNIFICANT INTERVAL DENSITY LOSS  [facility]  2013. FOLLOW-UP SCAN SHOULD BE CONSIDERED IN 2 YEARS. Results for orders placed or performed in visit on 11/10/22 (from the past 2160 hour(s))   EKG 12 Lead    Impression    Normal sinus rhythm rate of 59.   Normal intervals. Nonspecific T wave change   Results for orders placed or performed during the hospital encounter of 10/19/22 (from the past 2160 hour(s))   POCT Glucose   Result Value Ref Range    POC Glucose 94 65 - 100 mg/dL    Performed by: Torrey            Vitals:    01/04/23 1138   BP: 118/68   Site: Left Upper Arm   Position: Sitting   Cuff Size: Small Adult   Pulse: 61   Temp: 97.2 °F (36.2 °C)   TempSrc: Temporal   SpO2: 100%   Weight: 163 lb (73.9 kg)   Height: 5' 2\" (1.575 m)     Wt Readings from Last 3 Encounters:   01/04/23 163 lb (73.9 kg)   12/13/22 163 lb (73.9 kg)   12/02/22 159 lb (72.1 kg)     BP Readings from Last 3 Encounters:   01/04/23 118/68   12/13/22 120/80   12/02/22 117/80     Physical Exam  Vitals and nursing note reviewed. Constitutional:       Appearance: Normal appearance. She is not ill-appearing. HENT:      Head: Normocephalic and atraumatic. Eyes:      Extraocular Movements: Extraocular movements intact. Conjunctiva/sclera: Conjunctivae normal.   Cardiovascular:      Rate and Rhythm: Normal rate and regular rhythm. Pulmonary:      Effort: Pulmonary effort is normal.      Breath sounds: Normal breath sounds. Neurological:      General: No focal deficit present. Mental Status: She is alert and oriented to person, place, and time. Mental status is at baseline. Psychiatric:         Mood and Affect: Mood is anxious. Affect is not tearful.          Behavior: Behavior normal.         Cognition and Memory: Cognition normal.

## 2023-01-09 ENCOUNTER — TELEPHONE (OUTPATIENT)
Dept: ONCOLOGY | Age: 78
End: 2023-01-09

## 2023-01-10 ENCOUNTER — OFFICE VISIT (OUTPATIENT)
Dept: ONCOLOGY | Age: 78
End: 2023-01-10

## 2023-01-10 DIAGNOSIS — Z91.89 ADJUSTMENT TO LIFE THREATENING ILLNESS: Primary | ICD-10-CM

## 2023-01-10 DIAGNOSIS — Z17.0 MALIGNANT NEOPLASM OF RIGHT BREAST IN FEMALE, ESTROGEN RECEPTOR POSITIVE, UNSPECIFIED SITE OF BREAST (HCC): ICD-10-CM

## 2023-01-10 DIAGNOSIS — C50.911 MALIGNANT NEOPLASM OF RIGHT BREAST IN FEMALE, ESTROGEN RECEPTOR POSITIVE, UNSPECIFIED SITE OF BREAST (HCC): ICD-10-CM

## 2023-01-10 NOTE — PROGRESS NOTES
Behavioral Health - Progress Note          Name: Lori Herbert  :   MRN: [id number]  Date of Service: 1/10/2023  Location of Service: [address] [place] [address] for both provider and patient        Type of Service: Individual Therapy       Reason for Visit: Follow Up     Chief Complaint: Anxiety and its commodities due to distress caused by cancer diagnosis and treatment. Subjective:  \"My dog is sick\", Undersigned provided supportive psychotherapy. Undersigned discussed Self-Care. Patient verbalized understanding. [facility]WRENCE-CP used Cognitive Behavioral Therapy, tailored it to patient's need. Patient denied any plan or intent to hurt self or others. Patient was instructed, in case of emergency, to call 911 or [facility] [phone] in [place], [place]. Patient verbalized agreement. CBT Material discussed and reviewed in Session:  1/10/2023   Daily Mindfulness - 13  _________________________________________________    Clinical Impression: Lori Herbert is a 65 y.o. female . Mental Status Exam, patient was dressed properly. No abnormal psychomotor movements observed. Intellectual functioning appeared to be intact. Mood and affect were congruent. Insight was adequate. Judgment was adequate. Speech was normal, clear. Thought process was coherent. Patient did not report suicidal or homicidal ideations, intent or plans. Patient denied self-injury behaviors. Patient denied alcohol and other substance abuse. Patient was oriented to the four spheres: self, place, time and situation. Patient response to intervention was appropriate. Patient progress was adequate. Protective factor: self-determination.   __________________________________________________    Session scheduled for: 11:00  pm/am, patient was on time     Session started:  11:00    Session ended:  11:45  _________________________________________________    Patient Diagnosis:         PHQ 9:  8   - to be scanned into EMR.  0-4 Suggests the patient may not need depression treatment  5-14 Mild major depressive disorder. 15-19 Moderate-major depressive disorder. 20+ Severe major depressive disorders.      SONIDO 7:  11   - to be scanned into EMR.  0-4: minimal anxiety   5-9: mild anxiety   10-14: moderate anxiety   15-21: severe anxiety   __________________________________________________    Frequency: Weekly or bi-weekly appointments as schedule permits    Patient's Primary Goal: Decrease anxiety   __________________________________________________    Plans:  Continue to use Cognitive Behavioral Approach  Continue to work with patient on primary goal.    Continue to see patient, weekly or bi-weekly  __________________________________________________    Yancy Acuna

## 2023-01-11 ENCOUNTER — HOSPITAL ENCOUNTER (OUTPATIENT)
Dept: PHYSICAL THERAPY | Age: 78
Setting detail: RECURRING SERIES
Discharge: HOME | End: 2023-01-14
Payer: MEDICARE

## 2023-01-11 PROCEDURE — 97110 THERAPEUTIC EXERCISES: CPT

## 2023-01-11 ASSESSMENT — PAIN SCALES - GENERAL: PAINLEVEL_OUTOF10: 3

## 2023-01-11 NOTE — PROGRESS NOTES
Damien Carlson  :   Primary: [place]*  Secondary: Matt Garcia @ [facility]  [address] [place] [address] [facility] [address]  Phone: [phone]  Fax: [phone] Plan Frequency: 2x per week for 12 weeks    Plan of Care/Certification Expiration Date: 22      PT Visit Info:  No data recorded   Visit Count:  7   OUTPATIENT PHYSICAL THERAPY:OP NOTE TYPE: Treatment Note 2023       Episode  }Appt Desk             Treatment Diagnosis:  Pain in Right Hip (M25.551)  Difficulty in walking, Not elsewhere classified (R26.2), low back pain M54.5  Medical/Referring Diagnosis:  No admission diagnoses are documented for this encounter. Referring Physician:  RUBEN Eagle CNP, MD Orders:  PT Eval and Treat   Date of Onset:  Onset Date: 22     Allergies:   Milk protein  Restrictions/Precautions:  Restrictions/Precautions: None  No data recorded     Interventions Planned (Treatment may consist of any combination of the following):    Current Treatment Recommendations: Strengthening; ROM; Manual; Home exercise program; Modalities; Therapeutic activities; Aquatics     Subjective Comments:  Pt feeling less pain overall   Initial:}    3/10Post Session:       2/10  Medications Last Reviewed:  2023  Updated Objective Findings:  None Today  Treatment   THERAPEUTIC EXERCISE: (40 minutes):    Exercises per grid below to improve mobility and strength. Required minimal verbal cues to promote proper body mechanics. Progressed resistance, range, and repetitions as indicated.   Nu-step level 4 x 10 min  Standing bilateral hip abduction 4# x 20  Standing hip flexion 4# x 20  Bridging x 20  Trunk rotation x 15-supine  SLR x 15  Sit to stand-held  Trunk ext x 10 hold 5 sec  Hip flexor stretching manually  Long axis traction of hip x 3 hold 30 sec  Clamshells x 20 bilaterally blue TB  Standing swimmer x 20 each side  Supine clamshells blue TB x 20  Standing trunk rotation blue TB x 15  Standing low row march blue TB x 20    Treatment/Session Summary:    Treatment Assessment:  Pt reports feeling some mild right groin pain after treatment. She had no other complaints during treatment  Communication/Consultation:  None today  Equipment provided today:  None  Recommendations/Intent for next treatment session: Next visit will focus on continued strengthening, ROM.     Total Treatment Billable Duration:  40 minutes  Time In: 2907  Time Out: 18 Raquel Livingston, PT       Charge Capture  }Post Session Pain  PT Visit Info  MedBridge Portal  MD Guidelines  Scanned Media  Benefits  MyChart    Future Appointments   Date Time Provider Sheree Hernández   1/13/2023 11:45 AM Suzanne Hubbard, PT [facility]   1/16/2023 11:00 AM Elizabeth Bradley, PTA [facility]   1/18/2023  9:00 AM LAWRENCE Fu [facility]   1/18/2023 10:40 AM [facility]   1/18/2023 11:15 AM Tasia Frankel, MD [facility] AMB   1/20/2023 11:45 AM Suzanne Hubbard, PT [facility]   1/23/2023 11:00 AM Suzannesanta Hubbard, PT [facility]   1/26/2023 11:45 AM Suzanne Hubbard, PT [facility]   1/30/2023 11:00 AM Kevin Daniels MD [id number] [facility]VL AMB   5/15/2023  1:00 PM Michelle Gramajo MD [facility] AMB   7/3/2023  8:00 AM SFE LAB DS [facility]   7/10/2023  8:20 AM Javier Trivedi MD [facility] G[facility] AMB

## 2023-01-13 ENCOUNTER — HOSPITAL ENCOUNTER (OUTPATIENT)
Dept: PHYSICAL THERAPY | Age: 78
Setting detail: RECURRING SERIES
Discharge: HOME | End: 2023-01-16
Payer: MEDICARE

## 2023-01-13 DIAGNOSIS — C50.911 MALIGNANT NEOPLASM OF RIGHT BREAST IN FEMALE, ESTROGEN RECEPTOR POSITIVE, UNSPECIFIED SITE OF BREAST (HCC): Primary | ICD-10-CM

## 2023-01-13 DIAGNOSIS — Z17.0 MALIGNANT NEOPLASM OF RIGHT BREAST IN FEMALE, ESTROGEN RECEPTOR POSITIVE, UNSPECIFIED SITE OF BREAST (HCC): Primary | ICD-10-CM

## 2023-01-13 PROCEDURE — 97110 THERAPEUTIC EXERCISES: CPT

## 2023-01-13 ASSESSMENT — PAIN SCALES - GENERAL: PAINLEVEL_OUTOF10: 3

## 2023-01-13 NOTE — PROGRESS NOTES
Otilia Whalen  :   [place]*  Secondary: Matt Garcia @ [address] [place] [address]  Phone: [phone]  Fax: [phone] Plan Frequency: 2x per week for 12 weeks    Plan of Care/Certification Expiration Date: 22      PT Visit Info:  No data recorded   Visit Count:  8   OUTPATIENT PHYSICAL THERAPY:OP NOTE TYPE: Treatment Note 2023       Episode  }Appt Desk             Treatment Diagnosis:  Pain in Right Hip (M25.551)  Difficulty in walking, Not elsewhere classified (R26.2), low back pain M54.5  Medical/Referring Diagnosis:  No admission diagnoses are documented for this encounter. Referring Physician:  RUBEN Torres CNP, MD Orders:  PT Eval and Treat   Date of Onset:  Onset Date: 22     Allergies:   Milk protein  Restrictions/Precautions:  Restrictions/Precautions: None  No data recorded     Interventions Planned (Treatment may consist of any combination of the following):    Current Treatment Recommendations: Strengthening; ROM; Manual; Home exercise program; Modalities; Therapeutic activities; Aquatics     Subjective Comments:  Pt reports feeling okay today. Initial:}    3/10Post Session:       2/10  Medications Last Reviewed:  2023  Updated Objective Findings:  None Today  Treatment   THERAPEUTIC EXERCISE: (45 minutes):    Exercises per grid below to improve mobility and strength. Required minimal verbal cues to promote proper body mechanics. Progressed resistance, range, and repetitions as indicated.   Nu-step level 4 x 10 min  Standing bilateral hip abduction 4# x 20  Standing hip flexion 4# x 20  Bridging x 20  Trunk rotation x 15-supine  SLR x 15  Sit to stand-held  Trunk ext x 10 hold 5 sec  Hip flexor stretching manually-held  Long axis traction of hip x 3 hold 30 sec  Clamshells x 20 bilaterally blue TB  Standing swimmer x 20 each side  Supine clamshells blue TB x 20  Standing trunk rotation blue TB x 15  Standing low row march blue TB x 20  Step and reach with medball yellow x 15 each side  Treatment/Session Summary:    Treatment Assessment:  Pt reporting feeling okay during treatment. She continues to have some mild weakness in the hips, but is progressing. Communication/Consultation:  None today  Equipment provided today:  None  Recommendations/Intent for next treatment session: Next visit will focus on continued strengthening, ROM.     Total Treatment Billable Duration:  45 minutes  Time In: 3755  Time Out: [address], PT       Charge Capture  }Post Session Pain  PT Visit Info  MedBridge Portal  MD Guidelines  Scanned Media  Benefits  MyChart    Future Appointments   Date Time Provider Sheree Hernández   1/16/2023 11:00 AM [place] [facility]   1/18/2023  9:00 AM [facility] AMB   1/18/2023 10:40 AM [facility]ACH [facility]URANCE [facility]   1/18/2023 11:15 AM Yessica Diaz MD [facility] AMB   1/20/2023 11:45 AM Thamas Roof, PT [facility]   1/23/2023 11:00 AM Thamas Roof, PT [facility]   1/26/2023 11:45 AM Thamas Roof, PT [facility]   1/30/2023 11:00 AM Melissa Bustamante MD [id number] GVL AMB   5/15/2023  1:00 PM Mariana Delgadillo MD [facility] AMB   7/3/2023  8:00 AM SFE LAB DS [facility]EDS [facility]FE   7/10/2023  8:20 AM Brayan Pierce MD [facility] GVL AMB

## 2023-01-13 NOTE — PROGRESS NOTES
Shabana Vera  :   Primary: [facility] Medicare Hmo/p*  Secondary: [address] Garcia @ [facility]  [facility] [address] [place] [address]  Phone: [phone]  Fax: [phone] Plan Frequency: 2x per week for 12 weeks  Plan of Care/Certification Expiration Date: 22      PT Visit Info:         Visit Count:  8                OUTPATIENT PHYSICAL THERAPY:             OP NOTE TYPE: Progress Report 2023               Episode  Appt Desk         Treatment Diagnosis:  Pain in Right Hip (M25.551)  Difficulty in walking, Not elsewhere classified (R26.2), Acute right-sided low back pain without sciatica [M54.50]  Right hip pain [M25.551]  Medical/Referring Diagnosis:  No admission diagnoses are documented for this encounter. Referring Physician:  RUBEN Cortés CNP, MD Orders:  PT Eval and Treat   Return MD Appt:  as needed  Date of Onset:  Onset Date: 22    -roughly 3 weeks  Allergies:  Milk protein  Restrictions/Precautions:    Restrictions/Precautions: None      Medications Last Reviewed:  2023     SUBJECTIVE   History of Injury/Illness (Reason for Referral):  Pt with 3 week history of lower back pain and right hip pain with increased difficulty with walking. She went to urgent care and had back x-rayed. Determined she has DDD, but also has some blood in her urine and will be seeing urologist.  She does have a past history of lower back pain with positive treatment with therapy. She reports difficulty walking, standing and performing general ADL's in the home and community.   Patient Stated Goal(s):  \"to decrease pain\"  Initial:     3/10 Post Session:     2/10  Past Medical History/Comorbidities:   Ms. Paresh Pool  has a past medical history of Abdominal pain, Arthritis, Breast cancer ([address] Breast cancer, left ([address] Breast pain in female, Bunion, Chronic pain of left knee, Corns and callosities, DDD (degenerative disc disease), lumbar, Diabetes mellitus type II, controlled ([facility] [address] Emotional disorder, Fungal infection of toenail, Hallux valgus, Hip pain, bilateral, Hyperglycemia, Hyperlipidemia, Hypertension, Hypothyroidism, Lower GI bleed, Obesity, Other ill-defined conditions(799.89), Postmenopausal, Right median nerve neuropathy, Stress incontinence, and Traumatic arthropathy of knee. Ms. Carline Patel  has a past surgical history that includes Breast lumpectomy (Left, 2001); Urological Surgery; Colonoscopy (2008); Tubal ligation; Hysterectomy; us guided core breast biopsy (Right); Breast biopsy (Right, 2011, Stereo Core Bx 8-31-22); orthopedic surgery (Left); orthopedic surgery; Total shoulder arthroplasty (Left); orthopedic surgery; orthopedic surgery (2005); [facility] STEREO BREAST BX W LOC DEVICE 1ST LESION RIGHT (Right, 8/31/2022); Breast biopsy (Right, 10/5/2022); Breast biopsy (Right, 10/5/2022); and Breast surgery (Right, 10/19/2022). Social History/Living Environment:   Lives With: Alone  Home Layout: One level     Prior Level of Function/Work/Activity:   Occupation: Retired  Type of Occupation: [employer]:   Does the patient/guardian have any barriers to learning?: No barriers  Will there be a co-learner?: No  What is the preferred language of the patient/guardian?: English  Is an  required?: No  How does the patient/guardian prefer to learn new concepts?: Listening; Reading; Demonstration; Pictures/Videos     Fall Risk Scale: Snell Total Score: 45  Snell Fall Risk: High (45 and higher)           OBJECTIVE   LE strength measures:     R LE//updated 1/13/23 L LE//updated 1/13/23   Hip flexors   4-/5  , 4/5   4/5,  4/5   Glut medius   2+/5 , 3-/5   3/5,  3+/5   quad  4- /5, 4-/5   4/5,  4/5   hamstrings  4- /5,  4-/5   4-/5, 4/5   Ankle DF's  5 /5  5 /5   Ankle PF's  5 /5  5 /5     General standing posture is with forward flexed trunk. Pain increases with lumbar extension and FB.   LROM WFL  Bilateral hip motion WFL except for ER: R hip ER 23 °, L hip ER 36 °  Pt also reporting some mild pain in the groin region-possible hip OA  ASSESSMENT   Initial Assessment:  Pt with history of lower back pain with new onset three weeks ago after raking leaves. She reports increased difficulty with walking, standing upright and generalized standing. She was recently diagnosed with right breast cancer, stage one, and underwent lumpectomy. She is taking oral drug for maintenance. No chemo or radiation required. She would like to decrease overall pain and return to walking, normal and pain free ADL's and hobbies. Assessment as of 1/13/23:  Pt reporting an overall improvement since beginning therapy. She still has intermittent pain and does limit her tasks when she has more pain. She has worked on overall stretching and strengthening in therapy for core, hip and lower back. See objective section for strength measurements. Hip ROM remains the same due to effects of hip OA. It appears that her groin pain is connected to her hip OA. She will continue to benefit from generalized strengthening and stretching over the next few weeks to a month and then will work towards discharge. Problem List: (Impacting functional limitations): Body Structures, Functions, Activity Limitations Requiring Skilled Therapeutic Intervention: Decreased functional mobility ; Decreased ADL status; Decreased ROM; Decreased strength; Increased pain; Decreased posture     Therapy Prognosis:   Therapy Prognosis: Good     Initial Assessment Complexity:   Decision Making: Low Complexity    PLAN   Effective Dates: 12/9/22 TO Plan of Care/Certification Expiration Date: 03/09/22     Frequency/Duration: Plan Frequency: 2x per week for 12 weeks     Interventions Planned (Treatment may consist of any combination of the following):    Current Treatment Recommendations: Strengthening; ROM; Manual; Home exercise program; Modalities;  Therapeutic activities; Aquatics     Goals: (Goals have been discussed and agreed upon with patient.)  Short-Term Functional Goals: Time Frame: 6 weeks  Pt will be independent with HEP. -met  Pt will be able to walk up to 20 minutes without increased pain.-met  Pt will be able to report lying on right side up to one hour at night.-met  Pt will be able to report 4/10 pain rating or less with ADL's.-met    Discharge Goals: Time Frame: 12 weeks   Pt will be able to walk up to 30 minutes without increased pain.-in progress  Pt will be able to increase strength by 1/2 grade for improved ADL's.-in progress  Pt will be able to drive without increased pain.-intermittently meeting  Pt will be able to return to prior ADL's with 2/10 pain rating or less. - intermittently meeting         Outcome Measure: Tool Used: Modified Oswestry Low Back Pain Questionnaire  Score:  Initial: 10/50  Most Recent: 9/50 (Date: 1/13/23 )   Interpretation of Score: Each section is scored on a 0-5 scale, 5 representing the greatest disability. The scores of each section are added together for a total score of 50. Medical Necessity:   > Skilled intervention continues to be required due to increased pain in the lower back preventing her from prolonged standing, walking normal pain free ADL's. Reason For Services/Other Comments:  > Patient continues to require skilled intervention due to increased pain in lower back preventing her from prolonged standing, walking, normal pain free ADL's. Total Duration: 40 min       Regarding Shalini Rush's therapy, I certify that the treatment plan above will be carried out by a therapist or under their direction.   Thank you for this referral,  Liz Zendejas, PT           Post Session Pain  Charge Capture  PT Visit Info MD Mena Palmer

## 2023-01-16 ENCOUNTER — HOSPITAL ENCOUNTER (OUTPATIENT)
Dept: PHYSICAL THERAPY | Age: 78
Setting detail: RECURRING SERIES
Discharge: HOME | End: 2023-01-19
Payer: MEDICARE

## 2023-01-16 PROCEDURE — 97110 THERAPEUTIC EXERCISES: CPT

## 2023-01-16 ASSESSMENT — PAIN SCALES - GENERAL: PAINLEVEL_OUTOF10: 4

## 2023-01-16 NOTE — PROGRESS NOTES
Perry Deiters  :   [place]p*  Secondary: Matt Garcia @ [place] [address] [facility] [place] [facility] [address]  Phone: [phone]  Fax: [phone] Plan Frequency: 2x per week for 12 weeks    Plan of Care/Certification Expiration Date: 22      PT Visit Info:  No data recorded   Visit Count:  9   OUTPATIENT PHYSICAL THERAPY:OP NOTE TYPE: Treatment Note 2023       Episode  }Appt Desk             Treatment Diagnosis:  Pain in Right Hip (M25.551)  Difficulty in walking, Not elsewhere classified (R26.2), low back pain M54.5  Medical/Referring Diagnosis:  No admission diagnoses are documented for this encounter. Referring Physician:  RUBEN Hernandez CNP, MD Orders:  PT Eval and Treat   Date of Onset:  Onset Date: 22     Allergies:   Milk protein  Restrictions/Precautions:  Restrictions/Precautions: None  No data recorded     Interventions Planned (Treatment may consist of any combination of the following):    Current Treatment Recommendations: Strengthening; ROM; Manual; Home exercise program; Modalities; Therapeutic activities; Aquatics     Subjective Comments:  Patient reports back has been hurtimg more in last couple of days. Initial:}    4/10Post Session:       2/10  Medications Last Reviewed:  2023  Updated Objective Findings:  None Today  Treatment   THERAPEUTIC EXERCISE: (45 minutes):    Exercises per grid below to improve mobility and strength. Required minimal verbal cues to promote proper body mechanics. Progressed resistance, range, and repetitions as indicated.   Nu-step level 4 x 10 min  Standing bilateral hip abduction 4# x 20  Standing hip flexion 4# x 20  Bridging x 20  Trunk rotation x 15-supine  SLR x 15  Sit to stand-held  Trunk ext x 10 hold 5 sec  Hip flexor stretching manually-held  Long axis traction of hip x 3 hold 30 sec  Clamshells x 20 bilaterally blue TB  Standing swimmer x 20 each side  Supine clamshells blue TB x 20  Standing trunk rotation blue TB x 15  Standing low row march blue TB x 20  Step and reach with medball yellow x 15 each side  Treatment/Session Summary:    Treatment Assessment:  Patient tolerated treatment with mild discomfort today. Patient demonstrated good effort with all exercises. Communication/Consultation:  None today  Equipment provided today:  None  Recommendations/Intent for next treatment session: Next visit will focus on continued strengthening, ROM.     Total Treatment Billable Duration:  45 minutes  Time In: 1100  Time Out: [address] [place], [place]       Charge Capture  }Post Session Pain  PT Visit Info  MedBridge Portal  MD Guidelines  Scanned Media  Benefits  MyChart    Future Appointments   Date Time Provider Sheree Betty   1/18/2023  9:00 AM LAWRENCE Pompa [facility] G[facility] AMB   1/18/2023 10:40 AM [facility]REACH INSURANCE [facility]   1/18/2023 11:15 AM Jenny Mtz MD [facility] AMB   1/20/2023 11:45 AM Deepa Calabrese, PT [facility]   1/23/2023 11:00 AM Deepa Calabrese, PT [facility]   1/26/2023 11:45 AM Deepa Calabrese, PT [facility]   1/30/2023 11:00 AM Gregory Schneider MD [id number] GVL AMB   5/15/2023  1:00 PM Louis Slaughter MD [facility] AMB   7/3/2023  8:00 AM SFE LAB DS [facility]S [facility]FE   7/10/2023  8:20 AM Pancho Bess MD [facility] GVL AMB

## 2023-01-18 ENCOUNTER — OFFICE VISIT (OUTPATIENT)
Dept: ONCOLOGY | Age: 78
End: 2023-01-18
Payer: MEDICARE

## 2023-01-18 ENCOUNTER — OFFICE VISIT (OUTPATIENT)
Dept: ONCOLOGY | Age: 78
End: 2023-01-18

## 2023-01-18 VITALS
OXYGEN SATURATION: 99 % | WEIGHT: 167.9 LBS | SYSTOLIC BLOOD PRESSURE: 146 MMHG | DIASTOLIC BLOOD PRESSURE: 76 MMHG | BODY MASS INDEX: 30.9 KG/M2 | HEART RATE: 60 BPM | HEIGHT: 62 IN | RESPIRATION RATE: 16 BRPM | TEMPERATURE: 98.8 F

## 2023-01-18 DIAGNOSIS — Z17.0 MALIGNANT NEOPLASM OF RIGHT BREAST IN FEMALE, ESTROGEN RECEPTOR POSITIVE, UNSPECIFIED SITE OF BREAST (HCC): Primary | ICD-10-CM

## 2023-01-18 DIAGNOSIS — Z17.0 MALIGNANT NEOPLASM OF RIGHT BREAST IN FEMALE, ESTROGEN RECEPTOR POSITIVE, UNSPECIFIED SITE OF BREAST (HCC): ICD-10-CM

## 2023-01-18 DIAGNOSIS — C50.911 MALIGNANT NEOPLASM OF RIGHT BREAST IN FEMALE, ESTROGEN RECEPTOR POSITIVE, UNSPECIFIED SITE OF BREAST (HCC): ICD-10-CM

## 2023-01-18 DIAGNOSIS — Z91.89 ADJUSTMENT TO LIFE THREATENING ILLNESS: Primary | ICD-10-CM

## 2023-01-18 DIAGNOSIS — C50.911 MALIGNANT NEOPLASM OF RIGHT BREAST IN FEMALE, ESTROGEN RECEPTOR POSITIVE, UNSPECIFIED SITE OF BREAST (HCC): Primary | ICD-10-CM

## 2023-01-18 PROCEDURE — 99214 OFFICE O/P EST MOD 30 MIN: CPT | Performed by: INTERNAL MEDICINE

## 2023-01-18 PROCEDURE — 3078F DIAST BP <80 MM HG: CPT | Performed by: INTERNAL MEDICINE

## 2023-01-18 PROCEDURE — 1123F ACP DISCUSS/DSCN MKR DOCD: CPT | Performed by: INTERNAL MEDICINE

## 2023-01-18 PROCEDURE — 3077F SYST BP >= 140 MM HG: CPT | Performed by: INTERNAL MEDICINE

## 2023-01-18 ASSESSMENT — PATIENT HEALTH QUESTIONNAIRE - PHQ9
SUM OF ALL RESPONSES TO PHQ QUESTIONS 1-9: 6
9. THOUGHTS THAT YOU WOULD BE BETTER OFF DEAD, OR OF HURTING YOURSELF: 0
6. FEELING BAD ABOUT YOURSELF - OR THAT YOU ARE A FAILURE OR HAVE LET YOURSELF OR YOUR FAMILY DOWN: 0
3. TROUBLE FALLING OR STAYING ASLEEP: 2
5. POOR APPETITE OR OVEREATING: 2
SUM OF ALL RESPONSES TO PHQ QUESTIONS 1-9: 6
SUM OF ALL RESPONSES TO PHQ QUESTIONS 1-9: 6
8. MOVING OR SPEAKING SO SLOWLY THAT OTHER PEOPLE COULD HAVE NOTICED. OR THE OPPOSITE, BEING SO FIGETY OR RESTLESS THAT YOU HAVE BEEN MOVING AROUND A LOT MORE THAN USUAL: 0
SUM OF ALL RESPONSES TO PHQ9 QUESTIONS 1 & 2: 0
2. FEELING DOWN, DEPRESSED OR HOPELESS: 0
7. TROUBLE CONCENTRATING ON THINGS, SUCH AS READING THE NEWSPAPER OR WATCHING TELEVISION: 0
4. FEELING TIRED OR HAVING LITTLE ENERGY: 2
SUM OF ALL RESPONSES TO PHQ QUESTIONS 1-9: 6
1. LITTLE INTEREST OR PLEASURE IN DOING THINGS: 0

## 2023-01-18 ASSESSMENT — ANXIETY QUESTIONNAIRES
5. BEING SO RESTLESS THAT IT IS HARD TO SIT STILL: 2
3. WORRYING TOO MUCH ABOUT DIFFERENT THINGS: 0
GAD7 TOTAL SCORE: 9
2. NOT BEING ABLE TO STOP OR CONTROL WORRYING: 1
4. TROUBLE RELAXING: 2
6. BECOMING EASILY ANNOYED OR IRRITABLE: 3
1. FEELING NERVOUS, ANXIOUS, OR ON EDGE: 1
IF YOU CHECKED OFF ANY PROBLEMS ON THIS QUESTIONNAIRE, HOW DIFFICULT HAVE THESE PROBLEMS MADE IT FOR YOU TO DO YOUR WORK, TAKE CARE OF THINGS AT HOME, OR GET ALONG WITH OTHER PEOPLE: SOMEWHAT DIFFICULT
7. FEELING AFRAID AS IF SOMETHING AWFUL MIGHT HAPPEN: 0

## 2023-01-18 NOTE — PATIENT INSTRUCTIONS
Patient Instructions from Today's Visit    Reason for Visit:  Follow up    Diagnosis Information:  https://www.Xyle[employer]/. net/about-us/asco-answers-patient-education-materials/gsmf-ivmfcbu-xygf-sheets  Patient was educated and given handouts published by [employer] entitled [employer] Answers Fact Sheets about their diagnosis of  during todays office visit. Plan:  Stop the arimidex. We will call you in two weeks and see how you are doing and talk about if we want to start on a different hormone blocker    Follow Up: Follow up in 3 months    Recent Lab Results:  No visits with results within 3 Day(s) from this visit.    Latest known visit with results is:   Office Visit on 12/13/2022   Component Date Value Ref Range Status    TSH, 3RD GENERATION 12/13/2022 0.464  0.358 - 3.740 uIU/mL Final    Cholesterol, Total 12/13/2022 134  <200 MG/DL Final    Comment: Borderline High: 200-239 mg/dL  High: Greater than or equal to 240 mg/dL      Triglycerides 12/13/2022 86  35 - 150 MG/DL Final    Comment: Borderline High: 150-199 mg/dL, High: 200-499 mg/dL  Very High: Greater than or equal to 500 mg/dL      HDL 12/13/2022 59  40 - 60 MG/DL Final    LDL Calculated 12/13/2022 57.8  <100 MG/DL Final    Comment: Near Optimal: 100-129 mg/dL  Borderline High: 130-159, High: 160-189 mg/dL  Very High: Greater than or equal to 190 mg/dL      VLDL Cholesterol Calculated 12/13/2022 17.2  6.0 - 23.0 MG/DL Final    Chol/HDL Ratio 12/13/2022 2.3    Final    Hemoglobin A1C 12/13/2022 6.7 (A)  4.8 - 5.6 % Final    eAG 12/13/2022 146  mg/dL Final    Comment: Reference Range  Normal: 4.8-5.6  Diabetic >=6.5%  Normal       <5.7%      Sodium 12/13/2022 141  133 - 143 mmol/L Final    Potassium 12/13/2022 4.0  3.5 - 5.1 mmol/L Final    Chloride 12/13/2022 106  101 - 110 mmol/L Final    CO2 12/13/2022 30  21 - 32 mmol/L Final    Anion Gap 12/13/2022 5  2 - 11 mmol/L Final    Glucose 12/13/2022 101 (A)  65 - 100 mg/dL Final    BUN 12/13/2022 21  8 - 23 MG/DL Final    Creatinine 12/13/2022 1.00  0.6 - 1.0 MG/DL Final    EstCaroline Filt Rate 12/13/2022 58 (A)  >60 ml/min/1.73m2 Final    Comment:   Pediatric calculator link: Nam.at. org/professionals/kdoqi/gfr_calculatorped    These results are not intended for use in patients <25years of age. eGFR results are calculated without a race factor using  the 2021 CKD-EPI equation. Careful clinical correlation is recommended, particularly when comparing to results calculated using previous equations. The CKD-EPI equation is less accurate in patients with extremes of muscle mass, extra-renal metabolism of creatinine, excessive creatine ingestion, or following therapy that affects renal tubular secretion. Calcium 12/13/2022 9.9  8.3 - 10.4 MG/DL Final        Treatment Summary has been discussed and given to patient: n/a        -------------------------------------------------------------------------------------------------------------------  Please call our office at [phone] if you have any  of the following symptoms:   Fever of 100.5 or greater  Chills  Shortness of breath  Swelling or pain in one leg    After office hours an answering service is available and will contact a provider for emergencies or if you are experiencing any of the above symptoms. Patient did express an interest in My Chart. My Chart log in information explained on the after visit summary printout at the [facility] [address] desk.     Janki Jovel RN

## 2023-01-18 NOTE — PROGRESS NOTES
Behavioral Health - Progress Note          Name: Michael Funez  :   MRN: [id number]  Date of Service: 2023  Location of Service: [address] [place] [address] for both provider and patient        Type of Service: Individual Therapy       Reason for Visit: Follow Up     Chief Complaint: Anxiety and its commodities due to distress caused by cancer diagnosis and treatment. Subjective:  Patient reported having a better week. Learning to have healthy boundaries with her sons, Meri Reed, and Curly Alvarez. Undersigned reinforced self-care. Patient verbalized understanding. LISW-CP used Dialectical Behavioral and Cognitive Behavioral Therapy, tailored to patient's need. Patient denied any plan or intent to hurt self or others. Patient was instructed, in case of emergency, to call 911 or Crisisline 988 in [place], [place]. Patient verbalized agreement.     _________________________________________________    Clinical Impression: Michael Funez is a 65 y.o. female . Mental Status Exam, patient was dressed properly. No abnormal psychomotor movements observed. Intellectual functioning appeared to be intact. Mood and affect were congruent. Insight was adequate. Judgment was adequate. Speech was normal, clear. Thought process was coherent. Patient did not report suicidal or homicidal ideations, intent or plans. Patient denied self-injury behaviors. Patient denied alcohol and other substance abuse. Patient was oriented to the four spheres: self, place, time and situation. Patient response to intervention was appropriate. Patient progress was adequate. Protective factor: self-determination.   __________________________________________________    Session scheduled for: 9:00  pm/am, patient was on time     Session started:  9:00    Session ended: 10:00  _________________________________________________    Patient Diagnosis:         PHQ 9:   4  - to be scanned into EMR.  0-4 Suggests the patient may not need depression treatment  5-14 Mild major depressive disorder. 15-19 Moderate-major depressive disorder. 20+ Severe major depressive disorders.      SONIDO 7:   14  - to be scanned into EMR.  0-4: minimal anxiety   5-9: mild anxiety   10-14: moderate anxiety   15-21: severe anxiety   __________________________________________________    Frequency: Weekly or bi-weekly appointments as schedule permits    Patient's Primary Goal: Decrease anxiety   __________________________________________________    Plans:  Continue to use Cognitive Behavioral Approach  Continue to work with patient on primary goal.    Continue to see patient, weekly or bi-weekly  __________________________________________________    Bryan Meza

## 2023-01-18 NOTE — PATIENT INSTRUCTIONS
To reschedule your appointment, please call [phone].    In case of emergency, please, call 911 or [id number] in [facility].

## 2023-01-18 NOTE — PROGRESS NOTES
[facility] Hematology and Oncology: Office Visit Established Patient    Chief Complaint:    Chief Complaint   Patient presents with    Follow-up           History of Present Illness:  Ms. Rush is a 77 y.o. female who presents today for follow-up regarding breast cancer.  She had a left breast cancer in 2001 for which she had a lumpectomy and radiation, she also took endocrine therapy of unknown type and duration.  In August 2022 she had her routine screening mammogram that reported a right breast asymmetry. On 8/25/22 she had a right breast diagnostic mammogram and ultrasound which confirmed the findings and on 8/31/22 she underwent a core needle biopsy of the right breast mass. The pathology reported infiltrating ductal carcinoma, low grade, ER 95%, NH 0% and HER2 1+.  On 9/7/22 she had a bilateral breast MRI that reported the known right breast cancer of 1.4 cm, no additional suspicious findings in either breast or no evidence of lymphadenopathy was noted.  She was recommended to see surgery for upfront management, then return to us for adjuvant therapy recommendations.  Path confirms the tumor to be 1.6 cm, tumor extended to inferior margin but re-excision was negative.  3 sentinel nodes were also negative for tumor.  We discussed options, including gene recurrence testing for risk stratification, but she admits that this would not change her mind about potential for chemotherapy.  Therefore, we will recommend antiestrogen therapy alone.  She is post-menopausal, so a prescription was given for anastrozole.  We also discussed radiation referral for risk reduction but she does not seem interested in this discussion given our explanation of the data, which suggests no mortality benefit for patients over 70 who are on endocrine therapy.    She is here for follow-up.  She is doing quite poorly.  She is having \"cold chills\" which sounds like fluctuation between hot and cold, she is having more joint pain including  increased neck pain, worsening insomnia, and worsening labile mood with crying spells and feeling like she is \"not [her]self\". She also states that she feels something in her right axilla. Review of Systems:  Constitutional: Positive for fatigue and vasomotor symptoms. HENT: Negative. Eyes: Negative. Respiratory: Negative. Cardiovascular: Negative. Gastrointestinal: Negative. Genitourinary: Negative. Musculoskeletal: Positive for joint pains. Skin: Negative. Neurological: Negative. Endo/Heme/Allergies: Negative. Psychiatric/Behavioral: Positive for insomnia and labile mood. All other systems reviewed and are negative.      Allergies   Allergen Reactions    Milk Protein Other (See Comments)     Increased mucus production     Past Medical History:   Diagnosis Date    Abdominal pain     Arthritis     Breast cancer ([address]     Breast cancer, left ([address] 2001    radiation, surgery    Breast pain in female     Bunion     Chronic pain of left knee     Corns and callosities     DDD (degenerative disc disease), lumbar     Diabetes mellitus type II, controlled ([address]     Diet controlled    Emotional disorder     Fungal infection of toenail     Hallux valgus     Hip pain, bilateral     Hyperglycemia     Hyperlipidemia     Hypertension     medication    Hypothyroidism     Lower GI bleed     Obesity     Other ill-defined conditions(799.89)     pt reports urinalysis shows blood - she is scheduled for CT pelvis and abdomen in August)    Postmenopausal     Right median nerve neuropathy     Stress incontinence     Traumatic arthropathy of knee      Past Surgical History:   Procedure Laterality Date    BREAST BIOPSY Right 2011, Stereo Core Bx 8-31-22    benign    BREAST BIOPSY Right 10/5/2022    RIGHT BREAST [facility] WITH MAGSEED LOCALIZATION [facility]20[facility]    BREAST BIOPSY SENTINEL NODE DISSECTION  Pre-op : 7:00, Lympho:8:00, LOC: seed placed 10-3-22 , Surgery:  10:30am performed by Carlos Leggett DO at [id number] [facility] LUMPECTOMY Left 2001    radiation for CA    BREAST SURGERY Right 10/19/2022    RE-EXCISION RIGHT INFERIOR MARGINS performed by Carlos Leggett DO at [address] [facility]       HYSTERECTOMY (CERVIX STATUS UNKNOWN)      VERN STEROTACTIC LOC BREAST BIOPSY RIGHT Right 2022    VERN STEROTACTIC LOC BREAST BIOPSY RIGHT 2022 Contreras Khalil MD [facility]    ORTHOPEDIC SURGERY Left     ankle arthroscopy    ORTHOPEDIC SURGERY      left knee repair    ORTHOPEDIC SURGERY      right ankle    ORTHOPEDIC SURGERY  2005    Left TSA    SHOULDER ARTHROPLASTY Left     TUBAL LIGATION      UROLOGICAL SURGERY      cystoscopy    US GUIDED CORE BREAST BIOPSY Right      Family History   Problem Relation Age of Onset    Dementia Mother     Diabetes Mother     Cancer Mother 76        Colon    Diabetes Father     Breast Cancer Sister 78    Breast Cancer Sister 77    Hypertension Maternal Grandmother     Cancer Other         bone CA    Hypertension Other     Diabetes Other     Post-op Cognitive Dysfunction Neg Hx     Malig Hypertherm Neg Hx     Pseudochol.  Deficiency Neg Hx     Delayed Awakening Neg Hx     Post-op Nausea/Vomiting Neg Hx     Emergence Delirium Neg Hx     Other Neg Hx      Social History     Socioeconomic History    Marital status: Single     Spouse name: Not on file    Number of children: Not on file    Years of education: Not on file    Highest education level: Not on file   Occupational History    Not on file   Tobacco Use    Smoking status: Former     Packs/day: 0.25     Types: Cigarettes     Quit date: 2001     Years since quittin.5    Smokeless tobacco: Never   Vaping Use    Vaping Use: Never used   Substance and Sexual Activity    Alcohol use: No    Drug use: No    Sexual activity: Not on file   Other Topics Concern    Not on file   Social History Narrative    Not on file Social Determinants of Health     Financial Resource Strain: Low Risk     Difficulty of Paying Living Expenses: Not hard at all   Food Insecurity: No Food Insecurity    Worried About [address] in the Last Year: Never true    [address] in the Last Year: Never true   Transportation Needs: No Transportation Needs    Lack of Transportation (Medical): No    Lack of Transportation (Non-Medical): No   Physical Activity: Not on file   Stress: No Stress Concern Present    Feeling of Stress : Not at all   Social Connections: Unknown    Frequency of Communication with Friends and Family: Not on file    Frequency of Social Gatherings with Friends and Family: Not on file    Attends [employer]ism Services: Never    Active Member of Clubs or Organizations: No    Attends Club or Organization Meetings: Never    Marital Status:    Intimate Partner Violence: Not At Risk    Fear of Current or Ex-Partner: No    Emotionally Abused: No    Physically Abused: No    Sexually Abused: No   Housing Stability: Unknown    Unable to Pay for Housing in the Last Year: No    Number of Places Lived in the Last Year: Not on file    Unstable Housing in the Last Year: No     Current Outpatient Medications   Medication Sig Dispense Refill    busPIRone (BUSPAR) 7.5 MG tablet Take 1 tablet by mouth daily as needed (anxiety) 30 tablet 0    anastrozole (ARIMIDEX) 1 MG tablet Take 1 tablet by mouth daily 30 tablet 3    acetaminophen (TYLENOL) 500 MG tablet Take 1,000 mg by mouth every 6 hours as needed for Pain      magnesium oxide (MAG-OX) 400 MG tablet Take 400 mg by mouth daily      Multiple Vitamin (MULTIVITAMIN ADULT PO) Take by mouth daily      calcium carbonate (OSCAL) 500 MG TABS tablet Take 500 mg by mouth daily      nystatin (MYCOSTATIN) 473822 UNIT/GM cream Apply topically 2 times daily.  1 each 1    irbesartan (AVAPRO) 150 MG tablet Take 1 tablet by mouth daily (Patient taking differently: Take 150 mg by mouth at bedtime) 90 tablet 3    levothyroxine (SYNTHROID) 75 MCG tablet Take 1 tablet by mouth Daily TK 1 T PO D Indications: a condition with low thyroid hormone levels 90 tablet 3    atenolol-chlorthalidone (TENORETIC) 50-25 MG per tablet Take 0.5 tablets by mouth daily 90 tablet 3    Cholecalciferol 50 MCG (2000 UT) TABS Take by mouth      coenzyme Q10 100 MG CAPS capsule Take 100 mg by mouth daily      hydrocortisone 2.5 % cream Place rectally 4 times daily      atorvastatin (LIPITOR) 40 MG tablet Take 1 tablet by mouth daily (Patient taking differently: Take 40 mg by mouth at bedtime) 90 tablet 3    Diabetic Shoe MISC by Does not apply route Diabetic shoe, custom orthoses, custom prosthetic filler, DM2, neuropathy 1 each 0     No current facility-administered medications for this visit. OBJECTIVE:  BP (!) 146/76   Pulse 60   Temp 98.8 °F (37.1 °C)   Resp 16   Ht 5' 2\" (1.575 m)   Wt 167 lb 14.4 oz (76.2 kg)   SpO2 99%   BMI 30.71 kg/m²     Physical Exam:  Constitutional: Well developed, well nourished female in no acute distress, sitting comfortably on the examination table. HEENT: Normocephalic and atraumatic. Sclerae anicteric. Neck supple without JVD. No thyromegaly present. Lymph node   No palpable submandibular, cervical, supraclavicular lymph nodes. Some slight increase in seroma fluid    Skin Warm and dry. No bruising and no rash noted. No erythema. No pallor. Respiratory Lungs are clear to auscultation bilaterally without wheezes, rales or rhonchi, normal air exchange without accessory muscle use. CVS Normal rate, regular rhythm and normal S1 and S2. No murmurs, gallops, or rubs. Neuro Grossly nonfocal with no obvious sensory or motor deficits. MSK Normal range of motion in general.  No edema and no tenderness. Psych Appropriate mood and affect. Labs:  No results found for this or any previous visit (from the past 24 hour(s)).     Imaging:  [facility] BREAST SPECIMEN    Result Date: 8/31/2022  Stereotactic Biopsy right Breast, Surgical Specimen Radiograph, Postbiopsy Mammogram, 08/31/2022 HISTORY: Focal asymmetry with possible architectural distortion and microcalcifications at the 9:30 position right breast. STEREOTACTIC BIOPSY: The patient was explained the procedure and informed consent was obtained. The patient was then positioned on the prone Hologic stereotactic biopsy table. The right breast was placed in CC compression. The asymmetry was localized within the stereotactic window from a superior approach. The skin was cleansed and local anesthetic applied. A 9-gauge Brevera needle was placed into the right breast. 5 core samples were obtained from about the periphery of the needle. The samples were imaged while in the Mammography Suite. The patient tolerated the procedure well. There were no immediate complications. SPECIMEN RADIOGRAPH: The specimen radiograph reveals scattered calcifications. The samples are placed into a formalin container and sent to the lab for analysis. A biopsy clip was then placed into the small created biopsy cavity. POSTBIOPSY MAMMOGRAM: CC and ML views of the right breast were obtained. There are expected postprocedural changes at the 9:30 position right breast. The clip is in satisfactory position. No definite residual microcalcifications are present, however, these could be obscured due to postprocedural change. Stereotactic biopsy of the asymmetry with associated architectural distortion and microcalcifications at the 9:30 position right breast with histologic results pending. 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 Previous left lumpectomy and radiation for breast cancer in 2001 and right benign biopsy greater than 10 years ago.  Her sister had breast cancer. COMPARISON: Ultrasound and mammography 8/25/2022, 8/31/2022, 8/17/2022 as well as previous MRI breast 10/28/2013, 10/28/2011, 9/8/2009. TECHNIQUE: Standard MRI sequences were obtained through the breasts in multiple planes. Images were obtained before and after intravenous infusion of 15 mL of Prohance contrast. Images were reviewed with PACS and with [facility] CAD software. FINDINGS: The breasts demonstrate moderate glandularity and minimal background enhancement. Right breast at 9:30 centrally shows the biopsy clip within an irregular heterogeneously enhancing 1.4 x 1.0 cm malignant mass. Mild curvilinear enhancement adjacent to the biopsy tract is likely reactive, and there is a small hematoma. Scattered enhancing foci elsewhere in the right breast have not changed from older MRI exams. There is no other evidence of suspicious enhancing mass, and no dominant or unique nonmass enhancement, to suggest additional malignancy in either breast. There is no evidence of axillary or internal mammary lymphadenopathy. Multiple small right axillary lymph nodes have not changed from older examinations. Elsewhere, limited visualization of the partially included thorax and upper abdomen shows no acute abnormality. 1. Right 9:30 breast cancer measures up to 1.4 cm. 2. No additional suspicious finding elsewhere in either breast. No evidence of lymphadenopathy. Recommend continued malignancy management as directed clinically. BI-RADS Assessment Category 6: Known Biopsy Proven Malignancy     VERN DIGITAL SCREEN W OR WO CAD BILATERAL    Addendum Date: 8/25/2022    Addendum: Addendum by Dr. Germán Saeed on the report originally dictated by Dr. Cong Eagle. This patient returned on 8/25/2022 for the free technical repeat right MLO view. The skinfold in the lower central right breast resolves on that image.  Please see diagnostic mammogram and ultrasound report of 8/25/2022 for additional evaluation of the asymmetry. Result Date: 8/25/2022  STUDY:  Bilateral digital screening mammogram with CAD INDICATION:  Screening, history of left breast carcinoma status post lumpectomy in 2001. COMPARISON:  08/16/2021, 08/13/2020   BREAST DENSITY: There are scattered areas of fibroglandular density. (#BDB) FINDINGS: Bilateral digital mammography was performed, and is interpreted in conjunction with a computer assisted detection (CAD) system. There is an asymmetry within the slightly lateral right breast anterior depth. There is also a presumed skin fold on the right MLO view. Spot compression views in the CC and MLO projection as well as a full 90 degree mediolateral and repeat right MLO views are recommended for further evaluation. If this finding persists an ultrasound should be considered. There are no suspicious clustered microcalcifications. 1. Right breast asymmetry. 2. Presumed skin fold on the right MLO view. A repeat MLO view would also be recommended when the patient returns. BI-RADS Assessment Category 0: Incomplete: Needs additional imaging evaluation. We will attempt to contact the patient and arrange for this additional imaging. A reminder letter will be sent to the patient at the appropriate time pending additional views. VERN DIGITAL DIAGNOSTIC W OR WO CAD RIGHT    Result Date: 8/25/2022  DIAGNOSTIC DIGITAL RIGHT MAMMOGRAPHY AND RIGHT BREAST ULTRASOUND CLINICAL INDICATION: Additional evaluation of right lateral focal asymmetry. Prior contralateral malignant lumpectomy and radiation greater than 10 years ago, and prior right benign biopsy in 2011 demonstrating 10:00 fibrocystic changes. COMPARISON: 8/17/2022, others back to 10/12/2010 FINDINGS: Mammogram: Digital diagnostic mammography was performed, and is interpreted in conjunction with a computer assisted detection (CAD) system. Additional right mediolateral and compression magnification views were performed.  In the slightly upper lateral breast at anterior-middle depth (about 6 cm behind the nipple) there is a small focal asymmetry with associated faint indeterminate pleomorphic calcifications and subtle architectural changes. This measures close to 1 cm but is not well defined. Further evaluation is recommended by ultrasound. Elsewhere the right breast appears unchanged from older mammography. There are diffuse typically benign calcifications elsewhere without suspicious feature. The posterior upper outer quadrant benign biopsy clip is again noted. Ultrasound: Real time targeted sonography was performed of the right lateral breast carefully and extensively by the radiologist and sonographer. At 9:30 position there is a subcentimeter hypoechoic area which on careful real-time scanning could not be definitively reproduced. The surrounding lateral tissues otherwise demonstrate no concerning findings. Right lateral asymmetry without definite sonographic correlate, indeterminate for malignancy. Recommend stereotactic biopsy. Results and recommendations discussed in full with the patient at the time of interpretation, all questions were answered and she agrees. BI-RADS Assessment Category 4: Suspicious Finding- Biopsy should be considered. VERN STEREO BREAST BX W LOC DEVICE 1ST LESION RIGHT    Result Date: 8/31/2022  Stereotactic Biopsy right Breast, Surgical Specimen Radiograph, Postbiopsy Mammogram, 08/31/2022 HISTORY: Focal asymmetry with possible architectural distortion and microcalcifications at the 9:30 position right breast. STEREOTACTIC BIOPSY: The patient was explained the procedure and informed consent was obtained. The patient was then positioned on the prone Hologic stereotactic biopsy table. The right breast was placed in CC compression. The asymmetry was localized within the stereotactic window from a superior approach. The skin was cleansed and local anesthetic applied.  A 9-gauge Brevera needle was placed into the right breast. 5 core samples were obtained from about the periphery of the needle. The samples were imaged while in the Mammography Suite. The patient tolerated the procedure well. There were no immediate complications. SPECIMEN RADIOGRAPH: The specimen radiograph reveals scattered calcifications. The samples are placed into a formalin container and sent to the lab for analysis. A biopsy clip was then placed into the small created biopsy cavity. POSTBIOPSY MAMMOGRAM: CC and ML views of the right breast were obtained. There are expected postprocedural changes at the 9:30 position right breast. The clip is in satisfactory position. No definite residual microcalcifications are present, however, these could be obscured due to postprocedural change. Stereotactic biopsy of the asymmetry with associated architectural distortion and microcalcifications at the 9:30 position right breast with histologic results pending. US BREAST LIMITED RIGHT    Result Date: 8/25/2022  DIAGNOSTIC DIGITAL RIGHT MAMMOGRAPHY AND RIGHT BREAST ULTRASOUND CLINICAL INDICATION: Additional evaluation of right lateral focal asymmetry. Prior contralateral malignant lumpectomy and radiation greater than 10 years ago, and prior right benign biopsy in 2011 demonstrating 10:00 fibrocystic changes. COMPARISON: 8/17/2022, others back to 10/12/2010 FINDINGS: Mammogram: Digital diagnostic mammography was performed, and is interpreted in conjunction with a computer assisted detection (CAD) system. Additional right mediolateral and compression magnification views were performed. In the slightly upper lateral breast at anterior-middle depth (about 6 cm behind the nipple) there is a small focal asymmetry with associated faint indeterminate pleomorphic calcifications and subtle architectural changes. This measures close to 1 cm but is not well defined. Further evaluation is recommended by ultrasound. Elsewhere the right breast appears unchanged from older mammography.  There are diffuse typically benign calcifications elsewhere without suspicious feature. The posterior upper outer quadrant benign biopsy clip is again noted. Ultrasound: Real time targeted sonography was performed of the right lateral breast carefully and extensively by the radiologist and sonographer. At 9:30 position there is a subcentimeter hypoechoic area which on careful real-time scanning could not be definitively reproduced. The surrounding lateral tissues otherwise demonstrate no concerning findings. Right lateral asymmetry without definite sonographic correlate, indeterminate for malignancy. Recommend stereotactic biopsy. Results and recommendations discussed in full with the patient at the time of interpretation, all questions were answered and she agrees. BI-RADS Assessment Category 4: Suspicious Finding- Biopsy should be considered. MAMMOGRAM POST BX CLIP PLACEMENT RIGHT    Result Date: 8/31/2022  Stereotactic Biopsy right Breast, Surgical Specimen Radiograph, Postbiopsy Mammogram, 08/31/2022 HISTORY: Focal asymmetry with possible architectural distortion and microcalcifications at the 9:30 position right breast. STEREOTACTIC BIOPSY: The patient was explained the procedure and informed consent was obtained. The patient was then positioned on the prone Hologic stereotactic biopsy table. The right breast was placed in CC compression. The asymmetry was localized within the stereotactic window from a superior approach. The skin was cleansed and local anesthetic applied. A 9-gauge Brevera needle was placed into the right breast. 5 core samples were obtained from about the periphery of the needle. The samples were imaged while in the Mammography Suite. The patient tolerated the procedure well. There were no immediate complications. SPECIMEN RADIOGRAPH: The specimen radiograph reveals scattered calcifications. The samples are placed into a formalin container and sent to the lab for analysis.  A biopsy clip was then placed into the small created biopsy cavity. POSTBIOPSY MAMMOGRAM: CC and ML views of the right breast were obtained. There are expected postprocedural changes at the 9:30 position right breast. The clip is in satisfactory position. No definite residual microcalcifications are present, however, these could be obscured due to postprocedural change. Stereotactic biopsy of the asymmetry with associated architectural distortion and microcalcifications at the 9:30 position right breast with histologic results pending. Pathology:  DIAGNOSIS        A:  \"RIGHT BREAST MASS\":        INFILTRATING DUCTAL CARCINOMA, LOW-GRADE (WELL DIFFERENTIATED)   GROSSLY MEASURING APPROXIMATELY 1.6 X 1.5 X 1.5 CM. MICROSCOPICALLY TUMOR   APPEARS TO EXTEND TO MARKED INFERIOR MARGIN. OTHER MARGINS ARE NEGATIVE   FOR MALIGNANT TUMOR. FOCAL INTERMEDIATE GRADE DUCTAL CARCINOMA IN SITU. DEFINITE LYMPHOVASCULAR INVASION IS NOT IDENTIFIED. SEE COMMENT          B:  \"SENTINEL LYMPH NODES\":        THREE LYMPH NODES NEGATIVE FOR METASTATIC TUMOR       DIAGNOSIS        A:  \" RIGHT BREAST ASYMMETRY AT 9:30 POSITION, CORE BIOPSY\":         INFILTRATING DUCTAL CARCINOMA, LOW GRADE (WELL DIFFERENTIATED).    DEFINITE IN SITU COMPONENT AND LYMPHOVASCULAR INVASION ARE NOT IDENTIFIED               Procedures/Addenda                     [facility]F- ER/ND/[id number] BY IHC                                                                                                                                         Status:  Signed Out    Hortencia Casanova MD on 9/7/2022                                 Interpretation                  [facility] IHC Quantitative Breast Panel Result: A1     TEST NAME:                         RESULTS:               INTERNAL   CONTROLS:     ESTROGEN RECEPTOR:               Positive (95%)               Present,   positive   PROGESTERONE RECEPTOR:          Negative (0.0%)          Present, positive     HER-2/CHIQUITA: Negative (1+)               Percentage   of Cells with Uniform        Intense Complete Membrane   Stainin%             ASSESSMENT:   Diagnosis Orders   1. Malignant neoplasm of right breast in female, estrogen receptor positive, unspecified site of breast ([facility] [address]  CBC with Auto Differential    Comprehensive Metabolic Panel            Patient Active Problem List   Diagnosis    History of breast cancer    Arthritis    Spondylosis of lumbosacral region without myelopathy or radiculopathy    Chronic pain of left knee    Hyperlipidemia    Anxiety associated with depression    Right median nerve neuropathy    Refused varicella vaccine    Hypertension    Abdominal wall hernia    Hip pain, bilateral    Adenomatous polyp of colon    Stress incontinence    Agatston coronary artery calcium score greater than 400    Controlled type 2 diabetes mellitus with microalbuminuria, without long-term current use of insulin (HCC)    Anxiety    Hypothyroidism    Infiltrating ductal carcinoma of right breast ([facility] [address]    Hematuria           PLAN:  Lab studies were personally reviewed. Breast cancer: 1.4 cm, low grade, ER 95% and NJ/HER2 negative, right breast.  History of left breast cancer that was treated with lumpectomy/XRT/endocrine therapy in . Path confirms the tumor to be 1.6 cm, tumor extended to inferior margin but re-excision was negative. 3 sentinel nodes were also negative for tumor. We discussed options, including gene recurrence testing for risk stratification, but she admits that this would not change her mind about potential for chemotherapy. Therefore, we will recommend antiestrogen therapy alone. She is post-menopausal, so a prescription was given for anastrozole. We also discussed radiation referral for risk reduction but she does not seem interested in this discussion given our explanation of the data, which suggests no mortality benefit for patients over 70 who are on endocrine therapy.       She is here for follow-up. She is doing quite poorly. She is having \"cold chills\" which sounds like fluctuation between hot and cold, she is having more joint pain including increased neck pain, worsening insomnia, and worsening labile mood with crying spells and feeling like she is \"not [her]self\". She also states that she feels something in her right axilla, on exam this may be some slight increase in fluid but nothing suspicious for recurrence. Her overall clinical condition seems to be a considerable departure from our first two visits and it certainly seems like the endocrine therapy is driving much of these new symptoms. I would stop the Arimidex and proceed with a short period of observation (at least 2 weeks), if she feels much better I would consider a change to a different AI, or even tamoxifen, when she tolerated better in 2001. All questions were asked and answered to the best of my ability. F/u in 3 months for recheck on endocrine therapy, but we will reach out in a couple of weeks to see how she feels.           Km Tam MD, MD  [facility]  [address] [place] [address]  Office : [phone]  Fax : [phone]

## 2023-01-20 ENCOUNTER — HOSPITAL ENCOUNTER (OUTPATIENT)
Dept: PHYSICAL THERAPY | Age: 78
Setting detail: RECURRING SERIES
End: 2023-01-20
Payer: MEDICARE

## 2023-01-20 NOTE — PROGRESS NOTES
Baron Greco  : [id number]  Primary: [place]*  Secondary: [address] @ [facility]  [address] [place] [address] Liliam [place] [address]  Phone: [phone]  Fax: [phone] Plan Frequency: 2x per week for 12 weeks    Plan of Care/Certification Expiration Date: 22      PT Visit Info:  No data recorded       OUTPATIENT PHYSICAL THERAPY 2023     Appt Desk   Episode   MyChart      Pt called to cancel appointment today.     Future Appointments   Date Time Provider Sheree Hernández   2023  7:00 PM Yadira Gar, PT [facility]   2023 11:00 AM Yadira Gar PT [facility]   2023 11:00 AM LAWRENCE Bravo [facility]   2023 11:45 AM Yadira Gar PT OST [facility]   2023 11:00 AM Manjit Castro MD [id number] [facility] AMB   2023 12:10 PM [facility] OUTREACH INSURANCE GCC[facility]   2023 12:30 PM RUBEN Hopson - CNP [facility]   5/15/2023  1:00 PM Rajinder Mooney MD UCDG [facility] AMB   7/3/2023  8:00 AM SFE LAB DS SFEDS SFE   7/10/2023  8:20 AM Tara Ruiz MD MAT [facility] AMB   2023  1:00 PM Bienvenido [address] [place] [address]   2023  1:30 PM Glenora Severin, MD [facility]

## 2023-01-23 ENCOUNTER — HOSPITAL ENCOUNTER (OUTPATIENT)
Dept: PHYSICAL THERAPY | Age: 78
Setting detail: RECURRING SERIES
Discharge: HOME | End: 2023-01-26
Payer: MEDICARE

## 2023-01-23 PROCEDURE — 97110 THERAPEUTIC EXERCISES: CPT

## 2023-01-23 ASSESSMENT — PAIN SCALES - GENERAL: PAINLEVEL_OUTOF10: 3

## 2023-01-23 NOTE — PROGRESS NOTES
Bobby Sylvester  : [id number]  Primary: [place]*  Secondary: [address] @ [facility]  [facility] [address] [facility] [place] [address]  Phone: [phone]  Fax: [phone] Plan Frequency: 2x per week for 12 weeks    Plan of Care/Certification Expiration Date: 22      PT Visit Info:  No data recorded   Visit Count:  10   OUTPATIENT PHYSICAL KNRELPF:KK NOTE TYPE: Treatment Note 2023       Episode  }Appt Desk             Treatment Diagnosis:  Pain in Right Hip (M25.551)  Difficulty in walking, Not elsewhere classified (R26.2), low back pain M54.5  Medical/Referring Diagnosis:  No admission diagnoses are documented for this encounter. Referring Physician:  RUBEN Downs CNP, MD Orders:  PT Eval and Treat   Date of Onset:  Onset Date: 22     Allergies:   Milk protein  Restrictions/Precautions:  Restrictions/Precautions: None  No data recorded     Interventions Planned (Treatment may consist of any combination of the following):    Current Treatment Recommendations: Strengthening; ROM; Manual; Home exercise program; Modalities; Therapeutic activities; Aquatics     Subjective Comments:  Pt reports she is feeling some better. She did talk to oncologist and he is suggesting a different chemo medication for her due to her pain in her legs, feet. She does feel a slight improvement. Initial:}    3/10Post Session:       3/10  Medications Last Reviewed:  2023  Updated Objective Findings:  None Today  Treatment   THERAPEUTIC EXERCISE: (40 minutes):    Exercises per grid below to improve mobility and strength. Required minimal verbal cues to promote proper body mechanics. Progressed resistance, range, and repetitions as indicated.   Nu-step level 4 x 10 min  Standing bilateral hip abduction 4# x 20  Standing hip flexion 4# x 20  Bridging x 20  Trunk rotation x 15-supine  SLR x 15  Sit to stand-held  Trunk ext x 10 hold 5 sec  Hip flexor stretching manually-held  Long axis traction of hip x 3 hold 30 sec  Clamshells x 20 bilaterally blue TB  Standing swimmer x 20 each side  Supine clamshells blue TB x 20  Standing trunk rotation blue TB x 15  Standing low row march blue TB x 20  Step and reach with medball yellow x 15 each side  Seated jacks x 15  Seated climbers x 15  Supine hand to knee x 20  Treatment/Session Summary:    Treatment Assessment:  Pt progressed well.  Challenged by seated climbers-mainly due to coordinating arms and legs.  No increased pain noted  Communication/Consultation:  None today  Equipment provided today:  None  Recommendations/Intent for next treatment session: Next visit will focus on continued strengthening, ROM.    Total Treatment Billable Duration:  40 minutes  Time In: 1100  Time Out: 1140    Renetta Parra, PT       Charge Capture  }Post Session Pain  PT Visit Info  [employer] Portal  MD Guidelines  Scanned Media  Benefits  MyChart    Future Appointments   Date Time Provider Department Center   1/24/2023 11:00 AM LAWRENCE Woo [facility]   1/26/2023 11:45 AM Renetta Parra, PT S[facility]   1/30/2023 11:00 AM Franklin Mcdaniel MD [id number] [facility] AMB   4/18/2023 12:10 PM GCC OUTREACH INSURANCE GCCOIG [facility]   4/18/2023 12:30 PM Marisol Bautista, APRN - CNP [facility]   5/15/2023  1:00 PM Cleve Wilkinson MD UCDG [facility] AMB   7/3/2023  8:00 AM SFE LAB DS SFEDS [facility]FE   7/10/2023  8:20 AM Nikolas Dunham MD MAT [facility] AMB   7/24/2023  1:00 PM GCC OUTREACH INSURANCE GCCOIG [facility]   7/24/2023  1:30 PM Connor Enciso MD [facility]

## 2023-01-24 ENCOUNTER — OFFICE VISIT (OUTPATIENT)
Dept: ONCOLOGY | Age: 78
End: 2023-01-24

## 2023-01-24 DIAGNOSIS — Z17.0 MALIGNANT NEOPLASM OF RIGHT BREAST IN FEMALE, ESTROGEN RECEPTOR POSITIVE, UNSPECIFIED SITE OF BREAST (HCC): ICD-10-CM

## 2023-01-24 DIAGNOSIS — Z91.89 ADJUSTMENT TO LIFE THREATENING ILLNESS: Primary | ICD-10-CM

## 2023-01-24 DIAGNOSIS — C50.911 MALIGNANT NEOPLASM OF RIGHT BREAST IN FEMALE, ESTROGEN RECEPTOR POSITIVE, UNSPECIFIED SITE OF BREAST (HCC): ICD-10-CM

## 2023-01-24 NOTE — PATIENT INSTRUCTIONS
To reschedule your appointment, please call [phone].    In case of emergency, please, call 911 or [address] in [facility].

## 2023-01-24 NOTE — PROGRESS NOTES
Behavioral Health - Progress Note         Name: Marbella Jiménez  :   MRN: [id number]  Date of Service: 2023  Location of Service: [address] [place] [address] for both provider and patient        Type of Service: Individual Therapy       Reason for Visit: Follow Up     Chief Complaint: Anxiety and its commodities due to distress caused by cancer diagnosis and treatment. Subjective:  Patient stated, \"cancer affected my mind, emotions, and body\". Undersigned discussed self-care. Patient verbalized understanding. LISW-CP used Dialectical Behavioral and Cognitive Behavioral Therapy, tailored to patient's need. Patient denied any plan or intent to hurt self or others. Patient was instructed, in case of emergency, to call 911 or [facility] in [place], [place]. Patient verbalized agreement.     _________________________________________________    Clinical Impression: Marbella Jiménez is a 65 y.o. female . Mental Status Exam, patient was dressed properly. No abnormal psychomotor movements observed. Intellectual functioning appeared to be intact. Mood and affect were congruent. Insight was adequate. Judgment was adequate. Speech was normal, clear. Thought process was coherent. Patient did not report suicidal or homicidal ideations, intent or plans. Patient denied self-injury behaviors. Patient denied alcohol and other substance abuse. Patient was oriented to the four spheres: self, place, time and situation. Patient response to intervention was appropriate. Patient progress was adequate. Protective factor: self-determination. __________________________________________________    Session scheduled for: 11:00  pm/am, patient was on time     Session started:  11:00    Session ended:   12:00  _________________________________________________    Patient Diagnosis:         PHQ 9:   5  - to be scanned into EMR.  0-4 Suggests the patient may not need depression treatment  5-14 Mild major depressive disorder.    15-19 Moderate-major depressive disorder. 20+ Severe major depressive disorders.      SONIDO 7:  10   - to be scanned into EMR.  0-4: minimal anxiety   5-9: mild anxiety   10-14: moderate anxiety   15-21: severe anxiety   __________________________________________________    Frequency: Weekly or bi-weekly appointments as schedule permits    Patient's Primary Goal: Decrease anxiety   __________________________________________________    Plans:  Continue to use Cognitive Behavioral Approach  Continue to work with patient on primary goal.    Continue to see patient, weekly or bi-weekly  __________________________________________________    [facility]

## 2023-01-26 ENCOUNTER — HOSPITAL ENCOUNTER (OUTPATIENT)
Dept: PHYSICAL THERAPY | Age: 78
Setting detail: RECURRING SERIES
Discharge: HOME | End: 2023-01-29
Payer: MEDICARE

## 2023-01-26 PROCEDURE — 97110 THERAPEUTIC EXERCISES: CPT

## 2023-01-26 ASSESSMENT — PAIN SCALES - GENERAL: PAINLEVEL_OUTOF10: 3

## 2023-01-26 NOTE — PROGRESS NOTES
Lamont Villalpando  :   Primary: [place]*  Secondary: [address] @ [facility]  [address] [place] [address] [address] [facility] [address] [address]  Phone: [phone]  Fax: [phone] Plan Frequency: 2x per week for 12 weeks    Plan of Care/Certification Expiration Date: 22      PT Visit Info:  No data recorded   Visit Count:  11   OUTPATIENT PHYSICAL SIRVTCX:LW NOTE TYPE: Treatment Note 2023       Episode  }Appt Desk             Treatment Diagnosis:  Pain in Right Hip (M25.551)  Difficulty in walking, Not elsewhere classified (R26.2), low back pain M54.5  Medical/Referring Diagnosis:  No admission diagnoses are documented for this encounter. Referring Physician:  RUBEN Perez CNP, MD Orders:  PT Eval and Treat   Date of Onset:  Onset Date: 22     Allergies:   Milk protein  Restrictions/Precautions:  Restrictions/Precautions: None  No data recorded     Interventions Planned (Treatment may consist of any combination of the following):    Current Treatment Recommendations: Strengthening; ROM; Manual; Home exercise program; Modalities; Therapeutic activities; Aquatics     Subjective Comments:  Pt reports she feels better. Initial:}    3/10Post Session:       2/10  Medications Last Reviewed:  2023  Updated Objective Findings:  None Today  Treatment   THERAPEUTIC EXERCISE: (45 minutes):    Exercises per grid below to improve mobility and strength. Required minimal verbal cues to promote proper body mechanics. Progressed resistance, range, and repetitions as indicated.   Nu-step level 4 x 10 min  Standing bilateral hip abduction 4# x 20  Standing hip flexion 4# x 20  Bridging x 20  Trunk rotation x 15-supine  SLR x 15  Sit to stand-held  Trunk ext x 10 hold 5 sec  Hip flexor stretching manually-held  Long axis traction of hip x 3 hold 30 sec  Clamshells x 20 bilaterally blue TB  Standing swimmer x 20 each side  Supine clamshells blue TB x 20  Standing trunk rotation blue TB x 15  Standing low row march blue TB x 20  Step and reach with medball yellow x 15 each side  Supine hand to knee x 20  Treatment/Session Summary:    Treatment Assessment:  Pt reports feeling better overall. Able to complete exericses well. Ready for discharge today.   Communication/Consultation:  None today  Equipment provided today:  None    Total Treatment Billable Duration:  45 minutes  Time In: 1145  Time Out: [address], PT       Charge Capture  }Post Session Pain  PT Visit Info  [facility] Portal  MD Guidelines  Scanned Media  Benefits  MyChart    Future Appointments   Date Time Provider Sheree Hernández   1/30/2023 11:00 AM Mc Caldwell MD [id number] [facility] AMB   1/31/2023 11:00 AM LAWRENCE Tomlin [facility]   4/18/2023 12:10 PM [facility] [address] [facility]   4/18/2023 12:30 PM Pramod Martin, APRN - CNP [facility] AM[facility]   5/15/2023  1:00 PM Modesto Rosario MD UCDG [facility] AMB   7/3/2023  8:00 AM SFE LAB DS SFEDS SFE   7/10/2023  8:20 AM Richard Meyers MD MAT [facility] AMB   7/24/2023  1:00 PM Bienvenido [address] [place] [address]   7/24/2023  1:30 PM Candy Willingham MD [facility]

## 2023-01-26 NOTE — THERAPY DISCHARGE
Manoj Barbosa  :   Primary: [place]*  Secondary: [address] @ [facility]  [facility] [address] [place] [address]  Phone: [phone]  Fax: [phone] Plan Frequency: 2x per week for 12 weeks  Plan of Care/Certification Expiration Date: 22      PT Visit Info:         Visit Count:  11                OUTPATIENT PHYSICAL THERAPY:             OP NOTE TYPE: Discharge Summary 2023               Episode  Appt Desk         Treatment Diagnosis:  Pain in Right Hip (M25.551)  Difficulty in walking, Not elsewhere classified (R26.2), Acute right-sided low back pain without sciatica [M54.50]  Right hip pain [M25.551]  Medical/Referring Diagnosis:  No admission diagnoses are documented for this encounter. Referring Physician:  RUBEN Greenfield CNP, MD Orders:  PT Eval and Treat   Return MD Appt:  as needed  Date of Onset:  Onset Date: 22    -roughly 3 weeks  Allergies:  Milk protein  Restrictions/Precautions:    Restrictions/Precautions: None      Medications Last Reviewed:  2023     SUBJECTIVE   History of Injury/Illness (Reason for Referral):  Pt with 3 week history of lower back pain and right hip pain with increased difficulty with walking. She went to urgent care and had back x-rayed. Determined she has DDD, but also has some blood in her urine and will be seeing urologist.  She does have a past history of lower back pain with positive treatment with therapy. She reports difficulty walking, standing and performing general ADL's in the home and community.   Patient Stated Goal(s):  \"to decrease pain\"  Initial:     3/10 Post Session:     2/10  Past Medical History/Comorbidities:   Ms. Frieda Peña  has a past medical history of Abdominal pain, Arthritis, Breast cancer ([address] Breast cancer, left ([address] Breast pain in female, Bunion, Chronic pain of left knee, Corns and callosities, DDD (degenerative disc disease), lumbar, Diabetes mellitus type II, controlled ([facility] [address] Emotional disorder, Fungal infection of toenail, Hallux valgus, Hip pain, bilateral, Hyperglycemia, Hyperlipidemia, Hypertension, Hypothyroidism, Lower GI bleed, Obesity, Other ill-defined conditions(799.89), Postmenopausal, Right median nerve neuropathy, Stress incontinence, and Traumatic arthropathy of knee. Ms. Jessica Moyer  has a past surgical history that includes Breast lumpectomy (Left, 2001); Urological Surgery; Colonoscopy (2008); Tubal ligation; Hysterectomy; us guided core breast biopsy (Right); Breast biopsy (Right, 2011, Stereo Core Bx 8-31-22); orthopedic surgery (Left); orthopedic surgery; Total shoulder arthroplasty (Left); orthopedic surgery; orthopedic surgery (2005); [facility] STEREO BREAST BX W LOC DEVICE 1ST LESION RIGHT (Right, 8/31/2022); Breast biopsy (Right, 10/5/2022); Breast biopsy (Right, 10/5/2022); and Breast surgery (Right, 10/19/2022). Social History/Living Environment:   Lives With: Alone  Home Layout: One level     Prior Level of Function/Work/Activity:   Occupation: Retired  Type of Occupation: [employer]:   Does the patient/guardian have any barriers to learning?: No barriers  Will there be a co-learner?: No  What is the preferred language of the patient/guardian?: English  Is an  required?: No  How does the patient/guardian prefer to learn new concepts?: Listening; Reading; Demonstration; Pictures/Videos     Fall Risk Scale: Snell Total Score: 45  Snell Fall Risk: High (45 and higher)           OBJECTIVE   LE strength measures:     R LE//updated 1/26/23 L LE//updated 1/26/23   Hip flexors   4-/5  , 4/5   4/5,  4/5   Glut medius   2+/5 , 3-/5   3/5,  3+/5   quad  4- /5, 4-/5   4/5,  4/5   hamstrings  4- /5,  4-/5   4-/5, 4/5   Ankle DF's  5 /5  5 /5   Ankle PF's  5 /5  5 /5     General standing posture is with forward flexed trunk. Pain increases with lumbar extension and FB.   LROM WFL  Bilateral hip motion WFL except for ER: R hip ER 23 °, L hip ER 36 °  Pt also reporting some mild pain in the groin region-possible hip OA  ASSESSMENT   Initial Assessment:  Pt with history of lower back pain with new onset three weeks ago after raking leaves.  She reports increased difficulty with walking, standing upright and generalized standing.  She was recently diagnosed with right breast cancer, stage one, and underwent lumpectomy.  She is taking oral drug for maintenance.  No chemo or radiation required.  She would like to decrease overall pain and return to walking, normal and pain free ADL's and hobbies.      Assessment as of 1/13/23:  Pt reporting an overall improvement since beginning therapy.  She still has intermittent pain and does limit her tasks when she has more pain.  She has worked on overall stretching and strengthening in therapy for core, hip and lower back.  See objective section for strength measurements.  Hip ROM remains the same due to effects of hip OA.  It appears that her groin pain is connected to her hip OA.  She will continue to benefit from generalized strengthening and stretching over the next few weeks to a month and then will work towards discharge.    Discharge Assessment 1/26/23:   Pt reporting she has felt better since MD took her off a certain chem drug.  She is off for several weeks and will then be put on another medication.  Pt reports feeling less overall joint pain and has improved mood and less fogginess.  She has been able to demonstrate good effort and technique of exercises in the clinic.  She will continue with her home program as she is ready for discharge and feeling better overall.    Problem List: (Impacting functional limitations):    Body Structures, Functions, Activity Limitations Requiring Skilled Therapeutic Intervention: Decreased functional mobility ; Decreased ADL status; Decreased ROM; Decreased strength; Increased pain; Decreased posture     Therapy Prognosis:  Therapy Prognosis: Good     Initial Assessment Complexity:   Decision Making: Low Complexity    PLAN   Effective Dates: 12/9/22 TO Plan of Care/Certification Expiration Date: 03/09/22     Frequency/Duration: Plan Frequency: 2x per week for 12 weeks     Interventions Planned (Treatment may consist of any combination of the following):    Current Treatment Recommendations: Strengthening; ROM; Manual; Home exercise program; Modalities; Therapeutic activities; Aquatics     Goals: (Goals have been discussed and agreed upon with patient.)  Short-Term Functional Goals: Time Frame: 6 weeks  Pt will be independent with HEP. -met  Pt will be able to walk up to 20 minutes without increased pain.-met  Pt will be able to report lying on right side up to one hour at night.-met  Pt will be able to report 4/10 pain rating or less with ADL's.-met    Discharge Goals: Time Frame: 12 weeks   Pt will be able to walk up to 30 minutes without increased pain.-met  Pt will be able to increase strength by 1/2 grade for improved ADL's.-in progress  Pt will be able to drive without increased pain.-intermittently meeting  Pt will be able to return to prior ADL's with 2/10 pain rating or less. - intermittently meeting         Outcome Measure: Tool Used: Modified Oswestry Low Back Pain Questionnaire  Score:  Initial: 10/50  Most Recent: 9/50 (Date: 1/26/23 )   Interpretation of Score: Each section is scored on a 0-5 scale, 5 representing the greatest disability. The scores of each section are added together for a total score of 50. Total Duration: 45 min  Time In: 1145  Time Out: 8079    Regarding Shalini Rush's therapy, I certify that the treatment plan above will be carried out by a therapist or under their direction.   Thank you for this referral,  Jessica Jones, PT           Post Session Pain  Charge Capture  PT Visit Info MD Mena Palmer

## 2023-01-30 ENCOUNTER — OFFICE VISIT (OUTPATIENT)
Dept: UROLOGY | Age: 78
End: 2023-01-30
Payer: MEDICARE

## 2023-01-30 DIAGNOSIS — R39.15 URINARY URGENCY: ICD-10-CM

## 2023-01-30 DIAGNOSIS — R31.29 MICROSCOPIC HEMATURIA: Primary | ICD-10-CM

## 2023-01-30 PROCEDURE — 99204 OFFICE O/P NEW MOD 45 MIN: CPT | Performed by: UROLOGY

## 2023-01-30 PROCEDURE — 1123F ACP DISCUSS/DSCN MKR DOCD: CPT | Performed by: UROLOGY

## 2023-01-30 ASSESSMENT — ENCOUNTER SYMPTOMS
EYE DISCHARGE: 0
SHORTNESS OF BREATH: 0
HEARTBURN: 0
VOMITING: 0
WHEEZING: 0
BACK PAIN: 1
COUGH: 0
CONSTIPATION: 0
NAUSEA: 0
INDIGESTION: 0
DIARRHEA: 0
EYE PAIN: 0
ABDOMINAL PAIN: 0
SKIN LESIONS: 0
BLOOD IN STOOL: 0

## 2023-01-30 NOTE — PROGRESS NOTES
[facility]  [address] [facility]    [address], [address] [facility]  [address]  : [address]    Chief Complaint   Patient presents with    New Patient    Hematuria          ABEBA Denton is a 65 y.o. AA female who is referred to clinic for microscopic hematuria work up. She reports a PMH of breast cancer. Has had microscopic hematuria on U/A for the past few months. No gross hematuria. Had renal US with PCP 2022 that was negative for upper tract pathology. Does have urgency, frequency, urge urinary incontinence. NO retention. No issues with recurrent UTIs. Has not tried any treatment for LUTS. Does report history of stone many years ago X 1. Does have a history of tobacco use but quit 20 years ago. No occupational exposures. No personal or FH of  cancer.      Past Medical History:   Diagnosis Date    Abdominal pain     Arthritis     Breast cancer ([address]     Breast cancer, left ([address]     radiation, surgery    Breast pain in female     Bunion     Chronic pain of left knee     Corns and callosities     DDD (degenerative disc disease), lumbar     Diabetes mellitus type II, controlled ([address]     Diet controlled    Emotional disorder     Fungal infection of toenail     Hallux valgus     Hip pain, bilateral     Hyperglycemia     Hyperlipidemia     Hypertension     medication    Hypothyroidism     Lower GI bleed     Obesity     Other ill-defined conditions(799.89)     pt reports urinalysis shows blood - she is scheduled for CT pelvis and abdomen in August)    Postmenopausal     Right median nerve neuropathy     Stress incontinence     Traumatic arthropathy of knee      Past Surgical History:   Procedure Laterality Date    BREAST BIOPSY Right , Stereo Core Bx 8-31-22    benign    BREAST BIOPSY Right 10/5/2022    RIGHT BREAST LUMPECTOMY WITH MAGSEED LOCALIZATION performed by Sebastian Bhardwaj DO at  [facility] Right 10/5/2022 BREAST BIOPSY SENTINEL NODE DISSECTION  Pre-op : 7:00, Lympho:8:00, LOC:  seed placed 10-3-22 , Surgery:  10:30am performed by Joanne Prince DO at [address] [facility] LUMPECTOMY Left 2001    radiation for CA    BREAST SURGERY Right 10/19/2022    RE-EXCISION RIGHT INFERIOR MARGINS performed by Joanne Prince DO at [address] [facility]   2008    HYSTERECTOMY (CERVIX STATUS UNKNOWN)      [facility]N STEROTACTIC LOC BREAST BIOPSY RIGHT Right 8/31/2022    VERN STEROTACTIC LOC BREAST BIOPSY RIGHT 8/31/2022 Alina Miranda MD [facility]    ORTHOPEDIC SURGERY Left     ankle arthroscopy    ORTHOPEDIC SURGERY      left knee repair    ORTHOPEDIC SURGERY      right ankle    ORTHOPEDIC SURGERY  2005    Left TSA    SHOULDER ARTHROPLASTY Left     TUBAL LIGATION      UROLOGICAL SURGERY      cystoscopy    US GUIDED CORE BREAST BIOPSY Right      Current Outpatient Medications   Medication Sig Dispense Refill    busPIRone (BUSPAR) 7.5 MG tablet Take 1 tablet by mouth daily as needed (anxiety) 30 tablet 0    anastrozole (ARIMIDEX) 1 MG tablet Take 1 tablet by mouth daily 30 tablet 3    acetaminophen (TYLENOL) 500 MG tablet Take 1,000 mg by mouth every 6 hours as needed for Pain      magnesium oxide (MAG-OX) 400 MG tablet Take 400 mg by mouth daily      Multiple Vitamin (MULTIVITAMIN ADULT PO) Take by mouth daily      calcium carbonate (OSCAL) 500 MG TABS tablet Take 500 mg by mouth daily      nystatin (MYCOSTATIN) 727066 UNIT/GM cream Apply topically 2 times daily.  1 each 1    atorvastatin (LIPITOR) 40 MG tablet Take 1 tablet by mouth daily (Patient taking differently: Take 40 mg by mouth at bedtime) 90 tablet 3    irbesartan (AVAPRO) 150 MG tablet Take 1 tablet by mouth daily (Patient taking differently: Take 150 mg by mouth at bedtime) 90 tablet 3    levothyroxine (SYNTHROID) 75 MCG tablet Take 1 tablet by mouth Daily TK 1 T PO D Indications: a condition with low thyroid hormone levels 90 tablet 3 atenolol-chlorthalidone (TENORETIC) 50-25 MG per tablet Take 0.5 tablets by mouth daily 90 tablet 3    Diabetic Shoe MISC by Does not apply route Diabetic shoe, custom orthoses, custom prosthetic filler, DM2, neuropathy 1 each 0    Cholecalciferol 50 MCG (2000) TABS Take by mouth      coenzyme Q10 100 MG CAPS capsule Take 100 mg by mouth daily      hydrocortisone 2.5 % cream Place rectally 4 times daily       No current facility-administered medications for this visit. Allergies   Allergen Reactions    Milk Protein Other (See Comments)     Increased mucus production     Social History     Socioeconomic History    Marital status: Single     Spouse name: Not on file    Number of children: Not on file    Years of education: Not on file    Highest education level: Not on file   Occupational History    Not on file   Tobacco Use    Smoking status: Former     Packs/day: 0.25     Types: Cigarettes     Quit date: 2001     Years since quittin.5    Smokeless tobacco: Never   Vaping Use    Vaping Use: Never used   Substance and Sexual Activity    Alcohol use: No    Drug use: No    Sexual activity: Not on file   Other Topics Concern    Not on file   Social History Narrative    Not on file     Social Determinants of Health     Financial Resource Strain: Low Risk     Difficulty of Paying Living Expenses: Not hard at all   Food Insecurity: No Food Insecurity    Worried About Running Out of Food in the Last Year: Never true    Ran Out of Food in the Last Year: Never true   Transportation Needs: No Transportation Needs    Lack of Transportation (Medical): No    Lack of Transportation (Non-Medical):  No   Physical Activity: Not on file   Stress: No Stress Concern Present    Feeling of Stress : Not at all   Social Connections: Unknown    Frequency of Communication with Friends and Family: Not on file    Frequency of Social Gatherings with Friends and Family: Not on file    Attends Christianity Services: Never    Active Member of Clubs or Organizations: No    Attends Club or Organization Meetings: Never    Marital Status:    Intimate Partner Violence: Not At Risk    Fear of Current or Ex-Partner: No    Emotionally Abused: No    Physically Abused: No    Sexually Abused: No   Housing Stability: Unknown    Unable to Pay for Housing in the Last Year: No    Number of Places Lived in the Last Year: Not on file    Unstable Housing in the Last Year: No     Family History   Problem Relation Age of Onset    Dementia Mother     Diabetes Mother     Cancer Mother 76        Colon    Diabetes Father     Breast Cancer Sister 78    Breast Cancer Sister 77    Hypertension Maternal Grandmother     Cancer Other         bone CA    Hypertension Other     Diabetes Other     Post-op Cognitive Dysfunction Neg Hx     Malig Hypertherm Neg Hx     Pseudochol. Deficiency Neg Hx     Delayed Awakening Neg Hx     Post-op Nausea/Vomiting Neg Hx     Emergence Delirium Neg Hx     Other Neg Hx        Review of Systems  Constitutional:   Negative for fever, chills, appetite change, malaise/fatigue, headaches and weight loss. Skin:  Negative for skin lesions, rash and itching. Eyes:  Negative for visual disturbance, eye pain and eye discharge. ENT:  Negative for difficulty articulating words, pain swallowing, high frequency hearing loss and dry mouth. Respiratory:  Negative for cough, blood in sputum, shortness of breath and wheezing. Cardiovascular: Positive for hypertension, leg pain and varicose veins. Negative for chest pain, irregular heartbeat, leg swelling and regular rate and rhythm. GI:  Negative for nausea, vomiting, abdominal pain, blood in stool, constipation, diarrhea, indigestion and heartburn. Genitourinary: Positive for nocturia, urgency, frequent urination and hysterectomy.  Negative for urinary burning, hematuria, flank pain, recurrent UTIs, history of urolithiasis, slower stream, straining, leakage w/ urge, incomplete emptying, sexually transmitted disease, menstrual problem, endometriosis and vaginal pain. Musculoskeletal: Positive for back pain. Negative for bone pain, arthralgias, tenderness, muscle weakness and neck pain. Neurological:  Negative for dizziness, focal weakness, numbness, seizures and tremors. Psychological:  Negative for depression and psychiatric problem. Endocrine:  Negative for cold intolerance, thirst, excessive urination, fatigue and heat intolerance. Hem/Lymphatic:  Negative for easy bleeding, easy bruising and frequent infections. Urinalysis  UA - Dipstick  Results for orders placed or performed in visit on 12/02/22   AMB POC URINALYSIS DIP STICK AUTO W/O MICRO   Result Value Ref Range    Color, Urine, POC yellow     Clarity, Urine, POC slightly cloudy     Glucose, Urine, POC Negative Negative    Bilirubin, Urine, POC Negative Negative    Ketones, Urine, POC Negative Negative    Specific Gravity, Urine, POC 1.020 1.001 - 1.035    Blood, Urine, POC Moderate Negative    pH, Urine, POC 6.0 4.6 - 8.0    Protein, Urine, POC Negative Negative    Urobilinogen, POC 0.2     Nitrate, Urine, POC negative Negative    Leukocyte Esterase, Urine, POC Negative Negative       UA - Micro  WBC - 0  RBC - 3-5  Bacteria - 0  Epith - 0    There were no vitals taken for this visit. GENERAL: No acute distress, Awake, Alert, Oriented X 3, Gait normal  CARDIAC: regular rate and rhythm  CHEST AND LUNG: Easy work of breathing, clear to auscultation bilaterally, no cyanosis  ABDOMEN: soft, non tender, non-distended, positive bowel sounds, no organomegaly, no palpable masses, no guarding, no rebound tenderness  SKIN: No rash, no erythema, no lacerations or abrasions, no ecchymosis  NEUROLOGIC: cranial nerves 2-12 grossly intact       Assessment and Plan    ICD-10-CM    1. Microscopic hematuria  R31.29 CT ABDOMEN PELVIS HEMATURIA Additional Contrast? None      2.  Urinary urgency  R39.15         Microscopic Hematuria:     I had a long discussion today with pt. in regards to hematuria. We discussed potential causes such as infection, bladder tumor, renal tumor, nephrolithiasis, or idiopathic cause. In regards to work up, serum Cr was drawn. CT abd/pelvis without contrast/with contrast/with delayed images was ordered for upper tract imaging. Lastly, pt. will follow up for cystoscopy and to go over results. Urinary Urgency:   Discussed possible causes. Today. Timed/double void avoid bladder irritants. Discussed medications and possible side effects of medications. Wants to hold on treatment for now and evaluate more for possible cause when we do hematuria work up. I have spent 45 minutes today reviewing previous notes, test results and face to face with the patient as well as documenting. Franklin Miranda M.D.     [facility]  [address]  Phone: [phone]  Fax: [phone]

## 2023-01-31 ENCOUNTER — OFFICE VISIT (OUTPATIENT)
Dept: ONCOLOGY | Age: 78
End: 2023-01-31

## 2023-01-31 DIAGNOSIS — Z17.0 MALIGNANT NEOPLASM OF RIGHT BREAST IN FEMALE, ESTROGEN RECEPTOR POSITIVE, UNSPECIFIED SITE OF BREAST (HCC): ICD-10-CM

## 2023-01-31 DIAGNOSIS — C50.911 MALIGNANT NEOPLASM OF RIGHT BREAST IN FEMALE, ESTROGEN RECEPTOR POSITIVE, UNSPECIFIED SITE OF BREAST (HCC): ICD-10-CM

## 2023-01-31 DIAGNOSIS — Z91.89 ADJUSTMENT TO LIFE THREATENING ILLNESS: Primary | ICD-10-CM

## 2023-01-31 NOTE — PATIENT INSTRUCTIONS
To reschedule your appointment, please call [phone].   In case of emergency, please, call 911 or Crisis[facility] [phone] in [place], [place].

## 2023-01-31 NOTE — PROGRESS NOTES
Behavioral Health - Progress Note      Name: Iza Gay  :   MRN: [id number]  Date of Service: 2023  Location of Service: [facility] for both provider and patient        Type of Service: Individual Therapy       Reason for Visit: Follow Up     Chief Complaint: Anxiety and its commodities due to distress caused by cancer diagnosis and treatment. Subjective:  Undersigned discussed relationships and boundaries. Patient verbalized understanding. LISW-CP used Dialectical Behavioral and Cognitive Behavioral Therapy, tailored to patient's need. Patient denied any plan or intent to hurt self or others. Patient was instructed, in case of emergency, to call 911 or Crisisline [phone] in [place], [place]. Patient verbalized agreement. CBT Material discussed and reviewed in Session:    Relationships and boundaries   _________________________________________________    Clinical Impression: Iza Gay is a 65 y.o. female . Mental Status Exam, patient was dressed properly. No abnormal psychomotor movements observed. Intellectual functioning appeared to be intact. Mood and affect were congruent. Insight was adequate. Judgment was adequate. Speech was normal, clear. Thought process was coherent. Patient did not report suicidal or homicidal ideations, intent or plans. Patient denied self-injury behaviors. Patient denied alcohol and other substance abuse. Patient was oriented to the four spheres: self, place, time and situation. Patient response to intervention was appropriate. Patient progress was adequate. Protective factor: self-determination.   __________________________________________________    Session scheduled for: 11:00  pm/am, patient was on time     Session started:  11:00    Session ended:   12:00  _________________________________________________    Patient Diagnosis:         PHQ 9:  6  - to be scanned into EMR.  0-4 Suggests the patient may not need depression treatment  5-14 Mild major depressive disorder. 15-19 Moderate-major depressive disorder. 20+ Severe major depressive disorders.      SONIDO 7:  8  - to be scanned into EMR.  0-4: minimal anxiety   5-9: mild anxiety   10-14: moderate anxiety   15-21: severe anxiety   __________________________________________________    Frequency: Weekly or bi-weekly appointments as schedule permits    Patient's Primary Goal: Decrease anxiety   __________________________________________________    Plans:  Continue to use Cognitive Behavioral Approach  Continue to work with patient on primary goal.    Continue to see patient, weekly or bi-weekly  __________________________________________________    [facility]

## 2023-02-01 ENCOUNTER — TELEPHONE (OUTPATIENT)
Dept: ONCOLOGY | Age: 78
End: 2023-02-01

## 2023-02-01 RX ORDER — LETROZOLE 2.5 MG/1
2.5 TABLET, FILM COATED ORAL DAILY
Qty: 30 TABLET | Refills: 3 | Status: SHIPPED | OUTPATIENT
Start: 2023-02-01

## 2023-02-01 NOTE — TELEPHONE ENCOUNTER
Patient feels much better since coming off of Arimidex. She is willing to start letrozole daily. Rx sent in. She is call if any issues.

## 2023-02-02 ENCOUNTER — HOSPITAL ENCOUNTER (OUTPATIENT)
Dept: CT IMAGING | Age: 78
Discharge: HOME | End: 2023-02-02
Payer: MEDICARE

## 2023-02-02 DIAGNOSIS — R31.29 MICROSCOPIC HEMATURIA: ICD-10-CM

## 2023-02-02 LAB — CREAT BLD-MCNC: 1.11 MG/DL (ref 0.8–1.5)

## 2023-02-02 PROCEDURE — 74178 CT ABD&PLV WO CNTR FLWD CNTR: CPT

## 2023-02-02 PROCEDURE — 82565 ASSAY OF CREATININE: CPT

## 2023-02-02 PROCEDURE — 2580000003 HC RX 258: Performed by: UROLOGY

## 2023-02-02 PROCEDURE — 6360000004 HC RX CONTRAST MEDICATION: Performed by: UROLOGY

## 2023-02-02 RX ORDER — 0.9 % SODIUM CHLORIDE 0.9 %
100 INTRAVENOUS SOLUTION INTRAVENOUS
Status: COMPLETED | OUTPATIENT
Start: 2023-02-02 | End: 2023-02-02

## 2023-02-02 RX ORDER — SODIUM CHLORIDE 0.9 % (FLUSH) 0.9 %
10 SYRINGE (ML) INJECTION
Status: COMPLETED | OUTPATIENT
Start: 2023-02-02 | End: 2023-02-02

## 2023-02-02 RX ADMIN — SODIUM CHLORIDE 100 ML: 9 INJECTION, SOLUTION INTRAVENOUS at 08:08

## 2023-02-02 RX ADMIN — IOPAMIDOL 100 ML: 755 INJECTION, SOLUTION INTRAVENOUS at 08:04

## 2023-02-02 RX ADMIN — SODIUM CHLORIDE, PRESERVATIVE FREE 10 ML: 5 INJECTION INTRAVENOUS at 08:08

## 2023-02-07 ENCOUNTER — OFFICE VISIT (OUTPATIENT)
Dept: ONCOLOGY | Age: 78
End: 2023-02-07

## 2023-02-07 DIAGNOSIS — Z91.89 ADJUSTMENT TO LIFE THREATENING ILLNESS: Primary | ICD-10-CM

## 2023-02-07 DIAGNOSIS — C50.911 MALIGNANT NEOPLASM OF RIGHT BREAST IN FEMALE, ESTROGEN RECEPTOR POSITIVE, UNSPECIFIED SITE OF BREAST (HCC): ICD-10-CM

## 2023-02-07 DIAGNOSIS — Z17.0 MALIGNANT NEOPLASM OF RIGHT BREAST IN FEMALE, ESTROGEN RECEPTOR POSITIVE, UNSPECIFIED SITE OF BREAST (HCC): ICD-10-CM

## 2023-02-07 NOTE — PROGRESS NOTES
Behavioral Health - Progress Note          Name: Shawna Macedo  : [birth year]  MRN: [id number]  Date of Service: 2023  Location of Service: [facility] for both provider and patient        Type of Service: Individual Therapy       Reason for Visit: Follow Up     Chief Complaint: Anxiety and its commodities due to distress caused by cancer diagnosis and treatment. Subjective:  Patient,\"I am feeling much better mentally\". LISW-CP used Dialectical Behavioral and Cognitive Behavioral Therapy, tailored to patient's need. Patient denied any plan or intent to hurt self or others. Patient was instructed, in case of emergency, to call 911 or Crisisline [phone] in [place], [place]. Patient verbalized agreement.     _________________________________________________    Clinical Impression: Shawna Macedo is a 65 y.o. female . Mental Status Exam, patient was dressed properly. No abnormal psychomotor movements observed. Intellectual functioning appeared to be intact. Mood and affect were congruent. Insight was adequate. Judgment was adequate. Speech was normal, clear. Thought process was coherent. Patient did not report suicidal or homicidal ideations, intent or plans. Patient denied self-injury behaviors. Patient denied alcohol and other substance abuse. Patient was oriented to the four spheres: self, place, time and situation. Patient response to intervention was appropriate. Patient progress was adequate. Protective factor: self-determination. __________________________________________________    Session scheduled for: 10:00  pm/am, patient was on time     Session started:  10:00    Session ended:   11:00  _________________________________________________    Patient Diagnosis:         PHQ 9:  2  - to be scanned into EMR.  0-4 Suggests the patient may not need depression treatment  5-14 Mild major depressive disorder. 15-19 Moderate-major depressive disorder. 20+ Severe major depressive disorders.      SONIDO 7: 10 - to be scanned into EMR.  0-4: minimal anxiety   5-9: mild anxiety   10-14: moderate anxiety   15-21: severe anxiety   __________________________________________________    Frequency: Weekly or bi-weekly appointments as schedule permits    Patient's Primary Goal: Decrease anxiety   __________________________________________________    Plans:  Continue to use Cognitive Behavioral Approach  Continue to work with patient on primary goal.    Continue to see patient, weekly or bi-weekly  __________________________________________________    Mylene Khoury

## 2023-02-07 NOTE — PATIENT INSTRUCTIONS
To reschedule your appointment, please call [phone].    In case of emergency, please, call [phone] or [phone] [phone] in [address].

## 2023-02-13 ENCOUNTER — PROCEDURE VISIT (OUTPATIENT)
Dept: UROLOGY | Age: 78
End: 2023-02-13
Payer: MEDICARE

## 2023-02-13 DIAGNOSIS — R31.29 MICROSCOPIC HEMATURIA: Primary | ICD-10-CM

## 2023-02-13 DIAGNOSIS — R91.1 PULMONARY NODULE: ICD-10-CM

## 2023-02-13 LAB
BILIRUBIN, URINE, POC: NEGATIVE
BLOOD URINE, POC: NORMAL
GLUCOSE URINE, POC: NEGATIVE
KETONES, URINE, POC: NEGATIVE
LEUKOCYTE ESTERASE, URINE, POC: NEGATIVE
NITRITE, URINE, POC: NEGATIVE
PH, URINE, POC: 5 (ref 4.6–8)
PROTEIN,URINE, POC: 30
SPECIFIC GRAVITY, URINE, POC: 1.02 (ref 1–1.03)
URINALYSIS CLARITY, POC: NORMAL
URINALYSIS COLOR, POC: NORMAL
UROBILINOGEN, POC: NORMAL

## 2023-02-13 PROCEDURE — 81003 URINALYSIS AUTO W/O SCOPE: CPT | Performed by: UROLOGY

## 2023-02-13 PROCEDURE — 52000 CYSTOURETHROSCOPY: CPT | Performed by: UROLOGY

## 2023-02-13 PROCEDURE — 1123F ACP DISCUSS/DSCN MKR DOCD: CPT | Performed by: UROLOGY

## 2023-02-13 PROCEDURE — 99214 OFFICE O/P EST MOD 30 MIN: CPT | Performed by: UROLOGY

## 2023-02-13 NOTE — PROGRESS NOTES
[facility]  [address]   [address]  [facility], [address] [facility]  [phone]    Elicia Living  :     HPI   65 y.o. AA female who presents for cystoscopy for microscopic hematuria work up. She reports a PMH of breast cancer. Has had microscopic hematuria on U/A for the past few months. No gross hematuria. Had renal US with PCP 2022 that was negative for upper tract pathology. Does have urgency, frequency, urge urinary incontinence. NO retention. No issues with recurrent UTIs. Has not tried any treatment for LUTS. Does report history of stone many years ago X 1. Does have a history of tobacco use but quit 20 years ago. No occupational exposures. No personal or FH of  cancer. CT hematuria shows incidental pulmonary nodules of undetermined significance.        Past Medical History:   Diagnosis Date    Abdominal pain     Arthritis     Breast cancer ([address]     Breast cancer, left ([address]     radiation, surgery    Breast pain in female     Bunion     Chronic pain of left knee     Corns and callosities     DDD (degenerative disc disease), lumbar     Diabetes mellitus type II, controlled ([address]     Diet controlled    Emotional disorder     Fungal infection of toenail     Hallux valgus     Hip pain, bilateral     Hyperglycemia     Hyperlipidemia     Hypertension     medication    Hypothyroidism     Lower GI bleed     Obesity     Other ill-defined conditions(799.89)     pt reports urinalysis shows blood - she is scheduled for CT pelvis and abdomen in August)    Postmenopausal     Right median nerve neuropathy     Stress incontinence     Traumatic arthropathy of knee      Past Surgical History:   Procedure Laterality Date    BREAST BIOPSY Right , Stereo Core Bx 8--    benign    BREAST BIOPSY Right 10/5/2022    RIGHT BREAST LUMPECTOMY WITH MAGSEED LOCALIZATION performed by Dora Zeng DO at  [facility] Right 10/5/2022    BREAST BIOPSY SENTINEL NODE DISSECTION  Pre-op : 7:00, Lympho:8:00, LOC:  seed placed 10-3-22 , Surgery:  10:30am performed by Shakir Quintero DO at [id number] [facility] LUMPECTOMY Left 2001    radiation for CA    BREAST SURGERY Right 10/19/2022    RE-EXCISION RIGHT INFERIOR MARGINS performed by Shakir Quintero DO at [address]  2008    HYSTERECTOMY (CERVIX STATUS UNKNOWN)      VERN STEROTACTIC LOC BREAST BIOPSY RIGHT Right 8/31/2022    VERN STEROTACTIC LOC BREAST BIOPSY RIGHT 8/31/2022 Min Painting MD [facility]    ORTHOPEDIC SURGERY Left     ankle arthroscopy    ORTHOPEDIC SURGERY      left knee repair    ORTHOPEDIC SURGERY      right ankle    ORTHOPEDIC SURGERY  2005    Left TSA    SHOULDER ARTHROPLASTY Left     TUBAL LIGATION      UROLOGICAL SURGERY      cystoscopy    US GUIDED CORE BREAST BIOPSY Right      Current Outpatient Medications   Medication Sig Dispense Refill    letrozole (FEMARA) 2.5 MG tablet Take 1 tablet by mouth daily 30 tablet 3    anastrozole (ARIMIDEX) 1 MG tablet Take 1 tablet by mouth daily 30 tablet 3    acetaminophen (TYLENOL) 500 MG tablet Take 1,000 mg by mouth every 6 hours as needed for Pain      magnesium oxide (MAG-OX) 400 MG tablet Take 400 mg by mouth daily      Multiple Vitamin (MULTIVITAMIN ADULT PO) Take by mouth daily      calcium carbonate (OSCAL) 500 MG TABS tablet Take 500 mg by mouth daily      nystatin (MYCOSTATIN) 768706 UNIT/GM cream Apply topically 2 times daily.  1 each 1    atorvastatin (LIPITOR) 40 MG tablet Take 1 tablet by mouth daily (Patient taking differently: Take 40 mg by mouth at bedtime) 90 tablet 3    irbesartan (AVAPRO) 150 MG tablet Take 1 tablet by mouth daily (Patient taking differently: Take 150 mg by mouth at bedtime) 90 tablet 3    levothyroxine (SYNTHROID) 75 MCG tablet Take 1 tablet by mouth Daily TK 1 T PO D Indications: a condition with low thyroid hormone levels 90 tablet 3    atenolol-chlorthalidone (TENORETIC) 50-25 MG per tablet Take 0.5 tablets by mouth daily 90 tablet 3    Diabetic Shoe MISC by Does not apply route Diabetic shoe, custom orthoses, custom prosthetic filler, DM2, neuropathy 1 each 0    Cholecalciferol 50 MCG (2000) TABS Take by mouth      coenzyme Q10 100 MG CAPS capsule Take 100 mg by mouth daily      hydrocortisone 2.5 % cream Place rectally 4 times daily       No current facility-administered medications for this visit. Allergies   Allergen Reactions    Milk Protein Other (See Comments)     Increased mucus production     Social History     Socioeconomic History    Marital status: Single     Spouse name: Not on file    Number of children: Not on file    Years of education: Not on file    Highest education level: Not on file   Occupational History    Not on file   Tobacco Use    Smoking status: Former     Packs/day: 0.25     Types: Cigarettes     Quit date: 2001     Years since quittin.6    Smokeless tobacco: Never   Vaping Use    Vaping Use: Never used   Substance and Sexual Activity    Alcohol use: No    Drug use: No    Sexual activity: Not on file   Other Topics Concern    Not on file   Social History Narrative    Not on file     Social Determinants of Health     Financial Resource Strain: Low Risk     Difficulty of Paying Living Expenses: Not hard at all   Food Insecurity: No Food Insecurity    Worried About Running Out of Food in the Last Year: Never true    Ran Out of Food in the Last Year: Never true   Transportation Needs: No Transportation Needs    Lack of Transportation (Medical): No    Lack of Transportation (Non-Medical):  No   Physical Activity: Not on file   Stress: No Stress Concern Present    Feeling of Stress : Not at all   Social Connections: Unknown    Frequency of Communication with Friends and Family: Not on file    Frequency of Social Gatherings with Friends and Family: Not on file    Attends Sikh Services: Never    Active Member of Clubs or Organizations: No Attends Club or Organization Meetings: Never    Marital Status:    Intimate Partner Violence: Not At Risk    Fear of Current or Ex-Partner: No    Emotionally Abused: No    Physically Abused: No    Sexually Abused: No   Housing Stability: Unknown    Unable to Pay for Housing in the Last Year: No    Number of Places Lived in the Last Year: Not on file    Unstable Housing in the Last Year: No     Family History   Problem Relation Age of Onset    Dementia Mother     Diabetes Mother     Cancer Mother 76        Colon    Diabetes Father     Breast Cancer Sister 78    Breast Cancer Sister 77    Hypertension Maternal Grandmother     Cancer Other         bone CA    Hypertension Other     Diabetes Other     Post-op Cognitive Dysfunction Neg Hx     Malig Hypertherm Neg Hx     Pseudochol. Deficiency Neg Hx     Delayed Awakening Neg Hx     Post-op Nausea/Vomiting Neg Hx     Emergence Delirium Neg Hx     Other Neg Hx        There were no vitals taken for this visit.     UA - Dipstick  Results for orders placed or performed in visit on 02/13/23   AMB POC URINALYSIS DIP STICK AUTO W/O MICRO   Result Value Ref Range    Color (UA POC)      Clarity (UA POC)      Glucose, Urine, POC Negative Negative    Bilirubin, Urine, POC Negative Negative    KETONES, Urine, POC Negative Negative    Specific Gravity, Urine, POC 1.020 1.001 - 1.035    Blood (UA POC) Moderate Negative    pH, Urine, POC 5.0 4.6 - 8.0    Protein, Urine, POC 30 Negative    Urobilinogen, POC 0.2 mg/dL     Nitrite, Urine, POC Negative Negative    Leukocyte Esterase, Urine, POC Negative Negative   Results for orders placed or performed in visit on 12/02/22   AMB POC URINALYSIS DIP STICK AUTO W/O MICRO   Result Value Ref Range    Color, Urine, POC yellow     Clarity, Urine, POC slightly cloudy     Glucose, Urine, POC Negative Negative    Bilirubin, Urine, POC Negative Negative    Ketones, Urine, POC Negative Negative    Specific Gravity, Urine, POC 1.020 1.001 - 1.035    Blood, Urine, POC Moderate Negative    pH, Urine, POC 6.0 4.6 - 8.0    Protein, Urine, POC Negative Negative    Urobilinogen, POC 0.2     Nitrate, Urine, POC negative Negative    Leukocyte Esterase, Urine, POC Negative Negative       UA - Micro  WBC - 0  RBC - 0  Bacteria - 0  Epith - 0    There were no vitals taken for this visit.     GENERAL: No acute distress, Awake, Alert, Oriented X 3, Gait normal  CARDIAC: regular rate and rhythm  CHEST AND LUNG: Easy work of breathing, clear to auscultation bilaterally, no cyanosis  ABDOMEN: soft, non tender, non-distended, positive bowel sounds, no organomegaly, no palpable masses, no guarding, no rebound tenderness  SKIN: No rash, no erythema, no lacerations or abrasions, no ecchymosis  NEUROLOGIC: cranial nerves 2-12 grossly intact       Cystoscopy Procedure:     Procedure Room  1  Scope ID:       dispos  Assistant:      karina    All risks, benefits and alternatives were again reviewed with patient and she is willing to proceed at this time.  Her genital area was prepped and draped and a sterile field applied. 2% lidocaine jelly was injected in the the urethra and allowed to dwell for several minutes.  A flexible cystoscope was then inserted into the urethral meatus and advanced under direct vision.  The urethra appeared normal with no obstructions.  The bladder neck was open without scarring, contractures, frons or tumors present. The bladder was systematically surveyed.  No bladder trabeculations were seen.  No mucosal abnormalities were seen.  The ureteral orifices were seen in their normal orthotopic position.  The cystoscope was then removed under direct vision.  The patient tolerated the procedure well.    CT A/P:     1.  No evidence of kidney stone or significant kidney/bladder mass.   2.  Stable ventral hernia.   3.  2 small pulmonary nodules in the right middle lobe.  Only partially   visualized on this exam.  Follow-up noncontrast chest CT is recommended.  Assessment and Plan    ICD-10-CM    1. Microscopic hematuria  R31.29 CYSTOURETHROSCOPY     AMB POC URINALYSIS DIP STICK AUTO W/O MICRO      2. Pulmonary nodule  R91.1 CT CHEST W CONTRAST        Orders Placed This Encounter   Procedures    CYSTOURETHROSCOPY    CT CHEST W CONTRAST     Standing Status:   Future     Standing Expiration Date:   8/13/2024     Order Specific Question:   STAT Creatinine as needed:     Answer:   Yes     Order Specific Question:   Reason for exam:     Answer:   INcidental pulmonar nodules found on CT hematuria protocol. evaluate further to see if these are anything to worry about. AMB POC URINALYSIS DIP STICK AUTO W/O MICRO     Microscopic Hematuria:   Cysto negative for hematuria source today. CT with incidental pulmonary nodules. No visualized etiology for hematuria today. Pulmonary Nodules:   CT chest ordered today to evaluate further. Will call with results. Differential of pulmonary nodules discussed with patient today. Urinary Urgency / UUI:   Discussed today. Will avoid bladder irritants. Wants to hold on any medications for now. Follow up left open ended pending CT chest results. Patient had a complete established visit today for pulmonary nodules that is separately identifiable from procedure performed today for microscopic hematuria work up. Complete documentation of this separate visit is present. I have spent 30 minutes today reviewing previous notes, test results and face to face with the patient as well as documenting. Franklin Valencia M.D.     [facility]  [address]  Phone: [phone]  Fax: [phone]

## 2023-02-14 ENCOUNTER — OFFICE VISIT (OUTPATIENT)
Dept: ONCOLOGY | Age: 78
End: 2023-02-14

## 2023-02-14 DIAGNOSIS — Z17.0 MALIGNANT NEOPLASM OF RIGHT BREAST IN FEMALE, ESTROGEN RECEPTOR POSITIVE, UNSPECIFIED SITE OF BREAST (HCC): ICD-10-CM

## 2023-02-14 DIAGNOSIS — C50.911 MALIGNANT NEOPLASM OF RIGHT BREAST IN FEMALE, ESTROGEN RECEPTOR POSITIVE, UNSPECIFIED SITE OF BREAST (HCC): ICD-10-CM

## 2023-02-14 DIAGNOSIS — Z91.89 ADJUSTMENT TO LIFE THREATENING ILLNESS: Primary | ICD-10-CM

## 2023-02-14 NOTE — PATIENT INSTRUCTIONS
To reschedule your appointment, please call [phone].    In case of emergency, please, call 911 or [phone] in [facility].

## 2023-02-14 NOTE — PROGRESS NOTES
Behavioral Health - Progress Note        Name: Zulma Willams  :   MRN: [id number]  Date of Service: 2023  Location of Service: [facility] for both provider and patient        Type of Service: Individual Therapy       Reason for Visit: Follow Up     Chief Complaint: Anxiety and its commodities due to distress caused by cancer diagnosis and treatment. Subjective:  LISW-CP used Dialectical Behavioral and Cognitive Behavioral Therapy, tailored to patient's need. Patient denied any plan or intent to hurt self or others. Patient was instructed, in case of emergency, to call 911 or [phone] in [place], [place]. Patient verbalized agreement.     _________________________________________________    Clinical Impression: Zulma Willams is a 65 y.o. female . Mental Status Exam, patient was dressed properly. No abnormal psychomotor movements observed. Intellectual functioning appeared to be intact. Mood and affect were congruent. Insight was adequate. Judgment was adequate. Speech was normal, clear. Thought process was coherent. Patient did not report suicidal or homicidal ideations, intent or plans. Patient denied self-injury behaviors. Patient denied alcohol and other substance abuse. Patient was oriented to the four spheres: self, place, time and situation. Patient response to intervention was appropriate. Patient progress was adequate. Protective factor: self-determination.   __________________________________________________    Session scheduled for: 10:00  pm/am, patient was on time     Session started:  10:00    Session ended:    11:00  _________________________________________________    Patient Diagnosis:         __________________________________________________    Frequency: Weekly or bi-weekly appointments as schedule permits    Patient's Primary Goal: Decrease anxiety   __________________________________________________    Plans:  Continue to use Cognitive Behavioral Approach  Continue to work with patient on primary goal.    Continue to see patient, weekly or bi-weekly  __________________________________________________    Aurelio Nicholson

## 2023-02-15 ENCOUNTER — HOSPITAL ENCOUNTER (EMERGENCY)
Age: 78
Discharge: HOME | End: 2023-02-15
Attending: EMERGENCY MEDICINE
Payer: MEDICARE

## 2023-02-15 ENCOUNTER — APPOINTMENT (OUTPATIENT)
Dept: CT IMAGING | Age: 78
End: 2023-02-15
Payer: MEDICARE

## 2023-02-15 ENCOUNTER — APPOINTMENT (OUTPATIENT)
Dept: GENERAL RADIOLOGY | Age: 78
End: 2023-02-15
Payer: MEDICARE

## 2023-02-15 VITALS
OXYGEN SATURATION: 99 % | HEART RATE: 66 BPM | SYSTOLIC BLOOD PRESSURE: 145 MMHG | DIASTOLIC BLOOD PRESSURE: 77 MMHG | TEMPERATURE: 98.2 F | RESPIRATION RATE: 16 BRPM

## 2023-02-15 DIAGNOSIS — R91.8 PULMONARY NODULES/LESIONS, MULTIPLE: ICD-10-CM

## 2023-02-15 DIAGNOSIS — M25.511 ACUTE PAIN OF RIGHT SHOULDER: ICD-10-CM
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
## 2023-02-15 PROCEDURE — 99284 EMERGENCY DEPT VISIT MOD MDM: CPT

## 2023-02-15 PROCEDURE — 72125 CT NECK SPINE W/O DYE: CPT

## 2023-02-15 PROCEDURE — 73030 X-RAY EXAM OF SHOULDER: CPT

## 2023-02-15 PROCEDURE — 70450 CT HEAD/BRAIN W/O DYE: CPT

## 2023-02-15 ASSESSMENT — ENCOUNTER SYMPTOMS
ABDOMINAL PAIN: 0
DIARRHEA: 0
PHOTOPHOBIA: 0
SHORTNESS OF BREATH: 0
COUGH: 0
VOICE CHANGE: 0
SORE THROAT: 0
APNEA: 0
TROUBLE SWALLOWING: 0
CHEST TIGHTNESS: 0
RHINORRHEA: 0
EYE REDNESS: 0
WHEEZING: 0
EYE DISCHARGE: 0
EYE PAIN: 0
VOMITING: 0
FACIAL SWELLING: 0

## 2023-02-15 NOTE — ED NOTES
Patient discharged before CT results back. I called her to discuss results, including pulmonary nodules. She says she is aware of pulmonary nodules and has an appointment to have CT chest for further evaluation on Monday.       Amador Alejoma  02/15/23 0748
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
## 2023-02-15 NOTE — ED NOTES
I have reviewed discharge instructions with the patient. The patient verbalized understanding. Patient left ED via Discharge Method: ambulatory to Home with family. Opportunity for questions and clarification provided. Patient given 0 scripts.           Venessa Landau, RN  02/15/23 3624

## 2023-02-15 NOTE — DISCHARGE INSTRUCTIONS
Your x-rays were reassuring today. Your CT scan did not show any acute abnormalities. I suspect you have bruised your shoulder from the fall. Please be sure to exercise the shoulder daily to prevent frozen shoulder. Use Tylenol and Motrin as needed for pain. Return here for new or worsening symptoms. Otherwise please follow with your family doctor in 2 weeks for reassessment. As we discussed, I did not find a life threatening cause of your symptoms today. However, THAT DOES NOT MEAN IT COULD NOT DEVELOP. If you develop ANY new or worsening symptoms, it is critical that you return for re-evaluation. This includes any symptoms that are concerning to you, especially symptoms such as severe headache, numbness, loss of range of motion, vomiting, difficulty breathing. If you do not return for re-evaluation, you risk serious complications, including death.

## 2023-02-16 PROBLEM — R91.8 PULMONARY NODULES/LESIONS, MULTIPLE: Status: ACTIVE | Noted: 2023-02-16

## 2023-02-21 ENCOUNTER — CLINICAL DOCUMENTATION (OUTPATIENT)
Dept: ONCOLOGY | Age: 78
End: 2023-02-21

## 2023-02-21 ENCOUNTER — OFFICE VISIT (OUTPATIENT)
Dept: ONCOLOGY | Age: 78
End: 2023-02-21

## 2023-02-21 DIAGNOSIS — Z17.0 MALIGNANT NEOPLASM OF RIGHT BREAST IN FEMALE, ESTROGEN RECEPTOR POSITIVE, UNSPECIFIED SITE OF BREAST (HCC): ICD-10-CM

## 2023-02-21 DIAGNOSIS — Z91.89 ADJUSTMENT TO LIFE THREATENING ILLNESS: Primary | ICD-10-CM

## 2023-02-21 DIAGNOSIS — C50.911 MALIGNANT NEOPLASM OF RIGHT BREAST IN FEMALE, ESTROGEN RECEPTOR POSITIVE, UNSPECIFIED SITE OF BREAST (HCC): ICD-10-CM

## 2023-02-21 NOTE — PATIENT INSTRUCTIONS
To reschedule your appointment, please call [phone].    In case of emergency, please, call 911 or [address] in [place], [place].

## 2023-02-21 NOTE — PROGRESS NOTES
Behavioral Health - Progress Note      Name: July Crenshaw  :   MRN: [id number]  Date of Service: 2023  Location of Service: [facility] for both provider and patient        Type of Service: Individual Therapy       Reason for Visit: Follow Up     Chief Complaint: Anxiety and its commodities due to distress caused by cancer diagnosis and treatment. Subjective:   Undersigned discussed closure. Patient verbalized understanding. LISW-CP used Dialectical Behavioral and Cognitive Behavioral Therapy, tailored to patient's need. Patient denied any plan or intent to hurt self or others. Patient was instructed, in case of emergency, to call 911 or [facility] [address] in [place], [place]. Patient verbalized agreement.     _________________________________________________    Clinical Impression: July Crenshaw is a 65 y.o. female . Mental Status Exam, patient was dressed properly. No abnormal psychomotor movements observed. Intellectual functioning appeared to be intact. Mood and affect were congruent. Insight was adequate. Judgment was adequate. Speech was normal, clear. Thought process was coherent. Patient did not report suicidal or homicidal ideations, intent or plans. Patient denied self-injury behaviors. Patient denied alcohol and other substance abuse. Patient was oriented to the four spheres: self, place, time and situation. Patient response to intervention was appropriate. Patient progress was adequate. Protective factor: self-determination. __________________________________________________    Session scheduled for: 11:00  pm/am, patient was on time     Session started:  11:00    Session ended:   12:00  _________________________________________________    Patient Diagnosis:         PHQ 9:  2   - to be scanned into EMR.  0-4 Suggests the patient may not need depression treatment  5-14 Mild major depressive disorder. 15-19 Moderate-major depressive disorder.    20+ Severe major depressive disorders.      SONIDO 7:   5  - to be scanned into EMR.  0-4: minimal anxiety   5-9: mild anxiety   10-14: moderate anxiety   15-21: severe anxiety   __________________________________________________    Frequency: Weekly or bi-weekly appointments as schedule permits    Patient's Primary Goal: Decrease anxiety   __________________________________________________    Plans:  Continue to use Cognitive Behavioral Approach  Continue to work with patient on primary goal.    Continue to see patient, weekly or bi-weekly  __________________________________________________    Yfn Buitrago

## 2023-02-21 NOTE — PROGRESS NOTES
Behavioral Health - Progress Note       Name: Mitchell Rodriguez  : [birth year]  MRN: [id number]  Date of Service: 2023  Location of Service: [facility], [place] for both provider and patient        Type of Service: Individual Therapy       Reason for Visit: Follow Up     Chief Complaint: Anxiety and its commodities due to distress caused by cancer diagnosis and treatment. Subjective:  Undersigned discussed closure. Patient verbalized understanding. LISW-CP used Dialectical Behavioral and Cognitive Behavioral Therapy, tailored to patient's need. Patient denied any plan or intent to hurt self or others. Patient was instructed, in case of emergency, to call 911 or [facility] [address] in [place], [place]. Patient verbalized agreement.   _________________________________________________    Clinical Impression: Mitchell Rodriguez is a 65 y.o. female . Mental Status Exam, patient was dressed properly. No abnormal psychomotor movements observed. Intellectual functioning appeared to be intact. Mood and affect were congruent. Insight was adequate. Judgment was adequate. Speech was normal, clear. Thought process was coherent. Patient did not report suicidal or homicidal ideations, intent or plans. Patient denied self-injury behaviors. Patient denied alcohol and other substance abuse. Patient was oriented to the four spheres: self, place, time and situation. Patient response to intervention was appropriate. Patient progress was adequate. Protective factor: self-determination. __________________________________________________    Session scheduled for: 11:00  pm/am, patient was on time     Session started:  11:00    Session ended:   12:00  _________________________________________________    Patient Diagnosis:         PHQ 9:  2   - to be scanned into EMR.  0-4 Suggests the patient may not need depression treatment  5-14 Mild major depressive disorder. 15-19 Moderate-major depressive disorder. 20+ Severe major depressive disorders. SONIDO 7:  5  - to be scanned into EMR.  0-4: minimal anxiety   5-9: mild anxiety   10-14: moderate anxiety   15-21: severe anxiety   __________________________________________________    Frequency: Weekly or bi-weekly appointments as schedule permits    Patient's Primary Goal: Decrease anxiety   __________________________________________________    Plans:  Continue to use Cognitive Behavioral Approach  Continue to work with patient on primary goal.    Continue to see patient, weekly or bi-weekly  __________________________________________________    Anderson Jones

## 2023-02-27 ENCOUNTER — HOSPITAL ENCOUNTER (OUTPATIENT)
Dept: CT IMAGING | Age: 78
Discharge: HOME | End: 2023-03-02
Payer: MEDICARE

## 2023-02-27 DIAGNOSIS — R91.1 PULMONARY NODULE: ICD-10-CM

## 2023-02-27 LAB — CREAT BLD-MCNC: 1.06 MG/DL (ref 0.8–1.5)

## 2023-02-27 PROCEDURE — 82565 ASSAY OF CREATININE: CPT

## 2023-02-27 PROCEDURE — 2580000003 HC RX 258: Performed by: UROLOGY

## 2023-02-27 PROCEDURE — 71260 CT THORAX DX C+: CPT

## 2023-02-27 PROCEDURE — 6360000004 HC RX CONTRAST MEDICATION: Performed by: UROLOGY

## 2023-02-27 RX ORDER — 0.9 % SODIUM CHLORIDE 0.9 %
100 INTRAVENOUS SOLUTION INTRAVENOUS
Status: COMPLETED | OUTPATIENT
Start: 2023-02-27 | End: 2023-02-27

## 2023-02-27 RX ORDER — SODIUM CHLORIDE 0.9 % (FLUSH) 0.9 %
10 SYRINGE (ML) INJECTION
Status: DISCONTINUED | OUTPATIENT
Start: 2023-02-27 | End: 2023-03-03 | Stop reason: HOSPADM

## 2023-02-27 RX ADMIN — SODIUM CHLORIDE 100 ML: 9 INJECTION, SOLUTION INTRAVENOUS at 13:16

## 2023-02-27 RX ADMIN — IOPAMIDOL 80 ML: 755 INJECTION, SOLUTION INTRAVENOUS at 13:16

## 2023-02-27 RX ADMIN — SODIUM CHLORIDE, PRESERVATIVE FREE 10 ML: 5 INJECTION INTRAVENOUS at 13:16

## 2023-02-28 ENCOUNTER — OFFICE VISIT (OUTPATIENT)
Dept: INTERNAL MEDICINE CLINIC | Facility: CLINIC | Age: 78
End: 2023-02-28
Payer: MEDICARE

## 2023-02-28 VITALS
HEIGHT: 62 IN | BODY MASS INDEX: 30.55 KG/M2 | WEIGHT: 166 LBS | DIASTOLIC BLOOD PRESSURE: 74 MMHG | OXYGEN SATURATION: 99 % | SYSTOLIC BLOOD PRESSURE: 116 MMHG | HEART RATE: 48 BPM | TEMPERATURE: 97.9 F

## 2023-02-28 DIAGNOSIS — M25.511 ACUTE PAIN OF RIGHT SHOULDER: Primary | ICD-10-CM
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
## 2023-02-28 DIAGNOSIS — R91.1 NODULE OF MIDDLE LOBE OF RIGHT LUNG: ICD-10-CM

## 2023-02-28 PROCEDURE — 99213 OFFICE O/P EST LOW 20 MIN: CPT | Performed by: INTERNAL MEDICINE

## 2023-02-28 PROCEDURE — 3078F DIAST BP <80 MM HG: CPT | Performed by: INTERNAL MEDICINE

## 2023-02-28 PROCEDURE — 1123F ACP DISCUSS/DSCN MKR DOCD: CPT | Performed by: INTERNAL MEDICINE

## 2023-02-28 PROCEDURE — 3074F SYST BP LT 130 MM HG: CPT | Performed by: INTERNAL MEDICINE

## 2023-02-28 SDOH — ECONOMIC STABILITY: FOOD INSECURITY: WITHIN THE PAST 12 MONTHS, THE FOOD YOU BOUGHT JUST DIDN'T LAST AND YOU DIDN'T HAVE MONEY TO GET MORE.: NEVER TRUE

## 2023-02-28 SDOH — ECONOMIC STABILITY: INCOME INSECURITY: HOW HARD IS IT FOR YOU TO PAY FOR THE VERY BASICS LIKE FOOD, HOUSING, MEDICAL CARE, AND HEATING?: NOT HARD AT ALL

## 2023-02-28 SDOH — ECONOMIC STABILITY: FOOD INSECURITY: WITHIN THE PAST 12 MONTHS, YOU WORRIED THAT YOUR FOOD WOULD RUN OUT BEFORE YOU GOT MONEY TO BUY MORE.: NEVER TRUE

## 2023-02-28 ASSESSMENT — PATIENT HEALTH QUESTIONNAIRE - PHQ9
SUM OF ALL RESPONSES TO PHQ9 QUESTIONS 1 & 2: 0
8. MOVING OR SPEAKING SO SLOWLY THAT OTHER PEOPLE COULD HAVE NOTICED. OR THE OPPOSITE, BEING SO FIGETY OR RESTLESS THAT YOU HAVE BEEN MOVING AROUND A LOT MORE THAN USUAL: 0
SUM OF ALL RESPONSES TO PHQ QUESTIONS 1-9: 0
SUM OF ALL RESPONSES TO PHQ QUESTIONS 1-9: 0
9. THOUGHTS THAT YOU WOULD BE BETTER OFF DEAD, OR OF HURTING YOURSELF: 0
2. FEELING DOWN, DEPRESSED OR HOPELESS: 0
SUM OF ALL RESPONSES TO PHQ QUESTIONS 1-9: 0
3. TROUBLE FALLING OR STAYING ASLEEP: 0
1. LITTLE INTEREST OR PLEASURE IN DOING THINGS: 0
10. IF YOU CHECKED OFF ANY PROBLEMS, HOW DIFFICULT HAVE THESE PROBLEMS MADE IT FOR YOU TO DO YOUR WORK, TAKE CARE OF THINGS AT HOME, OR GET ALONG WITH OTHER PEOPLE: 0
7. TROUBLE CONCENTRATING ON THINGS, SUCH AS READING THE NEWSPAPER OR WATCHING TELEVISION: 0
4. FEELING TIRED OR HAVING LITTLE ENERGY: 0
6. FEELING BAD ABOUT YOURSELF - OR THAT YOU ARE A FAILURE OR HAVE LET YOURSELF OR YOUR FAMILY DOWN: 0
SUM OF ALL RESPONSES TO PHQ QUESTIONS 1-9: 0
5. POOR APPETITE OR OVEREATING: 0

## 2023-02-28 NOTE — RESULT ENCOUNTER NOTE
Mrs. Morillo Sis, your CT chest shows a small nodule in the right lung measuring 3 mm. The radiologist is recommending that you keep an eye on it with a repeat scan in about 1 year. I would recommend that you notify your primary care doctor about this finding and he/she usually will monitor this for you going forward.      If you have any questions/concerns for me, please let me know    Tiffany Koo,  Dr. Wendy Chavira

## 2023-02-28 NOTE — PATIENT INSTRUCTIONS
Refer to physical therapy for shoulder pain and range of motion. CT chest ordered for 2/28/24 to  follow up CT chest for lung nodule right middle lobe 3 mm incidental, former smoker, breast cancer history.

## 2023-02-28 NOTE — PROGRESS NOTES
ASSESSMENT/PLAN:    follow up CT chest for lung nodule right middle lobe 3 mm incidental, former smoker, breast cancer history. Evaluation and management of the chronic condition(s) delineated. No negative side effects reported. I have reviewed all the lab results. There are some abnormalities that are either expected or not critical to the patient's health, and are discussed in the office today and are addressed. Please refer to the above assessement and plan narrative and orders and follow up plan. Medication discussed and refilled as needed. Physical exam findings are stable unless otherwise indicated and this is addressed. The most recent lab work and imaging and consultant/urgent care visits and imaging are reviewed and discussed and considered during this visit encounter. On this date, 23, I have spent 25minutes reviewing previous notes, test results and face to face with the patient discussing the diagnosis and importance of compliance with the treatment plan as well as documenting on the day of the visit. An electronic signature was used to authenticate this note. -- Solange Gilmore MD   1. Acute pain of right shoulder  -     [facility]  2. Fall due to accidental trip by another person  -     [facility]  3. Nodule of middle lobe of right lung  Comments:  3 mm, incidental, former smoker. Follow up CT in one yr. Orders:  -     CT CHEST WO CONTRAST; Future           On this date, 23, I have spent 30 minutes reviewing previous notes, test results and face to face with the patient discussing the diagnosis and importance of compliance with the treatment plan as well as documenting on the day of the visit. An electronic signature was used to authenticate this note. -- Solange Gilmore MD     No follow-ups on file. SUBJECTIVE/OBJECTIVE:      HPI:   Dharmesh Webb (: [birth year] is a 65 y.o. female, here for evaluation of the following chief complaint(s):   Chief Complaint   Patient presents with    Shoulder Pain     [facility] at [address] [place] [address] ER on 2/15/23 ,imaging done     Shoulder Pain   This is a new (She was looking in a direction away and she bumped into a female employee in [employer] unexpectedly and fell to the floor. Trina ha a case file on incident.) problem. Episode onset: 2/15/23, Geraldene Harada in [place], hit the right shoulder. No LOC \"as far as I know\" transported to [facility] ER for eval.  Xrays taken. No fxrs. Released. Told had bruised shoulder and instructed to do some home PT shoulder exercises. Rx prescribed, not filled. Associated symptoms include a limited range of motion (sometimes with movement, \"I feel some pain in it. \"   improving? \"To a degree\"  worse with movement of shoulder upward and \"to the back\"). She has tried OTC pain meds, movement and acetaminophen for the symptoms. The treatment provided mild relief. Her past medical history is significant for osteoarthritis. Other  This is a new (CT chest completed 2/27/23. Reviewed.) problem. Associated symptoms include arthralgias and neck pain. Pertinent negatives include no headaches. Treatment Adherence:   Medication compliance:  compliant most of the time  Diet compliance:  compliant most of the time  Weight trend: stable  Current exercise: no regular exercise and active with ADLs  Barriers: financial, lack of support, and stress    Diabetes Mellitus Type 2: Current symptoms/problems include  mild neuropathy in feet and needs Rx for diabetic shoes . Home blood sugar records: trend: stable  Any episodes of hypoglycemia? no  Eye exam current (within one year): yes  Tobacco history: She  reports that she quit smoking about 21 years ago. Her smoking use included cigarettes. She smoked an average of .25 packs per day. She has never used smokeless tobacco.   Daily Aspirin?  No: not currently    Hypertension:  Home blood pressure monitoring: Yes - . She is adherent to a low sodium diet. Patient denies chest pain, shortness of breath, headache, peripheral edema, and dry cough. Antihypertensive medication side effects: no medication side effects noted. Use of agents associated with hypertension: .   Key Anti-Hypertensive Meds            irbesartan (AVAPRO) 150 MG tablet (Taking)    Sig - Route: Take 1 tablet by mouth daily - Oral    Patient taking differently: Take 150 mg by mouth at bedtime    atenolol-chlorthalidone (TENORETIC) 50-25 MG per tablet (Taking)    Sig - Route: Take 0.5 tablets by mouth daily - Oral            Hyperlipidemia:  No new myalgias or GI upset on atorvastatin (Lipitor). Lab Results   Component Value Date    LABA1C 6.7 (H) 12/13/2022    LABA1C 6.8 (H) 08/25/2022    LABA1C 6.6 (H) 02/22/2022     Lab Results   Component Value Date    CREATININE 1.06 02/27/2023     Lab Results   Component Value Date    ALT 21 08/25/2022    AST 16 08/25/2022     Lab Results   Component Value Date    CHOL 134 12/13/2022    TRIG 86 12/13/2022    HDL 59 12/13/2022    LDLCALC 57.8 12/13/2022        Key Hyperlipidemia Meds            atorvastatin (LIPITOR) 40 MG tablet (Taking)    Sig - Route: Take 1 tablet by mouth daily - Oral    Patient taking differently: Take 40 mg by mouth at bedtime            Breast Cancer  She had a left breast cancer in 2001 for which she had a lumpectomy and radiation, she also took endocrine therapy of unknown type and duration. In August 2022 she had her routine screening mammogram that reported a right breast asymmetry. On 8/25/22 she had a right breast diagnostic mammogram and ultrasound which confirmed the findings and on 8/31/22 she underwent a core needle biopsy of the right breast mass. The pathology reported infiltrating ductal carcinoma, low grade, ER 95%, HI 0% and HER2 1+.    On 9/7/22 she had a bilateral breast MRI that reported the known right breast cancer of 1.4 cm, no additional suspicious findings in either breast or no evidence of lymphadenopathy was noted. She has a surgical consult with Dr Chintan Villafana on 9/21/22. Treatment notes reviewed. 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 Chronic anxiety. Has seen [facility]. Declines SSRI. CBT therapy. Seeing counselor. Notes reviewed from 12/20/22     Current Outpatient Medications on File Prior to Visit   Medication Sig Dispense Refill    letrozole (FEMARA) 2.5 MG tablet Take 1 tablet by mouth daily 30 tablet 3    anastrozole (ARIMIDEX) 1 MG tablet Take 1 tablet by mouth daily 30 tablet 3    acetaminophen (TYLENOL) 500 MG tablet Take 1,000 mg by mouth every 6 hours as needed for Pain      magnesium oxide (MAG-OX) 400 MG tablet Take 400 mg by mouth daily      Multiple Vitamin (MULTIVITAMIN ADULT PO) Take by mouth daily      calcium carbonate (OSCAL) 500 MG TABS tablet Take 500 mg by mouth daily      nystatin (MYCOSTATIN) 892467 UNIT/GM cream Apply topically 2 times daily.  1 each 1    atorvastatin (LIPITOR) 40 MG tablet Take 1 tablet by mouth daily (Patient taking differently: Take 40 mg by mouth at bedtime) 90 tablet 3    irbesartan (AVAPRO) 150 MG tablet Take 1 tablet by mouth daily (Patient taking differently: Take 150 mg by mouth at bedtime) 90 tablet 3    levothyroxine (SYNTHROID) 75 MCG tablet Take 1 tablet by mouth Daily TK 1 T PO D Indications: a condition with low thyroid hormone levels 90 tablet 3    atenolol-chlorthalidone (TENORETIC) 50-25 MG per tablet Take 0.5 tablets by mouth daily 90 tablet 3    Diabetic Shoe MISC by Does not apply route Diabetic shoe, custom orthoses, custom prosthetic filler, DM2, neuropathy 1 each 0    Cholecalciferol 50 MCG (2000 UT) TABS Take by mouth      coenzyme Q10 100 MG CAPS capsule Take 100 mg by mouth daily      hydrocortisone 2.5 % cream Place rectally 4 times daily       Current Facility-Administered Medications on File Prior to Visit   Medication Dose Route Frequency Provider Last Rate Last Admin    sodium chloride flush 0.9 % injection 10 mL  10 mL IntraVENous ONCE PRN Cary Collier MD   10 mL at 02/27/23 1316         Labs reviewed and discussed and medication refilled as needed for chronic medications during ov or adjusted based on lab results and/or our discussion as appropriate. See discussion. The patient's available records and electronic chart records are reviewed. The PMH, PSH, medications, allergies, medications, FH, health maintenance and vaccination status are all reviewed and updated as appropriate. Records from outside providers have been reviewed, summarized, and considered as noted in the history of present illness, past medical history, and objective data of this note and encounter.           Health Maintenance   Topic Date Due    Shingles vaccine (1 of 2) Never done    COVID-19 Vaccine (4 - Booster for Garg Peter series) 12/31/2021    Annual Wellness Visit (AWV)  03/01/2023    A1C test (Diabetic or Prediabetic)  06/13/2023    Diabetic retinal exam  09/16/2023    Diabetic foot exam  09/19/2023    Lipids  12/13/2023    Depression Monitoring  01/18/2024    DTaP/Tdap/Td vaccine (2 - Td or Tdap) 03/31/2027    DEXA (modify frequency per FRAX score)  Completed    Flu vaccine  Completed    Pneumococcal 65+ years Vaccine  Completed    Hepatitis A vaccine  Aged Out    Hib vaccine  Aged Out    Meningococcal (ACWY) vaccine  Aged Out    Hepatitis C screen  Discontinued     Patient Active Problem List   Diagnosis    History of breast cancer    Arthritis    Spondylosis of lumbosacral region without myelopathy or radiculopathy    Chronic pain of left knee    Hyperlipidemia    Anxiety associated with depression    Right median nerve neuropathy    Refused varicella vaccine    Hypertension    Abdominal wall hernia    Hip pain, bilateral    Adenomatous polyp of colon    Stress incontinence    [facility] calcium score greater than 400    Controlled type 2 diabetes mellitus with microalbuminuria, without long-term current use of insulin (HCC)    Anxiety    Hypothyroidism    Infiltrating ductal carcinoma of right breast (HCC)    Hematuria    Pulmonary nodules/lesions, multiple       Review of Systems   Musculoskeletal:  Positive for arthralgias, neck pain and neck stiffness. Neurological:  Negative for seizures, speech difficulty and headaches. Hematological:  Does not bruise/bleed easily.      Lab Results   Component Value Date/Time    WBC 5.5 08/25/2022 08:23 AM    HGB 12.9 08/25/2022 08:23 AM    HCT 40.0 08/25/2022 08:23 AM    MCV 87.7 08/25/2022 08:23 AM    RDW 13.8 08/25/2022 08:23 AM     08/25/2022 08:23 AM    NEUTOPHILPCT 68 05/17/2022 03:30 PM    LYMPHOPCT 22 08/25/2022 08:23 AM    LYMPHOPCT 22 05/17/2022 03:30 PM    MONOPCT 9 08/25/2022 08:23 AM    MONOPCT 9 05/17/2022 03:30 PM    EOSRELPCT 1 08/25/2022 08:23 AM    BASOPCT 1 08/25/2022 08:23 AM    BASOPCT 1 05/17/2022 03:30 PM    LYMPHSABS 1.2 08/25/2022 08:23 AM    LYMPHSABS 1.4 05/17/2022 03:30 PM    MONOSABS 0.5 08/25/2022 08:23 AM    MONOSABS 0.6 05/17/2022 03:30 PM    EOSABS 0.1 08/25/2022 08:23 AM    EOSABS 0.1 05/17/2022 03:30 PM    BASOSABS 0.0 08/25/2022 08:23 AM    IMMGRAN 0 08/25/2022 08:23 AM    GRANULOCYTEABSOLUTECOUNT 0.0 05/17/2022 03:30 PM       Lab Results   Component Value Date/Time     12/13/2022 01:22 PM    K 4.0 12/13/2022 01:22 PM     12/13/2022 01:22 PM    CO2 30 12/13/2022 01:22 PM    ANIONGAP 5 12/13/2022 01:22 PM    GLUCOSE 101 12/13/2022 01:22 PM    BUN 21 12/13/2022 01:22 PM    CREATININE 1.06 02/27/2023 01:11 PM    CREATININE 1.00 12/13/2022 01:22 PM    GFRAA >60 08/25/2022 08:23 AM    LABGLOM 58 12/13/2022 01:22 PM    CALCIUM 9.9 12/13/2022 01:22 PM    BILITOT 0.9 08/25/2022 08:23 AM    ALT 21 08/25/2022 08:23 AM    AST 16 08/25/2022 08:23 AM    ALKPHOS 63 08/25/2022 08:23 AM    ALKPHOS 72 02/22/2022 08:13 AM    PROT 7.5 08/25/2022 08:23 AM    LABALBU 3.7 08/25/2022 08:23 AM    GLOB 3.8 08/25/2022 08:23 AM    ALBUMIN 1.0 08/25/2022 08:23 AM       Lab Results   Component Value Date/Time    CHOL 134 12/13/2022 01:22 PM    HDL 59 12/13/2022 01:22 PM    TRIG 86 12/13/2022 01:22 PM    LDLCALC 57.8 12/13/2022 01:22 PM    VLDL 12 02/22/2022 08:13 AM       Lab Results   Component Value Date/Time    LABA1C 6.7 12/13/2022 01:22 PM    LABA1C 6.8 08/25/2022 08:23 AM    LABA1C 6.6 02/22/2022 08:13 AM    LABA1C 6.7 08/18/2021 08:10 AM    LABA1C 6.6 04/13/2021 09:26 AM       Lab Results   Component Value Date/Time    TSH 2.660 02/22/2022 08:13 AM    TSH 1.430 04/13/2021 09:26 AM    TSH 0.641 09/24/2020 08:54 AM           Lab Results   Component Value Date/Time    SPECGRAV 1.016 08/25/2022 08:23 AM    COLORU YELLOW/STRAW 08/25/2022 08:23 AM    LEUKOCYTESUR SMALL 08/25/2022 08:23 AM    PROTEINU 30 08/25/2022 08:23 AM    GLUCOSEU Negative 08/25/2022 08:23 AM    KETUA Negative 08/25/2022 08:23 AM    BLOODU SMALL 08/25/2022 08:23 AM    BILIRUBINUR Negative 08/25/2022 08:23 AM    UROBILINOGEN 1.0 08/25/2022 08:23 AM    NITRU Negative 08/25/2022 08:23 AM     Results for orders placed or performed during the hospital encounter of 02/27/23 (from the past 2160 hour(s))   CT CHEST W CONTRAST    Impression    3 mm right middle lobe nodule. If the patient has a significant  smoking history or other risk factors for pulmonary disease follow-up  noncontrast chest CT to be obtained and 12 months to evaluate for stability. POCT Creatinine - BLOOD   Result Value Ref Range    POC Creatinine 1.06 0.8 - 1.5 mg/dL    eGFR, POC 54 (L) >60 ml/min/1.73m2   Results for orders placed or performed during the hospital encounter of 02/15/23 (from the past 2160 hour(s))   XR SHOULDER RIGHT (MIN 2 VIEWS)    Impression    Chronic appearing change without acute abnormality.    CT Head W/O Contrast    Impression    **This impression includes finding(s) with follow-up recommendations**      Head CT:     1. No acute intracranial abnormality. 2.  Age commensurate senescent changes. Mild chronic microvascular ischemic   changes. Atherosclerosis. Cervical spine CT:    1. No acute fracture or malalignment in the cervical spine. 2.  Multiple right more than left pulmonary nodules measuring up to 6 mm. Recommend further evaluation with nonemergent CT chest to complete evaluation   and determine recommended follow-up. Gurpreet Macedo MD  Neuroradiologist  [facility]  [email or web address]      Thank you for this referral. This exam was interpreted by a fellowship trained   neuroradiologist. If the patient's healthcare provider has any questions, a   Diversified neuroradiologist can be reached directly at [phone] at any   time. Anabelle Pryor M.D.   2/15/2023 6:11:00 PM   CT CSpine W/O Contrast    Impression    **This impression includes finding(s) with follow-up recommendations**      Head CT:     1. No acute intracranial abnormality. 2.  Age commensurate senescent changes. Mild chronic microvascular ischemic   changes. Atherosclerosis. Cervical spine CT:    1. No acute fracture or malalignment in the cervical spine. 2.  Multiple right more than left pulmonary nodules measuring up to 6 mm. Recommend further evaluation with nonemergent CT chest to complete evaluation   and determine recommended follow-up. Gurpreet Macedo MD  Neuroradiologist  [facility]  [email or web address]      Thank you for this referral. This exam was interpreted by a fellowship trained   neuroradiologist. If the patient's healthcare provider has any questions, a   Diversified neuroradiologist can be reached directly at [phone] at any   time.       Anabelle Pryor M.D.   2/15/2023 6:11:00 PM   Results for orders placed or performed in visit on 02/13/23 (from the past 2160 hour(s))   AMB POC URINALYSIS DIP STICK AUTO W/O MICRO   Result Value Ref Range    Color (UA POC)      Clarity (UA POC)      Glucose, Urine, POC Negative Negative    Bilirubin, Urine, POC Negative Negative    KETONES, Urine, POC Negative Negative    Specific Gravity, Urine, POC 1.020 1.001 - 1.035    Blood (UA POC) Moderate Negative    pH, Urine, POC 5.0 4.6 - 8.0    Protein, Urine, POC 30 Negative    Urobilinogen, POC 0.2 mg/dL     Nitrite, Urine, POC Negative Negative    Leukocyte Esterase, Urine, POC Negative Negative   Results for orders placed or performed during the hospital encounter of 02/02/23 (from the past 2160 hour(s))   CT ABDOMEN PELVIS HEMATURIA Additional Contrast? None    Impression    1.  No evidence of kidney stone or significant kidney/bladder mass.  2.  Stable ventral hernia.  3.  2 small pulmonary nodules in the right middle lobe.  Only partially  visualized on this exam.  Follow-up noncontrast chest CT is recommended.      ** If there are any questions about this report, I can be reached on  [employer] or at [phone] **   POCT Creatinine - BLOOD   Result Value Ref Range    POC Creatinine 1.11 0.8 - 1.5 mg/dL    eGFR, POC 51 (L) >60 ml/min/1.73m2   Results for orders placed or performed during the hospital encounter of 12/21/22 (from the past 2160 hour(s))   US RETROPERITONEAL LIMITED    Impression    Small simple cysts, otherwise unremarkable bilateral renal  ultrasound.        Results for orders placed or performed in visit on 12/13/22 (from the past 2160 hour(s))   TSH   Result Value Ref Range    TSH, 3RD GENERATION 0.464 0.358 - 3.740 uIU/mL   Lipid Panel   Result Value Ref Range    Cholesterol, Total 134 <200 MG/DL    Triglycerides 86 35 - 150 MG/DL    HDL 59 40 - 60 MG/DL    LDL Calculated 57.8 <100 MG/DL    VLDL Cholesterol Calculated 17.2 6.0 - 23.0 MG/DL    Chol/HDL Ratio 2.3     Hemoglobin A1C   Result Value Ref Range    Hemoglobin A1C 6.7 (H) 4.8 - 5.6 %    eAG 146 mg/dL    Basic Metabolic Panel   Result Value Ref Range    Sodium 141 133 - 143 mmol/L    Potassium 4.0 3.5 - 5.1 mmol/L    Chloride 106 101 - 110 mmol/L    CO2 30 21 - 32 mmol/L    Anion Gap 5 2 - 11 mmol/L    Glucose 101 (H) 65 - 100 mg/dL    BUN 21 8 - 23 MG/DL    Creatinine 1.00 0.6 - 1.0 MG/DL    Est, Glom Filt Rate 58 (L) >60 ml/min/1.73m2    Calcium 9.9 8.3 - 10.4 MG/DL   Results for orders placed or performed in visit on 12/02/22 (from the past 2160 hour(s))   AMB POC URINALYSIS DIP STICK AUTO W/O MICRO   Result Value Ref Range    Color, Urine, POC yellow     Clarity, Urine, POC slightly cloudy     Glucose, Urine, POC Negative Negative    Bilirubin, Urine, POC Negative Negative    Ketones, Urine, POC Negative Negative    Specific Gravity, Urine, POC 1.020 1.001 - 1.035    Blood, Urine, POC Moderate Negative    pH, Urine, POC 6.0 4.6 - 8.0    Protein, Urine, POC Negative Negative    Urobilinogen, POC 0.2     Nitrite, Urine, POC negative Negative    Leukocyte Esterase, Urine, POC Negative Negative           Vitals:    02/28/23 0825   BP: 116/74   Site: Left Upper Arm   Position: Sitting   Cuff Size: Small Adult   Pulse: (!) 48   Temp: 97.9 °F (36.6 °C)   TempSrc: Skin   SpO2: 99%   Weight: 166 lb (75.3 kg)   Height: 5' 2\" (1.575 m)     Wt Readings from Last 3 Encounters:   02/28/23 166 lb (75.3 kg)   01/18/23 167 lb 14.4 oz (76.2 kg)   01/04/23 163 lb (73.9 kg)     BP Readings from Last 3 Encounters:   02/28/23 116/74   02/15/23 (!) 145/77   01/18/23 (!) 146/76     Physical Exam  Vitals and nursing note reviewed.   Constitutional:       Appearance: Normal appearance. She is not ill-appearing.   HENT:      Head: Normocephalic and atraumatic.   Eyes:      Extraocular Movements: Extraocular movements intact.      Conjunctiva/sclera: Conjunctivae normal.   Cardiovascular:      Rate and Rhythm: Normal rate and regular rhythm.      Heart sounds: Normal heart sounds.   Pulmonary:      Effort: Pulmonary effort is normal.      Breath sounds: Normal breath sounds. Musculoskeletal:      Right shoulder: No tenderness. Normal range of motion. Right forearm: No swelling or edema. Right hand: No bony tenderness. Normal pulse. Neurological:      General: No focal deficit present. Mental Status: She is alert and oriented to person, place, and time. Mental status is at baseline. Psychiatric:         Mood and Affect: Mood is anxious. Affect is not tearful.          Behavior: Behavior normal.         Cognition and Memory: Cognition normal.

## 2023-03-09 ENCOUNTER — HOSPITAL ENCOUNTER (OUTPATIENT)
Dept: PHYSICAL THERAPY | Age: 78
Setting detail: RECURRING SERIES
Discharge: HOME | End: 2023-03-12
Payer: MEDICARE

## 2023-03-09 PROCEDURE — 97110 THERAPEUTIC EXERCISES: CPT

## 2023-03-09 PROCEDURE — 97161 PT EVAL LOW COMPLEX 20 MIN: CPT

## 2023-03-09 ASSESSMENT — PAIN SCALES - GENERAL: PAINLEVEL_OUTOF10: 5

## 2023-03-09 NOTE — PROGRESS NOTES
Miriam Denton  : 89  Primary: [employer] Hmo/p* (Medicare Managed)  Secondary: [address] @ [facility]  [address] [place] [address] [address] [facility] [address]  Phone: [phone]  Fax: [phone] Plan Frequency: 2x per week for 12 weeks    Plan of Care/Certification Expiration Date: 23      PT Visit Info:  Plan Frequency: 2x per week for 12 weeks  Plan of Care/Certification Expiration Date: 23      Visit Count:  1    OUTPATIENT PHYSICAL THERAPY:OP NOTE TYPE: Treatment Note 3/9/2023       Episode  }Appt Desk             Treatment Diagnosis:  Pain in Right Shoulder (M25.511)  Stiffness of Right Shoulder, Not elsewhere classified (M25.611)  Medical/Referring Diagnosis:  Acute pain of right shoulder [M25.511]  Fall due to accidental trip by another person [[id number]. [id number]]  Referring Physician:  RUBEN Steele CNP, MD Orders:  PT Eval and Treat   Date of Onset:  Onset Date: 02/15/23     Allergies:   Milk protein  Restrictions/Precautions:  Restrictions/Precautions: None  No data recorded     Interventions Planned (Treatment may consist of any combination of the following):    Current Treatment Recommendations: Strengthening; ROM; Balance training; Manual; Home exercise program; Modalities     Subjective Comments:  Pt reports the shoulder hurts intermittently  Initial:}    5/10Post Session:       4/10  Medications Last Reviewed:  3/9/2023  Updated Objective Findings:  See evaluation note from today  Treatment   THERAPEUTIC EXERCISE: (25 minutes):    Exercises per grid below to improve mobility and strength. Required minimal verbal cues to promote proper body mechanics. Progressed resistance, range, and repetitions as indicated.   Pulleys x 20  Rows red TB x 20  Wall washing x 12  PROM   Pendulums x 15  Supine wand flexion x 20  Supine wand ER x 20  MANUAL THERAPY: (0 minutes):   Joint mobilization, Soft tissue mobilization, and Manipulation was utilized and necessary because of the patient's restricted joint motion, painful spasm, and restricted motion of soft tissue. Treatment/Session Summary:    Treatment Assessment:  Pt demonstrated proper HEP form. She will return 2 times per week  Communication/Consultation:  None today  Equipment provided today:  red theraband  Recommendations/Intent for next treatment session: Next visit will focus on ROM, strengthening.     Total Treatment Billable Duration:  40 minutes  Time In: 1200  Time Out: Sg, PT       Charge Capture  }Post Session Pain  PT Visit Info  [employer] Portal  MD Guidelines  Scanned Media  Benefits  MyChart    Future Appointments   Date Time Provider Sheree Hernández   3/13/2023 11:00 AM LAWRENCE Medina [facility] AM[facility]   3/14/2023  1:45 PM Ashley Augusto, PT [facility]   3/17/2023  8:45 AM Cleola Albertina, PTA [facility]   3/20/2023  8:45 AM Cleola Albertina, PTA [facility]   3/22/2023 12:30 PM Dean Mala, PT [facility]   3/27/2023 11:45 AM Redmond Mala, PT [facility]   3/29/2023 10:15 AM Redmond Mala, PT [facility]   4/3/2023 10:15 AM Dean Mala, PT [facility]   4/5/2023 10:15 AM Redmond Mala, PT [facility]   4/18/2023 12:10 PM [facility] [address] [place] [address]   4/18/2023 12:30 PM Cassia Armendariz APRN - CNP [facility]   5/15/2023  1:00 PM Jeffrey Perez MD [facility] AMB   7/3/2023  8:00 AM [facility] LAB DS [facility]   7/10/2023  8:20 AM Silvestre Benites MD [facility] AMB   7/24/2023  1:00 PM [address] [place] [address]   7/24/2023  1:30 PM Nery Fang MD [facility] AMB
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
## 2023-03-13 ENCOUNTER — OFFICE VISIT (OUTPATIENT)
Dept: BEHAVIORAL/MENTAL HEALTH CLINIC | Facility: CLINIC | Age: 78
End: 2023-03-13
Payer: MEDICARE

## 2023-03-13 DIAGNOSIS — F41.1 GAD (GENERALIZED ANXIETY DISORDER): Primary | ICD-10-CM

## 2023-03-13 DIAGNOSIS — F33.1 MDD (MAJOR DEPRESSIVE DISORDER), RECURRENT EPISODE, MODERATE (HCC): ICD-10-CM

## 2023-03-13 PROCEDURE — 1123F ACP DISCUSS/DSCN MKR DOCD: CPT | Performed by: SOCIAL WORKER

## 2023-03-13 PROCEDURE — 90837 PSYTX W PT 60 MINUTES: CPT | Performed by: SOCIAL WORKER
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
## 2023-03-14 ENCOUNTER — APPOINTMENT (OUTPATIENT)
Dept: PHYSICAL THERAPY | Age: 78
End: 2023-03-14
Payer: MEDICARE

## 2023-03-17 ENCOUNTER — HOSPITAL ENCOUNTER (OUTPATIENT)
Dept: PHYSICAL THERAPY | Age: 78
Setting detail: RECURRING SERIES
Discharge: HOME | End: 2023-03-20
Payer: MEDICARE

## 2023-03-17 PROCEDURE — 97110 THERAPEUTIC EXERCISES: CPT

## 2023-03-17 ASSESSMENT — PAIN SCALES - GENERAL: PAINLEVEL_OUTOF10: 5

## 2023-03-17 NOTE — PROGRESS NOTES
Dion   :   Primary[place] (Medicare Managed)  Secondary: Matt Garcia @ [facility]  [address] [place] [address] [place] [address]  Phone: [phone]  Fax: [phone] Plan Frequency: 2x per week for 12 weeks    Plan of Care/Certification Expiration Date: 23      PT Visit Info:  Plan Frequency: 2x per week for 12 weeks  Plan of Care/Certification Expiration Date: 23      Visit Count:  1    OUTPATIENT PHYSICAL THERAPY:OP NOTE TYPE: Treatment Note 3/17/2023       Episode  }Appt Desk             Treatment Diagnosis:  Pain in Right Shoulder (M25.511)  Stiffness of Right Shoulder, Not elsewhere classified (M25.611)  Medical/Referring Diagnosis:  No admission diagnoses are documented for this encounter. Referring Physician:  RUBEN Weaver CNP, MD Orders:  PT Eval and Treat   Date of Onset:  Onset Date: 02/15/23     Allergies:   Milk protein  Restrictions/Precautions:  Restrictions/Precautions: None  No data recorded     Interventions Planned (Treatment may consist of any combination of the following):    Current Treatment Recommendations: Strengthening; ROM; Balance training; Manual; Home exercise program; Modalities     Subjective Comments:  Patient reports having more discomfort in both back and shoudler today. She states she may have over done it doing some work at her house. Initial:}    5/10Post Session:       3/10  Medications Last Reviewed:  3/17/2023  Updated Objective Findings:  None Today  Treatment   THERAPEUTIC EXERCISE: (45 minutes):    Exercises per grid below to improve mobility and strength. Required minimal verbal cues to promote proper body mechanics. Progressed resistance, range, and repetitions as indicated.   Pulleys x 20  Rows red TB x 20  Wall washing x 12  PROM   Pendulums x 15  Supine wand flexion x 20  Supine wand ER x 20  MANUAL THERAPY: (0 minutes):   Joint mobilization, Soft tissue

## 2023-03-20 ENCOUNTER — HOSPITAL ENCOUNTER (OUTPATIENT)
Dept: PHYSICAL THERAPY | Age: 78
Setting detail: RECURRING SERIES
Discharge: HOME | End: 2023-03-23
Payer: MEDICARE

## 2023-03-20 PROCEDURE — 97110 THERAPEUTIC EXERCISES: CPT

## 2023-03-20 ASSESSMENT — PAIN SCALES - GENERAL: PAINLEVEL_OUTOF10: 3

## 2023-03-20 NOTE — PROGRESS NOTES
Stu Dilcia  : [birth year]  [place]: [place] (Medicare Managed)  Secondary: [address] @ [address]  [facility] [address] [facility] [address]  Phone: [phone]  Fax: [phone] Plan Frequency: 2x per week for 12 weeks    Plan of Care/Certification Expiration Date: 23      PT Visit Info:  Plan Frequency: 2x per week for 12 weeks  Plan of Care/Certification Expiration Date: 23      Visit Count:  1    OUTPATIENT PHYSICAL THERAPY:OP NOTE TYPE: Treatment Note 3/20/2023       Episode  }Appt Desk             Treatment Diagnosis:  Pain in Right Shoulder (M25.511)  Stiffness of Right Shoulder, Not elsewhere classified (M25.611)  Medical/Referring Diagnosis:  No admission diagnoses are documented for this encounter. Referring Physician:  RUBEN Rocha CNP, MD Orders:  PT Eval and Treat   Date of Onset:  Onset Date: 02/15/23     Allergies:   Milk protein  Restrictions/Precautions:  Restrictions/Precautions: None  No data recorded     Interventions Planned (Treatment may consist of any combination of the following):    Current Treatment Recommendations: Strengthening; ROM; Balance training; Manual; Home exercise program; Modalities     Subjective Comments:  Patient reports feeling better today than she did at last visit with her back. She states shoulders are about the same. Initial:}    3/10Post Session:       2/10  Medications Last Reviewed:  3/20/2023  Updated Objective Findings:  None Today  Treatment   THERAPEUTIC EXERCISE: (45 minutes):    Exercises per grid below to improve mobility and strength. Required minimal verbal cues to promote proper body mechanics. Progressed resistance, range, and repetitions as indicated.   Pulleys x 20  Rows red TB x 20  Wall washing x 12  PROM   Pendulums x 15  Supine wand flexion x 20  Supine wand ER x 20  Bicep curls  x 20  Punches x 20    MANUAL THERAPY: (0 minutes):   Joint mobilization, Soft

## 2023-03-22 ENCOUNTER — APPOINTMENT (OUTPATIENT)
Dept: PHYSICAL THERAPY | Age: 78
End: 2023-03-22
Payer: MEDICARE

## 2023-03-24 ENCOUNTER — HOSPITAL ENCOUNTER (OUTPATIENT)
Dept: PHYSICAL THERAPY | Age: 78
Setting detail: RECURRING SERIES
Discharge: HOME | End: 2023-03-27
Payer: MEDICARE

## 2023-03-24 PROCEDURE — 97110 THERAPEUTIC EXERCISES: CPT

## 2023-03-24 ASSESSMENT — PAIN SCALES - GENERAL: PAINLEVEL_OUTOF10: 3

## 2023-03-24 NOTE — PROGRESS NOTES
Sara Campuzano  : [birth year]  Primary: [place] (Medicare Managed)  Secondary: Matt Garcia @ [address] [place] [address]  Phone: [phone]  Fax: [phone] Plan Frequency: 2x per week for 12 weeks    Plan of Care/Certification Expiration Date: 23      PT Visit Info:  Plan Frequency: 2x per week for 12 weeks  Plan of Care/Certification Expiration Date: 23      Visit Count:  1    OUTPATIENT PHYSICAL THERAPY:OP NOTE TYPE: Treatment Note 3/24/2023       Episode  }Appt Desk             Treatment Diagnosis:  Pain in Right Shoulder (M25.511)  Stiffness of Right Shoulder, Not elsewhere classified (M25.611)  Medical/Referring Diagnosis:  No admission diagnoses are documented for this encounter. Referring Physician:  RUBEN Osorio CNP, MD Orders:  PT Eval and Treat   Date of Onset:  Onset Date: 02/15/23     Allergies:   Milk protein  Restrictions/Precautions:  Restrictions/Precautions: None  No data recorded     Interventions Planned (Treatment may consist of any combination of the following):    Current Treatment Recommendations: Strengthening; ROM; Balance training; Manual; Home exercise program; Modalities     Subjective Comments:  Patient reports shoulders are doing ok. Initial:}    3/10Post Session:       2/10  Medications Last Reviewed:  3/24/2023  Updated Objective Findings:  None Today  Treatment   THERAPEUTIC EXERCISE: (45 minutes):    Exercises per grid below to improve mobility and strength. Required minimal verbal cues to promote proper body mechanics. Progressed resistance, range, and repetitions as indicated.   Pulleys x 20  Rows red TB x 20  Wall washing x 12  PROM   Pendulums x 15  Supine wand flexion x 20  Supine wand ER x 20  Bicep curls  x 20  Punches x 20      MANUAL THERAPY: (0 minutes):   Joint mobilization, Soft tissue mobilization, and Manipulation was utilized and necessary because of the

## 2023-03-27 ENCOUNTER — HOSPITAL ENCOUNTER (OUTPATIENT)
Dept: PHYSICAL THERAPY | Age: 78
Setting detail: RECURRING SERIES
Discharge: HOME | End: 2023-03-30
Payer: MEDICARE

## 2023-03-27 PROCEDURE — 97110 THERAPEUTIC EXERCISES: CPT

## 2023-03-27 ASSESSMENT — PAIN SCALES - GENERAL: PAINLEVEL_OUTOF10: 3

## 2023-03-27 NOTE — PROGRESS NOTES
Kaitlynn Michael  : [birth year]  Primary: [place]* (Medicare Managed)  Secondary: Matt Garcia @ [facility]  [address] [place] [address] [place] [address]  Phone: [phone]  Fax: [phone] Plan Frequency: 2x per week for 12 weeks    Plan of Care/Certification Expiration Date: 23      PT Visit Info:  Plan Frequency: 2x per week for 12 weeks  Plan of Care/Certification Expiration Date: 23         OUTPATIENT PHYSICAL THERAPY:OP NOTE TYPE: Treatment Note 3/27/2023       Episode  }Appt Desk             Treatment Diagnosis:  Pain in Right Shoulder (M25.511)  Stiffness of Right Shoulder, Not elsewhere classified (M25.611)  Medical/Referring Diagnosis:  No admission diagnoses are documented for this encounter. Referring Physician:  RUBEN Travis CNP, MD Orders:  PT Eval and Treat   Date of Onset:  Onset Date: 02/15/23     Allergies:   Milk protein  Restrictions/Precautions:  Restrictions/Precautions: None  No data recorded     Interventions Planned (Treatment may consist of any combination of the following):    Current Treatment Recommendations: Strengthening; ROM; Balance training; Manual; Home exercise program; Modalities     Subjective Comments:  Pt reports shoulder is slowly improving  Initial:}    3/10Post Session:       2/10  Medications Last Reviewed:  3/27/2023  Updated Objective Findings:  None Today  Treatment   THERAPEUTIC EXERCISE: (45 minutes):    Exercises per grid below to improve mobility and strength. Required minimal verbal cues to promote proper body mechanics. Progressed resistance, range, and repetitions as indicated.   Pulleys x 30  Rows red TB x 20  SA slides pink x 15  PROM   Pendulums x 15  Bicep curls 2# x 20  Punches yellow TBx 20  Bent over rows 4# x 25  Standing flexion to 90 2# x 20  Standing ER yellow TB x 20  Supine punches 2# x 20  MANUAL THERAPY: (0 minutes):   Joint mobilization, Soft tissue mobilization, and

## 2023-03-29 ENCOUNTER — HOSPITAL ENCOUNTER (OUTPATIENT)
Dept: PHYSICAL THERAPY | Age: 78
Setting detail: RECURRING SERIES
End: 2023-03-29
Payer: MEDICARE

## 2023-03-29 NOTE — PROGRESS NOTES
Gabby Evans  : [birth year]  Primary: Annabellen [place]  Secondary: [address] @ [facility]  [address] [place] [address]  Phone: [phone]  Fax: [phone] Plan Frequency: 2x per week for 12 weeks    Plan of Care/Certification Expiration Date: 23      PT Visit Info:  No data recorded       OUTPATIENT PHYSICAL THERAPY 3/29/2023     Appt Desk   Episode   MyChart      Pt did not show for appointment today.     Future Appointments   Date Time Provider Sheree Hectori   4/3/2023 10:15 AM Renetta Romano, PT [facility]   2023 10:15 AM Renetta Romano, PT [facility]   [facility] [address]   2023 12:30 PM RUBEN Cheema - CNP [facility]   5/15/2023  1:00 PM Edmond King MD [facility]   7/3/2023  8:00 AM [facility] LAB DS [facility]   7/10/2023  8:20 AM Caridad Wilkerson MD [facility]   2023  9:10 AM [facility] [address] [place] [address]   2023  9:45 AM Valery Cunningham MD [facility]

## 2023-04-03 ENCOUNTER — OFFICE VISIT (OUTPATIENT)
Dept: INTERNAL MEDICINE CLINIC | Facility: CLINIC | Age: 78
End: 2023-04-03
Payer: MEDICARE

## 2023-04-03 ENCOUNTER — HOSPITAL ENCOUNTER (OUTPATIENT)
Dept: PHYSICAL THERAPY | Age: 78
Setting detail: RECURRING SERIES
Discharge: HOME | End: 2023-04-06
Payer: MEDICARE

## 2023-04-03 VITALS
DIASTOLIC BLOOD PRESSURE: 60 MMHG | HEIGHT: 62 IN | HEART RATE: 60 BPM | BODY MASS INDEX: 30 KG/M2 | OXYGEN SATURATION: 97 % | SYSTOLIC BLOOD PRESSURE: 120 MMHG | TEMPERATURE: 97.5 F | WEIGHT: 163 LBS

## 2023-04-03 DIAGNOSIS — J30.9 ALLERGIC RHINITIS, UNSPECIFIED SEASONALITY, UNSPECIFIED TRIGGER: ICD-10-CM

## 2023-04-03 DIAGNOSIS — M54.50 ACUTE LEFT-SIDED LOW BACK PAIN WITHOUT SCIATICA: ICD-10-CM

## 2023-04-03 DIAGNOSIS — H93.8X3 SENSATION OF FULLNESS IN BOTH EARS: Primary | ICD-10-CM

## 2023-04-03 LAB
EXP DATE SOLUTION: NORMAL
EXP DATE SWAB: NORMAL
EXPIRATION DATE: NORMAL
LOT NUMBER POC: NORMAL
LOT NUMBER SOLUTION: NORMAL
LOT NUMBER SWAB: NORMAL
SARS-COV-2 RNA, POC: NEGATIVE

## 2023-04-03 PROCEDURE — 3078F DIAST BP <80 MM HG: CPT | Performed by: NURSE PRACTITIONER

## 2023-04-03 PROCEDURE — 87635 SARS-COV-2 COVID-19 AMP PRB: CPT | Performed by: NURSE PRACTITIONER

## 2023-04-03 PROCEDURE — 97110 THERAPEUTIC EXERCISES: CPT

## 2023-04-03 PROCEDURE — 99214 OFFICE O/P EST MOD 30 MIN: CPT | Performed by: NURSE PRACTITIONER

## 2023-04-03 PROCEDURE — 1123F ACP DISCUSS/DSCN MKR DOCD: CPT | Performed by: NURSE PRACTITIONER

## 2023-04-03 PROCEDURE — 3074F SYST BP LT 130 MM HG: CPT | Performed by: NURSE PRACTITIONER

## 2023-04-03 RX ORDER — FLUTICASONE PROPIONATE 50 MCG
SPRAY, SUSPENSION (ML) NASAL
COMMUNITY
Start: 2023-03-09

## 2023-04-03 SDOH — ECONOMIC STABILITY: FOOD INSECURITY: WITHIN THE PAST 12 MONTHS, THE FOOD YOU BOUGHT JUST DIDN'T LAST AND YOU DIDN'T HAVE MONEY TO GET MORE.: PATIENT DECLINED

## 2023-04-03 SDOH — ECONOMIC STABILITY: FOOD INSECURITY: WITHIN THE PAST 12 MONTHS, YOU WORRIED THAT YOUR FOOD WOULD RUN OUT BEFORE YOU GOT MONEY TO BUY MORE.: PATIENT DECLINED

## 2023-04-03 SDOH — ECONOMIC STABILITY: INCOME INSECURITY: HOW HARD IS IT FOR YOU TO PAY FOR THE VERY BASICS LIKE FOOD, HOUSING, MEDICAL CARE, AND HEATING?: PATIENT DECLINED

## 2023-04-03 SDOH — ECONOMIC STABILITY: HOUSING INSECURITY
IN THE LAST 12 MONTHS, WAS THERE A TIME WHEN YOU DID NOT HAVE A STEADY PLACE TO SLEEP OR SLEPT IN A SHELTER (INCLUDING NOW)?: PATIENT REFUSED

## 2023-04-03 ASSESSMENT — ENCOUNTER SYMPTOMS
SINUS PAIN: 0
VOMITING: 0
BACK PAIN: 1
SHORTNESS OF BREATH: 0
RHINORRHEA: 1
ABDOMINAL PAIN: 0
EYE ITCHING: 0
SORE THROAT: 0
COUGH: 1
SINUS PRESSURE: 0
DIARRHEA: 0
NAUSEA: 0
WHEEZING: 0
EYE DISCHARGE: 0

## 2023-04-03 ASSESSMENT — PATIENT HEALTH QUESTIONNAIRE - PHQ9
1. LITTLE INTEREST OR PLEASURE IN DOING THINGS: 0
SUM OF ALL RESPONSES TO PHQ QUESTIONS 1-9: 0
SUM OF ALL RESPONSES TO PHQ QUESTIONS 1-9: 0
SUM OF ALL RESPONSES TO PHQ9 QUESTIONS 1 & 2: 0
2. FEELING DOWN, DEPRESSED OR HOPELESS: 0
SUM OF ALL RESPONSES TO PHQ QUESTIONS 1-9: 0
SUM OF ALL RESPONSES TO PHQ QUESTIONS 1-9: 0

## 2023-04-03 ASSESSMENT — PAIN SCALES - GENERAL: PAINLEVEL_OUTOF10: 3

## 2023-04-03 NOTE — PROGRESS NOTES
pain.    Diagnoses and all orders for this visit:    Sensation of fullness in both ears  -     AMB POC COVID-19 COV  Ear exam unremarkable. Covid test negative. Restart Flonase as prescribed. Take daily non-drowsy antihistamine (Zyrtec, Claritin or similar). Tylenol PRN. Push fluids. Call if symptoms worsen or fail to improve.     Allergic rhinitis, unspecified seasonality, unspecified trigger  -     AMB POC COVID-19 COV    Acute left-sided low back pain without sciatica  Symptoms improving. Offered PT referral. Patient declines. Would like to continue at-home regimen and will let us know if symptoms worsen or fail to improve.       Follow-up and Dispositions    Return if symptoms worsen or fail to improve.         On this date, 4/3/23, I have spent 35 minutes reviewing previous notes, test results and face to face with the patient discussing the diagnosis and importance of compliance with the treatment plan as well as documenting on the day of the visit.     An electronic signature was used to authenticate this note.  -RANDI Montero

## 2023-04-03 NOTE — PROGRESS NOTES
Yara Fatimaer  : [birth year]  Primary[place]cos [place] (Medicare Managed)  Secondary: Matt Garcia @ [facility]  [address] [place] [address] [place]illian [place] [address]  Phone: [phone]  Fax: [phone] Plan Frequency: 2x per week for 12 weeks    Plan of Care/Certification Expiration Date: 23      PT Visit Info:  Plan Frequency: 2x per week for 12 weeks  Plan of Care/Certification Expiration Date: 23         OUTPATIENT PHYSICAL THERAPY:OP NOTE TYPE: Treatment Note 4/3/2023       Episode  }Appt Desk             Treatment Diagnosis:  Pain in Right Shoulder (M25.511)  Stiffness of Right Shoulder, Not elsewhere classified (M25.611)  Medical/Referring Diagnosis:  No admission diagnoses are documented for this encounter. Referring Physician:  RUBEN Vega CNP, MD Orders:  PT Eval and Treat   Date of Onset:  Onset Date: 02/15/23     Allergies:   Milk protein  Restrictions/Precautions:  Restrictions/Precautions: None  No data recorded     Interventions Planned (Treatment may consist of any combination of the following):    Current Treatment Recommendations: Strengthening; ROM; Balance training; Manual; Home exercise program; Modalities     Subjective Comments:  Pt reporting feeling better overall. Initial:}    3/10Post Session:       2/10  Medications Last Reviewed:  4/3/2023  Updated Objective Findings:  None Today  Treatment   THERAPEUTIC EXERCISE: (40 minutes):    Exercises per grid below to improve mobility and strength. Required minimal verbal cues to promote proper body mechanics. Progressed resistance, range, and repetitions as indicated.   Pulleys x 30  Rows red TB x 20        PROM         Bicep curls 3# x 25  Punches yellow TBx 25  Bent over rows 4# x 25  Standing flexion to 90 2# x 20  Standing ER yellow TB x 20  Supine punches 3# x 25  Nu-step level 4 x 10 min  Standing ER arms by side red TB x 20  MANUAL THERAPY: (0 minutes):   Joint

## 2023-04-05 ENCOUNTER — HOSPITAL ENCOUNTER (OUTPATIENT)
Dept: PHYSICAL THERAPY | Age: 78
Setting detail: RECURRING SERIES
Discharge: HOME | End: 2023-04-08
Payer: MEDICARE

## 2023-04-05 PROCEDURE — 97110 THERAPEUTIC EXERCISES: CPT

## 2023-04-05 ASSESSMENT — PAIN SCALES - GENERAL: PAINLEVEL_OUTOF10: 3

## 2023-04-05 NOTE — PROGRESS NOTES
red TB x 20  Supine flexion 2# x 20  MANUAL THERAPY: (0 minutes):   Joint mobilization, Soft tissue mobilization, and Manipulation was utilized and necessary because of the patient's restricted joint motion, painful spasm, and restricted motion of soft tissue. Treatment/Session Summary:    Treatment Assessment:  Pt progressed well today. Able to reach overhead and behind back. Has normalized range. Anticipate only 1-2 visits until transition to [facility]  Communication/Consultation:  None today  Equipment provided today:  na  Recommendations/Intent for next treatment session: Next visit will focus on ROM, strengthening.     Total Treatment Billable Duration:  40 minutes  Time In: 3976  Time Out: [address] [facility], PT       Charge Capture  }Post Session Pain  PT Visit Info  [facility] Portal  MD Guidelines  Scanned Media  Benefits  MyChart    Future Appointments   Date Time Provider Sheree Hernández   4/18/2023 12:10 PM [facility]ACH INSURANCE R Magda Blue 23 [facility]   4/18/2023 12:30 PM RUBEN Faustin - CNP [facility]   5/15/2023  1:00 PM Jess Mckeon MD [facility]   7/3/2023  8:00 AM [facility] LAB DS [facility]   7/10/2023  8:20 AM Joleen Michael MD [facility]   7/17/2023  9:10 AM [facility]   7/17/2023  9:45 AM Aurora Wooten MD [facility]

## 2023-04-17 ENCOUNTER — HOSPITAL ENCOUNTER (OUTPATIENT)
Dept: PHYSICAL THERAPY | Age: 78
Setting detail: RECURRING SERIES
Discharge: HOME | End: 2023-04-20
Payer: MEDICARE

## 2023-04-17 PROCEDURE — 97110 THERAPEUTIC EXERCISES: CPT

## 2023-04-17 ASSESSMENT — PAIN SCALES - GENERAL: PAINLEVEL_OUTOF10: 1

## 2023-04-18 ENCOUNTER — OFFICE VISIT (OUTPATIENT)
Dept: ONCOLOGY | Age: 78
End: 2023-04-18
Payer: MEDICARE

## 2023-04-18 ENCOUNTER — HOSPITAL ENCOUNTER (OUTPATIENT)
Dept: LAB | Age: 78
Discharge: HOME | End: 2023-04-21
Payer: MEDICARE

## 2023-04-18 VITALS
HEIGHT: 62 IN | SYSTOLIC BLOOD PRESSURE: 150 MMHG | DIASTOLIC BLOOD PRESSURE: 85 MMHG | RESPIRATION RATE: 14 BRPM | WEIGHT: 162.7 LBS | TEMPERATURE: 97.9 F | OXYGEN SATURATION: 97 % | HEART RATE: 64 BPM | BODY MASS INDEX: 29.94 KG/M2

## 2023-04-18 DIAGNOSIS — C50.911 MALIGNANT NEOPLASM OF RIGHT BREAST IN FEMALE, ESTROGEN RECEPTOR POSITIVE, UNSPECIFIED SITE OF BREAST (HCC): Primary | ICD-10-CM

## 2023-04-18 DIAGNOSIS — T45.1X5A HOT FLASHES RELATED TO AROMATASE INHIBITOR THERAPY: ICD-10-CM

## 2023-04-18 DIAGNOSIS — Z17.0 MALIGNANT NEOPLASM OF RIGHT BREAST IN FEMALE, ESTROGEN RECEPTOR POSITIVE, UNSPECIFIED SITE OF BREAST (HCC): ICD-10-CM

## 2023-04-18 DIAGNOSIS — C50.911 MALIGNANT NEOPLASM OF RIGHT BREAST IN FEMALE, ESTROGEN RECEPTOR POSITIVE, UNSPECIFIED SITE OF BREAST (HCC): ICD-10-CM

## 2023-04-18 DIAGNOSIS — Z79.811 USE OF AROMATASE INHIBITORS: ICD-10-CM

## 2023-04-18 DIAGNOSIS — Z17.0 MALIGNANT NEOPLASM OF RIGHT BREAST IN FEMALE, ESTROGEN RECEPTOR POSITIVE, UNSPECIFIED SITE OF BREAST (HCC): Primary | ICD-10-CM

## 2023-04-18 DIAGNOSIS — R23.2 HOT FLASHES RELATED TO AROMATASE INHIBITOR THERAPY: ICD-10-CM

## 2023-04-18 LAB
ALBUMIN SERPL-MCNC: 3.8 G/DL (ref 3.2–4.6)
ALBUMIN/GLOB SERPL: 0.8 (ref 0.4–1.6)
ALP SERPL-CCNC: 81 U/L (ref 50–136)
ALT SERPL-CCNC: 22 U/L (ref 12–65)
ANION GAP SERPL CALC-SCNC: 8 MMOL/L (ref 2–11)
AST SERPL-CCNC: 16 U/L (ref 15–37)
BASOPHILS # BLD: 0 K/UL (ref 0–0.2)
BASOPHILS NFR BLD: 1 % (ref 0–2)
BILIRUB SERPL-MCNC: 0.7 MG/DL (ref 0.2–1.1)
BUN SERPL-MCNC: 24 MG/DL (ref 8–23)
CALCIUM SERPL-MCNC: 9.9 MG/DL (ref 8.3–10.4)
CHLORIDE SERPL-SCNC: 107 MMOL/L (ref 101–110)
CO2 SERPL-SCNC: 27 MMOL/L (ref 21–32)
CREAT SERPL-MCNC: 1 MG/DL (ref 0.6–1)
DIFFERENTIAL METHOD BLD: NORMAL
EOSINOPHIL # BLD: 0.1 K/UL (ref 0–0.8)
EOSINOPHIL NFR BLD: 1 % (ref 0.5–7.8)
ERYTHROCYTE [DISTWIDTH] IN BLOOD BY AUTOMATED COUNT: 14.1 % (ref 11.9–14.6)
GLOBULIN SER CALC-MCNC: 4.7 G/DL (ref 2.8–4.5)
GLUCOSE SERPL-MCNC: 95 MG/DL (ref 65–100)
HCT VFR BLD AUTO: 39.7 % (ref 35.8–46.3)
HGB BLD-MCNC: 12.7 G/DL (ref 11.7–15.4)
IMM GRANULOCYTES # BLD AUTO: 0 K/UL (ref 0–0.5)
IMM GRANULOCYTES NFR BLD AUTO: 0 % (ref 0–5)
LYMPHOCYTES # BLD: 1.2 K/UL (ref 0.5–4.6)
LYMPHOCYTES NFR BLD: 20 % (ref 13–44)
MCH RBC QN AUTO: 27.4 PG (ref 26.1–32.9)
MCHC RBC AUTO-ENTMCNC: 32 G/DL (ref 31.4–35)
MCV RBC AUTO: 85.7 FL (ref 82–102)
MONOCYTES # BLD: 0.5 K/UL (ref 0.1–1.3)
MONOCYTES NFR BLD: 9 % (ref 4–12)
NEUTS SEG # BLD: 4.3 K/UL (ref 1.7–8.2)
NEUTS SEG NFR BLD: 70 % (ref 43–78)
NRBC # BLD: 0 K/UL (ref 0–0.2)
PLATELET # BLD AUTO: 187 K/UL (ref 150–450)
PMV BLD AUTO: 10.5 FL (ref 9.4–12.3)
POTASSIUM SERPL-SCNC: 3.6 MMOL/L (ref 3.5–5.1)
PROT SERPL-MCNC: 8.5 G/DL (ref 6.3–8.2)
RBC # BLD AUTO: 4.63 M/UL (ref 4.05–5.2)
SODIUM SERPL-SCNC: 142 MMOL/L (ref 133–143)
WBC # BLD AUTO: 6.1 K/UL (ref 4.3–11.1)

## 2023-04-18 PROCEDURE — 3079F DIAST BP 80-89 MM HG: CPT | Performed by: NURSE PRACTITIONER

## 2023-04-18 PROCEDURE — 80053 COMPREHEN METABOLIC PANEL: CPT

## 2023-04-18 PROCEDURE — 36415 COLL VENOUS BLD VENIPUNCTURE: CPT

## 2023-04-18 PROCEDURE — 99214 OFFICE O/P EST MOD 30 MIN: CPT | Performed by: NURSE PRACTITIONER

## 2023-04-18 PROCEDURE — 85025 COMPLETE CBC W/AUTO DIFF WBC: CPT

## 2023-04-18 PROCEDURE — 3077F SYST BP >= 140 MM HG: CPT | Performed by: NURSE PRACTITIONER

## 2023-04-18 PROCEDURE — 1123F ACP DISCUSS/DSCN MKR DOCD: CPT | Performed by: NURSE PRACTITIONER

## 2023-04-18 ASSESSMENT — PATIENT HEALTH QUESTIONNAIRE - PHQ9
2. FEELING DOWN, DEPRESSED OR HOPELESS: 1
SUM OF ALL RESPONSES TO PHQ QUESTIONS 1-9: 2
1. LITTLE INTEREST OR PLEASURE IN DOING THINGS: 1
SUM OF ALL RESPONSES TO PHQ QUESTIONS 1-9: 2
SUM OF ALL RESPONSES TO PHQ QUESTIONS 1-9: 2
SUM OF ALL RESPONSES TO PHQ9 QUESTIONS 1 & 2: 2
SUM OF ALL RESPONSES TO PHQ QUESTIONS 1-9: 2

## 2023-04-18 NOTE — PROGRESS NOTES
Further evaluation is recommended by ultrasound. Elsewhere the right breast appears unchanged from older mammography. There are diffuse typically benign calcifications elsewhere without suspicious feature. The posterior upper outer quadrant benign biopsy clip is again noted. Ultrasound: Real time targeted sonography was performed of the right lateral breast carefully and extensively by the radiologist and sonographer. At 9:30 position there is a subcentimeter hypoechoic area which on careful real-time scanning could not be definitively reproduced. The surrounding lateral tissues otherwise demonstrate no concerning findings. Right lateral asymmetry without definite sonographic correlate, indeterminate for malignancy. Recommend stereotactic biopsy. Results and recommendations discussed in full with the patient at the time of interpretation, all questions were answered and she agrees. BI-RADS Assessment Category 4: Suspicious Finding- Biopsy should be considered. VERN STEREO BREAST BX W LOC DEVICE 1ST LESION RIGHT    Result Date: 8/31/2022  Stereotactic Biopsy right Breast, Surgical Specimen Radiograph, Postbiopsy Mammogram, 08/31/2022 HISTORY: Focal asymmetry with possible architectural distortion and microcalcifications at the 9:30 position right breast. STEREOTACTIC BIOPSY: The patient was explained the procedure and informed consent was obtained. The patient was then positioned on the prone Hologic stereotactic biopsy table. The right breast was placed in CC compression. The asymmetry was localized within the stereotactic window from a superior approach. The skin was cleansed and local anesthetic applied. A 9-gauge Brevera needle was placed into the right breast. 5 core samples were obtained from about the periphery of the needle. The samples were imaged while in the Mammography Suite. The patient tolerated the procedure well. There were no immediate complications.  SPECIMEN RADIOGRAPH: The specimen

## 2023-04-18 NOTE — PATIENT INSTRUCTIONS
Patient Instructions from Today's Visit    Reason for Visit:  Follow up-Breast    Diagnosis Information:  [email or web address]      Plan:      Follow Up:  As scheduled    Recent Lab Results:  Hospital Outpatient Visit on 04/18/2023   Component Date Value Ref Range Status    WBC 04/18/2023 6.1  4.3 - 11.1 K/uL Final    RBC 04/18/2023 4.63  4.05 - 5.2 M/uL Final    Hemoglobin 04/18/2023 12.7  11.7 - 15.4 g/dL Final    Hematocrit 04/18/2023 39.7  35.8 - 46.3 % Final    MCV 04/18/2023 85.7  82.0 - 102.0 FL Final    MCH 04/18/2023 27.4  26.1 - 32.9 PG Final    MCHC 04/18/2023 32.0  31.4 - 35.0 g/dL Final    RDW 04/18/2023 14.1  11.9 - 14.6 % Final    Platelets 22/92/3120 187  150 - 450 K/uL Final    MPV 04/18/2023 10.5  9.4 - 12.3 FL Final    nRBC 04/18/2023 0.00  0.0 - 0.2 K/uL Final    **Note: Absolute NRBC parameter is now reported with Hemogram**    Differential Type 04/18/2023 AUTOMATED    Final    Seg Neutrophils 04/18/2023 70  43 - 78 % Final    Lymphocytes 04/18/2023 20  13 - 44 % Final    Monocytes 04/18/2023 9  4.0 - 12.0 % Final    Eosinophils % 04/18/2023 1  0.5 - 7.8 % Final    Basophils 04/18/2023 1  0.0 - 2.0 % Final    Immature Granulocytes 04/18/2023 0  0.0 - 5.0 % Final    Segs Absolute 04/18/2023 4.3  1.7 - 8.2 K/UL Final    Absolute Lymph # 04/18/2023 1.2  0.5 - 4.6 K/UL Final    Absolute Mono # 04/18/2023 0.5  0.1 - 1.3 K/UL Final    Absolute Eos # 04/18/2023 0.1  0.0 - 0.8 K/UL Final    Basophils Absolute 04/18/2023 0.0  0.0 - 0.2 K/UL Final    Absolute Immature Granulocyte 04/18/2023 0.0  0.0 - 0.5 K/UL Final    Sodium 04/18/2023 142  133 - 143 mmol/L Final    Potassium 04/18/2023 3.6  3.5 - 5.1 mmol/L Final    Chloride 04/18/2023 107  101 - 110 mmol/L Final    CO2 04/18/2023 27  21 - 32 mmol/L Final    Anion Gap 04/18/2023 8  2 - 11 mmol/L Final    Glucose 04/18/2023 95  65 - 100 mg/dL Final    BUN 04/18/2023 24 (H)  8

## 2023-04-24 ENCOUNTER — HOSPITAL ENCOUNTER (OUTPATIENT)
Dept: PHYSICAL THERAPY | Age: 78
Setting detail: RECURRING SERIES
Discharge: HOME | End: 2023-04-27
Payer: MEDICARE

## 2023-04-24 PROCEDURE — 97110 THERAPEUTIC EXERCISES: CPT

## 2023-04-24 ASSESSMENT — PAIN SCALES - GENERAL: PAINLEVEL_OUTOF10: 1

## 2023-04-24 NOTE — PROGRESS NOTES
Thai Going  :   Primary: [place] [place] H[place]* (Medicare Managed)  Secondary: Matt Garcia @ [facility]  [address] [place] [address] [address] [address]  Phone: [phone]  Fax: [phone] Plan Frequency: 2x per week for 12 weeks    Plan of Care/Certification Expiration Date: 23      PT Visit Info:  Plan Frequency: 2x per week for 12 weeks  Plan of Care/Certification Expiration Date: 23         OUTPATIENT PHYSICAL THERAPY:OP NOTE TYPE: Treatment Note 2023       Episode  }Appt Desk             Treatment Diagnosis:  Pain in Right Shoulder (M25.511)  Stiffness of Right Shoulder, Not elsewhere classified (M25.611)  Medical/Referring Diagnosis:  No admission diagnoses are documented for this encounter. Referring Physician:  RUBEN Shabazz CNP, MD Orders:  PT Eval and Treat   Date of Onset:  Onset Date: 02/15/23     Allergies:   Milk protein  Restrictions/Precautions:  Restrictions/Precautions: None  No data recorded     Interventions Planned (Treatment may consist of any combination of the following):    Current Treatment Recommendations: Strengthening; ROM; Balance training; Manual; Home exercise program; Modalities     Subjective Comments:  Pt reports shoulder is better. Initial:}    1/10Post Session:       1/10  Medications Last Reviewed:  2023  Updated Objective Findings:  None Today  Treatment   THERAPEUTIC EXERCISE: (45 minutes):    Exercises per grid below to improve mobility and strength. Required minimal verbal cues to promote proper body mechanics. Progressed resistance, range, and repetitions as indicated.   Pulleys x 30  Rows green TB x 20  IR green x 20        PROM         Bicep curls 4# x 30  Punches green TB x 30  Bent over rows 7# x 30  Standing flexion to 90 2# x 20  Standing ER green TB x 20  Supine punches 3# x 25  Nu-step level 4 x 10 min  Standing scaption 2# 2 x 15  Standing ER arms by side red TB x 20  Supine

## 2023-04-24 NOTE — THERAPY DISCHARGE
Vivian Badillo  :   Primary: [place] H[place]/p* (Medicare Managed)  Secondary: [address] @ [facility] [address] [place] [address]  Phone: [phone]  Fax: [phone] Plan Frequency: 2x per week for 12 weeks    Plan of Care/Certification Expiration Date: 23      PT Visit Info:  Plan Frequency: 2x per week for 12 weeks  Plan of Care/Certification Expiration Date: 23      Visit Count:  1                OUTPATIENT PHYSICAL THERAPY:             OP NOTE TYPE: Discharge Summary 2023               Episode (right shoulder pain) Appt Desk         Treatment Diagnosis:  Pain in Right Shoulder (M25.511)  Stiffness of Right Shoulder, Not elsewhere classified (M25.611)  Medical/Referring Diagnosis:  No admission diagnoses are documented for this encounter. Referring Physician:  RUBEN Alanis CNP, MD Orders:  PT Eval and Treat   Return MD Appt:  as needed  Date of Onset:  Onset Date: 02/15/23      Allergies:  Milk protein  Restrictions/Precautions:    Restrictions/Precautions: None        Medications Last Reviewed:  2023     SUBJECTIVE   History of Injury/Illness (Reason for Referral):  Pt reported she was tripped or tripped over someone at a store on 2/15/23 and she fell onto right shoulder. She went to ER and x-ray was negative for fracture, positive for arthritis.     Patient Stated Goal(s):  \"to decrease pain, reaching behind back, \"  Initial:     1/10 Post Session:     1/10  Past Medical History/Comorbidities:   Ms. Heriberto Agarwal  has a past medical history of Abdominal pain, Arthritis, Breast cancer ([address] Breast cancer, left ([address] Breast pain in female, Bunion, Chronic pain of left knee, Corns and callosities, DDD (degenerative disc disease), lumbar, Diabetes mellitus type II, controlled ([address] Emotional disorder, Fungal infection of toenail, Hallux valgus, Hip pain, bilateral, Hyperglycemia, Hyperlipidemia,

## 2023-05-24 ENCOUNTER — OFFICE VISIT (OUTPATIENT)
Age: 78
End: 2023-05-24
Payer: MEDICARE

## 2023-05-24 VITALS
BODY MASS INDEX: 29.92 KG/M2 | DIASTOLIC BLOOD PRESSURE: 70 MMHG | HEIGHT: 62 IN | WEIGHT: 162.6 LBS | HEART RATE: 66 BPM | SYSTOLIC BLOOD PRESSURE: 130 MMHG

## 2023-05-24 DIAGNOSIS — R06.09 DOE (DYSPNEA ON EXERTION): ICD-10-CM

## 2023-05-24 DIAGNOSIS — I10 ESSENTIAL (PRIMARY) HYPERTENSION: Primary | ICD-10-CM

## 2023-05-24 DIAGNOSIS — R93.1 AGATSTON CORONARY ARTERY CALCIUM SCORE GREATER THAN 400: ICD-10-CM

## 2023-05-24 DIAGNOSIS — I10 HYPERTENSION, ESSENTIAL: ICD-10-CM

## 2023-05-24 DIAGNOSIS — E08.65 DIABETES MELLITUS DUE TO UNDERLYING CONDITION WITH HYPERGLYCEMIA, UNSPECIFIED WHETHER LONG TERM INSULIN USE (HCC): ICD-10-CM

## 2023-05-24 PROCEDURE — 99214 OFFICE O/P EST MOD 30 MIN: CPT | Performed by: INTERNAL MEDICINE

## 2023-05-24 PROCEDURE — 3075F SYST BP GE 130 - 139MM HG: CPT | Performed by: INTERNAL MEDICINE

## 2023-05-24 PROCEDURE — 3078F DIAST BP <80 MM HG: CPT | Performed by: INTERNAL MEDICINE

## 2023-05-24 PROCEDURE — 1123F ACP DISCUSS/DSCN MKR DOCD: CPT | Performed by: INTERNAL MEDICINE

## 2023-05-24 ASSESSMENT — ENCOUNTER SYMPTOMS
ABDOMINAL PAIN: 0
COUGH: 0
APHONIA: 0
NAIL CHANGES: 0
EYE PAIN: 0
STRIDOR: 0

## 2023-05-24 NOTE — PROGRESS NOTES
[address], [address] [place] [address], [address] [place] [address]  PHONE: [phone]    SUBJECTIVE:   Delma Cespedes is a 77 y.o. female 1945   seen for a follow up visit regarding the following:     Chief Complaint   Patient presents with    Hypertension    6 Month Follow-Up    Established New Doctor         History of present illness: 77 y.o. female presented for follow-up 5/24/23 previously seen by Dr. María Mcdaniels. Tearful today. Recent diagnosis of breast cancer recurrence. Cardiac history:  6/2017 CT calcium score 840 [90th percentile]  7/2017 nuclear stress SPECT normal perfusion  2/28/2023 CT chest 3 mm right middle lobe nodule 12-month CT follow-up recommended    Assessment:   Hypertension  Key CAD CHF Meds            atorvastatin (LIPITOR) 40 MG tablet (Taking)    Sig - Route: Take 1 tablet by mouth daily - Oral    Patient taking differently: Take 1 tablet by mouth at bedtime    irbesartan (AVAPRO) 150 MG tablet (Taking)    Sig - Route: Take 1 tablet by mouth daily - Oral    Patient taking differently: Take 1 tablet by mouth at bedtime    atenolol-chlorthalidone (TENORETIC) 50-25 MG per tablet (Taking)    Sig - Route: Take 0.5 tablets by mouth daily - Oral           Hyperlipidemia  atorvastatin - 40 MG   Agatston score greater than 400  Diabetes with hyperglycemia  Last A1c <7     Current Outpatient Medications   Medication Sig    fluticasone (FLONASE) 50 MCG/ACT nasal spray USE 2 SPRAYS NASALLY ONCE DAILY    letrozole (FEMARA) 2.5 MG tablet Take 1 tablet by mouth daily    acetaminophen (TYLENOL) 500 MG tablet Take 2 tablets by mouth every 6 hours as needed for Pain    magnesium oxide (MAG-OX) 400 MG tablet Take 1 tablet by mouth daily    Multiple Vitamin (MULTIVITAMIN ADULT PO) Take by mouth daily    calcium carbonate (OSCAL) 500 MG TABS tablet Take 1 tablet by mouth daily    nystatin (MYCOSTATIN) 616616 UNIT/GM cream Apply topically 2 times daily.     atorvastatin (LIPITOR) 40 MG

## 2023-05-31 ENCOUNTER — TELEPHONE (OUTPATIENT)
Dept: ONCOLOGY | Age: 78
End: 2023-05-31

## 2023-05-31 RX ORDER — LETROZOLE 2.5 MG/1
2.5 TABLET, FILM COATED ORAL DAILY
Qty: 90 TABLET | Refills: 3 | Status: SHIPPED | OUTPATIENT
Start: 2023-05-31

## 2023-07-03 ENCOUNTER — HOSPITAL ENCOUNTER (OUTPATIENT)
Dept: LAB | Age: 78
Discharge: HOME | End: 2023-07-06

## 2023-07-03 DIAGNOSIS — E03.9 ACQUIRED HYPOTHYROIDISM: ICD-10-CM

## 2023-07-03 DIAGNOSIS — R80.9 CONTROLLED TYPE 2 DIABETES MELLITUS WITH MICROALBUMINURIA, WITHOUT LONG-TERM CURRENT USE OF INSULIN (HCC): ICD-10-CM

## 2023-07-03 DIAGNOSIS — E11.29 CONTROLLED TYPE 2 DIABETES MELLITUS WITH MICROALBUMINURIA, WITHOUT LONG-TERM CURRENT USE OF INSULIN (HCC): ICD-10-CM

## 2023-07-03 LAB
ALT SERPL-CCNC: 22 U/L (ref 12–65)
ANION GAP SERPL CALC-SCNC: 4 MMOL/L (ref 2–11)
AST SERPL-CCNC: 18 U/L (ref 15–37)
BUN SERPL-MCNC: 29 MG/DL (ref 8–23)
CALCIUM SERPL-MCNC: 10.1 MG/DL (ref 8.3–10.4)
CHLORIDE SERPL-SCNC: 104 MMOL/L (ref 101–110)
CHOLEST SERPL-MCNC: 126 MG/DL
CO2 SERPL-SCNC: 31 MMOL/L (ref 21–32)
CREAT SERPL-MCNC: 1.1 MG/DL (ref 0.6–1)
GLUCOSE SERPL-MCNC: 125 MG/DL (ref 65–100)
HDLC SERPL-MCNC: 53 MG/DL (ref 40–60)
HDLC SERPL: 2.4
LDLC SERPL CALC-MCNC: 58.4 MG/DL
POTASSIUM SERPL-SCNC: 4 MMOL/L (ref 3.5–5.1)
SODIUM SERPL-SCNC: 139 MMOL/L (ref 133–143)
TRIGL SERPL-MCNC: 73 MG/DL (ref 35–150)
TSH, 3RD GENERATION: 0.34 UIU/ML (ref 0.36–3.74)
VLDLC SERPL CALC-MCNC: 14.6 MG/DL (ref 6–23)

## 2023-07-04 LAB
EST. AVERAGE GLUCOSE BLD GHB EST-MCNC: 146 MG/DL
HBA1C MFR BLD: 6.7 % (ref 4.8–5.6)

## 2023-07-10 ENCOUNTER — OFFICE VISIT (OUTPATIENT)
Dept: INTERNAL MEDICINE CLINIC | Facility: CLINIC | Age: 78
End: 2023-07-10
Payer: MEDICARE

## 2023-07-10 VITALS
HEIGHT: 62 IN | SYSTOLIC BLOOD PRESSURE: 124 MMHG | BODY MASS INDEX: 29.44 KG/M2 | OXYGEN SATURATION: 99 % | DIASTOLIC BLOOD PRESSURE: 78 MMHG | WEIGHT: 160 LBS | TEMPERATURE: 97.7 F | HEART RATE: 57 BPM

## 2023-07-10 DIAGNOSIS — E03.9 ACQUIRED HYPOTHYROIDISM: ICD-10-CM

## 2023-07-10 DIAGNOSIS — Z23 NEED FOR PROPHYLACTIC VACCINATION AND INOCULATION AGAINST VARICELLA: ICD-10-CM

## 2023-07-10 DIAGNOSIS — R80.9 CONTROLLED TYPE 2 DIABETES MELLITUS WITH MICROALBUMINURIA, WITHOUT LONG-TERM CURRENT USE OF INSULIN (HCC): ICD-10-CM

## 2023-07-10 DIAGNOSIS — Z00.00 MEDICARE ANNUAL WELLNESS VISIT, SUBSEQUENT: Primary | ICD-10-CM

## 2023-07-10 DIAGNOSIS — C50.911 INFILTRATING DUCTAL CARCINOMA OF RIGHT BREAST (HCC): ICD-10-CM

## 2023-07-10 DIAGNOSIS — E11.29 CONTROLLED TYPE 2 DIABETES MELLITUS WITH MICROALBUMINURIA, WITHOUT LONG-TERM CURRENT USE OF INSULIN (HCC): ICD-10-CM

## 2023-07-10 PROCEDURE — 3078F DIAST BP <80 MM HG: CPT | Performed by: INTERNAL MEDICINE

## 2023-07-10 PROCEDURE — 3044F HG A1C LEVEL LT 7.0%: CPT | Performed by: INTERNAL MEDICINE

## 2023-07-10 PROCEDURE — G0439 PPPS, SUBSEQ VISIT: HCPCS | Performed by: INTERNAL MEDICINE

## 2023-07-10 PROCEDURE — 1123F ACP DISCUSS/DSCN MKR DOCD: CPT | Performed by: INTERNAL MEDICINE

## 2023-07-10 PROCEDURE — 99214 OFFICE O/P EST MOD 30 MIN: CPT | Performed by: INTERNAL MEDICINE

## 2023-07-10 PROCEDURE — 3074F SYST BP LT 130 MM HG: CPT | Performed by: INTERNAL MEDICINE

## 2023-07-10 RX ORDER — MELOXICAM 15 MG/1
TABLET ORAL
COMMUNITY
Start: 2019-02-15

## 2023-07-10 RX ORDER — LEVOTHYROXINE SODIUM 0.05 MG/1
50 TABLET ORAL DAILY
Qty: 30 TABLET | Refills: 5 | Status: SHIPPED | OUTPATIENT
Start: 2023-07-10

## 2023-07-10 RX ORDER — BLOOD SUGAR DIAGNOSTIC
1 STRIP MISCELLANEOUS DAILY
Qty: 100 EACH | Refills: 3 | Status: SHIPPED | OUTPATIENT
Start: 2023-07-10

## 2023-07-10 ASSESSMENT — PATIENT HEALTH QUESTIONNAIRE - PHQ9
2. FEELING DOWN, DEPRESSED OR HOPELESS: 0
3. TROUBLE FALLING OR STAYING ASLEEP: 0
5. POOR APPETITE OR OVEREATING: 0
1. LITTLE INTEREST OR PLEASURE IN DOING THINGS: 0
7. TROUBLE CONCENTRATING ON THINGS, SUCH AS READING THE NEWSPAPER OR WATCHING TELEVISION: 0
4. FEELING TIRED OR HAVING LITTLE ENERGY: 0
SUM OF ALL RESPONSES TO PHQ QUESTIONS 1-9: 0
SUM OF ALL RESPONSES TO PHQ QUESTIONS 1-9: 0
10. IF YOU CHECKED OFF ANY PROBLEMS, HOW DIFFICULT HAVE THESE PROBLEMS MADE IT FOR YOU TO DO YOUR WORK, TAKE CARE OF THINGS AT HOME, OR GET ALONG WITH OTHER PEOPLE: 0
SUM OF ALL RESPONSES TO PHQ9 QUESTIONS 1 & 2: 0
9. THOUGHTS THAT YOU WOULD BE BETTER OFF DEAD, OR OF HURTING YOURSELF: 0
SUM OF ALL RESPONSES TO PHQ QUESTIONS 1-9: 0
8. MOVING OR SPEAKING SO SLOWLY THAT OTHER PEOPLE COULD HAVE NOTICED. OR THE OPPOSITE, BEING SO FIGETY OR RESTLESS THAT YOU HAVE BEEN MOVING AROUND A LOT MORE THAN USUAL: 0
SUM OF ALL RESPONSES TO PHQ QUESTIONS 1-9: 0
6. FEELING BAD ABOUT YOURSELF - OR THAT YOU ARE A FAILURE OR HAVE LET YOURSELF OR YOUR FAMILY DOWN: 0

## 2023-07-10 ASSESSMENT — LIFESTYLE VARIABLES
HOW OFTEN DO YOU HAVE A DRINK CONTAINING ALCOHOL: NEVER
HOW MANY STANDARD DRINKS CONTAINING ALCOHOL DO YOU HAVE ON A TYPICAL DAY: PATIENT DOES NOT DRINK

## 2023-07-10 NOTE — PATIENT INSTRUCTIONS
Decrease Levothyroxine to 50 mcg once daily    Blood sugar is stable. Diabetic test strips are refilled. Nuclear stress test normal.    Podiatry care is current. Labs reviewed and are otherwise stable. Medication refilled    Consider Shingles vaccine. The patient and I discussed the risk and benefits of shingles vaccine. Given the patient's age I believe he would be a good candidate for this. Based on CDC recommendations, I recommend the Shingrix Vaccine for you. Healthy adults 50 years and older should get two doses of Shingrix,  by 2 to 6 months. You should get Shingrix even if in the past you received Zostavax. There is no maximum age for getting Shingrix. If you had shingles in the past, you can get Shingrix to help prevent future occurrences of the disease. There is no specific length of time that you need to wait after having shingles before you can receive Shingrix, but generally you should make sure the shingles rash has gone away before getting vaccinated.     Recommended to be purchased and administered your local pharmacy     Results for orders placed or performed in visit on 07/03/23 (from the past 2160 hour(s))   TSH   Result Value Ref Range    TSH, 3RD GENERATION 0.340 (L) 0.358 - 3.740 uIU/mL   AST   Result Value Ref Range    AST 18 15 - 37 U/L   ALT   Result Value Ref Range    ALT 22 12 - 65 U/L   Basic Metabolic Panel   Result Value Ref Range    Sodium 139 133 - 143 mmol/L    Potassium 4.0 3.5 - 5.1 mmol/L    Chloride 104 101 - 110 mmol/L    CO2 31 21 - 32 mmol/L    Anion Gap 4 2 - 11 mmol/L    Glucose 125 (H) 65 - 100 mg/dL    BUN 29 (H) 8 - 23 MG/DL    Creatinine 1.10 (H) 0.6 - 1.0 MG/DL    Est, Glom Filt Rate 51 (L) >60 ml/min/1.73m2    Calcium 10.1 8.3 - 10.4 MG/DL   Lipid Panel   Result Value Ref Range    Cholesterol, Total 126 <200 MG/DL    Triglycerides 73 35 - 150 MG/DL    HDL 53 40 - 60 MG/DL    LDL Calculated 58.4 <100 MG/DL    VLDL Cholesterol

## 2023-07-17 ENCOUNTER — HOSPITAL ENCOUNTER (OUTPATIENT)
Dept: LAB | Age: 78
Discharge: HOME | End: 2023-07-20
Payer: MEDICARE

## 2023-07-17 ENCOUNTER — OFFICE VISIT (OUTPATIENT)
Dept: ONCOLOGY | Age: 78
End: 2023-07-17
Payer: MEDICARE

## 2023-07-17 VITALS
WEIGHT: 160.6 LBS | HEART RATE: 60 BPM | DIASTOLIC BLOOD PRESSURE: 74 MMHG | BODY MASS INDEX: 29.55 KG/M2 | RESPIRATION RATE: 16 BRPM | TEMPERATURE: 98.1 F | OXYGEN SATURATION: 100 % | HEIGHT: 62 IN | SYSTOLIC BLOOD PRESSURE: 134 MMHG

## 2023-07-17 DIAGNOSIS — Z17.0 MALIGNANT NEOPLASM OF RIGHT BREAST IN FEMALE, ESTROGEN RECEPTOR POSITIVE, UNSPECIFIED SITE OF BREAST (HCC): ICD-10-CM

## 2023-07-17 DIAGNOSIS — Z79.811 USE OF AROMATASE INHIBITORS: ICD-10-CM

## 2023-07-17 DIAGNOSIS — Z17.0 MALIGNANT NEOPLASM OF RIGHT BREAST IN FEMALE, ESTROGEN RECEPTOR POSITIVE, UNSPECIFIED SITE OF BREAST (HCC): Primary | ICD-10-CM

## 2023-07-17 DIAGNOSIS — C50.911 MALIGNANT NEOPLASM OF RIGHT BREAST IN FEMALE, ESTROGEN RECEPTOR POSITIVE, UNSPECIFIED SITE OF BREAST (HCC): Primary | ICD-10-CM

## 2023-07-17 DIAGNOSIS — C50.911 MALIGNANT NEOPLASM OF RIGHT BREAST IN FEMALE, ESTROGEN RECEPTOR POSITIVE, UNSPECIFIED SITE OF BREAST (HCC): ICD-10-CM

## 2023-07-17 LAB
ALBUMIN SERPL-MCNC: 3.6 G/DL (ref 3.2–4.6)
ALBUMIN/GLOB SERPL: 0.7 (ref 0.4–1.6)
ALP SERPL-CCNC: 84 U/L (ref 50–136)
ALT SERPL-CCNC: 20 U/L (ref 12–65)
ANION GAP SERPL CALC-SCNC: 3 MMOL/L (ref 2–11)
AST SERPL-CCNC: 17 U/L (ref 15–37)
BASOPHILS # BLD: 0 K/UL (ref 0–0.2)
BASOPHILS NFR BLD: 0 % (ref 0–2)
BILIRUB SERPL-MCNC: 0.8 MG/DL (ref 0.2–1.1)
BUN SERPL-MCNC: 17 MG/DL (ref 8–23)
CALCIUM SERPL-MCNC: 9.9 MG/DL (ref 8.3–10.4)
CHLORIDE SERPL-SCNC: 105 MMOL/L (ref 101–110)
CO2 SERPL-SCNC: 30 MMOL/L (ref 21–32)
CREAT SERPL-MCNC: 1.1 MG/DL (ref 0.6–1)
DIFFERENTIAL METHOD BLD: NORMAL
EOSINOPHIL # BLD: 0.1 K/UL (ref 0–0.8)
EOSINOPHIL NFR BLD: 1 % (ref 0.5–7.8)
ERYTHROCYTE [DISTWIDTH] IN BLOOD BY AUTOMATED COUNT: 14 % (ref 11.9–14.6)
GLOBULIN SER CALC-MCNC: 4.9 G/DL (ref 2.8–4.5)
GLUCOSE SERPL-MCNC: 123 MG/DL (ref 65–100)
HCT VFR BLD AUTO: 40 % (ref 35.8–46.3)
HGB BLD-MCNC: 12.9 G/DL (ref 11.7–15.4)
IMM GRANULOCYTES # BLD AUTO: 0 K/UL (ref 0–0.5)
IMM GRANULOCYTES NFR BLD AUTO: 0 % (ref 0–5)
LYMPHOCYTES # BLD: 1.1 K/UL (ref 0.5–4.6)
LYMPHOCYTES NFR BLD: 17 % (ref 13–44)
MCH RBC QN AUTO: 27.7 PG (ref 26.1–32.9)
MCHC RBC AUTO-ENTMCNC: 32.3 G/DL (ref 31.4–35)
MCV RBC AUTO: 85.8 FL (ref 82–102)
MONOCYTES # BLD: 0.4 K/UL (ref 0.1–1.3)
MONOCYTES NFR BLD: 5 % (ref 4–12)
NEUTS SEG # BLD: 5 K/UL (ref 1.7–8.2)
NEUTS SEG NFR BLD: 77 % (ref 43–78)
NRBC # BLD: 0 K/UL (ref 0–0.2)
PLATELET # BLD AUTO: 238 K/UL (ref 150–450)
PMV BLD AUTO: 10.4 FL (ref 9.4–12.3)
POTASSIUM SERPL-SCNC: 4 MMOL/L (ref 3.5–5.1)
PROT SERPL-MCNC: 8.5 G/DL (ref 6.3–8.2)
RBC # BLD AUTO: 4.66 M/UL (ref 4.05–5.2)
SODIUM SERPL-SCNC: 138 MMOL/L (ref 133–143)
WBC # BLD AUTO: 6.6 K/UL (ref 4.3–11.1)

## 2023-07-17 PROCEDURE — 3075F SYST BP GE 130 - 139MM HG: CPT | Performed by: INTERNAL MEDICINE

## 2023-07-17 PROCEDURE — 80053 COMPREHEN METABOLIC PANEL: CPT

## 2023-07-17 PROCEDURE — 85025 COMPLETE CBC W/AUTO DIFF WBC: CPT

## 2023-07-17 PROCEDURE — 36415 COLL VENOUS BLD VENIPUNCTURE: CPT

## 2023-07-17 PROCEDURE — 99214 OFFICE O/P EST MOD 30 MIN: CPT | Performed by: INTERNAL MEDICINE

## 2023-07-17 PROCEDURE — 1123F ACP DISCUSS/DSCN MKR DOCD: CPT | Performed by: INTERNAL MEDICINE

## 2023-07-17 PROCEDURE — 3078F DIAST BP <80 MM HG: CPT | Performed by: INTERNAL MEDICINE

## 2023-07-17 ASSESSMENT — PATIENT HEALTH QUESTIONNAIRE - PHQ9
SUM OF ALL RESPONSES TO PHQ QUESTIONS 1-9: 0
1. LITTLE INTEREST OR PLEASURE IN DOING THINGS: 0
SUM OF ALL RESPONSES TO PHQ9 QUESTIONS 1 & 2: 0
SUM OF ALL RESPONSES TO PHQ QUESTIONS 1-9: 0
2. FEELING DOWN, DEPRESSED OR HOPELESS: 0

## 2023-07-17 NOTE — PATIENT INSTRUCTIONS
Patient Instructions from Today's Visit    Reason for Visit:  Follow up Breast Cancer     Diagnosis Information:  [email or web address]    Plan:  Lab results reviewed     Follow Up:   Follow up in 6 months     Recent Lab Results:  Hospital Outpatient Visit on 07/17/2023   Component Date Value Ref Range Status    WBC 07/17/2023 6.6  4.3 - 11.1 K/uL Final    RBC 07/17/2023 4.66  4.05 - 5.2 M/uL Final    Hemoglobin 07/17/2023 12.9  11.7 - 15.4 g/dL Final    Hematocrit 07/17/2023 40.0  35.8 - 46.3 % Final    MCV 07/17/2023 85.8  82.0 - 102.0 FL Final    MCH 07/17/2023 27.7  26.1 - 32.9 PG Final    MCHC 07/17/2023 32.3  31.4 - 35.0 g/dL Final    RDW 07/17/2023 14.0  11.9 - 14.6 % Final    Platelets 08/56/9174 238  150 - 450 K/uL Final    MPV 07/17/2023 10.4  9.4 - 12.3 FL Final    nRBC 07/17/2023 0.00  0.0 - 0.2 K/uL Final    **Note: Absolute NRBC parameter is now reported with Hemogram**    Neutrophils % 07/17/2023 77  43 - 78 % Final    Lymphocytes % 07/17/2023 17  13 - 44 % Final    Monocytes % 07/17/2023 5  4.0 - 12.0 % Final    Eosinophils % 07/17/2023 1  0.5 - 7.8 % Final    Basophils % 07/17/2023 0  0.0 - 2.0 % Final    Immature Granulocytes 07/17/2023 0  0.0 - 5.0 % Final    Neutrophils Absolute 07/17/2023 5.0  1.7 - 8.2 K/UL Final    Lymphocytes Absolute 07/17/2023 1.1  0.5 - 4.6 K/UL Final    Monocytes Absolute 07/17/2023 0.4  0.1 - 1.3 K/UL Final    Eosinophils Absolute 07/17/2023 0.1  0.0 - 0.8 K/UL Final    Basophils Absolute 07/17/2023 0.0  0.0 - 0.2 K/UL Final    Absolute Immature Granulocyte 07/17/2023 0.0  0.0 - 0.5 K/UL Final    Differential Type 07/17/2023 AUTOMATED    Final     Treatment Summary has been discussed and given to patient:   N/A  -------------------------------------------------------------------------------------------------------------------  Please call our office at [phone] if you have any  of the

## 2023-07-17 NOTE — PROGRESS NOTES
[facility]: Office Visit Established Patient    Chief Complaint:    Chief Complaint   Patient presents with    Follow-up           History of Present Illness:  Ms. Dov Castro is a 77 y.o. female who presents today for follow-up regarding breast cancer. She had a left breast cancer in 2001 for which she had a lumpectomy and radiation, she also took endocrine therapy of unknown type and duration. In August 2022 she had her routine screening mammogram that reported a right breast asymmetry. On 8/25/22 she had a right breast diagnostic mammogram and ultrasound which confirmed the findings and on 8/31/22 she underwent a core needle biopsy of the right breast mass. The pathology reported infiltrating ductal carcinoma, low grade, ER 95%, WV 0% and HER2 1+. On 9/7/22 she had a bilateral breast MRI that reported the known right breast cancer of 1.4 cm, no additional suspicious findings in either breast or no evidence of lymphadenopathy was noted. She was recommended to see surgery for upfront management, then return to us for adjuvant therapy recommendations. Path confirms the tumor to be 1.6 cm, tumor extended to inferior margin but re-excision was negative. 3 sentinel nodes were also negative for tumor. We discussed options, including gene recurrence testing for risk stratification, but she admits that this would not change her mind about potential for chemotherapy. Therefore, we will recommend antiestrogen therapy alone. She is post-menopausal, so a prescription was given for anastrozole. We also discussed radiation referral for risk reduction but she does not seem interested in this discussion given our explanation of the data, which suggests no mortality benefit for patients over 70 who are on endocrine therapy. She was changed from Arimidex to [facility] for tolerance in January 2023. She returns today for routine follow up on AI.   She does feel that the letrozole is easier on her than the

## 2023-07-21 ENCOUNTER — TELEPHONE (OUTPATIENT)
Dept: INTERNAL MEDICINE CLINIC | Facility: CLINIC | Age: 78
End: 2023-07-21

## 2023-07-21 NOTE — TELEPHONE ENCOUNTER
Patient states at last visit,she understood that Levothyroxine had been changed from 75 mcg to 50 mcg. She states she picked up new prescription but did not see it was changed on End of Visit summary. Which strength of Levothyroxine should she be taking?

## 2023-07-24 ENCOUNTER — HOSPITAL ENCOUNTER (OUTPATIENT)
Dept: GENERAL RADIOLOGY | Age: 78
Discharge: HOME | End: 2023-07-27
Payer: MEDICARE

## 2023-07-24 ENCOUNTER — OFFICE VISIT (OUTPATIENT)
Dept: ENT CLINIC | Age: 78
End: 2023-07-24
Payer: MEDICARE

## 2023-07-24 VITALS — OXYGEN SATURATION: 99 % | WEIGHT: 160 LBS | HEIGHT: 62 IN | BODY MASS INDEX: 29.44 KG/M2

## 2023-07-24 DIAGNOSIS — M54.50 ACUTE BILATERAL LOW BACK PAIN WITHOUT SCIATICA: ICD-10-CM

## 2023-07-24 DIAGNOSIS — H93.13 TINNITUS OF BOTH EARS: Primary | Chronic | ICD-10-CM

## 2023-07-24 DIAGNOSIS — H90.3 SENSORINEURAL HEARING LOSS, BILATERAL: Chronic | ICD-10-CM

## 2023-07-24 PROCEDURE — 99214 OFFICE O/P EST MOD 30 MIN: CPT | Performed by: PHYSICIAN ASSISTANT

## 2023-07-24 PROCEDURE — 1123F ACP DISCUSS/DSCN MKR DOCD: CPT | Performed by: PHYSICIAN ASSISTANT

## 2023-07-24 PROCEDURE — 72100 X-RAY EXAM L-S SPINE 2/3 VWS: CPT

## 2023-07-24 ASSESSMENT — ENCOUNTER SYMPTOMS
COUGH: 0
ABDOMINAL PAIN: 0
EYE DISCHARGE: 0

## 2023-07-24 NOTE — PROGRESS NOTES
noted.      Procedure: N/A    Assessment/Plan:  1. Tinnitus of both ears  2. Sensorineural hearing loss, bilateral    Pt has snhl and tinnitus with not much subjective changes from last visit she is bothered by tinnitus but has been very stressed, not sleeping well. Will rtc in Nov after 11/23 for annual audio. Recommend hearing aids. Masking and eval for dairy allergy. I recommended sinus rinse and saline spray. Return in about 6 months (around 1/24/2024) for with Audio. Patient agrees with this plan. SARAH Angulo    This note was generated using voice recognition software, please excuse any typos.

## 2023-07-27 ENCOUNTER — HOSPITAL ENCOUNTER (EMERGENCY)
Age: 78
Discharge: HOME | End: 2023-07-27
Attending: EMERGENCY MEDICINE
Payer: MEDICARE

## 2023-07-27 ENCOUNTER — APPOINTMENT (OUTPATIENT)
Dept: CT IMAGING | Age: 78
End: 2023-07-27
Payer: MEDICARE

## 2023-07-27 VITALS
RESPIRATION RATE: 15 BRPM | SYSTOLIC BLOOD PRESSURE: 149 MMHG | HEART RATE: 68 BPM | WEIGHT: 160 LBS | HEIGHT: 62 IN | OXYGEN SATURATION: 98 % | TEMPERATURE: 98 F | BODY MASS INDEX: 29.44 KG/M2 | DIASTOLIC BLOOD PRESSURE: 79 MMHG

## 2023-07-27 DIAGNOSIS — S09.90XA CLOSED HEAD INJURY, INITIAL ENCOUNTER: Primary | ICD-10-CM

## 2023-07-27 PROCEDURE — 70450 CT HEAD/BRAIN W/O DYE: CPT

## 2023-07-27 PROCEDURE — 72125 CT NECK SPINE W/O DYE: CPT

## 2023-07-27 PROCEDURE — 99284 EMERGENCY DEPT VISIT MOD MDM: CPT

## 2023-07-27 ASSESSMENT — LIFESTYLE VARIABLES
HOW MANY STANDARD DRINKS CONTAINING ALCOHOL DO YOU HAVE ON A TYPICAL DAY: PATIENT DOES NOT DRINK
HOW OFTEN DO YOU HAVE A DRINK CONTAINING ALCOHOL: NEVER

## 2023-07-27 ASSESSMENT — ENCOUNTER SYMPTOMS
PHOTOPHOBIA: 0
FACIAL SWELLING: 0
SHORTNESS OF BREATH: 0
ABDOMINAL PAIN: 0
COUGH: 0
VOMITING: 0
TROUBLE SWALLOWING: 0

## 2023-07-27 ASSESSMENT — PAIN - FUNCTIONAL ASSESSMENT: PAIN_FUNCTIONAL_ASSESSMENT: 0-10

## 2023-07-27 ASSESSMENT — PAIN DESCRIPTION - DESCRIPTORS: DESCRIPTORS: TENDER

## 2023-07-27 ASSESSMENT — PAIN DESCRIPTION - ORIENTATION: ORIENTATION: LEFT;POSTERIOR

## 2023-07-27 ASSESSMENT — PAIN SCALES - GENERAL: PAINLEVEL_OUTOF10: 3

## 2023-07-27 ASSESSMENT — PAIN DESCRIPTION - LOCATION: LOCATION: HEAD

## 2023-07-27 NOTE — DISCHARGE INSTRUCTIONS
Your CT imaging was normal today. You may have a minor concussion. Use Tylenol as needed for headaches. I advise resting your brain which includes limiting your screen time and using quiet and dark rooms. Ease back into your normal activities as you tolerate them. Return here for new or worsening symptoms. As we discussed, I did not find a life threatening cause of your symptoms today. However, THAT DOES NOT MEAN IT COULD NOT DEVELOP. If you develop ANY new or worsening symptoms, it is critical that you return for re-evaluation. This includes any symptoms that are concerning to you, especially symptoms such as fevers, worsening of life, double vision, unilateral weakness or numbness. If you do not return for re-evaluation, you risk serious complications, including death.

## 2023-07-27 NOTE — ED PROVIDER NOTES
Emergency Department Provider Note       PCP: Becky Luong MD   Age: 77 y.o. Sex: female     DISPOSITION Decision To Discharge 07/27/2023 04:37:33 PM       ICD-10-CM    1. Closed head injury, initial encounter  S09.90XA           Medical Decision Making     Complexity of Problems Addressed:  1 acute uncomplicated injury    Data Reviewed and Analyzed:  Category 1:   I independently ordered and reviewed each unique test.     The patients assessment required an independent historian: Patient's son. The reason they were needed is important historical information not provided by the patient. Category 2:   I interpreted the CT Scan no bleed or fracture. RADIOLOGY DISCLAIMER: Although I do my best to interpret the imaging, I am not a board-certified radiologist.  I am still basing my medical decision making off of the board-certified radiology interpretation provided to me. Category 3: Discussion of management or test interpretation. In summary this is a 65-year-old female patient who presented after a fall with head injury. Likely minor concussion. Based on my evaluation I feel the patient is at low risk for alternative diagnoses such as basilar skull fracture, open fracture, subarachnoid hemorrhage, intracranial bleed, focal neurologic deficit. The reasoning behind my decision making process is that the patient is grossly well-appearing on exam in no acute distress. Vital signs are stable without fever, tachycardia, tachypnea, hypoxia, hypotension. No raccoon eyes, Woodson sign, hemotympanum, CSF drainage from the nose or ears to suggest basilar skull fracture. No signs of open lesions to suggest open fracture. Headache not worst of life, sudden in onset, maximal in onset to suggest [place]. Neurologic exam was normal without focal neurologic deficit. [place] CT head rule positive due to age. Advanced CT imaging was performed. My independent interpretation did not reveal signs of bleeding.   Plan

## 2023-07-27 NOTE — ED TRIAGE NOTES
Patient ambulatory to triage. Pt reports she was transferring from a \"buggy\" at the grocery store to a bench and missed the bench and fell to the floor. Pt reports she fell onto Left arm with head strike to floor. Denies LOC and blood thinners. Pt c/o pain in Left side posterior head. A/Ox. 4

## 2023-08-11 ENCOUNTER — OFFICE VISIT (OUTPATIENT)
Dept: INTERNAL MEDICINE CLINIC | Facility: CLINIC | Age: 78
End: 2023-08-11
Payer: MEDICARE

## 2023-08-11 ENCOUNTER — TELEPHONE (OUTPATIENT)
Dept: INTERNAL MEDICINE CLINIC | Facility: CLINIC | Age: 78
End: 2023-08-11

## 2023-08-11 VITALS
OXYGEN SATURATION: 99 % | HEIGHT: 62 IN | TEMPERATURE: 97.4 F | BODY MASS INDEX: 28.16 KG/M2 | WEIGHT: 153 LBS | HEART RATE: 72 BPM | SYSTOLIC BLOOD PRESSURE: 116 MMHG | DIASTOLIC BLOOD PRESSURE: 70 MMHG
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
## 2023-08-11 DIAGNOSIS — J06.9 ACUTE URI: ICD-10-CM

## 2023-08-11 DIAGNOSIS — M25.562 ACUTE PAIN OF LEFT KNEE: ICD-10-CM

## 2023-08-11 PROCEDURE — 87635 SARS-COV-2 COVID-19 AMP PRB: CPT | Performed by: NURSE PRACTITIONER

## 2023-08-11 PROCEDURE — 1123F ACP DISCUSS/DSCN MKR DOCD: CPT | Performed by: NURSE PRACTITIONER

## 2023-08-11 PROCEDURE — 3074F SYST BP LT 130 MM HG: CPT | Performed by: NURSE PRACTITIONER

## 2023-08-11 PROCEDURE — 3078F DIAST BP <80 MM HG: CPT | Performed by: NURSE PRACTITIONER

## 2023-08-11 PROCEDURE — 99214 OFFICE O/P EST MOD 30 MIN: CPT | Performed by: NURSE PRACTITIONER

## 2023-08-11 ASSESSMENT — ENCOUNTER SYMPTOMS
RHINORRHEA: 1
WHEEZING: 0
COUGH: 0
NAUSEA: 0
SINUS PRESSURE: 0
SORE THROAT: 0
SINUS PAIN: 0
VOMITING: 0
SHORTNESS OF BREATH: 0
ABDOMINAL PAIN: 0
DIARRHEA: 0

## 2023-08-11 NOTE — TELEPHONE ENCOUNTER
----- Message from Amandaabeba Le sent at 8/4/2023 11:20 AM EDT -----  Subject: Message to Provider    QUESTIONS  Information for Provider? [facility] -is requesting that the patient   diabetic nephropathy testing   ---------------------------------------------------------------------------  --------------  CALL BACK INFO  [phone]; OK to leave message on voicemail  ---------------------------------------------------------------------------  --------------  SCRIPT ANSWERS  Relationship to Patient? Covered Entity  Covered Entity Type? Health Insurance?   Representative Name? [facility] zarina - Abdi Delgado [phone] Bed: H12  Expected date:   Expected time:   Means of arrival:   Comments:  charge

## 2023-08-11 NOTE — PROGRESS NOTES
[facility]      2023    Patient Name: Anisa Osullivan  :        Chief Complaint:  Chief Complaint   Patient presents with    Follow-up    Knee Injury     Patient c/o worsening left knee pain since fall. HPI  Patient presents today for follow-up on recent ER and urgent care visits. 50-year-old female patient presented to the ER on 23 complaining of a fall that occurred several hours prior to arrival.  Reports she was trying to change seats on a cart at the grocery store when she missed and fell over to the side. States she landed on her left side and hit her head against the ground. Denied loss of consciousness. Denied blood thinner use. Denied vomiting. Denied double vision. Denied unilateral weakness or numbness. Denied inability to bear weight. Denied any arm pain. CT cervical spine and CT head completed. No acute abnormalities. Patient reports two falls since February. She was then seen at urgent care on 23 with complaint of left knee pain that started a couple of days after being seen in the ER. X-ray completed which showed no acute abnormality. She was referred to [facility]. She will start PT later today. She has been applying Voltaren gel. Pain improving slowly. Lastly, she has complaint of low-grade fever, congestion and rhinorrhea. Symptoms present x 2-3 days. Patient very concerned about the possibility of having covid. Will test today. Denies SOB, wheezing, cough.        Past Medical History:   Diagnosis Date    Abdominal pain     Arthritis     Breast cancer ([address])     Breast cancer, left ([address])     radiation, surgery    Breast pain in female     Bunion     Chronic pain of left knee     Corns and callosities     DDD (degenerative disc disease), lumbar     Diabetes mellitus type II, controlled ([address])     Diet controlled    Emotional disorder     Fungal infection of toenail     Hallux valgus     Hip pain, bilateral     Hyperglycemia

## 2023-08-21 ENCOUNTER — HOSPITAL ENCOUNTER (OUTPATIENT)
Dept: MAMMOGRAPHY | Age: 78
Discharge: HOME | End: 2023-08-24
Attending: STUDENT IN AN ORGANIZED HEALTH CARE EDUCATION/TRAINING PROGRAM
Payer: MEDICARE

## 2023-08-21 VITALS — BODY MASS INDEX: 29.44 KG/M2 | WEIGHT: 160 LBS | HEIGHT: 62 IN

## 2023-08-21 DIAGNOSIS — Z12.31 SCREENING MAMMOGRAM FOR HIGH-RISK PATIENT: ICD-10-CM

## 2023-08-21 PROCEDURE — 77067 SCR MAMMO BI INCL CAD: CPT

## 2023-08-28 ENCOUNTER — HOSPITAL ENCOUNTER (OUTPATIENT)
Dept: GENERAL RADIOLOGY | Age: 78
Discharge: HOME | End: 2023-08-31
Payer: MEDICARE

## 2023-08-28 ENCOUNTER — OFFICE VISIT (OUTPATIENT)
Dept: INTERNAL MEDICINE CLINIC | Facility: CLINIC | Age: 78
End: 2023-08-28
Payer: MEDICARE

## 2023-08-28 VITALS
HEART RATE: 57 BPM | HEIGHT: 62 IN | WEIGHT: 159.38 LBS | DIASTOLIC BLOOD PRESSURE: 88 MMHG | BODY MASS INDEX: 29.33 KG/M2 | SYSTOLIC BLOOD PRESSURE: 132 MMHG | TEMPERATURE: 97.8 F | OXYGEN SATURATION: 99 %

## 2023-08-28 DIAGNOSIS — M25.472 PAIN AND SWELLING OF LEFT ANKLE: ICD-10-CM

## 2023-08-28 DIAGNOSIS — M25.572 PAIN AND SWELLING OF LEFT ANKLE: ICD-10-CM

## 2023-08-28 DIAGNOSIS — M54.2 NECK PAIN: ICD-10-CM
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
## 2023-08-28 DIAGNOSIS — M25.562 ACUTE PAIN OF LEFT KNEE: ICD-10-CM

## 2023-08-28 PROCEDURE — 3079F DIAST BP 80-89 MM HG: CPT | Performed by: NURSE PRACTITIONER

## 2023-08-28 PROCEDURE — 1123F ACP DISCUSS/DSCN MKR DOCD: CPT | Performed by: NURSE PRACTITIONER

## 2023-08-28 PROCEDURE — 99213 OFFICE O/P EST LOW 20 MIN: CPT | Performed by: NURSE PRACTITIONER

## 2023-08-28 PROCEDURE — 3075F SYST BP GE 130 - 139MM HG: CPT | Performed by: NURSE PRACTITIONER

## 2023-08-28 PROCEDURE — 73610 X-RAY EXAM OF ANKLE: CPT

## 2023-08-28 ASSESSMENT — ENCOUNTER SYMPTOMS
ABDOMINAL PAIN: 0
NAUSEA: 0
VOMITING: 0
SHORTNESS OF BREATH: 0

## 2023-08-28 NOTE — PROGRESS NOTES
[facility]      2023    Patient Name: Rissa Ponce  :        Chief Complaint:  Chief Complaint   Patient presents with    Knee Pain     Patient states knee pain is better due to physical therapy. Neck Pain     Patient states she has worsening neck pain. Ankle Pain     Patient c/o left ankle tenderness. States she had surgery on this ankle and it has become more tender lately. HPI  Patient presents today for follow-up. When seen earlier this month, complained of left knee pain following a fall. X-ray was negative for acute abnormality. She was referred for PT and reports that knee pain is improved since beginning therapy. Bilateral neck pain also started following the fall. CT cervical spine was completed when seen in the ER--negative for any acute abnormality; did show DDD, most severe at C5-C6 and facet arthropathy changes most pronounced at C4-C5. Over the past week or two, she is now having left ankle pain and swelling as well. Therapist told her it could be because of the way she is walking because of her knee pain. She reports history of left ankle surgery about 10 years ago.        Past Medical History:   Diagnosis Date    Abdominal pain     Arthritis     Breast cancer ([address])     Breast cancer, left ([address])     radiation, surgery    Breast pain in female     Bunion     Chronic pain of left knee     Corns and callosities     DDD (degenerative disc disease), lumbar     Diabetes mellitus type II, controlled ([address])     Diet controlled    Emotional disorder     Fungal infection of toenail     Hallux valgus     Hip pain, bilateral     Hyperglycemia     Hyperlipidemia     Hypertension     medication    Hypothyroidism     Lower GI bleed     Obesity     Other ill-defined conditions(309.89)     pt reports urinalysis shows blood - she is scheduled for CT pelvis and abdomen in August)    Postmenopausal     Right median nerve neuropathy     Stress incontinence

## 2023-09-06 ENCOUNTER — HOSPITAL ENCOUNTER (OUTPATIENT)
Dept: PHYSICAL THERAPY | Age: 78
Setting detail: RECURRING SERIES
Discharge: HOME | End: 2023-09-09
Payer: MEDICARE

## 2023-09-06 PROCEDURE — 97110 THERAPEUTIC EXERCISES: CPT

## 2023-09-06 PROCEDURE — 97161 PT EVAL LOW COMPLEX 20 MIN: CPT

## 2023-09-06 ASSESSMENT — PAIN SCALES - GENERAL: PAINLEVEL_OUTOF10: 7

## 2023-09-06 NOTE — PROGRESS NOTES
Bubba Median  : [birth year]  Primary[place]/p* (Medicare Managed)  Secondary: Mckay Perez @ [address]  [address] [facility] Juan [place] [address]  Phone: [phone]  Fax: [phone] Plan Frequency: 2x per week 8 weeks    Plan of Care/Certification Expiration Date: 23      >PT Visit Info:  Plan Frequency: 2x per week 8 weeks  Plan of Care/Certification Expiration Date: 23  Progress Note Counter: 1      Visit Count:  1    OUTPATIENT PHYSICAL THERAPY:OP NOTE TYPE: OP Note Type: Treatment Note 2023       Episode  }Appt Desk             Treatment Diagnosis:  Pain and swelling of left ankle [M25.572, M25.472]  Acute pain of left knee [M25.562]  Neck pain [M54.2]  Medical/Referring Diagnosis:  Pain and swelling of left ankle [M25.572, M25.472]  Fall, initial encounter [W19. XXXA]  Acute pain of left knee [M25.562]  Neck pain [M54.2]  Referring Physician:  RUBEN Reynoso CNP, MD Orders:  PT Eval and Treat   Date of Onset:  Onset Date: 23     Allergies:   Milk protein  Restrictions/Precautions:  Restrictions/Precautions: None  No data recorded   Interventions Planned (Treatment may consist of any combination of the following):    Current Treatment Recommendations: Strengthening; ROM; Balance training; Functional mobility training; Manual; Stair training; Home exercise program; Patient/Caregiver education & training; Modalities; Aquatics; Therapeutic activities     >Subjective Comments:  Pt reports knee and ankle are the worst today  >Initial:     7/10>Post Session:       7/10  Medications Last Reviewed:  2023  Updated Objective Findings:  See evaluation note from today  Treatment   THERAPEUTIC EXERCISE: (10 minutes):    Exercises per grid below to improve mobility and strength. Required minimal verbal cues to promote proper body mechanics. Progressed resistance, range, and repetitions as indicated.   Review of HEP  LAQ x 10  Sit

## 2023-09-06 NOTE — PLAN OF CARE
Cornelio Cotto  : [birth year]  Primary: [place]/p* (Medicare Managed)  Secondary: [address] @ [address]  [address] [facility] [place] [address]  Phone: [phone]  Fax: [phone] Plan Frequency: 2x per week 8 weeks    Plan of Care/Certification Expiration Date: 23      PT Visit Info:  Plan Frequency: 2x per week 8 weeks  Plan of Care/Certification Expiration Date: 23  Progress Note Counter: 1      Visit Count:  1                OUTPATIENT PHYSICAL THERAPY:             OP NOTE TYPE: Initial Assessment 2023               Episode (knee, ankle and neck pain) Appt Desk         Treatment Diagnosis:  Pain and swelling of left ankle [M25.572, M25.472]  Acute pain of left knee [M25.562]  Neck pain [M54.2]  Medical/Referring Diagnosis:  Pain and swelling of left ankle [M25.572, M25.472]  Fall, initial encounter [W19. XXXA]  Acute pain of left knee [M25.562]  Neck pain [M54.2]  Referring Physician:  RUBEN Dominguez CNP, MD Orders:  PT Eval and Treat   Return MD Appt:  as needed  Date of Onset:  Onset Date: 23      Allergies:  Milk protein  Restrictions/Precautions:    Restrictions/Precautions: None        Medications Last Reviewed:  2023     SUBJECTIVE   History of Injury/Illness (Reason for Referral):  Pt reports she fell while trying to sit down on bench at [employer] and she missed the bench and fell onto her left side. She is having increased pain in the left knee and left ankle. Also some mild pain in the neck region. She reports she was in a wheelchair at home and has weaned herself out of the chair and uses a cane at times. She is worried about falling. She was seen and cleared in ER. X-rays of ankle show tibiotalar arthritis.   Patient Stated Goal(s):  \"to decrease pain in knee, ankle and ambulate with more ease\"  Initial:     7/10 Post Session:     7/10  Past Medical History/Comorbidities:   Ms. Annemarie Butts  has a past

## 2023-09-08 ENCOUNTER — HOSPITAL ENCOUNTER (OUTPATIENT)
Dept: PHYSICAL THERAPY | Age: 78
Setting detail: RECURRING SERIES
Discharge: HOME | End: 2023-09-11
Payer: MEDICARE

## 2023-09-08 PROCEDURE — 97110 THERAPEUTIC EXERCISES: CPT

## 2023-09-08 ASSESSMENT — PAIN SCALES - GENERAL: PAINLEVEL_OUTOF10: 8

## 2023-09-08 NOTE — PROGRESS NOTES
Bubba Median  : 670  [place]/p* (Medicare Managed)  Secondary: Mckay Perez @ [address]  [address] [facility] EL[place] [address]  Phone: [phone]  Fax: [phone] Plan Frequency: 2x per week 8 weeks    Plan of Care/Certification Expiration Date: 23      >PT Visit Info:  Plan Frequency: 2x per week 8 weeks  Plan of Care/Certification Expiration Date: 23  Progress Note Counter: 1      Visit Count:  2    OUTPATIENT PHYSICAL THERAPY:OP NOTE TYPE: OP Note Type: Treatment Note 2023       Episode  }Appt Desk             Treatment Diagnosis:  Pain and swelling of left ankle [M25.572, M25.472]  Acute pain of left knee [M25.562]  Neck pain [M54.2]  Medical/Referring Diagnosis:  Fall, initial encounter [W19. XXXA]  Acute pain of left knee [M25.562]  Referring Physician:  RUBEN Reynoso CNP, MD Orders:  PT Eval and Treat   Date of Onset:  Onset Date: 23     Allergies:   Milk protein  Restrictions/Precautions:  Restrictions/Precautions: None  No data recorded   Interventions Planned (Treatment may consist of any combination of the following):    Current Treatment Recommendations: Strengthening; ROM; Balance training; Functional mobility training; Manual; Stair training; Home exercise program; Patient/Caregiver education & training; Modalities; Aquatics; Therapeutic activities     >Subjective Comments:  Patient reports knee is really really hurting today. She states some times it gets a little better, but not today. >Initial:     8/10>Post Session:       8/10  Medications Last Reviewed:  2023  Updated Objective Findings:  None Today  Treatment   THERAPEUTIC EXERCISE: (45 minutes):    Exercises per grid below to improve mobility and strength. Required minimal verbal cues to promote proper body mechanics. Progressed resistance, range, and repetitions as indicated.   Review of HEP  LAQ x 10  Sit to stand x 10  Quad

## 2023-09-11 ENCOUNTER — HOSPITAL ENCOUNTER (OUTPATIENT)
Dept: PHYSICAL THERAPY | Age: 78
Setting detail: RECURRING SERIES
End: 2023-09-11
Payer: MEDICARE

## 2023-09-14 ENCOUNTER — HOSPITAL ENCOUNTER (OUTPATIENT)
Dept: PHYSICAL THERAPY | Age: 78
Setting detail: RECURRING SERIES
End: 2023-09-14
Payer: MEDICARE

## 2023-09-14 NOTE — PROGRESS NOTES
Antonino Reilly  :   [place]p*  Secondary: [address] @ [facility] Therapy  [address] [facility] [address] [address] [facility] [address]  Phone: [phone]  Fax: [phone] Plan Frequency: 2x per week 8 weeks    Plan of Care/Certification Expiration Date: 23      PT Visit Info:  Progress Note Counter: 1         OUTPATIENT PHYSICAL THERAPY 2023     Appt Desk   Episode   MyChart      Pt's appointment canceled today due to pending insurance authorization.     Jazzy Blair, DPT    Future Appointments   Date Time Provider [address]   2023 12:30 PM Cy Brody, PT [facility]   2023 11:00 AM  Book, PTA [facility]   2023 10:15 AM Cy Brody, PT [facility]   2023 11:00 AM Lord Book, PTA [facility]   2023 11:00 AM Cy Brody, PT [facility]   10/2/2023 11:00 AM Lord Book, PTA [facility]   10/5/2023 11:00 AM Cy Brody, PT [facility]   10/16/2023  9:00 AM MD SCHUYLER Avina [facility]   2024  9:00 AM PERIPHERAL [facility]   2024  9:30 AM RUBEN Figueroa CNP [facility]   2024  9:30 AM Kennedy Eaton [facility] AMB   2024  9:30 AM SARAH DavisG [facility] AMB   2024  9:10 AM [facility] OUTREACH INSURANCE [facility]   2024  9:45 AM Jazmyn Pinedo MD [facility] AM[facility]

## 2023-09-18 ENCOUNTER — APPOINTMENT (OUTPATIENT)
Dept: PHYSICAL THERAPY | Age: 78
End: 2023-09-18
Payer: MEDICARE

## 2023-09-21 ENCOUNTER — APPOINTMENT (OUTPATIENT)
Dept: PHYSICAL THERAPY | Age: 78
End: 2023-09-21
Payer: MEDICARE

## 2023-09-25 ENCOUNTER — HOSPITAL ENCOUNTER (OUTPATIENT)
Dept: PHYSICAL THERAPY | Age: 78
Setting detail: RECURRING SERIES
Discharge: HOME | End: 2023-09-28
Payer: MEDICARE

## 2023-09-25 PROCEDURE — 97110 THERAPEUTIC EXERCISES: CPT

## 2023-09-25 ASSESSMENT — PAIN SCALES - GENERAL: PAINLEVEL_OUTOF10: 3

## 2023-09-25 NOTE — PROGRESS NOTES
Alexa Jama  :   Primary[place] Germ[place]/p* (Medicare Managed)  Secondary: [address] @ [address]  810 [facility] [place]un [place] [address]  Phone: [phone]  Fax: [phone] Plan Frequency: 2x per week 8 weeks    Plan of Care/Certification Expiration Date: 23      >PT Visit Info:  Plan Frequency: 2x per week 8 weeks  Plan of Care/Certification Expiration Date: 23  Progress Note Counter: 1      Visit Count:  3    OUTPATIENT PHYSICAL THERAPY:OP NOTE TYPE: OP Note Type: Treatment Note 2023       Episode  }Appt Desk             Treatment Diagnosis:  Pain and swelling of left ankle [M25.572, M25.472]  Acute pain of left knee [M25.562]  Neck pain [M54.2]  Medical/Referring Diagnosis:  No admission diagnoses are documented for this encounter. Referring Physician:  RUBEN Russell CNP, MD Orders:  PT Eval and Treat   Date of Onset:  Onset Date: 23     Allergies:   Milk protein  Restrictions/Precautions:  Restrictions/Precautions: None  No data recorded   Interventions Planned (Treatment may consist of any combination of the following):    Current Treatment Recommendations: Strengthening; ROM; Balance training; Functional mobility training; Manual; Stair training; Home exercise program; Patient/Caregiver education & training; Modalities; Aquatics; Therapeutic activities     >Subjective Comments:  Patient reports knee is feeling better. Patient states she is feeling better sleeping.  >Initial:     3/10>Post Session:       1/10  Medications Last Reviewed:  2023  Updated Objective Findings:  None Today  Treatment   THERAPEUTIC EXERCISE: (45 minutes):    Exercises per grid below to improve mobility and strength. Required minimal verbal cues to promote proper body mechanics. Progressed resistance, range, and repetitions as indicated.   Review of HEP  LAQ x 10  Sit to stand x 10  Quad sets x 20  Gait training x 80 feet

## 2023-09-27 ENCOUNTER — OFFICE VISIT (OUTPATIENT)
Dept: INTERNAL MEDICINE CLINIC | Facility: CLINIC | Age: 78
End: 2023-09-27
Payer: MEDICARE

## 2023-09-27 VITALS
SYSTOLIC BLOOD PRESSURE: 124 MMHG | HEIGHT: 62 IN | OXYGEN SATURATION: 100 % | TEMPERATURE: 97.4 F | WEIGHT: 161 LBS | HEART RATE: 62 BPM | DIASTOLIC BLOOD PRESSURE: 68 MMHG | BODY MASS INDEX: 29.63 KG/M2

## 2023-09-27 DIAGNOSIS — H66.90 ACUTE OTITIS MEDIA, UNSPECIFIED OTITIS MEDIA TYPE: Primary | ICD-10-CM

## 2023-09-27 DIAGNOSIS — H61.21 IMPACTED CERUMEN OF RIGHT EAR: ICD-10-CM

## 2023-09-27 DIAGNOSIS — M19.90 ARTHRITIS: ICD-10-CM

## 2023-09-27 PROCEDURE — 3074F SYST BP LT 130 MM HG: CPT | Performed by: INTERNAL MEDICINE

## 2023-09-27 PROCEDURE — 1123F ACP DISCUSS/DSCN MKR DOCD: CPT | Performed by: INTERNAL MEDICINE

## 2023-09-27 PROCEDURE — 3078F DIAST BP <80 MM HG: CPT | Performed by: INTERNAL MEDICINE

## 2023-09-27 PROCEDURE — 69210 REMOVE IMPACTED EAR WAX UNI: CPT | Performed by: INTERNAL MEDICINE

## 2023-09-27 PROCEDURE — 99213 OFFICE O/P EST LOW 20 MIN: CPT | Performed by: INTERNAL MEDICINE

## 2023-09-27 RX ORDER — FLUOCINOLONE ACETONIDE 0.11 MG/ML
4 OIL AURICULAR (OTIC) 2 TIMES DAILY
Qty: 20 ML | Refills: 0 | Status: SHIPPED | OUTPATIENT
Start: 2023-09-27

## 2023-09-27 RX ORDER — CIPROFLOXACIN 250 MG/1
250 TABLET, FILM COATED ORAL 2 TIMES DAILY
Qty: 10 TABLET | Refills: 0 | Status: SHIPPED | OUTPATIENT
Start: 2023-09-27 | End: 2023-10-02

## 2023-09-27 RX ORDER — MELOXICAM 15 MG/1
15 TABLET ORAL DAILY PRN
Qty: 30 TABLET | Refills: 0 | Status: SHIPPED | OUTPATIENT
Start: 2023-09-27

## 2023-09-28 ENCOUNTER — HOSPITAL ENCOUNTER (OUTPATIENT)
Dept: PHYSICAL THERAPY | Age: 78
Setting detail: RECURRING SERIES
End: 2023-09-28
Payer: MEDICARE

## 2023-09-28 PROCEDURE — 97110 THERAPEUTIC EXERCISES: CPT

## 2023-09-28 ASSESSMENT — PAIN SCALES - GENERAL: PAINLEVEL_OUTOF10: 2

## 2023-09-28 NOTE — PROGRESS NOTES
Girish Denises  : [birth year]  Primary: [place]/p* (Medicare Managed)  Secondary: [address] @ [id number] [place] [address] Road  810 [facility] [place]ia [place] [address]  Phone: [phone]  Fax: [phone] Plan Frequency: 2x per week 8 weeks    Plan of Care/Certification Expiration Date: 23      >PT Visit Info:  Plan Frequency: 2x per week 8 weeks  Plan of Care/Certification Expiration Date: 23  Progress Note Counter: 4      Visit Count:  4    OUTPATIENT PHYSICAL THERAPY:OP NOTE TYPE: OP Note Type: Treatment Note 2023       Episode  }Appt Desk             Treatment Diagnosis:  Pain and swelling of left ankle [M25.572, M25.472]  Acute pain of left knee [M25.562]  Neck pain [M54.2]  Medical/Referring Diagnosis:  No admission diagnoses are documented for this encounter. Referring Physician:  RUBEN Gao CNP, MD Orders:  PT Eval and Treat   Date of Onset:  Onset Date: 23     Allergies:   Milk protein  Restrictions/Precautions:  Restrictions/Precautions: None  No data recorded   Interventions Planned (Treatment may consist of any combination of the following):    Current Treatment Recommendations: Strengthening; ROM; Balance training; Functional mobility training; Manual; Stair training; Home exercise program; Patient/Caregiver education & training; Modalities; Aquatics; Therapeutic activities     >Subjective Comments:  Pt reports feeling better overall  >Initial:     10>Post Session:       1/10  Medications Last Reviewed:  2023  Updated Objective Findings:  None Today  Treatment   THERAPEUTIC EXERCISE: (40 minutes):    Exercises per grid below to improve mobility and strength. Required minimal verbal cues to promote proper body mechanics. Progressed resistance, range, and repetitions as indicated.   LAQ 2 x 10, 3#  Sit to stand x 10  Gait training x 80 feet with/without cane   SAQ x 10  SLR 2 x 10  Nu step x 10 min, level 2.6   Step ups 2

## 2023-10-02 ENCOUNTER — HOSPITAL ENCOUNTER (OUTPATIENT)
Dept: PHYSICAL THERAPY | Age: 78
Setting detail: RECURRING SERIES
Discharge: HOME | End: 2023-10-05
Payer: MEDICARE

## 2023-10-02 PROCEDURE — 97110 THERAPEUTIC EXERCISES: CPT

## 2023-10-02 ASSESSMENT — PAIN SCALES - GENERAL: PAINLEVEL_OUTOF10: 1

## 2023-10-02 NOTE — PROGRESS NOTES
Emily Jas  : 7012  Primary: [place]/p* (Medicare Managed)  Secondary: [address] @ [address]  [address] [facility] [address]  Phone: [phone]  Fax: [phone] Plan Frequency: 2x per week 8 weeks    Plan of Care/Certification Expiration Date: 23      >PT Visit Info:  Plan Frequency: 2x per week 8 weeks  Plan of Care/Certification Expiration Date: 23  Progress Note Counter: 4      Visit Count:  5    OUTPATIENT PHYSICAL THERAPY:OP NOTE TYPE: OP Note Type: Treatment Note 10/2/2023       Episode  }Appt Desk             Treatment Diagnosis:  Pain and swelling of left ankle [M25.572, M25.472]  Acute pain of left knee [M25.562]  Neck pain [M54.2]  Medical/Referring Diagnosis:  No admission diagnoses are documented for this encounter. Referring Physician:  RUBEN Fair CNP, MD Orders:  PT Eval and Treat   Date of Onset:  Onset Date: 23     Allergies:   Milk protein  Restrictions/Precautions:  Restrictions/Precautions: None  No data recorded   Interventions Planned (Treatment may consist of any combination of the following):    Current Treatment Recommendations: Strengthening; ROM; Balance training; Functional mobility training; Manual; Stair training; Home exercise program; Patient/Caregiver education & training; Modalities; Aquatics; Therapeutic activities     >Subjective Comments:  Patiet reports doing pretty good today. >Initial:     1/10>Post Session:       1/10  Medications Last Reviewed:  10/2/2023  Updated Objective Findings:  None Today  Treatment   THERAPEUTIC EXERCISE: (45 minutes):    Exercises per grid below to improve mobility and strength. Required minimal verbal cues to promote proper body mechanics. Progressed resistance, range, and repetitions as indicated.   LAQ 2 x 10, 3#  Sit to stand x 10  Gait training x 80 feet with/without cane   SAQ x 10  SLR 2 x 10  Nu step x 10 min, level 2.6   Step

## 2023-10-16 ENCOUNTER — OFFICE VISIT (OUTPATIENT)
Dept: INTERNAL MEDICINE CLINIC | Facility: CLINIC | Age: 78
End: 2023-10-16
Payer: MEDICARE

## 2023-10-16 VITALS
TEMPERATURE: 97.2 F | OXYGEN SATURATION: 100 % | SYSTOLIC BLOOD PRESSURE: 120 MMHG | HEIGHT: 62 IN | WEIGHT: 162 LBS | DIASTOLIC BLOOD PRESSURE: 70 MMHG | HEART RATE: 60 BPM | BODY MASS INDEX: 29.81 KG/M2

## 2023-10-16 DIAGNOSIS — R44.8 FEELS COLD: ICD-10-CM

## 2023-10-16 DIAGNOSIS — R80.9 CONTROLLED TYPE 2 DIABETES MELLITUS WITH MICROALBUMINURIA, WITHOUT LONG-TERM CURRENT USE OF INSULIN (HCC): Primary | ICD-10-CM

## 2023-10-16 DIAGNOSIS — E03.9 ACQUIRED HYPOTHYROIDISM: ICD-10-CM

## 2023-10-16 DIAGNOSIS — Z91.81 AT HIGH RISK FOR FALLS: ICD-10-CM

## 2023-10-16 DIAGNOSIS — E11.29 CONTROLLED TYPE 2 DIABETES MELLITUS WITH MICROALBUMINURIA, WITHOUT LONG-TERM CURRENT USE OF INSULIN (HCC): Primary | ICD-10-CM

## 2023-10-16 LAB
EXP DATE SOLUTION: NORMAL
EXP DATE SWAB: NORMAL
EXPIRATION DATE: NORMAL
HBA1C MFR BLD: 6.4 %
LOT NUMBER POC: NORMAL
LOT NUMBER SOLUTION: NORMAL
LOT NUMBER SWAB: NORMAL
SARS-COV-2 RNA, POC: NEGATIVE

## 2023-10-16 PROCEDURE — 83036 HEMOGLOBIN GLYCOSYLATED A1C: CPT | Performed by: INTERNAL MEDICINE

## 2023-10-16 PROCEDURE — 3044F HG A1C LEVEL LT 7.0%: CPT | Performed by: INTERNAL MEDICINE

## 2023-10-16 PROCEDURE — 99213 OFFICE O/P EST LOW 20 MIN: CPT | Performed by: INTERNAL MEDICINE

## 2023-10-16 PROCEDURE — 87635 SARS-COV-2 COVID-19 AMP PRB: CPT | Performed by: INTERNAL MEDICINE

## 2023-10-16 PROCEDURE — 3074F SYST BP LT 130 MM HG: CPT | Performed by: INTERNAL MEDICINE

## 2023-10-16 PROCEDURE — 1123F ACP DISCUSS/DSCN MKR DOCD: CPT | Performed by: INTERNAL MEDICINE

## 2023-10-16 PROCEDURE — 3078F DIAST BP <80 MM HG: CPT | Performed by: INTERNAL MEDICINE

## 2023-10-16 RX ORDER — LEVOTHYROXINE SODIUM 0.05 MG/1
50 TABLET ORAL DAILY
Qty: 30 TABLET | Refills: 5 | Status: CANCELLED | OUTPATIENT
Start: 2023-10-16

## 2023-10-16 ASSESSMENT — PATIENT HEALTH QUESTIONNAIRE - PHQ9
SUM OF ALL RESPONSES TO PHQ QUESTIONS 1-9: 2
2. FEELING DOWN, DEPRESSED OR HOPELESS: 1
SUM OF ALL RESPONSES TO PHQ QUESTIONS 1-9: 2
SUM OF ALL RESPONSES TO PHQ9 QUESTIONS 1 & 2: 2
1. LITTLE INTEREST OR PLEASURE IN DOING THINGS: 1

## 2023-10-16 ASSESSMENT — ENCOUNTER SYMPTOMS
SHORTNESS OF BREATH: 0
COUGH: 0

## 2023-11-05 DIAGNOSIS — E03.9 ACQUIRED HYPOTHYROIDISM: ICD-10-CM

## 2023-11-06 RX ORDER — LEVOTHYROXINE SODIUM 0.05 MG/1
50 TABLET ORAL DAILY
Qty: 30 TABLET | Refills: 5 | OUTPATIENT
Start: 2023-11-06

## 2023-11-06 RX ORDER — LEVOTHYROXINE SODIUM 0.05 MG/1
50 TABLET ORAL DAILY
Qty: 90 TABLET | OUTPATIENT
Start: 2023-11-06

## 2023-11-07 RX ORDER — LETROZOLE 2.5 MG/1
2.5 TABLET, FILM COATED ORAL DAILY
Qty: 30 TABLET | Refills: 3 | OUTPATIENT
Start: 2023-11-07

## 2023-11-13 ENCOUNTER — OFFICE VISIT (OUTPATIENT)
Dept: INTERNAL MEDICINE CLINIC | Facility: CLINIC | Age: 78
End: 2023-11-13
Payer: MEDICARE

## 2023-11-13 VITALS
HEART RATE: 65 BPM | WEIGHT: 163 LBS | HEIGHT: 62 IN | SYSTOLIC BLOOD PRESSURE: 140 MMHG | BODY MASS INDEX: 30 KG/M2 | OXYGEN SATURATION: 96 % | DIASTOLIC BLOOD PRESSURE: 82 MMHG | TEMPERATURE: 96.8 F

## 2023-11-13 DIAGNOSIS — Z85.3 HISTORY OF BREAST CANCER: ICD-10-CM

## 2023-11-13 DIAGNOSIS — Z85.9 NECK PAIN WITH HISTORY OF MALIGNANT NEOPLASM: Primary | ICD-10-CM

## 2023-11-13 DIAGNOSIS — M54.2 NECK PAIN WITH HISTORY OF MALIGNANT NEOPLASM: Primary | ICD-10-CM

## 2023-11-13 PROCEDURE — 3077F SYST BP >= 140 MM HG: CPT | Performed by: INTERNAL MEDICINE

## 2023-11-13 PROCEDURE — 99213 OFFICE O/P EST LOW 20 MIN: CPT | Performed by: INTERNAL MEDICINE

## 2023-11-13 PROCEDURE — 1123F ACP DISCUSS/DSCN MKR DOCD: CPT | Performed by: INTERNAL MEDICINE

## 2023-11-13 PROCEDURE — 3079F DIAST BP 80-89 MM HG: CPT | Performed by: INTERNAL MEDICINE

## 2023-11-13 RX ORDER — PREDNISONE 10 MG/1
10 TABLET ORAL DAILY
Qty: 7 TABLET | Refills: 0 | Status: SHIPPED | OUTPATIENT
Start: 2023-11-13 | End: 2023-11-20

## 2023-11-13 ASSESSMENT — ENCOUNTER SYMPTOMS: SHORTNESS OF BREATH: 0

## 2023-11-13 NOTE — PROGRESS NOTES
ASSESSMENT/PLAN:    MRI and NS consult. Prednisone 10 mg daily for 7 days. Evaluation and management of the chronic condition(s) delineated. No negative side effects reported. I have reviewed all the lab results. There are some abnormalities that are either expected or not critical to the patient's health, and are discussed in the office today and are addressed. Please refer to the above assessement and plan narrative and orders and follow up plan. Medication discussed and refilled as needed. Physical exam findings are stable unless otherwise indicated and this is addressed. The most recent lab work and imaging and consultant/urgent care visits and imaging are reviewed and discussed and considered during this visit encounter. Nova Dc was seen today for follow-up. Diagnoses and all orders for this visit:    Neck pain with history of malignant neoplasm  -     [facility] OF THE [facility]  -     predniSONE (DELTASONE) 10 MG tablet; Take 1 tablet by mouth daily for 7 days    History of breast cancer  -     [facility]            On this date, 11/13/23, I have spent 20 minutes reviewing previous notes, test results and face to face with the patient discussing the diagnosis and importance of compliance with the treatment plan as well as documenting on the day of the visit. An electronic signature was used to authenticate this note. -- Lyndsey Ledezma MD     Return if symptoms worsen or fail to improve. SUBJECTIVE/OBJECTIVE:    HPI:   Chief Complaint   Patient presents with    Follow-up     Pt is here for a UC f/u for neck pain, fatigue, and weakness. Bubba Median 77 y.o.  Black / [place] American female is here for follow-up of neck pain and management of chronic medical conditions, review of medications, review of recent labs, review of any imaging completed since our last office visit and discuss any consultants opinions and/or management

## 2023-12-06 ENCOUNTER — OFFICE VISIT (OUTPATIENT)
Dept: NEUROSURGERY | Age: 78
End: 2023-12-06
Payer: MEDICARE

## 2023-12-06 VITALS
HEART RATE: 57 BPM | BODY MASS INDEX: 30.73 KG/M2 | HEIGHT: 62 IN | DIASTOLIC BLOOD PRESSURE: 90 MMHG | WEIGHT: 167 LBS | TEMPERATURE: 97.2 F | SYSTOLIC BLOOD PRESSURE: 151 MMHG | OXYGEN SATURATION: 97 %

## 2023-12-06 DIAGNOSIS — M50.30 DDD (DEGENERATIVE DISC DISEASE), CERVICAL: ICD-10-CM

## 2023-12-06 DIAGNOSIS — M62.838 TRAPEZIUS MUSCLE SPASM: ICD-10-CM

## 2023-12-06 DIAGNOSIS — M54.2 NECK PAIN: Primary | ICD-10-CM

## 2023-12-06 PROCEDURE — 99204 OFFICE O/P NEW MOD 45 MIN: CPT | Performed by: NEUROLOGICAL SURGERY

## 2023-12-06 PROCEDURE — 3077F SYST BP >= 140 MM HG: CPT | Performed by: NEUROLOGICAL SURGERY

## 2023-12-06 PROCEDURE — 1123F ACP DISCUSS/DSCN MKR DOCD: CPT | Performed by: NEUROLOGICAL SURGERY

## 2023-12-06 PROCEDURE — 3080F DIAST BP >= 90 MM HG: CPT | Performed by: NEUROLOGICAL SURGERY

## 2023-12-06 RX ORDER — MELOXICAM 15 MG/1
15 TABLET ORAL DAILY
Qty: 30 TABLET | Refills: 3 | Status: SHIPPED | OUTPATIENT
Start: 2023-12-06

## 2024-01-03 ENCOUNTER — HOSPITAL ENCOUNTER (OUTPATIENT)
Dept: PHYSICAL THERAPY | Age: 79
Setting detail: RECURRING SERIES
Discharge: HOME | End: 2024-01-06
Attending: NEUROLOGICAL SURGERY
Payer: MEDICARE

## 2024-01-03 DIAGNOSIS — M54.2 CERVICALGIA: Primary | ICD-10-CM

## 2024-01-03 DIAGNOSIS — E03.9 ACQUIRED HYPOTHYROIDISM: ICD-10-CM

## 2024-01-03 PROCEDURE — 97162 PT EVAL MOD COMPLEX 30 MIN: CPT

## 2024-01-03 RX ORDER — LEVOTHYROXINE SODIUM 0.05 MG/1
TABLET ORAL
Qty: 30 TABLET | Refills: 0 | Status: SHIPPED | OUTPATIENT
Start: 2024-01-03

## 2024-01-03 ASSESSMENT — PAIN DESCRIPTION - LOCATION: LOCATION: NECK

## 2024-01-03 ASSESSMENT — PAIN SCALES - GENERAL: PAINLEVEL_OUTOF10: 3

## 2024-01-03 NOTE — TELEPHONE ENCOUNTER
Pt needs to have refill of Levothyrozine .5 Mg sent to [facility].    There are no refills

## 2024-01-03 NOTE — THERAPY EVALUATION
Shalini Rush  : 1945  Primary: [employer] [place] Hmo/p* (Medicare Managed)  Secondary: MEDICAID [facility] @ [address]  [facility] [address]  Phone: [phone]  Fax: [phone] Plan Frequency: 2 times per week for 8 weeks    Plan of Care/Certification Expiration Date: 24        Plan of Care/Certification Expiration Date:  Plan of Care/Certification Expiration Date: 24     Frequency/Duration: Plan Frequency: 2 times per week for 8 weeks       Time In/Out:    Time In: 1023  Time Out: 1100      PT Visit Info:     Plan Frequency: 2 times per week for 8 weeks  Progress Note Due Date: 24  Total # of Visits to Date: 1      Visit Count:  1                OUTPATIENT PHYSICAL THERAPY:             Initial Assessment 1/3/2024               Episode (PT: Neck pain)         Treatment Diagnosis:     Cervicalgia  Medical/Referring Diagnosis:       M54.2 (ICD-10-CM) - Neck pain   M50.30 (ICD-10-CM) - DDD (degenerative disc disease), cervical   M62.838 (ICD-10-CM) - Trapezius muscle spasm     Referring Physician:  Mayco Hayward MD MD Orders:  PT Eval and Treat     Please eval and treat 2xwk for 8wks   Return MD Appt:    Date of Onset:  Onset Date: 23 (about 2 months per patient)     Allergies:  Milk protein  Restrictions/Precautions:    Hx of breast CA; Hx TSA L      Medications Last Reviewed:  1/3/2024     SUBJECTIVE   History of Injury/Illness (Reason for Referral):  Pain upper traps B and R neck more than L.  Has been hurting for about 2 months. Able to do what she has to do. Warm showers help. If hurting too bad, uses heating pad to heat the bed.  Carpal tunnel R hand. L TSA about 15 years ago and still gives her some trouble.  Sleeping habits have changed, waking up at night, sometimes due to neck, but a lot of the time bc thinking about things.     Taking pills for CA, didn't take radiation.     Per chart review: \"The patient

## 2024-01-03 NOTE — PROGRESS NOTES
Shalini XIOMY Rush  : 1945  [place]: [employer] [place] H[place]/p* (Medicare Managed)  Secondary: MEDICAID [facility] @ [address]  [facility] [address]  Phone: [phone]  Fax: [phone] Plan Frequency: 2 times per week for 8 weeks    Plan of Care/Certification Expiration Date: 24        Plan of Care/Certification Expiration Date:  Plan of Care/Certification Expiration Date: 24    Frequency/Duration: Plan Frequency: 2 times per week for 8 weeks      Time In/Out:   Time In: 1023  Time Out: 1100      PT Visit Info:    Plan Frequency: 2 times per week for 8 weeks  Progress Note Due Date: 24  Total # of Visits to Date: 1      Visit Count:  1    OUTPATIENT PHYSICAL THERAPY:   Treatment Note 1/3/2024       Episode  (PT: Neck pain)               Treatment Diagnosis:    Cervicalgia  Medical/Referring Diagnosis:       M54.2 (ICD-10-CM) - Neck pain   M50.30 (ICD-10-CM) - DDD (degenerative disc disease), cervical   M62.838 (ICD-10-CM) - Trapezius muscle spasm     Referring Physician:  Mayco Hayward MD MD Orders:  PT Eval and Treat   Please eval and treat 2xwk for 8wks    Return MD Appt:     Date of Onset:  Onset Date: 23 (about 2 months per patient)     Allergies:   Milk protein  Restrictions/Precautions:   Hx of breast CA; Hx TSA L  Medications Last Reviewed:  1/3/2024  Interventions Planned (Treatment may consist of any combination of the following):     See Assessment Note    Subjective Comments:   Neck hurts but I can do what I need to do  Initial Pain Level::   Neck 3/10  Post Session Pain Level:     Neck 3/10  Medications Last Reviewed:  1/3/2024  Updated Objective Findings:  See Evaluation Note from today  Treatment   THERAPEUTIC EXERCISE: (5 minutes):    Exercises per grid below to improve mobility and strength.  Required minimal verbal cues to promote proper body alignment, promote proper body posture, and promote proper body

## 2024-01-04 RX ORDER — LEVOTHYROXINE SODIUM 0.05 MG/1
TABLET ORAL
Qty: 90 TABLET | OUTPATIENT
Start: 2024-01-04

## 2024-01-04 NOTE — TELEPHONE ENCOUNTER
Patient notified 30 day supply sent to pharmacy and reminded about upcoming appt with Dr Dunham on 1/25/2024

## 2024-01-11 ENCOUNTER — OFFICE VISIT (OUTPATIENT)
Dept: INTERNAL MEDICINE CLINIC | Facility: CLINIC | Age: 79
End: 2024-01-11
Payer: MEDICARE

## 2024-01-11 VITALS
DIASTOLIC BLOOD PRESSURE: 82 MMHG | WEIGHT: 171 LBS | SYSTOLIC BLOOD PRESSURE: 148 MMHG | HEIGHT: 62 IN | HEART RATE: 65 BPM | OXYGEN SATURATION: 98 % | TEMPERATURE: 97.9 F | BODY MASS INDEX: 31.47 KG/M2

## 2024-01-11 DIAGNOSIS — K64.9 HEMORRHOIDS, UNSPECIFIED HEMORRHOID TYPE: ICD-10-CM

## 2024-01-11 DIAGNOSIS — M16.11 PRIMARY OSTEOARTHRITIS OF RIGHT HIP: ICD-10-CM

## 2024-01-11 DIAGNOSIS — R10.84 GENERALIZED ABDOMINAL PAIN: Primary | ICD-10-CM

## 2024-01-11 DIAGNOSIS — Z23 NEED FOR IMMUNIZATION AGAINST INFLUENZA: ICD-10-CM

## 2024-01-11 PROCEDURE — 90694 VACC AIIV4 NO PRSRV 0.5ML IM: CPT | Performed by: PHYSICIAN ASSISTANT

## 2024-01-11 PROCEDURE — 1123F ACP DISCUSS/DSCN MKR DOCD: CPT | Performed by: PHYSICIAN ASSISTANT

## 2024-01-11 PROCEDURE — 3079F DIAST BP 80-89 MM HG: CPT | Performed by: PHYSICIAN ASSISTANT

## 2024-01-11 PROCEDURE — G0008 ADMIN INFLUENZA VIRUS VAC: HCPCS | Performed by: PHYSICIAN ASSISTANT

## 2024-01-11 PROCEDURE — 3077F SYST BP >= 140 MM HG: CPT | Performed by: PHYSICIAN ASSISTANT

## 2024-01-11 PROCEDURE — 99214 OFFICE O/P EST MOD 30 MIN: CPT | Performed by: PHYSICIAN ASSISTANT

## 2024-01-11 RX ORDER — HYDROCORTISONE 25 MG/G
CREAM TOPICAL
Qty: 28 G | Refills: 0 | Status: SHIPPED | OUTPATIENT
Start: 2024-01-11

## 2024-01-11 ASSESSMENT — ENCOUNTER SYMPTOMS
ABDOMINAL PAIN: 1
BLOOD IN STOOL: 0
CONSTIPATION: 0
ABDOMINAL DISTENTION: 0
VOMITING: 0
NAUSEA: 0
DIARRHEA: 0

## 2024-01-11 NOTE — PATIENT INSTRUCTIONS
vaccine is for use in adults and children at least 6 months old, to prevent infection caused by influenza virus. This vaccine helps your body develop immunity to the disease, but will not treat an active infection you already have.  Influenza virus vaccine is redeveloped each year to contain specific strains of inactivated (killed) flu virus that are recommended by public health officials for that year.  The injectable influenza virus vaccine (flu shot) is made from \"killed viruses.\" Influenza virus vaccine is also available in a nasal spray form, which is a \"live virus\" vaccine. This medication guide addresses only the injectable form of this vaccine.  Like any vaccine, influenza virus vaccine may not provide protection from disease in every person.  What should I discuss with my healthcare provider before receiving this vaccine?  You may not be able to receive this vaccine if you are allergic to eggs, or if you have ever had a severe allergic reaction to a flu vaccine.  Tell your vaccination provider if you have:  a weak immune system (caused by disease or by using certain medicine); or  a history of Guillain-Aniwa syndrome (within 6 weeks after receiving a flu vaccine).  You can still receive a vaccine if you have a minor cold. In the case of a more severe illness with a fever or any type of infection, wait until you get better before receiving this vaccine.  Tell your vaccination provider if you are pregnant or breastfeeding.  The Centers for Disease Control and Prevention recommends that pregnant women get a flu shot during any trimester of pregnancy to protect themselves and their  babies from flu. The nasal spray form of influenza vaccine is not recommended for use in pregnant women.   How is this vaccine given?  Some brands of this vaccine are made for use in adults and not in children. Your child's vaccination provider can recommend the best influenza virus vaccine for your child.  This vaccine is

## 2024-01-11 NOTE — PROGRESS NOTES
posterior facet arthropathy changes with  the DDD most severe at C5-C6 and facet arthropathy changes most pronounced at  C4-C5.  Cervical paraspinous soft tissues are unremarkable for age.  Thoracic inlet appearance is unremarkable.]    Impression  1.  STABLE APPEARANCE OF CHRONIC CHANGES-AS ABOVE.    2. NO ACUTE TRAUMATIC FRACTURE OR LISTHESIS SITE.        Additional concerns addressed today include single episode of green stool on 1/4/24.  She is unsure of trigger but suspects it might have been ice cream.  She has noticed a palpable hemorrhoid x 1 week.  She denies any blood per rectum or pain associated with it.  Her usually stool pattern is usually normal but temporary soft stools that required mild straining due to sensation that there was something blocking stool on the L side.      Review of Systems   Gastrointestinal:  Positive for abdominal pain (R groin) and hemorrhoids. Negative for abdominal distention, blood in stool, constipation, diarrhea, nausea and vomiting.        Negative for heartburn or melena          BP (!) 148/82 (Site: Left Upper Arm, Position: Sitting, Cuff Size: Large Adult)   Pulse 65   Temp 97.9 °F (36.6 °C) (Temporal)   Ht 1.575 m (5' 2\")   Wt 77.6 kg (171 lb)   SpO2 98%   BMI 31.28 kg/m²       Objective   Physical Exam  Constitutional:       Appearance: Normal appearance.   HENT:      Head: Normocephalic and atraumatic.   Eyes:      Conjunctiva/sclera: Conjunctivae normal.      Pupils: Pupils are equal, round, and reactive to light.   Neck:      Vascular: No carotid bruit.   Cardiovascular:      Rate and Rhythm: Normal rate and regular rhythm.      Heart sounds: Normal heart sounds.   Pulmonary:      Effort: Pulmonary effort is normal.      Breath sounds: Normal breath sounds.   Abdominal:      General: Bowel sounds are normal.      Palpations: Abdomen is soft.      Tenderness: There is abdominal tenderness in the right upper quadrant, right lower quadrant, epigastric area and

## 2024-01-16 ENCOUNTER — HOSPITAL ENCOUNTER (OUTPATIENT)
Dept: PHYSICAL THERAPY | Age: 79
Setting detail: RECURRING SERIES
Discharge: HOME | End: 2024-01-19
Attending: NEUROLOGICAL SURGERY
Payer: MEDICARE

## 2024-01-16 DIAGNOSIS — Z17.0 MALIGNANT NEOPLASM OF RIGHT BREAST IN FEMALE, ESTROGEN RECEPTOR POSITIVE, UNSPECIFIED SITE OF BREAST (HCC): Primary | ICD-10-CM

## 2024-01-16 DIAGNOSIS — C50.911 MALIGNANT NEOPLASM OF RIGHT BREAST IN FEMALE, ESTROGEN RECEPTOR POSITIVE, UNSPECIFIED SITE OF BREAST (HCC): Primary | ICD-10-CM

## 2024-01-16 PROCEDURE — 97110 THERAPEUTIC EXERCISES: CPT

## 2024-01-16 PROCEDURE — 97140 MANUAL THERAPY 1/> REGIONS: CPT

## 2024-01-16 ASSESSMENT — PAIN DESCRIPTION - LOCATION: LOCATION: NECK

## 2024-01-16 ASSESSMENT — PAIN SCALES - GENERAL: PAINLEVEL_OUTOF10: 4

## 2024-01-16 NOTE — PROGRESS NOTES
mechanics.  Progressed resistance, range, repetitions, and complexity of movement as indicated.   Date:  1/3/24 Date:  1/16/24 Date:     Activity/Exercise Parameters Parameters Parameters   Upper trap stretch without overpressure HEP 3 x 20 sec B    Levator scap stretch without overpressure HEP 3 x 20 sec B    Scapular squeezes. HEP HEP    Rows  Red tubing 2 x 10 reps B    Pull downs  Red tubing 2 x 10 reps B    B shoulder ER (lower trap)  Yellow band 2 x 10 reps B              MANUAL THERAPY: (25 minutes):   Joint mobilization and Soft tissue mobilization was utilized and necessary because of the patient's restricted joint motion and restricted motion of soft tissue. Soft tissue mobilization to B upper traps, Levator scapulae, and B cervical paraspinals to decrease tissue tension and pain.      MODALITIES: (0 minutes)       *  Hot Pack Therapy in order to provide analgesia and relieve muscle spasm.       Treatment/Session Summary:    Treatment Assessment:   Patient reported decreased tightness and soreness in neck and shoulders at end of session.  Communication/Consultation:  None today  Equipment provided today:  None   Recommendations/Intent for next treatment session: Next visit will focus on stretching, postural strengthening, manual therapy, heat as needed.    >Total Treatment Billable Duration:  40 minutes  Time In: 1340  Time Out: 1420    SARAH BRAVO PT         Charge Capture  [employer] Portal  Appt Desk     Future Appointments   Date Time Provider Department Center   1/17/2024  9:00 AM [facility]IPHERAL [facility]   1/17/2024  9:30 AM Denisha Steele, APRN - CNP [facility] AMB   1/18/2024  8:00 AM SFE LAB DS SFEDS SFE   1/22/2024  9:30 AM Sarah Bravo, PT SFEORPT SFE   1/22/2024  3:30 PM SFE CT REV EDUARD 64 SLICE SFERCT SFE   1/24/2024 10:15 AM Sarah Bravo, PT SFEORPT SFE   1/25/2024  9:40 AM Nikolas Dunham MD [facility] AMB   1/29/2024  9:00 AM Jessica Flores, AuD

## 2024-01-17 ENCOUNTER — HOSPITAL ENCOUNTER (OUTPATIENT)
Dept: LAB | Age: 79
Discharge: HOME | End: 2024-01-20
Payer: MEDICARE

## 2024-01-17 ENCOUNTER — OFFICE VISIT (OUTPATIENT)
Dept: ONCOLOGY | Age: 79
End: 2024-01-17
Payer: MEDICARE

## 2024-01-17 VITALS
SYSTOLIC BLOOD PRESSURE: 138 MMHG | HEIGHT: 62 IN | WEIGHT: 168.4 LBS | HEART RATE: 68 BPM | TEMPERATURE: 98 F | BODY MASS INDEX: 30.99 KG/M2 | DIASTOLIC BLOOD PRESSURE: 74 MMHG

## 2024-01-17 DIAGNOSIS — C50.911 MALIGNANT NEOPLASM OF RIGHT BREAST IN FEMALE, ESTROGEN RECEPTOR POSITIVE, UNSPECIFIED SITE OF BREAST (HCC): ICD-10-CM

## 2024-01-17 DIAGNOSIS — C50.911 MALIGNANT NEOPLASM OF RIGHT BREAST IN FEMALE, ESTROGEN RECEPTOR POSITIVE, UNSPECIFIED SITE OF BREAST (HCC): Primary | ICD-10-CM

## 2024-01-17 DIAGNOSIS — Z17.0 MALIGNANT NEOPLASM OF RIGHT BREAST IN FEMALE, ESTROGEN RECEPTOR POSITIVE, UNSPECIFIED SITE OF BREAST (HCC): Primary | ICD-10-CM

## 2024-01-17 DIAGNOSIS — Z17.0 MALIGNANT NEOPLASM OF RIGHT BREAST IN FEMALE, ESTROGEN RECEPTOR POSITIVE, UNSPECIFIED SITE OF BREAST (HCC): ICD-10-CM

## 2024-01-17 DIAGNOSIS — Z79.811 USE OF AROMATASE INHIBITORS: ICD-10-CM

## 2024-01-17 LAB
ALBUMIN SERPL-MCNC: 3.8 G/DL (ref 3.2–4.6)
ALBUMIN/GLOB SERPL: 0.7 (ref 0.4–1.6)
ALP SERPL-CCNC: 79 U/L (ref 50–136)
ALT SERPL-CCNC: 25 U/L (ref 12–65)
ANION GAP SERPL CALC-SCNC: 2 MMOL/L (ref 2–11)
AST SERPL-CCNC: 22 U/L (ref 15–37)
BASOPHILS # BLD: 0 K/UL (ref 0–0.2)
BASOPHILS NFR BLD: 0 % (ref 0–2)
BILIRUB SERPL-MCNC: 0.9 MG/DL (ref 0.2–1.1)
BUN SERPL-MCNC: 20 MG/DL (ref 8–23)
CALCIUM SERPL-MCNC: 9.7 MG/DL (ref 8.3–10.4)
CHLORIDE SERPL-SCNC: 104 MMOL/L (ref 103–113)
CO2 SERPL-SCNC: 34 MMOL/L (ref 21–32)
CREAT SERPL-MCNC: 1.1 MG/DL (ref 0.6–1)
DIFFERENTIAL METHOD BLD: ABNORMAL
EOSINOPHIL # BLD: 0.1 K/UL (ref 0–0.8)
EOSINOPHIL NFR BLD: 1 % (ref 0.5–7.8)
ERYTHROCYTE [DISTWIDTH] IN BLOOD BY AUTOMATED COUNT: 14.7 % (ref 11.9–14.6)
GLOBULIN SER CALC-MCNC: 5.2 G/DL (ref 2.8–4.5)
GLUCOSE SERPL-MCNC: 121 MG/DL (ref 65–100)
HCT VFR BLD AUTO: 41.9 % (ref 35.8–46.3)
HGB BLD-MCNC: 13.3 G/DL (ref 11.7–15.4)
IMM GRANULOCYTES # BLD AUTO: 0 K/UL (ref 0–0.5)
IMM GRANULOCYTES NFR BLD AUTO: 0 % (ref 0–5)
LYMPHOCYTES # BLD: 1.4 K/UL (ref 0.5–4.6)
LYMPHOCYTES NFR BLD: 24 % (ref 13–44)
MCH RBC QN AUTO: 27.7 PG (ref 26.1–32.9)
MCHC RBC AUTO-ENTMCNC: 31.7 G/DL (ref 31.4–35)
MCV RBC AUTO: 87.3 FL (ref 82–102)
MONOCYTES # BLD: 0.6 K/UL (ref 0.1–1.3)
MONOCYTES NFR BLD: 9 % (ref 4–12)
NEUTS SEG # BLD: 3.8 K/UL (ref 1.7–8.2)
NEUTS SEG NFR BLD: 66 % (ref 43–78)
NRBC # BLD: 0 K/UL (ref 0–0.2)
PLATELET # BLD AUTO: 209 K/UL (ref 150–450)
PMV BLD AUTO: 10.4 FL (ref 9.4–12.3)
POTASSIUM SERPL-SCNC: 3.8 MMOL/L (ref 3.5–5.1)
PROT SERPL-MCNC: 9 G/DL (ref 6.3–8.2)
RBC # BLD AUTO: 4.8 M/UL (ref 4.05–5.2)
SODIUM SERPL-SCNC: 140 MMOL/L (ref 136–146)
WBC # BLD AUTO: 5.9 K/UL (ref 4.3–11.1)

## 2024-01-17 PROCEDURE — 1123F ACP DISCUSS/DSCN MKR DOCD: CPT

## 2024-01-17 PROCEDURE — 85025 COMPLETE CBC W/AUTO DIFF WBC: CPT

## 2024-01-17 PROCEDURE — 80053 COMPREHEN METABOLIC PANEL: CPT

## 2024-01-17 PROCEDURE — 3078F DIAST BP <80 MM HG: CPT

## 2024-01-17 PROCEDURE — 3075F SYST BP GE 130 - 139MM HG: CPT

## 2024-01-17 PROCEDURE — 36415 COLL VENOUS BLD VENIPUNCTURE: CPT

## 2024-01-17 PROCEDURE — 99214 OFFICE O/P EST MOD 30 MIN: CPT

## 2024-01-17 RX ORDER — AMOXICILLIN AND CLAVULANATE POTASSIUM 875; 125 MG/1; MG/1
1 TABLET, FILM COATED ORAL EVERY 12 HOURS
COMMUNITY
Start: 2024-01-13

## 2024-01-17 ASSESSMENT — PATIENT HEALTH QUESTIONNAIRE - PHQ9
2. FEELING DOWN, DEPRESSED OR HOPELESS: 1
1. LITTLE INTEREST OR PLEASURE IN DOING THINGS: 1
SUM OF ALL RESPONSES TO PHQ QUESTIONS 1-9: 2
SUM OF ALL RESPONSES TO PHQ QUESTIONS 1-9: 2
SUM OF ALL RESPONSES TO PHQ9 QUESTIONS 1 & 2: 2
SUM OF ALL RESPONSES TO PHQ QUESTIONS 1-9: 2
SUM OF ALL RESPONSES TO PHQ QUESTIONS 1-9: 2

## 2024-01-17 NOTE — PATIENT INSTRUCTIONS
Hospital Outpatient Visit on 01/17/2024   Component Date Value Ref Range Status    WBC 01/17/2024 5.9  4.3 - 11.1 K/uL Final    RBC 01/17/2024 4.80  4.05 - 5.2 M/uL Final    Hemoglobin 01/17/2024 13.3  11.7 - 15.4 g/dL Final    Hematocrit 01/17/2024 41.9  35.8 - 46.3 % Final    MCV 01/17/2024 87.3  82.0 - 102.0 FL Final    MCH 01/17/2024 27.7  26.1 - 32.9 PG Final    MCHC 01/17/2024 31.7  31.4 - 35.0 g/dL Final    RDW 01/17/2024 14.7 (H)  11.9 - 14.6 % Final    Platelets 01/17/2024 209  150 - 450 K/uL Final    MPV 01/17/2024 10.4  9.4 - 12.3 FL Final    nRBC 01/17/2024 0.00  0.0 - 0.2 K/uL Final    **Note: Absolute NRBC parameter is now reported with Hemogram**    Neutrophils % 01/17/2024 66  43 - 78 % Final    Lymphocytes % 01/17/2024 24  13 - 44 % Final    Monocytes % 01/17/2024 9  4.0 - 12.0 % Final    Eosinophils % 01/17/2024 1  0.5 - 7.8 % Final    Basophils % 01/17/2024 0  0.0 - 2.0 % Final    Immature Granulocytes 01/17/2024 0  0.0 - 5.0 % Final    Neutrophils Absolute 01/17/2024 3.8  1.7 - 8.2 K/UL Final    Lymphocytes Absolute 01/17/2024 1.4  0.5 - 4.6 K/UL Final    Monocytes Absolute 01/17/2024 0.6  0.1 - 1.3 K/UL Final    Eosinophils Absolute 01/17/2024 0.1  0.0 - 0.8 K/UL Final    Basophils Absolute 01/17/2024 0.0  0.0 - 0.2 K/UL Final    Absolute Immature Granulocyte 01/17/2024 0.0  0.0 - 0.5 K/UL Final    Differential Type 01/17/2024 AUTOMATED    Final    Sodium 01/17/2024 140  136 - 146 mmol/L Final    Potassium 01/17/2024 3.8  3.5 - 5.1 mmol/L Final    Chloride 01/17/2024 104  103 - 113 mmol/L Final    CO2 01/17/2024 34 (H)  21 - 32 mmol/L Final    Anion Gap 01/17/2024 2  2 - 11 mmol/L Final    Glucose 01/17/2024 121 (H)  65 - 100 mg/dL Final    BUN 01/17/2024 20  8 - 23 MG/DL Final    Creatinine 01/17/2024 1.10 (H)  0.6 - 1.0 MG/DL Final    Est, Glom Filt Rate 01/17/2024 51 (L)  >60 ml/min/1.73m2 Final    Comment:    Pediatric calculator link:

## 2024-01-17 NOTE — PROGRESS NOTES
[facility] Hematology and Oncology: Office Visit Established Patient    Chief Complaint:    Chief Complaint   Patient presents with    Follow-up     Patient has a rash/redness on her left shoulder/bra line area. In addition, she is currently on Amoxicillin due to a dog bite.           History of Present Illness:  Ms. Rush is a 78 y.o. female who presents today for follow-up regarding breast cancer.  She had a left breast cancer in 2001 for which she had a lumpectomy and radiation, she also took endocrine therapy of unknown type and duration.  In August 2022 she had her routine screening mammogram that reported a right breast asymmetry. On 8/25/22 she had a right breast diagnostic mammogram and ultrasound which confirmed the findings and on 8/31/22 she underwent a core needle biopsy of the right breast mass. The pathology reported infiltrating ductal carcinoma, low grade, ER 95%, IA 0% and HER2 1+.  On 9/7/22 she had a bilateral breast MRI that reported the known right breast cancer of 1.4 cm, no additional suspicious findings in either breast or no evidence of lymphadenopathy was noted.  She was recommended to see surgery for upfront management, then return to us for adjuvant therapy recommendations.  Path confirms the tumor to be 1.6 cm, tumor extended to inferior margin but re-excision was negative.  3 sentinel nodes were also negative for tumor.  We discussed options, including gene recurrence testing for risk stratification, but she admits that this would not change her mind about potential for chemotherapy.  Therefore, we will recommend antiestrogen therapy alone.  She is post-menopausal, so a prescription was given for anastrozole.  We also discussed radiation referral for risk reduction but she does not seem interested in this discussion given our explanation of the data, which suggests no mortality benefit for patients over 70 who are on endocrine therapy.  She was changed from Arimidex to Femara for

## 2024-01-18 ENCOUNTER — HOSPITAL ENCOUNTER (OUTPATIENT)
Dept: LAB | Age: 79
Discharge: HOME | End: 2024-01-21

## 2024-01-18 DIAGNOSIS — E03.9 ACQUIRED HYPOTHYROIDISM: ICD-10-CM

## 2024-01-18 DIAGNOSIS — R80.9 CONTROLLED TYPE 2 DIABETES MELLITUS WITH MICROALBUMINURIA, WITHOUT LONG-TERM CURRENT USE OF INSULIN (HCC): ICD-10-CM

## 2024-01-18 DIAGNOSIS — E11.29 CONTROLLED TYPE 2 DIABETES MELLITUS WITH MICROALBUMINURIA, WITHOUT LONG-TERM CURRENT USE OF INSULIN (HCC): ICD-10-CM

## 2024-01-18 DIAGNOSIS — C50.911 INFILTRATING DUCTAL CARCINOMA OF RIGHT BREAST (HCC): ICD-10-CM

## 2024-01-18 LAB
ALBUMIN SERPL-MCNC: 3.8 G/DL (ref 3.2–4.6)
ALBUMIN/GLOB SERPL: 0.9 (ref 0.4–1.6)
ALP SERPL-CCNC: 78 U/L (ref 50–136)
ALT SERPL-CCNC: 24 U/L (ref 12–65)
ANION GAP SERPL CALC-SCNC: 2 MMOL/L (ref 2–11)
APPEARANCE UR: CLEAR
AST SERPL-CCNC: 19 U/L (ref 15–37)
BACTERIA URNS QL MICRO: NEGATIVE /HPF
BILIRUB SERPL-MCNC: 0.9 MG/DL (ref 0.2–1.1)
BILIRUB UR QL: NEGATIVE
BUN SERPL-MCNC: 21 MG/DL (ref 8–23)
CALCIUM SERPL-MCNC: 9.8 MG/DL (ref 8.3–10.4)
CASTS URNS QL MICRO: ABNORMAL /LPF (ref 0–2)
CHLORIDE SERPL-SCNC: 104 MMOL/L (ref 103–113)
CHOLEST SERPL-MCNC: 159 MG/DL
CO2 SERPL-SCNC: 32 MMOL/L (ref 21–32)
COLOR UR: ABNORMAL
CREAT SERPL-MCNC: 1.1 MG/DL (ref 0.6–1)
EPI CELLS #/AREA URNS HPF: ABNORMAL /HPF (ref 0–5)
ERYTHROCYTE [DISTWIDTH] IN BLOOD BY AUTOMATED COUNT: 14.7 % (ref 11.9–14.6)
EST. AVERAGE GLUCOSE BLD GHB EST-MCNC: 148 MG/DL
GLOBULIN SER CALC-MCNC: 4.3 G/DL (ref 2.8–4.5)
GLUCOSE SERPL-MCNC: 133 MG/DL (ref 65–100)
GLUCOSE UR STRIP.AUTO-MCNC: NEGATIVE MG/DL
HBA1C MFR BLD: 6.8 % (ref 4.8–5.6)
HCT VFR BLD AUTO: 40.2 % (ref 35.8–46.3)
HDLC SERPL-MCNC: 62 MG/DL (ref 40–60)
HDLC SERPL: 2.6
HGB BLD-MCNC: 12.8 G/DL (ref 11.7–15.4)
HGB UR QL STRIP: ABNORMAL
KETONES UR QL STRIP.AUTO: NEGATIVE MG/DL
LDLC SERPL CALC-MCNC: 83.6 MG/DL
LEUKOCYTE ESTERASE UR QL STRIP.AUTO: NEGATIVE
MCH RBC QN AUTO: 27.9 PG (ref 26.1–32.9)
MCHC RBC AUTO-ENTMCNC: 31.8 G/DL (ref 31.4–35)
MCV RBC AUTO: 87.8 FL (ref 82–102)
NITRITE UR QL STRIP.AUTO: NEGATIVE
NRBC # BLD: 0 K/UL (ref 0–0.2)
PH UR STRIP: 5.5 (ref 5–9)
PLATELET # BLD AUTO: 201 K/UL (ref 150–450)
PMV BLD AUTO: 11 FL (ref 9.4–12.3)
POTASSIUM SERPL-SCNC: 4.1 MMOL/L (ref 3.5–5.1)
PROT SERPL-MCNC: 8.1 G/DL (ref 6.3–8.2)
PROT UR STRIP-MCNC: 30 MG/DL
RBC # BLD AUTO: 4.58 M/UL (ref 4.05–5.2)
RBC #/AREA URNS HPF: ABNORMAL /HPF (ref 0–5)
SODIUM SERPL-SCNC: 138 MMOL/L (ref 136–146)
SP GR UR REFRACTOMETRY: 1.02 (ref 1–1.02)
T4 FREE SERPL-MCNC: 0.8 NG/DL (ref 0.78–1.46)
TRIGL SERPL-MCNC: 67 MG/DL (ref 35–150)
TSH, 3RD GENERATION: 9.31 UIU/ML (ref 0.36–3.74)
UROBILINOGEN UR QL STRIP.AUTO: 0.2 EU/DL (ref 0.2–1)
VLDLC SERPL CALC-MCNC: 13.4 MG/DL (ref 6–23)
WBC # BLD AUTO: 5.8 K/UL (ref 4.3–11.1)
WBC URNS QL MICRO: ABNORMAL /HPF (ref 0–4)

## 2024-01-22 ENCOUNTER — HOSPITAL ENCOUNTER (OUTPATIENT)
Dept: PHYSICAL THERAPY | Age: 79
Setting detail: RECURRING SERIES
End: 2024-01-22
Attending: NEUROLOGICAL SURGERY
Payer: MEDICARE

## 2024-01-24 ENCOUNTER — HOSPITAL ENCOUNTER (OUTPATIENT)
Dept: PHYSICAL THERAPY | Age: 79
Setting detail: RECURRING SERIES
Discharge: HOME | End: 2024-01-27
Attending: NEUROLOGICAL SURGERY
Payer: MEDICARE

## 2024-01-24 PROCEDURE — 97140 MANUAL THERAPY 1/> REGIONS: CPT

## 2024-01-24 PROCEDURE — 97110 THERAPEUTIC EXERCISES: CPT

## 2024-01-24 ASSESSMENT — PAIN SCALES - GENERAL: PAINLEVEL_OUTOF10: 2

## 2024-01-24 ASSESSMENT — PAIN DESCRIPTION - LOCATION: LOCATION: NECK

## 2024-01-24 NOTE — PROGRESS NOTES
proper body posture, and promote proper body mechanics.  Progressed resistance, range, repetitions, and complexity of movement as indicated.   Date:  1/3/24 Date:  1/16/24 Date:  1/24/24   Activity/Exercise Parameters Parameters Parameters   Upper trap stretch without overpressure HEP 3 x 20 sec B 3 x 20 sec B   Levator scap stretch without overpressure HEP 3 x 20 sec B 3 x 20 sec B   Scapular squeezes. HEP HEP    Rows  Red tubing 2 x 10 reps B Red tubing 2 x 10 reps B   Pull downs  Red tubing 2 x 10 reps B Red tubing 2 x 10 reps B   B shoulder ER (lower trap)  Yellow band 2 x 10 reps B Yellow band 2 x 10 reps B             MANUAL THERAPY: (25 minutes):   Joint mobilization and Soft tissue mobilization was utilized and necessary because of the patient's restricted joint motion and restricted motion of soft tissue. Soft tissue mobilization to B upper traps, Levator scapulae, and B cervical paraspinals to decrease tissue tension and pain.      MODALITIES: (0 minutes)       *  Hot Pack Therapy in order to provide analgesia and relieve muscle spasm.       Treatment/Session Summary:    Treatment Assessment:   Patient needs cuing for upright posture during exercises and standing/walking. Patient has less palpable muscle tension in neck/upper trap region today.  Communication/Consultation:  None today  Equipment provided today:  None   Recommendations/Intent for next treatment session: Next visit will focus on stretching, postural strengthening, manual therapy, heat as needed.    >Total Treatment Billable Duration:  40 minutes  Time In: 1020  Time Out: 1100    SARAH DE OLIVEIRA PT         Charge Capture  [employer] Portal  Appt Desk     Future Appointments   Date Time Provider Department Center   1/25/2024  9:40 AM Nikolas Dunham MD [facility] AMB   1/25/2024  2:15 PM SFE CT REV EDUARD 64 SLICE SFERCT SFE   1/29/2024  9:00 AM Jessica Flores AuD [facility] AMB   1/29/2024  9:00 AM Dunia Gonzales PA [facility] AMB

## 2024-01-25 ENCOUNTER — OFFICE VISIT (OUTPATIENT)
Dept: INTERNAL MEDICINE CLINIC | Facility: CLINIC | Age: 79
End: 2024-01-25
Payer: MEDICARE

## 2024-01-25 ENCOUNTER — HOSPITAL ENCOUNTER (OUTPATIENT)
Dept: CT IMAGING | Age: 79
Discharge: HOME | End: 2024-01-25
Payer: MEDICARE

## 2024-01-25 VITALS
HEART RATE: 60 BPM | SYSTOLIC BLOOD PRESSURE: 124 MMHG | BODY MASS INDEX: 30.73 KG/M2 | WEIGHT: 167 LBS | TEMPERATURE: 97 F | DIASTOLIC BLOOD PRESSURE: 60 MMHG | HEIGHT: 62 IN | OXYGEN SATURATION: 97 %

## 2024-01-25 DIAGNOSIS — R10.84 GENERALIZED ABDOMINAL PAIN: ICD-10-CM

## 2024-01-25 DIAGNOSIS — R31.9 HEMATURIA, UNSPECIFIED TYPE: ICD-10-CM

## 2024-01-25 DIAGNOSIS — R80.9 CONTROLLED TYPE 2 DIABETES MELLITUS WITH MICROALBUMINURIA, WITHOUT LONG-TERM CURRENT USE OF INSULIN (HCC): ICD-10-CM

## 2024-01-25 DIAGNOSIS — M19.90 ARTHRITIS: ICD-10-CM

## 2024-01-25 DIAGNOSIS — E78.5 HYPERLIPIDEMIA LDL GOAL <100: ICD-10-CM

## 2024-01-25 DIAGNOSIS — E03.9 ACQUIRED HYPOTHYROIDISM: Primary | ICD-10-CM

## 2024-01-25 DIAGNOSIS — I10 ESSENTIAL (PRIMARY) HYPERTENSION: ICD-10-CM

## 2024-01-25 DIAGNOSIS — E11.29 CONTROLLED TYPE 2 DIABETES MELLITUS WITH MICROALBUMINURIA, WITHOUT LONG-TERM CURRENT USE OF INSULIN (HCC): ICD-10-CM

## 2024-01-25 PROCEDURE — 3078F DIAST BP <80 MM HG: CPT | Performed by: INTERNAL MEDICINE

## 2024-01-25 PROCEDURE — 3044F HG A1C LEVEL LT 7.0%: CPT | Performed by: INTERNAL MEDICINE

## 2024-01-25 PROCEDURE — 74177 CT ABD & PELVIS W/CONTRAST: CPT

## 2024-01-25 PROCEDURE — 3074F SYST BP LT 130 MM HG: CPT | Performed by: INTERNAL MEDICINE

## 2024-01-25 PROCEDURE — 6360000004 HC RX CONTRAST MEDICATION: Performed by: PHYSICIAN ASSISTANT

## 2024-01-25 PROCEDURE — 1123F ACP DISCUSS/DSCN MKR DOCD: CPT | Performed by: INTERNAL MEDICINE

## 2024-01-25 PROCEDURE — 74177 CT ABD & PELVIS W/CONTRAST: CPT | Performed by: RADIOLOGY

## 2024-01-25 PROCEDURE — 99214 OFFICE O/P EST MOD 30 MIN: CPT | Performed by: INTERNAL MEDICINE

## 2024-01-25 RX ORDER — LEVOTHYROXINE SODIUM 0.07 MG/1
75 TABLET ORAL DAILY
Qty: 30 TABLET | Refills: 5 | Status: SHIPPED | OUTPATIENT
Start: 2024-01-25

## 2024-01-25 RX ORDER — IRBESARTAN 150 MG/1
150 TABLET ORAL NIGHTLY
Qty: 90 TABLET | Refills: 3 | Status: SHIPPED | OUTPATIENT
Start: 2024-01-25

## 2024-01-25 RX ORDER — BLOOD SUGAR DIAGNOSTIC
1 STRIP MISCELLANEOUS DAILY
Qty: 100 EACH | Refills: 3 | Status: SHIPPED | OUTPATIENT
Start: 2024-01-25

## 2024-01-25 RX ORDER — ATENOLOL AND CHLORTHALIDONE TABLET 50; 25 MG/1; MG/1
0.5 TABLET ORAL DAILY
Qty: 90 TABLET | Refills: 3 | Status: SHIPPED | OUTPATIENT
Start: 2024-01-25

## 2024-01-25 RX ORDER — LEVOTHYROXINE SODIUM 0.05 MG/1
TABLET ORAL
Qty: 90 TABLET | Refills: 3 | Status: SHIPPED | OUTPATIENT
Start: 2024-01-25 | End: 2024-01-25

## 2024-01-25 RX ORDER — ATORVASTATIN CALCIUM 40 MG/1
40 TABLET, FILM COATED ORAL NIGHTLY
Qty: 90 TABLET | Refills: 3 | Status: SHIPPED | OUTPATIENT
Start: 2024-01-25

## 2024-01-25 RX ADMIN — IOPAMIDOL 100 ML: 755 INJECTION, SOLUTION INTRAVENOUS at 14:47

## 2024-01-25 ASSESSMENT — ENCOUNTER SYMPTOMS
BACK PAIN: 1
SHORTNESS OF BREATH: 0
ABDOMINAL PAIN: 1

## 2024-01-25 NOTE — PATIENT INSTRUCTIONS
Complete therapy.    Increase synthroid to 75 mcg daily.  Ensure proper dosing technique for thyroid medication - take first thing in the AM on empty stomach with water only & wait 45-60 minutes before eating/drinking/taking ANY other medication.  Reminded that any stomach acid inhibitors need to be taken at a different part of the day and iron and/or calcium supplements cannot be taken within 4 hours of thyroid med    CT abdomen today.    Follow up in 3 months with TSH and T4 lab prior.      CORI BARRAZA MD

## 2024-01-25 NOTE — PROGRESS NOTES
ASSESSMENT/PLAN:    Recovered from Flu  Chest clear.  Recovering from Flu.  Doing well.      Increase Levothyroxine (Synthroid) back to 75 mcg daily.    Ensure proper dosing technique for thyroid medication - take first thing in the AM on empty stomach with water only & wait 45-60 minutes before eating/drinking/taking ANY other medication.  Reminded that any stomach acid inhibitors need to be taken at a different part of the day and iron and/or calcium supplements cannot be taken within 4 hours of thyroid med    Complete CT of the abdomen to evaluate your abdominal pain, back pain, hernia.    Oncology notes reviewed.  History of breast cancer.      Neurosurgery notes reviewed.  Non surgical neck pain.  MRI reviewed.  Continue physical therapy.      Labs look good except thyroid needs adjustment.      Urine with hematuria.  This has been evaluated with urology.       Inguinal Hernia (Pt has a R sided inguinal hernia that is starting to cause her some pain when moving around.), Hemorrhoids (Pt states that she has some external hemorrhoids that is causing some constipation, but no bleeding .  CT abdomen later today at 3PM.  Abdomen with epigastric tenderness.  Non acute.  No rebound.  No worse with eating.  Denies constipation today    Evaluation and management of the chronic condition(s) delineated.  No negative side effects reported.  I have reviewed all the lab results. There are some abnormalities that are either expected or not critical to the patient's health, and are discussed in the office today and are addressed.  Please refer to the above assessement and plan narrative and orders and follow up plan.  Medication discussed and refilled as needed.  Physical exam findings are stable unless otherwise indicated and this is addressed.  The most recent lab work and imaging and consultant/urgent care visits and imaging are reviewed and discussed and considered during this visit encounter.      Shalini was seen today

## 2024-01-29 ENCOUNTER — OFFICE VISIT (OUTPATIENT)
Dept: ENT CLINIC | Age: 79
End: 2024-01-29
Payer: MEDICARE

## 2024-01-29 ENCOUNTER — OFFICE VISIT (OUTPATIENT)
Dept: AUDIOLOGY | Age: 79
End: 2024-01-29
Payer: MEDICARE

## 2024-01-29 VITALS
SYSTOLIC BLOOD PRESSURE: 124 MMHG | DIASTOLIC BLOOD PRESSURE: 60 MMHG | HEIGHT: 62 IN | BODY MASS INDEX: 30.73 KG/M2 | WEIGHT: 167 LBS

## 2024-01-29 DIAGNOSIS — H61.23 BILATERAL IMPACTED CERUMEN: Chronic | ICD-10-CM

## 2024-01-29 DIAGNOSIS — H90.3 SENSORINEURAL HEARING LOSS, BILATERAL: Primary | ICD-10-CM

## 2024-01-29 DIAGNOSIS — H90.3 SENSORINEURAL HEARING LOSS (SNHL) OF BOTH EARS: Primary | Chronic | ICD-10-CM

## 2024-01-29 PROCEDURE — 92557 COMPREHENSIVE HEARING TEST: CPT | Performed by: AUDIOLOGIST

## 2024-01-29 PROCEDURE — 92567 TYMPANOMETRY: CPT | Performed by: AUDIOLOGIST

## 2024-01-29 PROCEDURE — 1123F ACP DISCUSS/DSCN MKR DOCD: CPT | Performed by: PHYSICIAN ASSISTANT

## 2024-01-29 PROCEDURE — 69210 REMOVE IMPACTED EAR WAX UNI: CPT | Performed by: PHYSICIAN ASSISTANT

## 2024-01-29 PROCEDURE — 3074F SYST BP LT 130 MM HG: CPT | Performed by: PHYSICIAN ASSISTANT

## 2024-01-29 PROCEDURE — 3078F DIAST BP <80 MM HG: CPT | Performed by: PHYSICIAN ASSISTANT

## 2024-01-29 PROCEDURE — 99214 OFFICE O/P EST MOD 30 MIN: CPT | Performed by: PHYSICIAN ASSISTANT

## 2024-01-29 RX ORDER — OSELTAMIVIR PHOSPHATE 75 MG/1
CAPSULE ORAL
COMMUNITY
Start: 2024-01-18

## 2024-01-29 ASSESSMENT — ENCOUNTER SYMPTOMS
GASTROINTESTINAL NEGATIVE: 1
RESPIRATORY NEGATIVE: 1
ALLERGIC/IMMUNOLOGIC NEGATIVE: 1
EYES NEGATIVE: 1

## 2024-01-29 NOTE — PROGRESS NOTES
developed, well nourished, in no acute distress  Communication: The patient communicates appropriately for their age.  Voice: Normal.  Head, Face, and Salivary Glands: No head or facial abnormalities present, No masses or lesions present, Overall appearance is normal, No abnormality of parotid or submandibular glands present.      External Ears: appearance is normal with no scars, lesions or masses.   Right Ear:  Canals is partially blocked with wax, Tympanic membrane with normal landmarks and normal mobility, no retraction, inflammation, effusion.  Left  Ear: Canal is completely blocked with wax, Tympanic membrane with normal landmarks and normal mobility, no retraction, inflammation, effusion.    Nose/Nasal Cavity: Nasal mucosa is pink/ moist.  Nasal septum is midline.  Inferior turbinates are of normal size.  Both nasal passages are clear, no polyps or abnormal drainage.    Lips/Gums/Teeth: Inspection of lips, gums and teeth are normal  Oral Cavity: normal oral mucosa, no visualized ulcers, masses or other lesions,  Palate normal, Tongue normal, Floor of mouth normal. Mallampati Class 2 .  Oropharynx: Oropharynx normal and unobstructed, tonsils are  + 2  , no lesion or inflammation.     Neck/Thyroid: Normal appearance without mass, Trachea midline, No lymphadenopathy, No enlargement, tenderness or mass of thyroid noted.      Procedure: Otomicroscopy with cerumen removal requiring instrumentation    Procedure: Using a microscope, the external auditory canal of both ears were inspected.  A curette was used to atraumatically remove wax impacted into the lateral aspect of the right and left EAC.  The TM's were then inspected with the above findings.    Complications: The patient had no complications during or immediately following the procedure. The patient tolerated the procedure well and left the clinic in good condition.      Assessment/Plan:  1. Sensorineural hearing loss (SNHL) of both ears  2. Bilateral

## 2024-01-29 NOTE — PROGRESS NOTES
AUDIOLOGY EVALUATION    Shalini Rush had Tympanometry and Audiometry performed today.    The patient reports hearing loss.     Results as follows:    Tympanometry    Type A -  bilaterally    Audiometry    Test Performed - Comprehensive Audiogram    Type of Loss - Right Ear: abnormal hearing: degree of loss is mild to moderate sensorineural hearing loss                           Left Ear: abnormal hearing: degree of loss is mild to moderate sensorineural hearing loss     SRT   Measurement Right Ear Left Ear   Value 45 40   Unit dB dB     Discrimination  Measurement Right Ear Left Ear   Value 88% 88%   Unit dB dB     Recommend  Binaural amplification and annual audios    Kennedy Comer [facility]  Audiologist

## 2024-01-30 ENCOUNTER — HOSPITAL ENCOUNTER (OUTPATIENT)
Dept: PHYSICAL THERAPY | Age: 79
Setting detail: RECURRING SERIES
Discharge: HOME | End: 2024-02-02
Attending: NEUROLOGICAL SURGERY
Payer: MEDICARE

## 2024-01-30 DIAGNOSIS — R10.84 GENERALIZED ABDOMINAL PAIN: ICD-10-CM

## 2024-01-30 DIAGNOSIS — K43.9 VENTRAL HERNIA WITHOUT OBSTRUCTION OR GANGRENE: Primary | ICD-10-CM

## 2024-01-30 PROCEDURE — 97140 MANUAL THERAPY 1/> REGIONS: CPT

## 2024-01-30 PROCEDURE — 97110 THERAPEUTIC EXERCISES: CPT

## 2024-01-30 ASSESSMENT — PAIN SCALES - GENERAL: PAINLEVEL_OUTOF10: 2

## 2024-01-30 ASSESSMENT — PAIN DESCRIPTION - LOCATION: LOCATION: NECK

## 2024-01-30 NOTE — PROGRESS NOTES
body posture, and promote proper body mechanics.  Progressed resistance, range, repetitions, and complexity of movement as indicated.   Date:  1/3/24 Date:  1/16/24 Date:  1/24/24 Date   1/30/24   Activity/Exercise Parameters Parameters Parameters    Upper trap stretch without overpressure HEP 3 x 20 sec B 3 x 20 sec B 3 x 20 sec B   Levator scap stretch without overpressure HEP 3 x 20 sec B 3 x 20 sec B 3 x 20 sec B   Scapular squeezes. HEP HEP     Rows  Red tubing 2 x 10 reps B Red tubing 2 x 10 reps B Red tubing 2 x 10 reps B   Pull downs  Red tubing 2 x 10 reps B Red tubing 2 x 10 reps B Red tubing 2 x 10 reps B   B shoulder ER (lower trap)  Yellow band 2 x 10 reps B Yellow band 2 x 10 reps B red band 2 x 10 reps B   Bent over rows    2# x 10 reps each UE       MANUAL THERAPY: (25 minutes):   Joint mobilization and Soft tissue mobilization was utilized and necessary because of the patient's restricted joint motion and restricted motion of soft tissue. Soft tissue mobilization to B upper traps, Levator scapulae, and B cervical paraspinals to decrease tissue tension and pain.      MODALITIES: (10 minutes)       *  Hot Pack Therapy in order to provide analgesia and relieve muscle spasm. Moist heat to B upper traps/neck at end of session to decrease tissue tension and pain.      Treatment/Session Summary:    Treatment Assessment:   Patient tolerated session well. She reports decreased tension and pain in neck/shoulders. Patient is less tight to palpation in upper traps as well.  Communication/Consultation:  None today  Equipment provided today:  None   Recommendations/Intent for next treatment session: Next visit will focus on stretching, postural strengthening, manual therapy, heat as needed.    >Total Treatment Billable Duration:  40 minutes + 10 minutes heat  Time In: 1255  Time Out: 9645    SARAH DE OLIVEIRA PT         Charge Capture  [employer] Portal  Appt Desk     Future Appointments   Date Time Provider

## 2024-01-30 NOTE — RESULT ENCOUNTER NOTE
The hernia looks stable without change.  Would she like to see a surgeon to consider getting that fixed in case it could be contributing to the pain she has been experiencing?  There was moderate stool in the colon so she should increase water intake and dietary fiber (apple or pear w/ skin, beans, whole grains, broccoli) to improve consistency and frequency of bowel emptying with BMs.

## 2024-02-07 ENCOUNTER — OFFICE VISIT (OUTPATIENT)
Dept: SURGERY | Age: 79
End: 2024-02-07

## 2024-02-07 VITALS
SYSTOLIC BLOOD PRESSURE: 126 MMHG | WEIGHT: 169 LBS | BODY MASS INDEX: 31.1 KG/M2 | HEART RATE: 67 BPM | HEIGHT: 62 IN | DIASTOLIC BLOOD PRESSURE: 75 MMHG

## 2024-02-07 DIAGNOSIS — K43.9 ABDOMINAL WALL HERNIA: ICD-10-CM

## 2024-02-07 DIAGNOSIS — K43.2 INCISIONAL HERNIA, WITHOUT OBSTRUCTION OR GANGRENE: Primary | ICD-10-CM

## 2024-02-08 ASSESSMENT — ENCOUNTER SYMPTOMS
EYES NEGATIVE: 1
ALLERGIC/IMMUNOLOGIC NEGATIVE: 1
RESPIRATORY NEGATIVE: 1
GASTROINTESTINAL NEGATIVE: 1

## 2024-02-08 NOTE — PROGRESS NOTES
2/8/2024    Shalini Ruhs  MRN: [id number]      CHIEF COMPLAINT: hernia      PRIMARY CARE PHYSICIAN: Nikolas Dunham MD      HISTORY:  Prior abdominal surgery with lower midline incision. Has developed an incisional hernia at the lower part. It reduces easily.  She thinks it may be getting a little larger.  She has no pain.  No changes in bowel or bladder habits. CT shows a 2cm fascial defect. She was sent to evaluate and discuss surgery.     REVIEW OF SYSTEMS:  Review of Systems   Constitutional: Negative.    HENT: Negative.     Eyes: Negative.    Respiratory: Negative.     Cardiovascular: Negative.    Gastrointestinal: Negative.    Endocrine: Negative.    Genitourinary: Negative.    Musculoskeletal: Negative.    Skin: Negative.    Allergic/Immunologic: Negative.    Neurological: Negative.    Hematological: Negative.    Psychiatric/Behavioral: Negative.            Past Medical History:   Diagnosis Date    Abdominal pain     Arthritis     Breast cancer (HCC)     Breast cancer, left (HCC) 2001    radiation, surgery    Breast pain in female     Bunion     Chronic pain of left knee     Corns and callosities     DDD (degenerative disc disease), lumbar     Diabetes mellitus type II, controlled ([facility])     Diet controlled    Emotional disorder     Fungal infection of toenail     Hallux valgus     Hip pain, bilateral     Hyperglycemia     Hyperlipidemia     Hypertension     medication    Hypothyroidism     Lower GI bleed     Obesity     Other ill-defined conditions(799.89)     pt reports urinalysis shows blood - she is scheduled for CT pelvis and abdomen in August)    Postmenopausal     Right median nerve neuropathy     Stress incontinence     Traumatic arthropathy of knee        Current Outpatient Medications   Medication Sig Dispense Refill    oseltamivir (TAMIFLU) 75 MG capsule TAKE 1 CAPSULE BY MOUTH TWICE DAILY FOR 5 DAYS      atenolol-chlorthalidone (TENORETIC) 50-25 MG per tablet Take 0.5 tablets

## 2024-02-12 ENCOUNTER — HOSPITAL ENCOUNTER (OUTPATIENT)
Dept: PHYSICAL THERAPY | Age: 79
Setting detail: RECURRING SERIES
End: 2024-02-12
Attending: NEUROLOGICAL SURGERY
Payer: MEDICARE

## 2024-02-12 NOTE — PROGRESS NOTES
Shalini Rush  : 1945  Primary: [employer] Medicare Hmo/p*  Secondary: MEDICAID [facility] @ [address] [place]  [facility] [address]  Phone: [phone]  Fax: [phone]    PT Visit Info:    Total # of Visits to Date: 4  Progress Note Counter: 4  Progress Note Due Date: 24  No Show: 1 (1/10/24)  Canceled Appointment: 2 (24-pt 30 min late for appt)     OT Visit Info:  No data recorded    OUTPATIENT THERAPY: 2024  Episode  Appt Desk        Shalini Rush  came to appt 30 minutes late for  her appointment for today due to  running late .  Will plan to follow up next during next appointment.  Thank you,  SARAH BRAVO, PT    Future Appointments   Date Time Provider Department Center   2024  8:45 AM Sarah Bravo, PT SFEORPT [facility]FE   2024  8:45 AM Sarah Bravo, PT SFEORPT SFE   2024  8:30 AM SFE LAB DS SFEDS SFE   2024 11:00 AM Rox Chavez APRN - CNP [facility] AMB   2024  9:10 AM [facility]   2024  9:45 AM Connor Enciso MD [facility] AMB

## 2024-02-19 ENCOUNTER — HOSPITAL ENCOUNTER (OUTPATIENT)
Dept: PHYSICAL THERAPY | Age: 79
Setting detail: RECURRING SERIES
Discharge: HOME | End: 2024-02-22
Attending: NEUROLOGICAL SURGERY
Payer: MEDICARE

## 2024-02-19 PROCEDURE — 97110 THERAPEUTIC EXERCISES: CPT

## 2024-02-19 PROCEDURE — 97140 MANUAL THERAPY 1/> REGIONS: CPT

## 2024-02-19 ASSESSMENT — PAIN DESCRIPTION - LOCATION: LOCATION: NECK

## 2024-02-19 ASSESSMENT — PAIN SCALES - GENERAL: PAINLEVEL_OUTOF10: 1

## 2024-02-19 NOTE — PROGRESS NOTES
Shalini Rush  : 1945  Primary: [employer] Medicare Hmo/p* (Medicare Managed)  Secondary: MEDICAID [facility] @ [address]  [facility] [address]  Phone: [phone]  Fax: [phone] Plan Frequency: 2 times per week for 8 weeks    Plan of Care/Certification Expiration Date: 24        Plan of Care/Certification Expiration Date:  Plan of Care/Certification Expiration Date: 24     Frequency/Duration: Plan Frequency: 2 times per week for 8 weeks       Time In/Out:    Time In: 845  Time Out: 925      PT Visit Info:     Plan Frequency: 2 times per week for 8 weeks  Progress Note Due Date: 24  Total # of Visits to Date: 5  No Show: 1 (1/10/24)  Canceled Appointment: 2 (24-pt 30 min late for appt)      Visit Count:  5                OUTPATIENT PHYSICAL THERAPY:             Progress Report 2024               Episode (PT: Neck pain)         Treatment Diagnosis:     Cervicalgia  Medical/Referring Diagnosis:       M54.2 (ICD-10-CM) - Neck pain   M50.30 (ICD-10-CM) - DDD (degenerative disc disease), cervical   M62.838 (ICD-10-CM) - Trapezius muscle spasm     Referring Physician:  Mayco Hayward MD MD Orders:  PT Eval and Treat     Please eval and treat 2xwk for 8wks   Return MD Appt:    Date of Onset:  Onset Date: 23 (about 2 months per patient)     Allergies:  Milk protein  Restrictions/Precautions:    Hx of breast CA; Hx TSA L      Medications Last Reviewed:  2024     SUBJECTIVE   History of Injury/Illness (Reason for Referral):  Pain upper traps B and R neck more than L.  Has been hurting for about 2 months. Able to do what she has to do. Warm showers help. If hurting too bad, uses heating pad to heat the bed.  Carpal tunnel R hand. L TSA about 15 years ago and still gives her some trouble.  Sleeping habits have changed, waking up at night, sometimes due to neck, but a lot of the time bc thinking about things.

## 2024-02-19 NOTE — PROGRESS NOTES
Shalini Rush  : 1945  Primary: [employer] Medicare Hmo/p* (Medicare Managed)  Secondary: MEDICAID [facility] @ [address]  [facility] [address]  Phone: [phone]  Fax: [phone] Plan Frequency: 2 times per week for 8 weeks    Plan of Care/Certification Expiration Date: 24        Plan of Care/Certification Expiration Date:  Plan of Care/Certification Expiration Date: 24    Frequency/Duration: Plan Frequency: 2 times per week for 8 weeks      Time In/Out:   Time In: 845  Time Out: 925      PT Visit Info:    Plan Frequency: 2 times per week for 8 weeks  Progress Note Due Date: 24  Total # of Visits to Date: 5  No Show: 1 (1/10/24)  Canceled Appointment: 2 (24-pt 30 min late for appt)      Visit Count:  5    OUTPATIENT PHYSICAL THERAPY:   Treatment Note 2024       Episode  (PT: Neck pain)               Treatment Diagnosis:    Cervicalgia  Medical/Referring Diagnosis:       M54.2 (ICD-10-CM) - Neck pain   M50.30 (ICD-10-CM) - DDD (degenerative disc disease), cervical   M62.838 (ICD-10-CM) - Trapezius muscle spasm     Referring Physician:  Mayco Hayward MD MD Orders:  PT Eval and Treat   Please eval and treat 2xwk for 8wks    Return MD Appt:     Date of Onset:  Onset Date: 23 (about 2 months per patient)     Allergies:   Milk protein  Restrictions/Precautions:   Hx of breast CA; Hx TSA L  Medications Last Reviewed:  1/3/2024  Interventions Planned (Treatment may consist of any combination of the following):     See Assessment Note    Subjective Comments:   Doing better. Neck feels pretty good  Initial Pain Level::   Neck 1/10  Post Session Pain Level:     Neck 1/10  Medications Last Reviewed:  2024  Updated Objective Findings:  None Today  Treatment   THERAPEUTIC EXERCISE: (15 minutes):    Exercises per grid below to improve mobility and strength.  Required minimal verbal cues to promote proper body alignment,

## 2024-02-26 ENCOUNTER — HOSPITAL ENCOUNTER (OUTPATIENT)
Dept: PHYSICAL THERAPY | Age: 79
Setting detail: RECURRING SERIES
Discharge: HOME | End: 2024-02-29
Attending: NEUROLOGICAL SURGERY
Payer: MEDICARE

## 2024-02-26 ENCOUNTER — TELEPHONE (OUTPATIENT)
Age: 79
End: 2024-02-26

## 2024-02-26 PROCEDURE — 97140 MANUAL THERAPY 1/> REGIONS: CPT

## 2024-02-26 PROCEDURE — 97110 THERAPEUTIC EXERCISES: CPT

## 2024-02-26 ASSESSMENT — PAIN DESCRIPTION - LOCATION: LOCATION: NECK

## 2024-02-26 NOTE — THERAPY RECERTIFICATION
Shalini Rush  : 1945  Primary: [employer] Medicare Hmo/p* (Medicare Managed)  Secondary: MEDICAID [facility] @ [address]  [facility] [address]  Phone: [phone]  Fax: [phone] Plan Frequency: 1-2 times per week for 5 more weeks    Plan of Care/Certification Expiration Date: 24        Plan of Care/Certification Expiration Date:  Plan of Care/Certification Expiration Date: 24     Frequency/Duration: Plan Frequency: 1-2 times per week for 5 more weeks       Time In/Out:    Time In: 0850  Time Out: 0930      PT Visit Info:     Plan Frequency: 1-2 times per week for 5 more weeks  Progress Note Due Date: 24  Total # of Visits to Date: 6  Progress Note Counter: 1  No Show: 1 (1/10/24)  Canceled Appointment: 2 (24-pt 30 min late for appt)      Visit Count:  6                OUTPATIENT PHYSICAL THERAPY:             Recertification 2024               Episode (PT: Neck pain)         Treatment Diagnosis:     Cervicalgia  Medical/Referring Diagnosis:       M54.2 (ICD-10-CM) - Neck pain   M50.30 (ICD-10-CM) - DDD (degenerative disc disease), cervical   M62.838 (ICD-10-CM) - Trapezius muscle spasm     Referring Physician:  Mayco Hayward MD MD Orders:  PT Eval and Treat     Please eval and treat 2xwk for 8wks   Return MD Appt:    Date of Onset:  Onset Date: 23 (about 2 months per patient)     Allergies:  Milk protein  Restrictions/Precautions:    Hx of breast CA; Hx TSA L      Medications Last Reviewed:  2024     SUBJECTIVE   History of Injury/Illness (Reason for Referral):  Pain upper traps B and R neck more than L.  Has been hurting for about 2 months. Able to do what she has to do. Warm showers help. If hurting too bad, uses heating pad to heat the bed.  Carpal tunnel R hand. L TSA about 15 years ago and still gives her some trouble.  Sleeping habits have changed, waking up at night, sometimes due to neck, but a

## 2024-02-26 NOTE — PROGRESS NOTES
Shalini Rush  : 1945  Primary: [employer] Medicare Hmo/p* (Medicare Managed)  Secondary: MEDICAID [facility] @ [address]  [facility] [address]  Phone: [phone]  Fax: [phone] Plan Frequency: 2 times per week for 8 weeks    Plan of Care/Certification Expiration Date: 24        Plan of Care/Certification Expiration Date:  Plan of Care/Certification Expiration Date: 24    Frequency/Duration: Plan Frequency: 2 times per week for 8 weeks      Time In/Out:   Time In: 0850  Time Out: 0930      PT Visit Info:    Plan Frequency: 2 times per week for 8 weeks  Progress Note Due Date: 24  Total # of Visits to Date: 6  Progress Note Counter: 1  No Show: 1 (1/10/24)  Canceled Appointment: 2 (24-pt 30 min late for appt)      Visit Count:  6    OUTPATIENT PHYSICAL THERAPY:   Treatment Note 2024       Episode  (PT: Neck pain)               Treatment Diagnosis:    Cervicalgia  Medical/Referring Diagnosis:       M54.2 (ICD-10-CM) - Neck pain   M50.30 (ICD-10-CM) - DDD (degenerative disc disease), cervical   M62.838 (ICD-10-CM) - Trapezius muscle spasm     Referring Physician:  Mayco Hayward MD MD Orders:  PT Eval and Treat   Please eval and treat 2xwk for 8wks    Return MD Appt:     Date of Onset:  Onset Date: 23 (about 2 months per patient)     Allergies:   Milk protein  Restrictions/Precautions:   Hx of breast CA; Hx TSA L  Medications Last Reviewed:  1/3/2024  Interventions Planned (Treatment may consist of any combination of the following):     See Assessment Note    Subjective Comments: Doing pretty good    Initial Pain Level::   Neck  (2-3)/10  Post Session Pain Level:     Neck  (1-2)/10  Medications Last Reviewed:  2024  Updated Objective Findings:  None Today  Treatment   THERAPEUTIC EXERCISE: (15 minutes):    Exercises per grid below to improve mobility and strength.  Required minimal verbal cues to promote

## 2024-03-04 ENCOUNTER — APPOINTMENT (OUTPATIENT)
Dept: PHYSICAL THERAPY | Age: 79
End: 2024-03-04
Attending: NEUROLOGICAL SURGERY
Payer: MEDICARE

## 2024-03-06 ENCOUNTER — HOSPITAL ENCOUNTER (OUTPATIENT)
Dept: PHYSICAL THERAPY | Age: 79
Setting detail: RECURRING SERIES
Discharge: HOME | End: 2024-03-09
Attending: NEUROLOGICAL SURGERY
Payer: MEDICARE

## 2024-03-06 PROCEDURE — 97140 MANUAL THERAPY 1/> REGIONS: CPT

## 2024-03-06 PROCEDURE — 97110 THERAPEUTIC EXERCISES: CPT

## 2024-03-06 ASSESSMENT — PAIN DESCRIPTION - LOCATION: LOCATION: NECK

## 2024-03-06 ASSESSMENT — PAIN SCALES - GENERAL: PAINLEVEL_OUTOF10: 3

## 2024-03-06 NOTE — PROGRESS NOTES
Shalini Rush  : 1945  Primary: [employer] Hmo/p* (Medicare Managed)  Secondary: MEDICAID [facility] @ [address]  [facility] [address]  Phone: [phone]  Fax: [phone] Plan Frequency: 1-2 times per week for 5 more weeks    Plan of Care/Certification Expiration Date: 24        Plan of Care/Certification Expiration Date:  Plan of Care/Certification Expiration Date: 24    Frequency/Duration: Plan Frequency: 1-2 times per week for 5 more weeks      Time In/Out:   Time In: 1100  Time Out: 1143      PT Visit Info:    Plan Frequency: 1-2 times per week for 5 more weeks  Progress Note Due Date: 24  Total # of Visits to Date: 7  Progress Note Counter: 2  No Show: 1 (1/10/24)  Canceled Appointment: 2 (24-pt 30 min late for appt)      Visit Count:  7    OUTPATIENT PHYSICAL THERAPY:   Treatment Note 3/6/2024       Episode  (PT: Neck pain)               Treatment Diagnosis:    Cervicalgia  Medical/Referring Diagnosis:       M54.2 (ICD-10-CM) - Neck pain   M50.30 (ICD-10-CM) - DDD (degenerative disc disease), cervical   M62.838 (ICD-10-CM) - Trapezius muscle spasm     Referring Physician:  Mayco Hayward MD MD Orders:  PT Eval and Treat   Please eval and treat 2xwk for 8wks    Return MD Appt:     Date of Onset:  Onset Date: 23 (about 2 months per patient)     Allergies:   Milk protein  Restrictions/Precautions:   Hx of breast CA; Hx TSA L  Medications Last Reviewed:  1/3/2024  Interventions Planned (Treatment may consist of any combination of the following):     See Assessment Note    Subjective Comments: Doing okay. Been working on cleaning out the house.    Initial Pain Level::   Neck 3/10  Post Session Pain Level:     Neck 2/10  Medications Last Reviewed:  3/6/2024  Updated Objective Findings:  None Today  Treatment   THERAPEUTIC EXERCISE: (15 minutes):    Exercises per grid below to improve mobility and strength.

## 2024-03-13 ENCOUNTER — HOSPITAL ENCOUNTER (OUTPATIENT)
Dept: PHYSICAL THERAPY | Age: 79
Setting detail: RECURRING SERIES
Discharge: HOME | End: 2024-03-16
Attending: NEUROLOGICAL SURGERY
Payer: MEDICARE

## 2024-03-13 PROCEDURE — 97140 MANUAL THERAPY 1/> REGIONS: CPT

## 2024-03-13 PROCEDURE — 97110 THERAPEUTIC EXERCISES: CPT

## 2024-03-13 ASSESSMENT — PAIN SCALES - GENERAL: PAINLEVEL_OUTOF10: 1

## 2024-03-13 ASSESSMENT — PAIN DESCRIPTION - LOCATION: LOCATION: NECK

## 2024-03-13 NOTE — PROGRESS NOTES
Shalini Rush  : 1945  Primary: [employer] H[employer]/p* (Medicare Managed)  Secondary: MEDICAID [facility] @ [address]  [facility] [address]  Phone: [phone]  Fax: [phone] Plan Frequency: 1-2 times per week for 5 more weeks    Plan of Care/Certification Expiration Date: 24        Plan of Care/Certification Expiration Date:  Plan of Care/Certification Expiration Date: 24    Frequency/Duration: Plan Frequency: 1-2 times per week for 5 more weeks      Time In/Out:   Time In: 1100  Time Out: 1140      PT Visit Info:    Plan Frequency: 1-2 times per week for 5 more weeks  Progress Note Due Date: 24  Total # of Visits to Date: 8  Progress Note Counter: 3  No Show: 1 (1/10/24)  Canceled Appointment: 2 (24-pt 30 min late for appt)      Visit Count:  8    OUTPATIENT PHYSICAL THERAPY:   Treatment Note 3/13/2024       Episode  (PT: Neck pain)               Treatment Diagnosis:    Cervicalgia  Medical/Referring Diagnosis:       M54.2 (ICD-10-CM) - Neck pain   M50.30 (ICD-10-CM) - DDD (degenerative disc disease), cervical   M62.838 (ICD-10-CM) - Trapezius muscle spasm     Referring Physician:  Mayco Hayward MD MD Orders:  PT Eval and Treat   Please eval and treat 2xwk for 8wks    Return MD Appt:     Date of Onset:  Onset Date: 23 (about 2 months per patient)     Allergies:   Milk protein  Restrictions/Precautions:   Hx of breast CA; Hx TSA L  Medications Last Reviewed:  1/3/2024  Interventions Planned (Treatment may consist of any combination of the following):     See Assessment Note    Subjective Comments: Doing pretty well. Pain is better.     Initial Pain Level::   Neck 10  Post Session Pain Level:     Neck /10  Medications Last Reviewed:  3/13/2024  Updated Objective Findings:  None Today  Treatment   THERAPEUTIC EXERCISE: (15 minutes):    Exercises per grid below to improve mobility and strength.  Required minimal

## 2024-03-14 ENCOUNTER — OFFICE VISIT (OUTPATIENT)
Dept: INTERNAL MEDICINE CLINIC | Facility: CLINIC | Age: 79
End: 2024-03-14
Payer: MEDICARE

## 2024-03-14 VITALS
DIASTOLIC BLOOD PRESSURE: 74 MMHG | WEIGHT: 165 LBS | TEMPERATURE: 97.6 F | SYSTOLIC BLOOD PRESSURE: 118 MMHG | BODY MASS INDEX: 30.36 KG/M2 | OXYGEN SATURATION: 99 % | HEART RATE: 60 BPM | HEIGHT: 62 IN

## 2024-03-14 DIAGNOSIS — M25.561 RIGHT KNEE PAIN, UNSPECIFIED CHRONICITY: Primary | ICD-10-CM

## 2024-03-14 PROCEDURE — 3074F SYST BP LT 130 MM HG: CPT | Performed by: NURSE PRACTITIONER

## 2024-03-14 PROCEDURE — 1123F ACP DISCUSS/DSCN MKR DOCD: CPT | Performed by: NURSE PRACTITIONER

## 2024-03-14 PROCEDURE — 3078F DIAST BP <80 MM HG: CPT | Performed by: NURSE PRACTITIONER

## 2024-03-14 PROCEDURE — 99213 OFFICE O/P EST LOW 20 MIN: CPT | Performed by: NURSE PRACTITIONER

## 2024-03-14 ASSESSMENT — ENCOUNTER SYMPTOMS
COUGH: 0
SHORTNESS OF BREATH: 0
ABDOMINAL PAIN: 0
NAUSEA: 0
VOMITING: 0

## 2024-03-14 NOTE — PROGRESS NOTES
Review of Systems   Constitutional:  Negative for chills and fever.   Respiratory:  Negative for cough and shortness of breath.    Cardiovascular:  Negative for chest pain and leg swelling.   Gastrointestinal:  Negative for abdominal pain, nausea and vomiting.   Musculoskeletal:  Positive for arthralgias and gait problem.       Objective:  /74 (Site: Left Upper Arm, Position: Sitting, Cuff Size: Large Adult)   Pulse 60   Temp 97.6 °F (36.4 °C) (Temporal)   Ht 1.575 m (5' 2\")   Wt 74.8 kg (165 lb)   SpO2 99%   BMI 30.18 kg/m²       Examination:  Physical Exam  Vitals and nursing note reviewed.   Constitutional:       General: She is not in acute distress.     Appearance: Normal appearance.   Cardiovascular:      Rate and Rhythm: Normal rate and regular rhythm.   Pulmonary:      Effort: Pulmonary effort is normal. No respiratory distress.      Breath sounds: Normal breath sounds.   Musculoskeletal:      Right knee: Crepitus present. Decreased range of motion. Tenderness present.      Right lower leg: No edema.      Left lower leg: No edema.   Skin:     General: Skin is warm and dry.   Neurological:      Mental Status: She is alert and oriented to person, place, and time.   Psychiatric:         Mood and Affect: Mood normal.           Assessment/Plan:    Shalini was seen today for knee pain.    Diagnoses and all orders for this visit:    Right knee pain, unspecified chronicity  -     XR KNEE RIGHT (3 VIEWS); Future  X-ray today. Plan pending results. OK to continue Tylenol PRN. Recommended OTC topical Voltaren or similar.             On this date, 3/14/24, I have spent 25 minutes reviewing previous notes, test results and face to face with the patient discussing the diagnosis and importance of compliance with the treatment plan as well as documenting on the day of the visit.     An electronic signature was used to authenticate this note.  -RANDI Montero

## 2024-03-15 ENCOUNTER — HOSPITAL ENCOUNTER (OUTPATIENT)
Dept: GENERAL RADIOLOGY | Age: 79
End: 2024-03-15
Payer: MEDICARE

## 2024-03-15 DIAGNOSIS — M25.561 RIGHT KNEE PAIN, UNSPECIFIED CHRONICITY: ICD-10-CM

## 2024-03-15 PROCEDURE — 73562 X-RAY EXAM OF KNEE 3: CPT

## 2024-03-18 DIAGNOSIS — M25.561 RIGHT KNEE PAIN, UNSPECIFIED CHRONICITY: Primary | ICD-10-CM

## 2024-03-18 DIAGNOSIS — M17.11 PRIMARY OSTEOARTHRITIS OF RIGHT KNEE: ICD-10-CM

## 2024-03-20 ENCOUNTER — HOSPITAL ENCOUNTER (OUTPATIENT)
Dept: PHYSICAL THERAPY | Age: 79
Setting detail: RECURRING SERIES
Discharge: HOME | End: 2024-03-23
Attending: NEUROLOGICAL SURGERY
Payer: MEDICARE

## 2024-03-20 PROCEDURE — 97110 THERAPEUTIC EXERCISES: CPT

## 2024-03-20 PROCEDURE — 97140 MANUAL THERAPY 1/> REGIONS: CPT

## 2024-03-20 ASSESSMENT — PAIN DESCRIPTION - LOCATION: LOCATION: NECK

## 2024-03-20 ASSESSMENT — PAIN SCALES - GENERAL: PAINLEVEL_OUTOF10: 2

## 2024-03-20 NOTE — THERAPY DISCHARGE
Shalini Rush  : 1945  Primary: [employer] H[employer]/p* (Medicare Managed)  Secondary: MEDICAID [facility] @ [address]  [facility] [address]  Phone: [phone]  Fax: [phone] Plan Frequency: 1-2 times per week for 5 more weeks    Plan of Care/Certification Expiration Date: 24        Plan of Care/Certification Expiration Date:  Plan of Care/Certification Expiration Date: 24     Frequency/Duration: Plan Frequency: 1-2 times per week for 5 more weeks       Time In/Out:    Time In: 1100  Time Out: 1140      PT Visit Info:     Plan Frequency: 1-2 times per week for 5 more weeks  Progress Note Due Date: 24  Total # of Visits to Date: 9  Progress Note Counter: 4  No Show: 1 (1/10/24)  Canceled Appointment: 2 (24-pt 30 min late for appt)      Visit Count:  9                OUTPATIENT PHYSICAL THERAPY:             Discharge Summary 3/20/2024               Episode (PT: Neck pain)         Treatment Diagnosis:     Cervicalgia  Medical/Referring Diagnosis:       M54.2 (ICD-10-CM) - Neck pain   M50.30 (ICD-10-CM) - DDD (degenerative disc disease), cervical   M62.838 (ICD-10-CM) - Trapezius muscle spasm     Referring Physician:  Mayco Hayward MD MD Orders:  PT Eval and Treat     Please eval and treat 2xwk for 8wks   Return MD Appt:    Date of Onset:  Onset Date: 23 (about 2 months per patient)     Allergies:  Milk protein  Restrictions/Precautions:    Hx of breast CA; Hx TSA L      Medications Last Reviewed:  3/20/2024     SUBJECTIVE   History of Injury/Illness (Reason for Referral):  Pain upper traps B and R neck more than L.  Has been hurting for about 2 months. Able to do what she has to do. Warm showers help. If hurting too bad, uses heating pad to heat the bed.  Carpal tunnel R hand. L TSA about 15 years ago and still gives her some trouble.  Sleeping habits have changed, waking up at night, sometimes due to neck, but

## 2024-03-20 NOTE — PROGRESS NOTES
Shalini Rush  : 1945  Primary: [employer] H[employer]/p* (Medicare Managed)  Secondary: MEDICAID [facility] @ [address]  [facility] [address]  Phone: [phone]  Fax: [phone] Plan Frequency: 1-2 times per week for 5 more weeks    Plan of Care/Certification Expiration Date: 24        Plan of Care/Certification Expiration Date:  Plan of Care/Certification Expiration Date: 24    Frequency/Duration: Plan Frequency: 1-2 times per week for 5 more weeks      Time In/Out:   Time In: 1100  Time Out: 1140      PT Visit Info:    Plan Frequency: 1-2 times per week for 5 more weeks  Progress Note Due Date: 24  Total # of Visits to Date: 9  Progress Note Counter: 4  No Show: 1 (1/10/24)  Canceled Appointment: 2 (24-pt 30 min late for appt)      Visit Count:  9    OUTPATIENT PHYSICAL THERAPY:   Treatment Note 3/20/2024       Episode  (PT: Neck pain)               Treatment Diagnosis:    Cervicalgia  Medical/Referring Diagnosis:       M54.2 (ICD-10-CM) - Neck pain   M50.30 (ICD-10-CM) - DDD (degenerative disc disease), cervical   M62.838 (ICD-10-CM) - Trapezius muscle spasm     Referring Physician:  Mayco Hayward MD MD Orders:  PT Eval and Treat   Please eval and treat 2xwk for 8wks    Return MD Appt:     Date of Onset:  Onset Date: 23 (about 2 months per patient)     Allergies:   Milk protein  Restrictions/Precautions:   Hx of breast CA; Hx TSA L  Medications Last Reviewed:  1/3/2024  Interventions Planned (Treatment may consist of any combination of the following):     See Assessment Note    Subjective Comments: Neck is feeling pretty good.    Initial Pain Level::   Neck 2/10  Post Session Pain Level:     Neck 1/10  Medications Last Reviewed:  3/20/2024  Updated Objective Findings:  None Today  Treatment   THERAPEUTIC EXERCISE: (15 minutes):    Exercises per grid below to improve mobility and strength.  Required minimal verbal

## 2024-04-18 ENCOUNTER — NURSE ONLY (OUTPATIENT)
Dept: INTERNAL MEDICINE CLINIC | Facility: CLINIC | Age: 79
End: 2024-04-18

## 2024-04-18 DIAGNOSIS — E03.9 ACQUIRED HYPOTHYROIDISM: ICD-10-CM

## 2024-04-18 LAB
T4 FREE SERPL-MCNC: 1.3 NG/DL (ref 0.78–1.46)
TSH, 3RD GENERATION: 0.6 UIU/ML (ref 0.36–3.74)

## 2024-04-25 ENCOUNTER — OFFICE VISIT (OUTPATIENT)
Dept: INTERNAL MEDICINE CLINIC | Facility: CLINIC | Age: 79
End: 2024-04-25
Payer: MEDICARE

## 2024-04-25 VITALS
TEMPERATURE: 97.2 F | SYSTOLIC BLOOD PRESSURE: 130 MMHG | WEIGHT: 160 LBS | OXYGEN SATURATION: 98 % | HEIGHT: 62 IN | DIASTOLIC BLOOD PRESSURE: 82 MMHG | HEART RATE: 62 BPM | BODY MASS INDEX: 29.44 KG/M2

## 2024-04-25 DIAGNOSIS — R80.9 CONTROLLED TYPE 2 DIABETES MELLITUS WITH MICROALBUMINURIA, WITHOUT LONG-TERM CURRENT USE OF INSULIN (HCC): Primary | ICD-10-CM

## 2024-04-25 DIAGNOSIS — I10 PRIMARY HYPERTENSION: ICD-10-CM

## 2024-04-25 DIAGNOSIS — M25.561 CHRONIC PAIN OF BOTH KNEES: ICD-10-CM

## 2024-04-25 DIAGNOSIS — E78.00 PURE HYPERCHOLESTEROLEMIA: ICD-10-CM

## 2024-04-25 DIAGNOSIS — E11.29 CONTROLLED TYPE 2 DIABETES MELLITUS WITH MICROALBUMINURIA, WITHOUT LONG-TERM CURRENT USE OF INSULIN (HCC): Primary | ICD-10-CM

## 2024-04-25 DIAGNOSIS — M25.562 CHRONIC PAIN OF BOTH KNEES: ICD-10-CM

## 2024-04-25 DIAGNOSIS — E03.9 ACQUIRED HYPOTHYROIDISM: ICD-10-CM

## 2024-04-25 DIAGNOSIS — Z85.3 HISTORY OF BREAST CANCER: ICD-10-CM

## 2024-04-25 DIAGNOSIS — G89.29 CHRONIC PAIN OF BOTH KNEES: ICD-10-CM

## 2024-04-25 PROCEDURE — 3079F DIAST BP 80-89 MM HG: CPT | Performed by: NURSE PRACTITIONER

## 2024-04-25 PROCEDURE — 99214 OFFICE O/P EST MOD 30 MIN: CPT | Performed by: NURSE PRACTITIONER

## 2024-04-25 PROCEDURE — 3044F HG A1C LEVEL LT 7.0%: CPT | Performed by: NURSE PRACTITIONER

## 2024-04-25 PROCEDURE — 1123F ACP DISCUSS/DSCN MKR DOCD: CPT | Performed by: NURSE PRACTITIONER

## 2024-04-25 PROCEDURE — 3075F SYST BP GE 130 - 139MM HG: CPT | Performed by: NURSE PRACTITIONER

## 2024-04-25 ASSESSMENT — ENCOUNTER SYMPTOMS
CONSTIPATION: 0
SORE THROAT: 0
DIARRHEA: 0
ABDOMINAL PAIN: 0
COUGH: 0
RHINORRHEA: 0
NAUSEA: 0
SHORTNESS OF BREATH: 0
VOMITING: 0

## 2024-04-25 NOTE — PROGRESS NOTES
Palpations: Abdomen is soft.      Tenderness: There is no abdominal tenderness.   Musculoskeletal:      Right knee: Crepitus present. Decreased range of motion.      Left knee: Crepitus present. Decreased range of motion.      Right lower leg: No edema.      Left lower leg: No edema.   Skin:     General: Skin is warm and dry.   Neurological:      Mental Status: She is alert and oriented to person, place, and time.   Psychiatric:         Mood and Affect: Mood normal.             Lab Results   Component Value Date/Time    WBC 5.8 01/18/2024 08:15 AM    HGB 12.8 01/18/2024 08:15 AM    HCT 40.2 01/18/2024 08:15 AM    MCV 87.8 01/18/2024 08:15 AM    RDW 14.7 01/18/2024 08:15 AM     01/18/2024 08:15 AM    NEUTOPHILPCT 66 01/17/2024 08:54 AM    LYMPHOPCT 24 01/17/2024 08:54 AM    MONOPCT 9 01/17/2024 08:54 AM    EOSRELPCT 1 01/17/2024 08:54 AM    BASOPCT 0 01/17/2024 08:54 AM    LYMPHSABS 1.4 01/17/2024 08:54 AM    MONOSABS 0.6 01/17/2024 08:54 AM    EOSABS 0.1 01/17/2024 08:54 AM    BASOSABS 0.0 01/17/2024 08:54 AM    IMMGRAN 0 01/17/2024 08:54 AM    GRANULOCYTEABSOLUTECOUNT 0.0 05/17/2022 03:30 PM       Lab Results   Component Value Date/Time     01/18/2024 08:15 AM    K 4.1 01/18/2024 08:15 AM     01/18/2024 08:15 AM    CO2 32 01/18/2024 08:15 AM    ANIONGAP 2 01/18/2024 08:15 AM    GLUCOSE 133 01/18/2024 08:15 AM    BUN 21 01/18/2024 08:15 AM    CREATININE 1.10 01/18/2024 08:15 AM    GFRAA >60 08/25/2022 08:23 AM    LABGLOM 51 01/18/2024 08:15 AM    CALCIUM 9.8 01/18/2024 08:15 AM    BILITOT 0.9 01/18/2024 08:15 AM    ALT 24 01/18/2024 08:15 AM    AST 19 01/18/2024 08:15 AM    ALKPHOS 78 01/18/2024 08:15 AM    ALKPHOS 72 02/22/2022 08:13 AM    PROT 8.1 01/18/2024 08:15 AM    GLOB 4.3 01/18/2024 08:15 AM    ALBUMIN 3.8 01/18/2024 08:15 AM       Lab Results   Component Value Date/Time    CHOL 159 01/18/2024 08:15 AM    HDL 62 01/18/2024 08:15 AM    TRIG 67 01/18/2024 08:15 AM    LDLCALC 83.6 01/18/2024

## 2024-05-06 ENCOUNTER — OFFICE VISIT (OUTPATIENT)
Age: 79
End: 2024-05-06
Payer: MEDICARE

## 2024-05-06 VITALS
BODY MASS INDEX: 29.44 KG/M2 | DIASTOLIC BLOOD PRESSURE: 80 MMHG | HEART RATE: 60 BPM | SYSTOLIC BLOOD PRESSURE: 120 MMHG | WEIGHT: 160 LBS | HEIGHT: 62 IN

## 2024-05-06 DIAGNOSIS — R93.1 AGATSTON CORONARY ARTERY CALCIUM SCORE GREATER THAN 400: ICD-10-CM

## 2024-05-06 DIAGNOSIS — I10 ESSENTIAL (PRIMARY) HYPERTENSION: Primary | ICD-10-CM

## 2024-05-06 DIAGNOSIS — I10 HYPERTENSION, ESSENTIAL: ICD-10-CM

## 2024-05-06 PROCEDURE — 99214 OFFICE O/P EST MOD 30 MIN: CPT | Performed by: INTERNAL MEDICINE

## 2024-05-06 PROCEDURE — 3079F DIAST BP 80-89 MM HG: CPT | Performed by: INTERNAL MEDICINE

## 2024-05-06 PROCEDURE — 93000 ELECTROCARDIOGRAM COMPLETE: CPT | Performed by: INTERNAL MEDICINE

## 2024-05-06 PROCEDURE — 1123F ACP DISCUSS/DSCN MKR DOCD: CPT | Performed by: INTERNAL MEDICINE

## 2024-05-06 PROCEDURE — 3074F SYST BP LT 130 MM HG: CPT | Performed by: INTERNAL MEDICINE

## 2024-05-06 ASSESSMENT — ENCOUNTER SYMPTOMS
COUGH: 0
STRIDOR: 0
NAIL CHANGES: 0
ABDOMINAL PAIN: 0
APHONIA: 0
EYE PAIN: 0

## 2024-05-06 NOTE — PROGRESS NOTES
[facility] CARDIOLOGY, [address], [address]  [place] [address]  PHONE: [phone]    SUBJECTIVE:   Shalini Rush is a 78 y.o. female 1945   seen for a follow up visit regarding the following:     Chief Complaint   Patient presents with    Hypertension    Follow-up       History of present illness: 78 y.o. female presented for follow-up 5/6/24 history of breast cancer patient on letrozole. Walking her dog. She has had increased stressors selling home. No chest pain or shortness of breath     Cardiac history:  6/2017 CT calcium score 840 [90th percentile]  7/2017 nuclear stress SPECT normal perfusion  2/28/2023 CT chest 3 mm right middle lobe nodule 12-month CT follow-up recommended  6/23/2023 NST normal perfusion   5/6/2024 EKG sinus rhythm nonspecific ST abnormalities    Assessment:   Hypertension  Cardiac Medications       Antihypertensive Combinations       atenolol-chlorthalidone (TENORETIC) 50-25 MG per tablet Take 0.5 tablets by mouth daily       HMG CoA Reductase Inhibitors       atorvastatin (LIPITOR) 40 MG tablet Take 1 tablet by mouth at bedtime       Angiotensin II Receptor Antagonists       irbesartan (AVAPRO) 150 MG tablet Take 1 tablet by mouth at bedtime           Hyperlipidemia  atorvastatin - 40 MG   Agatston score greater than 400  Bradycardia   We have discussed the natural history of progression of conduction disease.  We discussed class I and class II indications for permanent device-based therapies.     Current Outpatient Medications   Medication Sig    atenolol-chlorthalidone (TENORETIC) 50-25 MG per tablet Take 0.5 tablets by mouth daily    atorvastatin (LIPITOR) 40 MG tablet Take 1 tablet by mouth at bedtime    blood glucose test strips (ONETOUCH ULTRA) strip 1 each by In Vitro route daily As needed.    irbesartan (AVAPRO) 150 MG tablet Take 1 tablet by mouth at bedtime    levothyroxine (SYNTHROID) 75 MCG tablet Take 1 tablet by mouth Daily    hydrocortisone (ANUSOL-HC)

## 2024-05-08 NOTE — PROGRESS NOTES
[FreeTextEntry1] : 71 yo presents for follow up for re-examination and repeat sozo measurement with h/o RIGHT lumpectomy and SLNB on 12/22/22 for RIGHT invasive lobular carcinoma % % Her-2 negative, LCIS and focally involving radial scar, seen on diagnostic imaging as architectural distortion at R 11:00 spanning 1.5cm on US, originally palpated by patient. Course complicated by right breast and axillary swelling likely hematoma. Final surgical pathology yielded 0.8 cm infiltrating lobular carcinoma no evidence of lymphovascular invasion. Biopsy site changes seen, (0/6) lymph nodes negative for tumor, margins negative.   Oncotype RS 15. Completed XRT Dr. Wernicke 3/30/23, on Anastrozole Dr. Barrientos.  10/24/23 B/L MG & US, BIRADS 2.  Patient previously followed by Mesha Araujo NP. She is on blood thinners and has history of post-biopsy hematoma. She was unable to stop blood thinners prior to initial biopsy but stopped for surgery, began again on 12/24/22 after confirming with cardiologist and Dr. Garcia.   Her family history is significant for breast cancer in her mother (dx age 65) and her m-aunt (dx age 50) and [id number]-cousin dx recent at age 30; patient met with BRETT Garibay via telehealth 10/28/22, full breast/gyn panel negative for pathogenic mutations.   TNM Stage: p T 1b N 0 M x  AJCC Stage (8th Ed): I.  08/28/23 159 lb 6 oz (72.3 kg)     BP Readings from Last 3 Encounters:   10/16/23 120/70   09/27/23 124/68   08/28/23 132/88     Physical Exam  Vitals and nursing note reviewed. Exam conducted with a chaperone present. Constitutional:       General: She is not in acute distress. Appearance: Normal appearance. HENT:      Head: Normocephalic and atraumatic. Eyes:      Extraocular Movements: Extraocular movements intact. Conjunctiva/sclera: Conjunctivae normal.   Neck:      Vascular: No carotid bruit. Cardiovascular:      Rate and Rhythm: Normal rate and regular rhythm. Heart sounds: No murmur heard. No friction rub. No gallop. Pulmonary:      Effort: Pulmonary effort is normal.      Breath sounds: Normal breath sounds. Musculoskeletal:      Right lower leg: No edema. Left lower leg: No edema. Skin:     Coloration: Skin is not jaundiced or pale. Neurological:      General: No focal deficit present. Mental Status: She is alert. Mental status is at baseline. Psychiatric:         Mood and Affect: Mood normal.         Behavior: Behavior normal.                 On the basis of positive falls risk screening, assessment and plan is as follows: home safety tips provided.

## 2024-05-21 ENCOUNTER — OFFICE VISIT (OUTPATIENT)
Dept: ORTHOPEDIC SURGERY | Age: 79
End: 2024-05-21
Payer: MEDICARE

## 2024-05-21 DIAGNOSIS — M25.561 RIGHT KNEE PAIN, UNSPECIFIED CHRONICITY: Primary | ICD-10-CM

## 2024-05-21 PROCEDURE — 99204 OFFICE O/P NEW MOD 45 MIN: CPT | Performed by: ORTHOPAEDIC SURGERY

## 2024-05-21 PROCEDURE — 1123F ACP DISCUSS/DSCN MKR DOCD: CPT | Performed by: ORTHOPAEDIC SURGERY

## 2024-05-21 RX ORDER — MELOXICAM 7.5 MG/1
7.5 TABLET ORAL DAILY
Qty: 60 TABLET | Refills: 2 | Status: SHIPPED | OUTPATIENT
Start: 2024-05-21

## 2024-05-21 NOTE — PROGRESS NOTES
Grandmother     Cancer Other         bone CA    Hypertension Other     Diabetes Other     Post-op Cognitive Dysfunction Neg Hx     Malig Hypertherm Neg Hx     Pseudochol. Deficiency Neg Hx     Delayed Awakening Neg Hx     Post-op Nausea/Vomiting Neg Hx     Emergence Delirium Neg Hx     Other Neg Hx        Social History:      Social History     Tobacco Use    Smoking status: Former     Current packs/day: 0.00     Types: Cigarettes     Quit date: 2001     Years since quittin.8    Smokeless tobacco: Never   Substance Use Topics    Alcohol use: No         Allergies:      Allergies   Allergen Reactions    Milk Protein Other (See Comments)     Increased mucus production        Vitals:   There were no vitals taken for this visit.    ROS:   Review of Systems         Objective:   General: Patient is awake and in no acute distress  Psych: Mood and affect appropriate  HEENT: Normocephalic. Atramatic. Pupils equal, round and reactive. Sclera normal.   Neck: Supple without obvious mass   Chest: Symmetric  Lungs:  Breathing non-labored. No tachypnea noted.  Abdomen: Soft on gross examination without obvious distention.  Neuro: No obvious neurologic deficit. Grossly moves bilateral upper extremities without motor or sensory deficits. No gross weakness noted in the lower extremities. No hyporeflexia or hyperreflexia noted.  Vascular: No gross arterial or venous deficiency noted. DP and PT pulses are palpable in the lower extremities  Lymphatic: No lymphedema noted in the lower extremities.  Skin: No prior incisions noted about the right knee. No obvious rashes noted about the area. No skin changes noted about the knee or about the adjacent thigh or leg.  Extremities:  Patient ambulates with an antalgic gait. There is pain with ROM of the right knee. Range of motion is 0-130. Trace effusion noted in the knee. Patellofemoral crepitus is present. Distally the patient shows no neurologic deficit.        Xrays (obtained

## 2024-06-10 RX ORDER — LETROZOLE 2.5 MG/1
2.5 TABLET, FILM COATED ORAL DAILY
Qty: 90 TABLET | Refills: 3 | Status: SHIPPED | OUTPATIENT
Start: 2024-06-10

## 2024-06-10 NOTE — TELEPHONE ENCOUNTER
Medication Requested: Letrozole    Date last prescribed: 5/31/23    [facility]ed [facility]acy[facility]    Action Taken: Chart reviewed, refill pended and routed for signature.

## 2024-06-10 NOTE — TELEPHONE ENCOUNTER
Physician provider: Dr. Enciso  Reason for today's call (Please detail here patients chief complaint): refill   Last office visit:n/a  Preferred pharmacy (If refill request): [facility]  Calls to office within the last 48 hours?:No    Patient notified that their information will be routed to the [facility] clinical triage team for review. Patient is advised that they will receive a phone call from the triage department.       Pt need refill for Letrozole 2.5 mg

## 2024-06-11 ENCOUNTER — OFFICE VISIT (OUTPATIENT)
Dept: INTERNAL MEDICINE CLINIC | Facility: CLINIC | Age: 79
End: 2024-06-11
Payer: MEDICARE

## 2024-06-11 VITALS
HEIGHT: 62 IN | SYSTOLIC BLOOD PRESSURE: 120 MMHG | TEMPERATURE: 97 F | HEART RATE: 55 BPM | OXYGEN SATURATION: 100 % | DIASTOLIC BLOOD PRESSURE: 68 MMHG | BODY MASS INDEX: 28.71 KG/M2 | WEIGHT: 156 LBS

## 2024-06-11 DIAGNOSIS — N39.0 URINARY TRACT INFECTION WITHOUT HEMATURIA, SITE UNSPECIFIED: Primary | ICD-10-CM

## 2024-06-11 DIAGNOSIS — F33.1 MDD (MAJOR DEPRESSIVE DISORDER), RECURRENT EPISODE, MODERATE (HCC): ICD-10-CM

## 2024-06-11 DIAGNOSIS — R82.90 ABNORMAL FINDING ON URINALYSIS: ICD-10-CM

## 2024-06-11 LAB
BILIRUBIN, URINE, POC: NEGATIVE
BLOOD URINE, POC: NORMAL
GLUCOSE URINE, POC: NEGATIVE
KETONES, URINE, POC: NEGATIVE
LEUKOCYTE ESTERASE, URINE, POC: NORMAL
NITRITE, URINE, POC: NEGATIVE
PH, URINE, POC: 5.5 (ref 4.6–8)
PROTEIN,URINE, POC: NORMAL
SPECIFIC GRAVITY, URINE, POC: 1.02 (ref 1–1.03)
URINALYSIS CLARITY, POC: CLEAR
URINALYSIS COLOR, POC: YELLOW
UROBILINOGEN, POC: NORMAL

## 2024-06-11 PROCEDURE — 3074F SYST BP LT 130 MM HG: CPT | Performed by: INTERNAL MEDICINE

## 2024-06-11 PROCEDURE — 99213 OFFICE O/P EST LOW 20 MIN: CPT | Performed by: INTERNAL MEDICINE

## 2024-06-11 PROCEDURE — 81003 URINALYSIS AUTO W/O SCOPE: CPT | Performed by: INTERNAL MEDICINE

## 2024-06-11 PROCEDURE — 3078F DIAST BP <80 MM HG: CPT | Performed by: INTERNAL MEDICINE

## 2024-06-11 PROCEDURE — 1123F ACP DISCUSS/DSCN MKR DOCD: CPT | Performed by: INTERNAL MEDICINE

## 2024-06-11 RX ORDER — CIPROFLOXACIN 250 MG/1
250 TABLET, FILM COATED ORAL 2 TIMES DAILY
Qty: 14 TABLET | Refills: 0 | Status: SHIPPED | OUTPATIENT
Start: 2024-06-11 | End: 2024-06-18

## 2024-06-11 RX ORDER — PHENAZOPYRIDINE HYDROCHLORIDE 100 MG/1
100 TABLET, FILM COATED ORAL 2 TIMES DAILY PRN
Qty: 6 TABLET | Refills: 0 | Status: SHIPPED | OUTPATIENT
Start: 2024-06-11 | End: 2024-06-14

## 2024-06-11 NOTE — PROGRESS NOTES
Chief Complaint   Patient presents with    Dysuria       Shalini Rush 79 y.o. female     Urinary Tract Infection: Patient complains of dysuria, frequency, urgency She has had symptoms for 4 days. Patient also complains of  suprapubic lower abdomen tenderness . Patient denies fever. Patient does not have a history of recurrent UTI.  Patient does not have a history of pyelonephritis.       Blood Pressure 120/68 (Site: Left Upper Arm, Position: Sitting, Cuff Size: Large Adult)   Pulse 55   Temperature 97 °F (36.1 °C) (Temporal)   Height 1.575 m (5' 2\")   Weight 70.8 kg (156 lb)   Oxygen Saturation 100%   Body Mass Index 28.53 kg/m²   Wt Readings from Last 3 Encounters:   06/11/24 70.8 kg (156 lb)   05/06/24 72.6 kg (160 lb)   04/25/24 72.6 kg (160 lb)     BP Readings from Last 3 Encounters:   06/11/24 120/68   05/06/24 120/80   04/25/24 130/82       Physical Exam  Vitals and nursing note reviewed. Exam conducted with a chaperone present.   Constitutional:       General: She is not in acute distress.     Appearance: Normal appearance. She is not ill-appearing or toxic-appearing.   HENT:      Head: Normocephalic and atraumatic.   Eyes:      Extraocular Movements: Extraocular movements intact.      Conjunctiva/sclera: Conjunctivae normal.   Cardiovascular:      Rate and Rhythm: Normal rate and regular rhythm.   Pulmonary:      Effort: Pulmonary effort is normal.      Breath sounds: Normal breath sounds.   Abdominal:      Tenderness: There is abdominal tenderness in the suprapubic area.   Neurological:      General: No focal deficit present.      Mental Status: She is alert and oriented to person, place, and time. Mental status is at baseline.   Psychiatric:         Mood and Affect: Mood normal.         Behavior: Behavior normal.         Assessment & Plan     1. Urinary tract infection without hematuria, site unspecified     - AMB POC URINALYSIS DIP STICK AUTO W/O MICRO  - Culture, Urine; Future  - Culture, Urine  -

## 2024-06-11 NOTE — PATIENT INSTRUCTIONS
Cipro 250 mg twice daily with food for 7 days    Urine culture sent.  I will notify you if antibiotic adjustment is needed when the final culture results.      Encouraged water and clear fluid intake to flush urinary system.    Pyridium 100 mg twice daily as needed for dysuria and urinary spasm and discomfort with urination.  You may or may not need much of this medication.  Maybe only a day or 2?  This will turn your urine bright orange.    Follow up if symptoms do not improve.

## 2024-06-14 LAB
BACTERIA SPEC CULT: NORMAL
BACTERIA SPEC CULT: NORMAL
SERVICE CMNT-IMP: NORMAL

## 2024-07-01 DIAGNOSIS — E03.9 ACQUIRED HYPOTHYROIDISM: ICD-10-CM

## 2024-07-02 ENCOUNTER — TELEPHONE (OUTPATIENT)
Dept: ONCOLOGY | Age: 79
End: 2024-07-02

## 2024-07-02 RX ORDER — LEVOTHYROXINE SODIUM 75 UG/1
75 TABLET ORAL DAILY
Qty: 30 TABLET | Refills: 5 | OUTPATIENT
Start: 2024-07-02

## 2024-07-02 NOTE — TELEPHONE ENCOUNTER
Physician provider: Dr. Enciso  Reason for today's call (Please detail here patients chief complaint): Sore nipple  Last office visit:n/a  Preferred pharmacy (If refill request): n/a  Calls to office within the last 48 hours?:No    Patient notified that their information will be routed to the [facility] clinical triage team for review. Patient is advised that they will receive a phone call from the triage department. If symptom related and symptoms worsen before receiving a call back, the patient has been advised to proceed to the nearest ED.          Pt stated that her right nipple is raw and she is applying alcohol and neosporin. She had surgery last year.

## 2024-07-02 NOTE — TELEPHONE ENCOUNTER
Subjective    Chief Complaint: Right nipple open area  Details of Complaint: Patient states right nipple had a raw place that was slightly opened.  Per patient, she cleaned with alcohol and applied Neosporin.  States today area is closed, not tender, red or has any drainage.  Denied fever.    Objective    Date of last Office Visit: 24   Date of last labs: 24   Date of last imagin24  Date of last treatment, if applicable:na      Plan/Intervention    Proposed Plan: Instructed to continue to monitor and to notify clinic for any changes.  Patient verbalized understanding of plan: YES/NO: Yes  Patient voiced intended compliance with plan: Verbalized/ Refused: Verbalized intended compliance

## 2024-07-08 ENCOUNTER — TELEPHONE (OUTPATIENT)
Dept: INTERNAL MEDICINE CLINIC | Facility: CLINIC | Age: 79
End: 2024-07-08

## 2024-07-08 DIAGNOSIS — E03.9 ACQUIRED HYPOTHYROIDISM: ICD-10-CM

## 2024-07-08 DIAGNOSIS — E78.5 HYPERLIPIDEMIA LDL GOAL <100: ICD-10-CM

## 2024-07-08 RX ORDER — LEVOTHYROXINE SODIUM 0.07 MG/1
75 TABLET ORAL DAILY
Qty: 90 TABLET | Refills: 3 | Status: SHIPPED | OUTPATIENT
Start: 2024-07-08

## 2024-07-08 RX ORDER — ATORVASTATIN CALCIUM 40 MG/1
40 TABLET, FILM COATED ORAL NIGHTLY
Qty: 90 TABLET | Refills: 3 | Status: SHIPPED | OUTPATIENT
Start: 2024-07-08

## 2024-07-08 NOTE — TELEPHONE ENCOUNTER
Pt need  a refill on her  Atorvastatin  40MG  and her  Levothroxine  0.075 Mg     no pills left    Sent to [facility] on [address]

## 2024-07-08 NOTE — TELEPHONE ENCOUNTER
Orders Placed This Encounter    atorvastatin (LIPITOR) 40 MG tablet     Sig: Take 1 tablet by mouth at bedtime     Dispense:  90 tablet     Refill:  3    levothyroxine (SYNTHROID) 75 MCG tablet     Sig: Take 1 tablet by mouth Daily     Dispense:  90 tablet     Refill:  3     Medication refilled        Nikolas Dunham MD

## 2024-07-11 DIAGNOSIS — Z17.0 MALIGNANT NEOPLASM OF RIGHT BREAST IN FEMALE, ESTROGEN RECEPTOR POSITIVE, UNSPECIFIED SITE OF BREAST (HCC): Primary | ICD-10-CM

## 2024-07-11 DIAGNOSIS — C50.911 MALIGNANT NEOPLASM OF RIGHT BREAST IN FEMALE, ESTROGEN RECEPTOR POSITIVE, UNSPECIFIED SITE OF BREAST (HCC): Primary | ICD-10-CM

## 2024-07-17 ENCOUNTER — OFFICE VISIT (OUTPATIENT)
Dept: ONCOLOGY | Age: 79
End: 2024-07-17
Payer: MEDICARE

## 2024-07-17 ENCOUNTER — HOSPITAL ENCOUNTER (OUTPATIENT)
Dept: LAB | Age: 79
Discharge: HOME | End: 2024-07-20
Payer: MEDICARE

## 2024-07-17 VITALS
TEMPERATURE: 97.9 F | HEIGHT: 62 IN | BODY MASS INDEX: 28.71 KG/M2 | SYSTOLIC BLOOD PRESSURE: 140 MMHG | RESPIRATION RATE: 22 BRPM | OXYGEN SATURATION: 96 % | WEIGHT: 156 LBS | HEART RATE: 61 BPM | DIASTOLIC BLOOD PRESSURE: 73 MMHG

## 2024-07-17 DIAGNOSIS — Z17.0 MALIGNANT NEOPLASM OF RIGHT BREAST IN FEMALE, ESTROGEN RECEPTOR POSITIVE, UNSPECIFIED SITE OF BREAST (HCC): ICD-10-CM

## 2024-07-17 DIAGNOSIS — Z17.0 MALIGNANT NEOPLASM OF RIGHT BREAST IN FEMALE, ESTROGEN RECEPTOR POSITIVE, UNSPECIFIED SITE OF BREAST (HCC): Primary | ICD-10-CM

## 2024-07-17 DIAGNOSIS — C50.911 MALIGNANT NEOPLASM OF RIGHT BREAST IN FEMALE, ESTROGEN RECEPTOR POSITIVE, UNSPECIFIED SITE OF BREAST (HCC): ICD-10-CM

## 2024-07-17 DIAGNOSIS — Z79.811 USE OF AROMATASE INHIBITORS: ICD-10-CM

## 2024-07-17 DIAGNOSIS — Z12.31 ENCOUNTER FOR SCREENING MAMMOGRAM FOR BREAST CANCER: ICD-10-CM

## 2024-07-17 DIAGNOSIS — C50.911 MALIGNANT NEOPLASM OF RIGHT BREAST IN FEMALE, ESTROGEN RECEPTOR POSITIVE, UNSPECIFIED SITE OF BREAST (HCC): Primary | ICD-10-CM

## 2024-07-17 LAB
ALBUMIN SERPL-MCNC: 3.8 G/DL (ref 3.2–4.6)
ALBUMIN/GLOB SERPL: 1 (ref 1–1.9)
ALP SERPL-CCNC: 77 U/L (ref 35–104)
ALT SERPL-CCNC: 16 U/L (ref 12–65)
ANION GAP SERPL CALC-SCNC: 12 MMOL/L (ref 9–18)
AST SERPL-CCNC: 24 U/L (ref 15–37)
BASOPHILS # BLD: 0.1 K/UL (ref 0–0.2)
BASOPHILS NFR BLD: 1 % (ref 0–2)
BILIRUB SERPL-MCNC: 1 MG/DL (ref 0–1.2)
BUN SERPL-MCNC: 20 MG/DL (ref 8–23)
CALCIUM SERPL-MCNC: 10 MG/DL (ref 8.8–10.2)
CHLORIDE SERPL-SCNC: 103 MMOL/L (ref 98–107)
CO2 SERPL-SCNC: 30 MMOL/L (ref 20–28)
CREAT SERPL-MCNC: 1.03 MG/DL (ref 0.6–1.1)
DIFFERENTIAL METHOD BLD: NORMAL
EOSINOPHIL # BLD: 0.1 K/UL (ref 0–0.8)
EOSINOPHIL NFR BLD: 1 % (ref 0.5–7.8)
ERYTHROCYTE [DISTWIDTH] IN BLOOD BY AUTOMATED COUNT: 13.9 % (ref 11.9–14.6)
GLOBULIN SER CALC-MCNC: 3.9 G/DL (ref 2.3–3.5)
GLUCOSE SERPL-MCNC: 128 MG/DL (ref 70–99)
HCT VFR BLD AUTO: 38.8 % (ref 35.8–46.3)
HGB BLD-MCNC: 12.7 G/DL (ref 11.7–15.4)
IMM GRANULOCYTES # BLD AUTO: 0 K/UL (ref 0–0.5)
IMM GRANULOCYTES NFR BLD AUTO: 0 % (ref 0–5)
LYMPHOCYTES # BLD: 1 K/UL (ref 0.5–4.6)
LYMPHOCYTES NFR BLD: 16 % (ref 13–44)
MCH RBC QN AUTO: 28.5 PG (ref 26.1–32.9)
MCHC RBC AUTO-ENTMCNC: 32.7 G/DL (ref 31.4–35)
MCV RBC AUTO: 87 FL (ref 82–102)
MONOCYTES # BLD: 0.5 K/UL (ref 0.1–1.3)
MONOCYTES NFR BLD: 7 % (ref 4–12)
NEUTS SEG # BLD: 4.7 K/UL (ref 1.7–8.2)
NEUTS SEG NFR BLD: 75 % (ref 43–78)
NRBC # BLD: 0 K/UL (ref 0–0.2)
PLATELET # BLD AUTO: 182 K/UL (ref 150–450)
PMV BLD AUTO: 10.8 FL (ref 9.4–12.3)
POTASSIUM SERPL-SCNC: 4 MMOL/L (ref 3.5–5.1)
PROT SERPL-MCNC: 7.6 G/DL (ref 6.3–8.2)
RBC # BLD AUTO: 4.46 M/UL (ref 4.05–5.2)
SODIUM SERPL-SCNC: 145 MMOL/L (ref 136–145)
WBC # BLD AUTO: 6.2 K/UL (ref 4.3–11.1)

## 2024-07-17 PROCEDURE — 85025 COMPLETE CBC W/AUTO DIFF WBC: CPT

## 2024-07-17 PROCEDURE — 1123F ACP DISCUSS/DSCN MKR DOCD: CPT | Performed by: INTERNAL MEDICINE

## 2024-07-17 PROCEDURE — 99214 OFFICE O/P EST MOD 30 MIN: CPT | Performed by: INTERNAL MEDICINE

## 2024-07-17 PROCEDURE — 80053 COMPREHEN METABOLIC PANEL: CPT

## 2024-07-17 PROCEDURE — 3077F SYST BP >= 140 MM HG: CPT | Performed by: INTERNAL MEDICINE

## 2024-07-17 PROCEDURE — 36415 COLL VENOUS BLD VENIPUNCTURE: CPT

## 2024-07-17 PROCEDURE — 3078F DIAST BP <80 MM HG: CPT | Performed by: INTERNAL MEDICINE

## 2024-07-17 ASSESSMENT — PATIENT HEALTH QUESTIONNAIRE - PHQ9
SUM OF ALL RESPONSES TO PHQ QUESTIONS 1-9: 0
2. FEELING DOWN, DEPRESSED OR HOPELESS: NOT AT ALL
1. LITTLE INTEREST OR PLEASURE IN DOING THINGS: NOT AT ALL
SUM OF ALL RESPONSES TO PHQ QUESTIONS 1-9: 0
SUM OF ALL RESPONSES TO PHQ9 QUESTIONS 1 & 2: 0

## 2024-07-17 NOTE — PATIENT INSTRUCTIONS
Patient Information from Today's Visit    The members of your [facility] are listed below:    Physician Provider: Connor Enciso, Medical Oncologist  Advanced Practice Clinician: Marisol Bautista NP  Registered Nurse: Rock ROTH RN  Nurse Navigator: Tanisha KIM RN and Desi CHAMPION RN  Medical Assistant: Brittany TITUS CMA  :Cathleen HAQ  Supportive Care Services: Felix BALDWIN LMSW    Diagnosis: Breast Cancer    Follow Up Instructions: 6 months      Treatment Summary has been discussed and given to patient:No      Current Labs:   Hospital Outpatient Visit on 07/17/2024   Component Date Value Ref Range Status    WBC 07/17/2024 6.2  4.3 - 11.1 K/uL Final    RBC 07/17/2024 4.46  4.05 - 5.2 M/uL Final    Hemoglobin 07/17/2024 12.7  11.7 - 15.4 g/dL Final    Hematocrit 07/17/2024 38.8  35.8 - 46.3 % Final    MCV 07/17/2024 87.0  82.0 - 102.0 FL Final    MCH 07/17/2024 28.5  26.1 - 32.9 PG Final    MCHC 07/17/2024 32.7  31.4 - 35.0 g/dL Final    RDW 07/17/2024 13.9  11.9 - 14.6 % Final    Platelets 07/17/2024 182  150 - 450 K/uL Final    MPV 07/17/2024 10.8  9.4 - 12.3 FL Final    nRBC 07/17/2024 0.00  0.0 - 0.2 K/uL Final    **Note: Absolute NRBC parameter is now reported with Hemogram**    Neutrophils % 07/17/2024 75  43 - 78 % Final    Lymphocytes % 07/17/2024 16  13 - 44 % Final    Monocytes % 07/17/2024 7  4.0 - 12.0 % Final    Eosinophils % 07/17/2024 1  0.5 - 7.8 % Final    Basophils % 07/17/2024 1  0.0 - 2.0 % Final    Immature Granulocytes % 07/17/2024 0  0.0 - 5.0 % Final    Neutrophils Absolute 07/17/2024 4.7  1.7 - 8.2 K/UL Final    Lymphocytes Absolute 07/17/2024 1.0  0.5 - 4.6 K/UL Final    Monocytes Absolute 07/17/2024 0.5  0.1 - 1.3 K/UL Final    Eosinophils Absolute 07/17/2024 0.1  0.0 - 0.8 K/UL Final    Basophils Absolute 07/17/2024 0.1  0.0 - 0.2 K/UL Final    Immature Granulocytes Absolute 07/17/2024 0.0  0.0 - 0.5 K/UL Final    Differential Type 07/17/2024 AUTOMATED    Final         Please refer

## 2024-07-17 NOTE — PROGRESS NOTES
[facility] Hematology and Oncology: Office Visit Established Patient    Chief Complaint:    Chief Complaint   Patient presents with    Follow-up           History of Present Illness:  Ms. Rush is a 79 y.o. female who presents today for follow-up regarding breast cancer.  She had a left breast cancer in 2001 for which she had a lumpectomy and radiation, she also took endocrine therapy of unknown type and duration.  In August 2022 she had her routine screening mammogram that reported a right breast asymmetry. On 8/25/22 she had a right breast diagnostic mammogram and ultrasound which confirmed the findings and on 8/31/22 she underwent a core needle biopsy of the right breast mass. The pathology reported infiltrating ductal carcinoma, low grade, ER 95%, TX 0% and HER2 1+.  On 9/7/22 she had a bilateral breast MRI that reported the known right breast cancer of 1.4 cm, no additional suspicious findings in either breast or no evidence of lymphadenopathy was noted.  She was recommended to see surgery for upfront management, then return to us for adjuvant therapy recommendations.  Path confirms the tumor to be 1.6 cm, tumor extended to inferior margin but re-excision was negative.  3 sentinel nodes were also negative for tumor.  We discussed options, including gene recurrence testing for risk stratification, but she admits that this would not change her mind about potential for chemotherapy.  Therefore, we will recommend antiestrogen therapy alone.  She is post-menopausal, so a prescription was given for anastrozole.  We also discussed radiation referral for risk reduction but she does not seem interested in this discussion given our explanation of the data, which suggests no mortality benefit for patients over 70 who are on endocrine therapy.  She was changed from Arimidex to Femara for tolerance in January 2023.    She returns today for routine follow up on letrozole. She is doing well overall.  No hot flashes.  She has

## 2024-07-21 ENCOUNTER — APPOINTMENT (OUTPATIENT)
Dept: GENERAL RADIOLOGY | Age: 79
End: 2024-07-21
Payer: MEDICARE

## 2024-07-21 ENCOUNTER — HOSPITAL ENCOUNTER (EMERGENCY)
Age: 79
Discharge: HOME | End: 2024-07-22
Payer: MEDICARE

## 2024-07-21 VITALS
RESPIRATION RATE: 18 BRPM | HEART RATE: 85 BPM | SYSTOLIC BLOOD PRESSURE: 167 MMHG | BODY MASS INDEX: 29.44 KG/M2 | WEIGHT: 160 LBS | OXYGEN SATURATION: 95 % | TEMPERATURE: 98.2 F | DIASTOLIC BLOOD PRESSURE: 91 MMHG | HEIGHT: 62 IN

## 2024-07-21 DIAGNOSIS — M54.2 NECK PAIN: Primary | ICD-10-CM

## 2024-07-21 DIAGNOSIS — M25.571 ACUTE RIGHT ANKLE PAIN: ICD-10-CM

## 2024-07-21 PROCEDURE — 6370000000 HC RX 637 (ALT 250 FOR IP): Performed by: STUDENT IN AN ORGANIZED HEALTH CARE EDUCATION/TRAINING PROGRAM

## 2024-07-21 PROCEDURE — 99283 EMERGENCY DEPT VISIT LOW MDM: CPT

## 2024-07-21 PROCEDURE — 72040 X-RAY EXAM NECK SPINE 2-3 VW: CPT

## 2024-07-21 PROCEDURE — 73610 X-RAY EXAM OF ANKLE: CPT

## 2024-07-21 RX ORDER — CHLORZOXAZONE 500 MG/1
500 TABLET ORAL 3 TIMES DAILY PRN
Qty: 30 TABLET | Refills: 0 | Status: SHIPPED | OUTPATIENT
Start: 2024-07-21 | End: 2024-07-31

## 2024-07-21 RX ORDER — ACETAMINOPHEN 500 MG
1000 TABLET ORAL
Status: COMPLETED | OUTPATIENT
Start: 2024-07-21 | End: 2024-07-21

## 2024-07-21 RX ORDER — MELOXICAM 15 MG/1
7.5 TABLET ORAL DAILY
Qty: 15 TABLET | Refills: 0 | Status: SHIPPED | OUTPATIENT
Start: 2024-07-21 | End: 2024-08-20

## 2024-07-21 RX ADMIN — ACETAMINOPHEN 1000 MG: 500 TABLET, FILM COATED ORAL at 23:13

## 2024-07-22 ENCOUNTER — CARE COORDINATION (OUTPATIENT)
Dept: CARE COORDINATION | Facility: CLINIC | Age: 79
End: 2024-07-22

## 2024-07-22 NOTE — ED TRIAGE NOTES
Pt c/o right foot leg pain since yesterday and crick in her neck , no know injury , chills, loose stools

## 2024-07-22 NOTE — CARE COORDINATION
Ambulatory Care Management Outreach Attempt    This patient was received as a referral from  Daily assignment for case management of ed utilizer.    Attempted to reach patient for ED follow up. Unable to reach patient, LM with my contact information and will reach out again tomorrow.         Patient: Shalini Rush Patient : 1945 MRN: [id number]    Last Discharge Facility       Date Complaint Diagnosis Description Type Department Provider    24 Leg Pain Neck pain ... ED (DISCHARGE) SFEED                 Noted following upcoming appointments from discharge chart review:   [facility] follow up appointment(s):   Future Appointments   Date Time Provider Department Center   2024 11:45 AM MAT LAB [facility] AMB   2024  3:20 PM Nikolas Dunham MD [facility] AMB   2025  9:30 AM PERIPHERAL [facility]   2025 10:30 AM Marisol Bautista, APRN - CNP [facility] AMB   2025  7:30 AM PERIPHERAL [facility]   2025  8:30 AM Connor Enciso MD [facility] AMB     Non-[facility] follow up appointment(s):

## 2024-07-22 NOTE — ED PROVIDER NOTES
Emergency Department Provider Note       PCP: Nikolas Dunham MD   Age: 79 y.o.   Sex: female     DISPOSITION Discharge - Pending Orders Complete 07/21/2024 11:52:31 PM       ICD-10-CM    1. Neck pain  M54.2       2. Acute right ankle pain  M25.571           Medical Decision Making     In summary this is an 79-year-old female patient presented for evaluation of pain in her neck as well as pain in her ankle.  No signs of referred pain from the abdomen or chest.  No extremity weakness or paralysis to suggest cord compression.  Low suspicion for vertebral artery dissection.  No headache or fevers or symptoms concerning for meningitis.  Will treat symptomatically.  She did not have any injury to the ankle although there was some tenderness over the ATFL.  Possible sprain but she does not remember injuring it.  No signs of fracture or septic joint or overlying cellulitis.  Neurovascular intact.  Will treat with pain control.  Have her follow-up with family doctor.  Counseled on signs to return for.  Patient has verbalized understanding and agreed to the plan.  Discharged in stable condition.     1 acute, uncomplicated illness or injury.  Prescription drug management performed.  Patient was discharged risks and benefits of hospitalization were considered.  ED attending physician present in department at time of care. Based on current hospital policy, their co-signature is not required on this note.   I independently ordered and reviewed each unique test.       I interpreted the X-rays no fractures.  RADIOLOGY DISCLAIMER: Although I do my best to interpret the imaging, I am not a board-certified radiologist.  I am still basing my medical decision making off of the board-certified radiology interpretation provided to me.              History     79-year-old female patient with history of diabetes, hyperlipidemia, hypertension, hypothyroidism, breast cancer presents today with multiple concerns.  She states her

## 2024-07-22 NOTE — ED NOTES
Patient mobility status  with no difficulty. Provider aware     I have reviewed discharge instructions with the patient.  The patient verbalized understanding.    Patient left ED via Discharge Method: ambulatory to Home.  Opportunity for questions and clarification provided.     Patient given 2 scripts.

## 2024-07-22 NOTE — DISCHARGE INSTRUCTIONS
Your x-rays were unremarkable today.  Use Mobic for pain.  Follow-up with your primary care provider.    As we discussed, I did not find a life threatening cause of your symptoms today. However, THAT DOES NOT MEAN IT COULD NOT DEVELOP. If you develop ANY new or worsening symptoms, it is critical that you return for re-evaluation. This includes any symptoms that are concerning to you, especially symptoms such as extremity weakness or numbness, inability bear weight, fevers, worsening of your life.  If you do not return for re-evaluation, you risk serious complications, including death.

## 2024-07-23 ENCOUNTER — CARE COORDINATION (OUTPATIENT)
Dept: CARE COORDINATION | Facility: CLINIC | Age: 79
End: 2024-07-23

## 2024-07-23 NOTE — CARE COORDINATION
2nd attempt to reach pt to offer ACM services.Pt has respectfully declined services at this time, my contact information has been shared with pt in the event there circumstances change. They are free to reach out at any time. ACM signing off.

## 2024-08-01 ENCOUNTER — LAB (OUTPATIENT)
Dept: INTERNAL MEDICINE CLINIC | Facility: CLINIC | Age: 79
End: 2024-08-01

## 2024-08-01 DIAGNOSIS — E11.29 CONTROLLED TYPE 2 DIABETES MELLITUS WITH MICROALBUMINURIA, WITHOUT LONG-TERM CURRENT USE OF INSULIN (HCC): ICD-10-CM

## 2024-08-01 DIAGNOSIS — R80.9 CONTROLLED TYPE 2 DIABETES MELLITUS WITH MICROALBUMINURIA, WITHOUT LONG-TERM CURRENT USE OF INSULIN (HCC): ICD-10-CM

## 2024-08-01 DIAGNOSIS — E03.9 ACQUIRED HYPOTHYROIDISM: ICD-10-CM

## 2024-08-01 DIAGNOSIS — I10 PRIMARY HYPERTENSION: ICD-10-CM

## 2024-08-01 DIAGNOSIS — E78.00 PURE HYPERCHOLESTEROLEMIA: ICD-10-CM

## 2024-08-01 LAB
ALBUMIN SERPL-MCNC: 3.8 G/DL (ref 3.2–4.6)
ALBUMIN/GLOB SERPL: 1 (ref 1–1.9)
ALP SERPL-CCNC: 83 U/L (ref 35–104)
ALT SERPL-CCNC: 17 U/L (ref 12–65)
ANION GAP SERPL CALC-SCNC: 12 MMOL/L (ref 9–18)
AST SERPL-CCNC: 26 U/L (ref 15–37)
BASOPHILS # BLD: 0 K/UL (ref 0–0.2)
BASOPHILS NFR BLD: 0 % (ref 0–2)
BILIRUB SERPL-MCNC: 1.4 MG/DL (ref 0–1.2)
BUN SERPL-MCNC: 20 MG/DL (ref 8–23)
CALCIUM SERPL-MCNC: 9.8 MG/DL (ref 8.8–10.2)
CHLORIDE SERPL-SCNC: 103 MMOL/L (ref 98–107)
CHOLEST SERPL-MCNC: 121 MG/DL (ref 0–200)
CO2 SERPL-SCNC: 29 MMOL/L (ref 20–28)
CREAT SERPL-MCNC: 1.02 MG/DL (ref 0.6–1.1)
CREAT UR-MCNC: 231 MG/DL (ref 28–217)
DIFFERENTIAL METHOD BLD: NORMAL
EOSINOPHIL # BLD: 0.1 K/UL (ref 0–0.8)
EOSINOPHIL NFR BLD: 1 % (ref 0.5–7.8)
ERYTHROCYTE [DISTWIDTH] IN BLOOD BY AUTOMATED COUNT: 13.9 % (ref 11.9–14.6)
EST. AVERAGE GLUCOSE BLD GHB EST-MCNC: 150 MG/DL
GLOBULIN SER CALC-MCNC: 3.8 G/DL (ref 2.3–3.5)
GLUCOSE SERPL-MCNC: 113 MG/DL (ref 70–99)
HBA1C MFR BLD: 6.9 % (ref 0–5.6)
HCT VFR BLD AUTO: 39.6 % (ref 35.8–46.3)
HDLC SERPL-MCNC: 48 MG/DL (ref 40–60)
HDLC SERPL: 2.5 (ref 0–5)
HGB BLD-MCNC: 12.6 G/DL (ref 11.7–15.4)
IMM GRANULOCYTES # BLD AUTO: 0 K/UL (ref 0–0.5)
IMM GRANULOCYTES NFR BLD AUTO: 0 % (ref 0–5)
LDLC SERPL CALC-MCNC: 61 MG/DL (ref 0–100)
LYMPHOCYTES # BLD: 1.1 K/UL (ref 0.5–4.6)
LYMPHOCYTES NFR BLD: 16 % (ref 13–44)
MCH RBC QN AUTO: 28.1 PG (ref 26.1–32.9)
MCHC RBC AUTO-ENTMCNC: 31.8 G/DL (ref 31.4–35)
MCV RBC AUTO: 88.4 FL (ref 82–102)
MICROALBUMIN UR-MCNC: 6.57 MG/DL (ref 0–20)
MICROALBUMIN/CREAT UR-RTO: 28 MG/G (ref 0–30)
MONOCYTES # BLD: 0.6 K/UL (ref 0.1–1.3)
MONOCYTES NFR BLD: 9 % (ref 4–12)
NEUTS SEG # BLD: 5 K/UL (ref 1.7–8.2)
NEUTS SEG NFR BLD: 74 % (ref 43–78)
NRBC # BLD: 0 K/UL (ref 0–0.2)
PLATELET # BLD AUTO: 208 K/UL (ref 150–450)
PMV BLD AUTO: 11.5 FL (ref 9.4–12.3)
POTASSIUM SERPL-SCNC: 4.1 MMOL/L (ref 3.5–5.1)
PROT SERPL-MCNC: 7.6 G/DL (ref 6.3–8.2)
RBC # BLD AUTO: 4.48 M/UL (ref 4.05–5.2)
SODIUM SERPL-SCNC: 143 MMOL/L (ref 136–145)
T4 FREE SERPL-MCNC: 1.3 NG/DL (ref 0.9–1.7)
TRIGL SERPL-MCNC: 59 MG/DL (ref 0–150)
TSH, 3RD GENERATION: 0.34 UIU/ML (ref 0.27–4.2)
VLDLC SERPL CALC-MCNC: 12 MG/DL (ref 6–23)
WBC # BLD AUTO: 6.8 K/UL (ref 4.3–11.1)

## 2024-08-08 ENCOUNTER — OFFICE VISIT (OUTPATIENT)
Dept: INTERNAL MEDICINE CLINIC | Facility: CLINIC | Age: 79
End: 2024-08-08
Payer: MEDICARE

## 2024-08-08 VITALS
DIASTOLIC BLOOD PRESSURE: 70 MMHG | BODY MASS INDEX: 28.93 KG/M2 | HEIGHT: 62 IN | HEART RATE: 55 BPM | OXYGEN SATURATION: 100 % | TEMPERATURE: 97.9 F | WEIGHT: 157.2 LBS | SYSTOLIC BLOOD PRESSURE: 100 MMHG | RESPIRATION RATE: 16 BRPM

## 2024-08-08 DIAGNOSIS — Z00.00 MEDICARE ANNUAL WELLNESS VISIT, SUBSEQUENT: Primary | ICD-10-CM

## 2024-08-08 DIAGNOSIS — E11.29 CONTROLLED TYPE 2 DIABETES MELLITUS WITH MICROALBUMINURIA, WITHOUT LONG-TERM CURRENT USE OF INSULIN (HCC): ICD-10-CM

## 2024-08-08 DIAGNOSIS — E03.9 ACQUIRED HYPOTHYROIDISM: ICD-10-CM

## 2024-08-08 DIAGNOSIS — L30.9 DERMATITIS: ICD-10-CM

## 2024-08-08 DIAGNOSIS — I10 PRIMARY HYPERTENSION: ICD-10-CM

## 2024-08-08 DIAGNOSIS — R80.9 CONTROLLED TYPE 2 DIABETES MELLITUS WITH MICROALBUMINURIA, WITHOUT LONG-TERM CURRENT USE OF INSULIN (HCC): ICD-10-CM

## 2024-08-08 DIAGNOSIS — F33.1 MDD (MAJOR DEPRESSIVE DISORDER), RECURRENT EPISODE, MODERATE (HCC): ICD-10-CM

## 2024-08-08 DIAGNOSIS — C50.911 INFILTRATING DUCTAL CARCINOMA OF RIGHT BREAST (HCC): ICD-10-CM

## 2024-08-08 PROCEDURE — 3044F HG A1C LEVEL LT 7.0%: CPT | Performed by: INTERNAL MEDICINE

## 2024-08-08 PROCEDURE — G0439 PPPS, SUBSEQ VISIT: HCPCS | Performed by: INTERNAL MEDICINE

## 2024-08-08 PROCEDURE — 1123F ACP DISCUSS/DSCN MKR DOCD: CPT | Performed by: INTERNAL MEDICINE

## 2024-08-08 PROCEDURE — 3074F SYST BP LT 130 MM HG: CPT | Performed by: INTERNAL MEDICINE

## 2024-08-08 PROCEDURE — 99214 OFFICE O/P EST MOD 30 MIN: CPT | Performed by: INTERNAL MEDICINE

## 2024-08-08 PROCEDURE — 3078F DIAST BP <80 MM HG: CPT | Performed by: INTERNAL MEDICINE

## 2024-08-08 RX ORDER — CLOTRIMAZOLE AND BETAMETHASONE DIPROPIONATE 10; .64 MG/G; MG/G
CREAM TOPICAL
Qty: 60 G | Refills: 0 | Status: SHIPPED | OUTPATIENT
Start: 2024-08-08

## 2024-08-08 SDOH — ECONOMIC STABILITY: INCOME INSECURITY: HOW HARD IS IT FOR YOU TO PAY FOR THE VERY BASICS LIKE FOOD, HOUSING, MEDICAL CARE, AND HEATING?: NOT HARD AT ALL

## 2024-08-08 SDOH — ECONOMIC STABILITY: FOOD INSECURITY: WITHIN THE PAST 12 MONTHS, YOU WORRIED THAT YOUR FOOD WOULD RUN OUT BEFORE YOU GOT MONEY TO BUY MORE.: NEVER TRUE

## 2024-08-08 SDOH — ECONOMIC STABILITY: FOOD INSECURITY: WITHIN THE PAST 12 MONTHS, THE FOOD YOU BOUGHT JUST DIDN'T LAST AND YOU DIDN'T HAVE MONEY TO GET MORE.: NEVER TRUE

## 2024-08-08 ASSESSMENT — PATIENT HEALTH QUESTIONNAIRE - PHQ9
SUM OF ALL RESPONSES TO PHQ QUESTIONS 1-9: 0
1. LITTLE INTEREST OR PLEASURE IN DOING THINGS: NOT AT ALL
2. FEELING DOWN, DEPRESSED OR HOPELESS: NOT AT ALL
SUM OF ALL RESPONSES TO PHQ9 QUESTIONS 1 & 2: 0
SUM OF ALL RESPONSES TO PHQ QUESTIONS 1-9: 0

## 2024-08-08 NOTE — ASSESSMENT & PLAN NOTE
Well-controlled, continue current medications    Lab Results   Component Value Date/Time     08/01/2024 11:43 AM    K 4.1 08/01/2024 11:43 AM     08/01/2024 11:43 AM    CO2 29 08/01/2024 11:43 AM    BUN 20 08/01/2024 11:43 AM    CREATININE 1.02 08/01/2024 11:43 AM    GLUCOSE 113 08/01/2024 11:43 AM    CALCIUM 9.8 08/01/2024 11:43 AM    LABGLOM 56 08/01/2024 11:43 AM    LABGLOM 51 01/18/2024 08:15 AM        Hypertension Medications       Antihypertensive Combinations       atenolol-chlorthalidone (TENORETIC) 50-25 MG per tablet Take 0.5 tablets by mouth daily      Angiotensin II Receptor Antagonists       irbesartan (AVAPRO) 150 MG tablet Take 1 tablet by mouth at bedtime

## 2024-08-08 NOTE — PATIENT INSTRUCTIONS
lifestyle, illnesses that may run in your family, and various assessments and screenings as appropriate.    After reviewing your medical record and screening and assessments performed today your provider may have ordered immunizations, labs, imaging, and/or referrals for you.  A list of these orders (if applicable) as well as your Preventive Care list are included within your After Visit Summary for your review.    Other Preventive Recommendations:    A preventive eye exam performed by an eye specialist is recommended every 1-2 years to screen for glaucoma; cataracts, macular degeneration, and other eye disorders.  A preventive dental visit is recommended every 6 months.  Try to get at least 150 minutes of exercise per week or 10,000 steps per day on a pedometer .  Order or download the FREE \"Exercise & Physical Activity: Your Everyday Guide\" from [employer]. Call 1-884.114.6391 or search [employer] online.  You need 9911-8597 mg of calcium and 0081-8665 IU of vitamin D per day. It is possible to meet your calcium requirement with diet alone, but a vitamin D supplement is usually necessary to meet this goal.  When exposed to the sun, use a sunscreen that protects against both UVA and UVB radiation with an SPF of 30 or greater. Reapply every 2 to 3 hours or after sweating, drying off with a towel, or swimming.  Always wear a seat belt when traveling in a car. Always wear a helmet when riding a bicycle or motorcycle.

## 2024-08-08 NOTE — PROGRESS NOTES
Medicare Annual Wellness Visit    Shalini Rush is here for Follow-up (Pt is here for a 3 month follow up to review labs. ) and Medicare AWV    Assessment & Plan   Medicare annual wellness visit, subsequent  Controlled type 2 diabetes mellitus with microalbuminuria, without long-term current use of insulin (HCC)  Assessment & Plan:   Well-controlled, continue current medications  Hemoglobin A1C   Date Value Ref Range Status   08/01/2024 6.9 (H) 0 - 5.6 % Final     Comment:     Reference Range  Normal       <5.7%  Prediabetes  5.7-6.4%  Diabetes     >6.4%       Hemoglobin A1C, POC   Date Value Ref Range Status   10/16/2023 6.4 % Final     MDD (major depressive disorder), recurrent episode, moderate (HCC)  Assessment & Plan:  Refer to behavioral health/psychiatry.    Declines medication treatment.    No SI.    Counseling might be helpful for her.  Lots of family issues with her children.  Recently sold her house and moved to a new apartment.      Patient Education:  Reviewed concept of anxiety as biochemical imbalance of neurotransmitters and rationale for treatment. Instructed patient to contact office or on-call physician promptly should condition worsen or any new symptoms appear and provided on-call telephone numbers.  IF THE PATIENT HAS ANY SUICIDAL OR HOMICIDAL IDEATION, CALL THE OFFICE, DISCUSS WITH A SUPPORT MEMBER OR GO TO THE ER IMMEDIATELY.  Patient was agreeable with this plan.    Orders:  -     [facility] (Psychiatry)  Dermatitis  Assessment & Plan:   Unclear control, changes made today: Lotrisone.  Bug bites?  Dermatitis from moving to new home? Lotrisone topical.  Orders:  -     clotrimazole-betamethasone (LOTRISONE) 1-0.05 % cream; Apply topically 2 times daily., Disp-60 g, R-0, Normal  Infiltrating ductal carcinoma of right breast (HCC)  Assessment & Plan:   Monitored by specialist- no acute findings meriting change in the plan    Primary hypertension  Assessment & Plan:

## 2024-08-08 NOTE — ASSESSMENT & PLAN NOTE
Unclear control, changes made today: Lotrisone.  Bug bites?  Dermatitis from moving to new home? Lotrisone topical.

## 2024-08-08 NOTE — ASSESSMENT & PLAN NOTE
Well-controlled, continue current medications  Hemoglobin A1C   Date Value Ref Range Status   08/01/2024 6.9 (H) 0 - 5.6 % Final     Comment:     Reference Range  Normal       <5.7%  Prediabetes  5.7-6.4%  Diabetes     >6.4%       Hemoglobin A1C, POC   Date Value Ref Range Status   10/16/2023 6.4 % Final

## 2024-08-08 NOTE — ASSESSMENT & PLAN NOTE
Refer to behavioral health/psychiatry.    Declines medication treatment.    No SI.    Counseling might be helpful for her.  Lots of family issues with her children.  Recently sold her house and moved to a new apartment.      Patient Education:  Reviewed concept of anxiety as biochemical imbalance of neurotransmitters and rationale for treatment. Instructed patient to contact office or on-call physician promptly should condition worsen or any new symptoms appear and provided on-call telephone numbers.  IF THE PATIENT HAS ANY SUICIDAL OR HOMICIDAL IDEATION, CALL THE OFFICE, DISCUSS WITH A SUPPORT MEMBER OR GO TO THE ER IMMEDIATELY.  Patient was agreeable with this plan.

## 2024-08-08 NOTE — ASSESSMENT & PLAN NOTE
Well-controlled, continue current medications  Lab Results   Component Value Date    TSH 0.336 08/01/2024

## 2024-08-14 ENCOUNTER — TELEPHONE (OUTPATIENT)
Dept: INTERNAL MEDICINE CLINIC | Facility: CLINIC | Age: 79
End: 2024-08-14

## 2024-08-14 NOTE — TELEPHONE ENCOUNTER
PA initiated on CMM through [employer] for clotrimazole-betamethasone 1-0.05% cream. CMM Key: [id number]. Waiting on determination.

## 2024-08-14 NOTE — TELEPHONE ENCOUNTER
Pt called, states her prescription for clotrimazole was received by pharmacy, but pt states she was told that it may not be paid for by insurance.

## 2024-08-15 NOTE — TELEPHONE ENCOUNTER
PA denied for 60 grams/30 days, but approved for 45 grams/30 days. RX changed to dispense 45 grams. Pt notified and verbalized understanding.    Pt also states that she does not want to see a Psychiatrist, she would rather see a Counselor. Referral changed.

## 2024-08-20 ENCOUNTER — OFFICE VISIT (OUTPATIENT)
Dept: BEHAVIORAL/MENTAL HEALTH CLINIC | Age: 79
End: 2024-08-20
Payer: MEDICARE

## 2024-08-20 DIAGNOSIS — F43.22 ADJUSTMENT DISORDER WITH ANXIETY: ICD-10-CM

## 2024-08-20 DIAGNOSIS — F33.1 MDD (MAJOR DEPRESSIVE DISORDER), RECURRENT EPISODE, MODERATE (HCC): Primary | ICD-10-CM

## 2024-08-20 PROCEDURE — 90791 PSYCH DIAGNOSTIC EVALUATION: CPT | Performed by: SOCIAL WORKER

## 2024-08-20 PROCEDURE — 1123F ACP DISCUSS/DSCN MKR DOCD: CPT | Performed by: SOCIAL WORKER

## 2024-08-20 ASSESSMENT — PATIENT HEALTH QUESTIONNAIRE - PHQ9
SUM OF ALL RESPONSES TO PHQ QUESTIONS 1-9: 13
1. LITTLE INTEREST OR PLEASURE IN DOING THINGS: MORE THAN HALF THE DAYS
9. THOUGHTS THAT YOU WOULD BE BETTER OFF DEAD, OR OF HURTING YOURSELF: NOT AT ALL
7. TROUBLE CONCENTRATING ON THINGS, SUCH AS READING THE NEWSPAPER OR WATCHING TELEVISION: MORE THAN HALF THE DAYS
SUM OF ALL RESPONSES TO PHQ9 QUESTIONS 1 & 2: 3
SUM OF ALL RESPONSES TO PHQ QUESTIONS 1-9: 13
3. TROUBLE FALLING OR STAYING ASLEEP: MORE THAN HALF THE DAYS
6. FEELING BAD ABOUT YOURSELF - OR THAT YOU ARE A FAILURE OR HAVE LET YOURSELF OR YOUR FAMILY DOWN: SEVERAL DAYS
SUM OF ALL RESPONSES TO PHQ QUESTIONS 1-9: 13
5. POOR APPETITE OR OVEREATING: MORE THAN HALF THE DAYS
10. IF YOU CHECKED OFF ANY PROBLEMS, HOW DIFFICULT HAVE THESE PROBLEMS MADE IT FOR YOU TO DO YOUR WORK, TAKE CARE OF THINGS AT HOME, OR GET ALONG WITH OTHER PEOPLE: SOMEWHAT DIFFICULT
4. FEELING TIRED OR HAVING LITTLE ENERGY: MORE THAN HALF THE DAYS
SUM OF ALL RESPONSES TO PHQ QUESTIONS 1-9: 13
2. FEELING DOWN, DEPRESSED OR HOPELESS: SEVERAL DAYS
8. MOVING OR SPEAKING SO SLOWLY THAT OTHER PEOPLE COULD HAVE NOTICED. OR THE OPPOSITE, BEING SO FIGETY OR RESTLESS THAT YOU HAVE BEEN MOVING AROUND A LOT MORE THAN USUAL: SEVERAL DAYS

## 2024-08-20 ASSESSMENT — ANXIETY QUESTIONNAIRES
IF YOU CHECKED OFF ANY PROBLEMS ON THIS QUESTIONNAIRE, HOW DIFFICULT HAVE THESE PROBLEMS MADE IT FOR YOU TO DO YOUR WORK, TAKE CARE OF THINGS AT HOME, OR GET ALONG WITH OTHER PEOPLE: SOMEWHAT DIFFICULT
2. NOT BEING ABLE TO STOP OR CONTROL WORRYING: MORE THAN HALF THE DAYS
3. WORRYING TOO MUCH ABOUT DIFFERENT THINGS: MORE THAN HALF THE DAYS
1. FEELING NERVOUS, ANXIOUS, OR ON EDGE: MORE THAN HALF THE DAYS
6. BECOMING EASILY ANNOYED OR IRRITABLE: MORE THAN HALF THE DAYS
GAD7 TOTAL SCORE: 13
4. TROUBLE RELAXING: MORE THAN HALF THE DAYS
7. FEELING AFRAID AS IF SOMETHING AWFUL MIGHT HAPPEN: SEVERAL DAYS
5. BEING SO RESTLESS THAT IT IS HARD TO SIT STILL: MORE THAN HALF THE DAYS

## 2024-08-20 NOTE — PROGRESS NOTES
that they will call 911, or go to the nearest ER or Urgent Care Center, prior to acting on these thoughts.  Thought processes appear normal.  Speech is goal directed.  The patient does not appear to be responding to internal stimuli.      Plan: I will see this patient again in about two weeks.    PORFIRIO Rose

## 2024-08-21 NOTE — PROGRESS NOTES
Length of meeting; 47   minutes    Start Time: 1008    Stop Time: 1055    Diagnosis: Major depressive disorder, recurrent, moderate.  Adjustment disorder with anxiety.    Treatment Plan:  Will focus on relaxation skills.  Will focus on different activities that she can engage in.  AWARE process for panic attacks.    Patient Prognosis: Guarded at present time.    Patient Progress: The patient indicates \"I am pretty good but I think I am a little emotional right now.\"  She notes \"I am dealing with some stupid young men.\"  We processed some of these issues today, and we talked about some of her affiliated emotions.  We talked about setting boundaries with her children and some of the difficulties that she may encounter.      We briefly talked about her top 3 treatment goals from a prior given list of 50 that the patient self selected.  They are as follows: Learning how to relax, Having more pleasurable activities, and Reducing panic attacks.  We will discuss these more at length in our next meeting.        Mental Status exam: PHQ-2 is done with a score of 2; PHQ-9 is a 10; her prior score was a 13.  No current active SI or HI, and continues to contract for safety.  Thought processes appear normal.  Speech is goal directed.  The patient does not appear to be responding to internal stimuli.      Plan: I will see this patient again in about 2 weeks.  PORFIRIO Rose

## 2024-09-05 ENCOUNTER — OFFICE VISIT (OUTPATIENT)
Dept: BEHAVIORAL/MENTAL HEALTH CLINIC | Age: 79
End: 2024-09-05

## 2024-09-05 DIAGNOSIS — F43.22 ADJUSTMENT DISORDER WITH ANXIETY: ICD-10-CM

## 2024-09-05 DIAGNOSIS — F33.1 MDD (MAJOR DEPRESSIVE DISORDER), RECURRENT EPISODE, MODERATE (HCC): Primary | ICD-10-CM

## 2024-09-05 ASSESSMENT — PATIENT HEALTH QUESTIONNAIRE - PHQ9
7. TROUBLE CONCENTRATING ON THINGS, SUCH AS READING THE NEWSPAPER OR WATCHING TELEVISION: SEVERAL DAYS
2. FEELING DOWN, DEPRESSED OR HOPELESS: SEVERAL DAYS
1. LITTLE INTEREST OR PLEASURE IN DOING THINGS: SEVERAL DAYS
8. MOVING OR SPEAKING SO SLOWLY THAT OTHER PEOPLE COULD HAVE NOTICED. OR THE OPPOSITE, BEING SO FIGETY OR RESTLESS THAT YOU HAVE BEEN MOVING AROUND A LOT MORE THAN USUAL: NOT AT ALL
4. FEELING TIRED OR HAVING LITTLE ENERGY: MORE THAN HALF THE DAYS
3. TROUBLE FALLING OR STAYING ASLEEP: MORE THAN HALF THE DAYS
SUM OF ALL RESPONSES TO PHQ QUESTIONS 1-9: 10
SUM OF ALL RESPONSES TO PHQ QUESTIONS 1-9: 2
SUM OF ALL RESPONSES TO PHQ QUESTIONS 1-9: 2
6. FEELING BAD ABOUT YOURSELF - OR THAT YOU ARE A FAILURE OR HAVE LET YOURSELF OR YOUR FAMILY DOWN: SEVERAL DAYS
SUM OF ALL RESPONSES TO PHQ9 QUESTIONS 1 & 2: 2
SUM OF ALL RESPONSES TO PHQ QUESTIONS 1-9: 10
SUM OF ALL RESPONSES TO PHQ QUESTIONS 1-9: 10
SUM OF ALL RESPONSES TO PHQ9 QUESTIONS 1 & 2: 2
SUM OF ALL RESPONSES TO PHQ QUESTIONS 1-9: 10
SUM OF ALL RESPONSES TO PHQ QUESTIONS 1-9: 2
2. FEELING DOWN, DEPRESSED OR HOPELESS: SEVERAL DAYS
10. IF YOU CHECKED OFF ANY PROBLEMS, HOW DIFFICULT HAVE THESE PROBLEMS MADE IT FOR YOU TO DO YOUR WORK, TAKE CARE OF THINGS AT HOME, OR GET ALONG WITH OTHER PEOPLE: SOMEWHAT DIFFICULT
SUM OF ALL RESPONSES TO PHQ QUESTIONS 1-9: 2
5. POOR APPETITE OR OVEREATING: MORE THAN HALF THE DAYS
1. LITTLE INTEREST OR PLEASURE IN DOING THINGS: SEVERAL DAYS

## 2024-09-05 ASSESSMENT — COLUMBIA-SUICIDE SEVERITY RATING SCALE - C-SSRS
6. HAVE YOU EVER DONE ANYTHING, STARTED TO DO ANYTHING, OR PREPARED TO DO ANYTHING TO END YOUR LIFE?: NO
1. WITHIN THE PAST MONTH, HAVE YOU WISHED YOU WERE DEAD OR WISHED YOU COULD GO TO SLEEP AND NOT WAKE UP?: NO
2. HAVE YOU ACTUALLY HAD ANY THOUGHTS OF KILLING YOURSELF?: NO

## 2024-09-10 ENCOUNTER — HOSPITAL ENCOUNTER (EMERGENCY)
Age: 79
Discharge: HOME | End: 2024-09-10
Payer: MEDICARE

## 2024-09-10 VITALS
TEMPERATURE: 98.2 F | DIASTOLIC BLOOD PRESSURE: 77 MMHG | BODY MASS INDEX: 27.97 KG/M2 | HEART RATE: 71 BPM | SYSTOLIC BLOOD PRESSURE: 136 MMHG | RESPIRATION RATE: 16 BRPM | WEIGHT: 152 LBS | OXYGEN SATURATION: 96 % | HEIGHT: 62 IN

## 2024-09-10 DIAGNOSIS — R19.7 DIARRHEA, UNSPECIFIED TYPE: Primary | ICD-10-CM

## 2024-09-10 LAB
ALBUMIN SERPL-MCNC: 3.5 G/DL (ref 3.2–4.6)
ALBUMIN/GLOB SERPL: 0.9 (ref 1–1.9)
ALP SERPL-CCNC: 75 U/L (ref 35–104)
ALT SERPL-CCNC: 15 U/L (ref 12–65)
ANION GAP SERPL CALC-SCNC: 11 MMOL/L (ref 9–18)
AST SERPL-CCNC: 18 U/L (ref 15–37)
BASOPHILS # BLD: 0 K/UL (ref 0–0.2)
BASOPHILS NFR BLD: 1 % (ref 0–2)
BILIRUB SERPL-MCNC: 0.8 MG/DL (ref 0–1.2)
BUN SERPL-MCNC: 21 MG/DL (ref 8–23)
CALCIUM SERPL-MCNC: 9.6 MG/DL (ref 8.8–10.2)
CHLORIDE SERPL-SCNC: 102 MMOL/L (ref 98–107)
CO2 SERPL-SCNC: 31 MMOL/L (ref 20–28)
CREAT SERPL-MCNC: 1.18 MG/DL (ref 0.6–1.1)
DIFFERENTIAL METHOD BLD: NORMAL
EOSINOPHIL # BLD: 0.1 K/UL (ref 0–0.8)
EOSINOPHIL NFR BLD: 1 % (ref 0.5–7.8)
ERYTHROCYTE [DISTWIDTH] IN BLOOD BY AUTOMATED COUNT: 13.5 % (ref 11.9–14.6)
GLOBULIN SER CALC-MCNC: 3.7 G/DL (ref 2.3–3.5)
GLUCOSE SERPL-MCNC: 128 MG/DL (ref 70–99)
HCT VFR BLD AUTO: 39 % (ref 35.8–46.3)
HGB BLD-MCNC: 12.6 G/DL (ref 11.7–15.4)
IMM GRANULOCYTES # BLD AUTO: 0 K/UL (ref 0–0.5)
IMM GRANULOCYTES NFR BLD AUTO: 0 % (ref 0–5)
LIPASE SERPL-CCNC: 122 U/L (ref 13–60)
LYMPHOCYTES # BLD: 1.2 K/UL (ref 0.5–4.6)
LYMPHOCYTES NFR BLD: 21 % (ref 13–44)
MAGNESIUM SERPL-MCNC: 1.8 MG/DL (ref 1.8–2.4)
MCH RBC QN AUTO: 27.8 PG (ref 26.1–32.9)
MCHC RBC AUTO-ENTMCNC: 32.3 G/DL (ref 31.4–35)
MCV RBC AUTO: 86.1 FL (ref 82–102)
MONOCYTES # BLD: 0.5 K/UL (ref 0.1–1.3)
MONOCYTES NFR BLD: 9 % (ref 4–12)
NEUTS SEG # BLD: 3.8 K/UL (ref 1.7–8.2)
NEUTS SEG NFR BLD: 67 % (ref 43–78)
NRBC # BLD: 0 K/UL (ref 0–0.2)
PLATELET # BLD AUTO: 205 K/UL (ref 150–450)
PMV BLD AUTO: 10.7 FL (ref 9.4–12.3)
POTASSIUM SERPL-SCNC: 3.8 MMOL/L (ref 3.5–5.1)
PROT SERPL-MCNC: 7.2 G/DL (ref 6.3–8.2)
RBC # BLD AUTO: 4.53 M/UL (ref 4.05–5.2)
SARS-COV-2 RDRP RESP QL NAA+PROBE: NOT DETECTED
SODIUM SERPL-SCNC: 144 MMOL/L (ref 136–145)
SOURCE: NORMAL
WBC # BLD AUTO: 5.6 K/UL (ref 4.3–11.1)

## 2024-09-10 PROCEDURE — 85025 COMPLETE CBC W/AUTO DIFF WBC: CPT

## 2024-09-10 PROCEDURE — 6370000000 HC RX 637 (ALT 250 FOR IP): Performed by: PHYSICIAN ASSISTANT

## 2024-09-10 PROCEDURE — 99283 EMERGENCY DEPT VISIT LOW MDM: CPT

## 2024-09-10 PROCEDURE — 80053 COMPREHEN METABOLIC PANEL: CPT

## 2024-09-10 PROCEDURE — 83735 ASSAY OF MAGNESIUM: CPT

## 2024-09-10 PROCEDURE — 87635 SARS-COV-2 COVID-19 AMP PRB: CPT

## 2024-09-10 PROCEDURE — 83690 ASSAY OF LIPASE: CPT

## 2024-09-10 RX ADMIN — HYOSCYAMINE SULFATE 0.12 MG: 0.12 TABLET ORAL; SUBLINGUAL at 21:25

## 2024-09-10 ASSESSMENT — PAIN SCALES - GENERAL
PAINLEVEL_OUTOF10: 3
PAINLEVEL_OUTOF10: 3

## 2024-09-10 ASSESSMENT — ENCOUNTER SYMPTOMS
COUGH: 0
EYE REDNESS: 0
ABDOMINAL PAIN: 0
ABDOMINAL DISTENTION: 0
BACK PAIN: 0
SORE THROAT: 0
CHEST TIGHTNESS: 0
SHORTNESS OF BREATH: 0
VOMITING: 0
NAUSEA: 0
DIARRHEA: 1
RHINORRHEA: 0
CONSTIPATION: 0

## 2024-09-10 ASSESSMENT — PAIN DESCRIPTION - PAIN TYPE: TYPE: ACUTE PAIN

## 2024-09-10 ASSESSMENT — PAIN - FUNCTIONAL ASSESSMENT: PAIN_FUNCTIONAL_ASSESSMENT: 0-10

## 2024-09-10 ASSESSMENT — PAIN DESCRIPTION - DESCRIPTORS: DESCRIPTORS: CRAMPING

## 2024-09-10 ASSESSMENT — PAIN DESCRIPTION - FREQUENCY: FREQUENCY: INTERMITTENT

## 2024-09-11 ENCOUNTER — CARE COORDINATION (OUTPATIENT)
Dept: CARE COORDINATION | Facility: CLINIC | Age: 79
End: 2024-09-11

## 2024-09-16 ENCOUNTER — OFFICE VISIT (OUTPATIENT)
Dept: INTERNAL MEDICINE CLINIC | Facility: CLINIC | Age: 79
End: 2024-09-16

## 2024-09-16 VITALS
DIASTOLIC BLOOD PRESSURE: 80 MMHG | HEART RATE: 64 BPM | SYSTOLIC BLOOD PRESSURE: 130 MMHG | HEIGHT: 62 IN | OXYGEN SATURATION: 97 % | RESPIRATION RATE: 16 BRPM | WEIGHT: 159 LBS | TEMPERATURE: 98.2 F | BODY MASS INDEX: 29.26 KG/M2

## 2024-09-16 DIAGNOSIS — Z80.0 FAMILY HISTORY OF COLON CANCER IN MOTHER: ICD-10-CM

## 2024-09-16 DIAGNOSIS — R19.5 CHANGE IN CONSISTENCY OF STOOL: Primary | ICD-10-CM

## 2024-09-16 DIAGNOSIS — Z12.11 SCREEN FOR COLON CANCER: ICD-10-CM

## 2024-09-16 ASSESSMENT — PATIENT HEALTH QUESTIONNAIRE - PHQ9
9. THOUGHTS THAT YOU WOULD BE BETTER OFF DEAD, OR OF HURTING YOURSELF: NOT AT ALL
4. FEELING TIRED OR HAVING LITTLE ENERGY: SEVERAL DAYS
7. TROUBLE CONCENTRATING ON THINGS, SUCH AS READING THE NEWSPAPER OR WATCHING TELEVISION: MORE THAN HALF THE DAYS
SUM OF ALL RESPONSES TO PHQ9 QUESTIONS 1 & 2: 3
3. TROUBLE FALLING OR STAYING ASLEEP: SEVERAL DAYS
SUM OF ALL RESPONSES TO PHQ QUESTIONS 1-9: 9
2. FEELING DOWN, DEPRESSED OR HOPELESS: MORE THAN HALF THE DAYS
8. MOVING OR SPEAKING SO SLOWLY THAT OTHER PEOPLE COULD HAVE NOTICED. OR THE OPPOSITE, BEING SO FIGETY OR RESTLESS THAT YOU HAVE BEEN MOVING AROUND A LOT MORE THAN USUAL: NOT AT ALL
5. POOR APPETITE OR OVEREATING: MORE THAN HALF THE DAYS
10. IF YOU CHECKED OFF ANY PROBLEMS, HOW DIFFICULT HAVE THESE PROBLEMS MADE IT FOR YOU TO DO YOUR WORK, TAKE CARE OF THINGS AT HOME, OR GET ALONG WITH OTHER PEOPLE: SOMEWHAT DIFFICULT
SUM OF ALL RESPONSES TO PHQ QUESTIONS 1-9: 9
SUM OF ALL RESPONSES TO PHQ QUESTIONS 1-9: 9
6. FEELING BAD ABOUT YOURSELF - OR THAT YOU ARE A FAILURE OR HAVE LET YOURSELF OR YOUR FAMILY DOWN: NOT AT ALL
1. LITTLE INTEREST OR PLEASURE IN DOING THINGS: SEVERAL DAYS
SUM OF ALL RESPONSES TO PHQ QUESTIONS 1-9: 9

## 2024-09-16 ASSESSMENT — ENCOUNTER SYMPTOMS
CONSTIPATION: 0
VOMITING: 0
WHEEZING: 0
SHORTNESS OF BREATH: 0
ABDOMINAL PAIN: 0
NAUSEA: 0
DIARRHEA: 1
BLOOD IN STOOL: 0
COUGH: 0

## 2024-09-17 ENCOUNTER — OFFICE VISIT (OUTPATIENT)
Dept: BEHAVIORAL/MENTAL HEALTH CLINIC | Age: 79
End: 2024-09-17
Payer: MEDICARE

## 2024-09-17 DIAGNOSIS — F43.22 ADJUSTMENT DISORDER WITH ANXIETY: ICD-10-CM

## 2024-09-17 DIAGNOSIS — F33.1 MDD (MAJOR DEPRESSIVE DISORDER), RECURRENT EPISODE, MODERATE (HCC): Primary | ICD-10-CM

## 2024-09-17 PROCEDURE — 90834 PSYTX W PT 45 MINUTES: CPT | Performed by: SOCIAL WORKER

## 2024-09-17 PROCEDURE — 1123F ACP DISCUSS/DSCN MKR DOCD: CPT | Performed by: SOCIAL WORKER

## 2024-09-17 ASSESSMENT — ANXIETY QUESTIONNAIRES
3. WORRYING TOO MUCH ABOUT DIFFERENT THINGS: SEVERAL DAYS
1. FEELING NERVOUS, ANXIOUS, OR ON EDGE: SEVERAL DAYS
7. FEELING AFRAID AS IF SOMETHING AWFUL MIGHT HAPPEN: NOT AT ALL
IF YOU CHECKED OFF ANY PROBLEMS ON THIS QUESTIONNAIRE, HOW DIFFICULT HAVE THESE PROBLEMS MADE IT FOR YOU TO DO YOUR WORK, TAKE CARE OF THINGS AT HOME, OR GET ALONG WITH OTHER PEOPLE: SOMEWHAT DIFFICULT
5. BEING SO RESTLESS THAT IT IS HARD TO SIT STILL: SEVERAL DAYS
GAD7 TOTAL SCORE: 7
6. BECOMING EASILY ANNOYED OR IRRITABLE: SEVERAL DAYS
4. TROUBLE RELAXING: MORE THAN HALF THE DAYS
2. NOT BEING ABLE TO STOP OR CONTROL WORRYING: SEVERAL DAYS
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
## 2024-10-07 ENCOUNTER — OFFICE VISIT (OUTPATIENT)
Dept: BEHAVIORAL/MENTAL HEALTH CLINIC | Age: 79
End: 2024-10-07
Payer: MEDICARE

## 2024-10-07 DIAGNOSIS — F43.22 ADJUSTMENT DISORDER WITH ANXIETY: ICD-10-CM

## 2024-10-07 DIAGNOSIS — F33.0 MAJOR DEPRESSIVE DISORDER, RECURRENT EPISODE, MILD (HCC): Primary | ICD-10-CM

## 2024-10-07 PROCEDURE — 1123F ACP DISCUSS/DSCN MKR DOCD: CPT | Performed by: SOCIAL WORKER

## 2024-10-07 PROCEDURE — 90834 PSYTX W PT 45 MINUTES: CPT | Performed by: SOCIAL WORKER

## 2024-10-07 ASSESSMENT — PATIENT HEALTH QUESTIONNAIRE - PHQ9
SUM OF ALL RESPONSES TO PHQ QUESTIONS 1-9: 7
SUM OF ALL RESPONSES TO PHQ9 QUESTIONS 1 & 2: 1
7. TROUBLE CONCENTRATING ON THINGS, SUCH AS READING THE NEWSPAPER OR WATCHING TELEVISION: MORE THAN HALF THE DAYS
2. FEELING DOWN, DEPRESSED OR HOPELESS: SEVERAL DAYS
10. IF YOU CHECKED OFF ANY PROBLEMS, HOW DIFFICULT HAVE THESE PROBLEMS MADE IT FOR YOU TO DO YOUR WORK, TAKE CARE OF THINGS AT HOME, OR GET ALONG WITH OTHER PEOPLE: NOT DIFFICULT AT ALL
3. TROUBLE FALLING OR STAYING ASLEEP: MORE THAN HALF THE DAYS
SUM OF ALL RESPONSES TO PHQ QUESTIONS 1-9: 7
9. THOUGHTS THAT YOU WOULD BE BETTER OFF DEAD, OR OF HURTING YOURSELF: NOT AT ALL
8. MOVING OR SPEAKING SO SLOWLY THAT OTHER PEOPLE COULD HAVE NOTICED. OR THE OPPOSITE, BEING SO FIGETY OR RESTLESS THAT YOU HAVE BEEN MOVING AROUND A LOT MORE THAN USUAL: NOT AT ALL
5. POOR APPETITE OR OVEREATING: SEVERAL DAYS
1. LITTLE INTEREST OR PLEASURE IN DOING THINGS: NOT AT ALL
6. FEELING BAD ABOUT YOURSELF - OR THAT YOU ARE A FAILURE OR HAVE LET YOURSELF OR YOUR FAMILY DOWN: SEVERAL DAYS
SUM OF ALL RESPONSES TO PHQ QUESTIONS 1-9: 7
SUM OF ALL RESPONSES TO PHQ QUESTIONS 1-9: 7
4. FEELING TIRED OR HAVING LITTLE ENERGY: NOT AT ALL

## 2024-10-08 NOTE — PROGRESS NOTES
Length of meeting; 30  minutes    Start Time: 1007    Stop Time: 1037    Diagnosis: Major depressive disorder, recurrent, mild.  Adjustment disorder with anxiety.    Treatment Plan:  Will focus on relaxation skills.  Will focus on different activities that she can engage in.  AWARE process for panic attacks.    Patient Prognosis: Fair.    Patient Progress: The patient indicates \"I am good, bad, and in between.\" She discussed some \"pressure\" that she is under, related to some physical pain that she is having.  Notes ongoing conflicts with her adult children and her perception that they do not fully appreciate her health concerns.  Support is offered; pt shared her thoughts related to her irritability with her sons at some length today.  Notes that her sleep is affected by pain.      Mental Status exam: No current active SI or HI, and continues to contract for safety.  Thought processes appear normal.  Speech is goal directed.  The patient does not appear to be responding to internal stimuli.      Plan: I will see this patient again in about 3 weeks.  PORFIRIO Rose

## 2024-10-13 ENCOUNTER — HOSPITAL ENCOUNTER (EMERGENCY)
Age: 79
Discharge: HOME | End: 2024-10-13
Attending: EMERGENCY MEDICINE
Payer: MEDICARE

## 2024-10-13 ENCOUNTER — APPOINTMENT (OUTPATIENT)
Dept: GENERAL RADIOLOGY | Age: 79
End: 2024-10-13
Payer: MEDICARE

## 2024-10-13 VITALS
RESPIRATION RATE: 18 BRPM | TEMPERATURE: 97.8 F | BODY MASS INDEX: 28.52 KG/M2 | HEART RATE: 65 BPM | DIASTOLIC BLOOD PRESSURE: 88 MMHG | SYSTOLIC BLOOD PRESSURE: 184 MMHG | WEIGHT: 155 LBS | OXYGEN SATURATION: 96 % | HEIGHT: 62 IN

## 2024-10-13 DIAGNOSIS — M16.11 OSTEOARTHRITIS OF RIGHT HIP, UNSPECIFIED OSTEOARTHRITIS TYPE: ICD-10-CM

## 2024-10-13 DIAGNOSIS — J06.9 UPPER RESPIRATORY TRACT INFECTION, UNSPECIFIED TYPE: Primary | ICD-10-CM

## 2024-10-13 DIAGNOSIS — M54.41 ACUTE RIGHT-SIDED LOW BACK PAIN WITH RIGHT-SIDED SCIATICA: ICD-10-CM

## 2024-10-13 LAB
FLUAV RNA SPEC QL NAA+PROBE: NOT DETECTED
FLUBV RNA SPEC QL NAA+PROBE: NOT DETECTED
SARS-COV-2 RDRP RESP QL NAA+PROBE: NOT DETECTED
SOURCE: NORMAL

## 2024-10-13 PROCEDURE — 6360000002 HC RX W HCPCS: Performed by: EMERGENCY MEDICINE

## 2024-10-13 PROCEDURE — 6370000000 HC RX 637 (ALT 250 FOR IP): Performed by: EMERGENCY MEDICINE

## 2024-10-13 PROCEDURE — 73502 X-RAY EXAM HIP UNI 2-3 VIEWS: CPT

## 2024-10-13 PROCEDURE — 72100 X-RAY EXAM L-S SPINE 2/3 VWS: CPT

## 2024-10-13 PROCEDURE — 87635 SARS-COV-2 COVID-19 AMP PRB: CPT

## 2024-10-13 PROCEDURE — 99284 EMERGENCY DEPT VISIT MOD MDM: CPT

## 2024-10-13 PROCEDURE — 96372 THER/PROPH/DIAG INJ SC/IM: CPT

## 2024-10-13 PROCEDURE — 87502 INFLUENZA DNA AMP PROBE: CPT

## 2024-10-13 RX ORDER — LIDOCAINE 4 G/G
1 PATCH TOPICAL
Status: DISCONTINUED | OUTPATIENT
Start: 2024-10-13 | End: 2024-10-13 | Stop reason: HOSPADM

## 2024-10-13 RX ORDER — ONDANSETRON 4 MG/1
4 TABLET, ORALLY DISINTEGRATING ORAL
Status: COMPLETED | OUTPATIENT
Start: 2024-10-13 | End: 2024-10-13

## 2024-10-13 RX ORDER — LIDOCAINE 4 G/G
1 PATCH TOPICAL DAILY
Qty: 30 PATCH | Refills: 0 | Status: SHIPPED | OUTPATIENT
Start: 2024-10-13 | End: 2024-11-12

## 2024-10-13 RX ORDER — KETOROLAC TROMETHAMINE 15 MG/ML
15 INJECTION, SOLUTION INTRAMUSCULAR; INTRAVENOUS ONCE
Status: COMPLETED | OUTPATIENT
Start: 2024-10-13 | End: 2024-10-13

## 2024-10-13 RX ORDER — TRAMADOL HYDROCHLORIDE 50 MG/1
50 TABLET ORAL EVERY 8 HOURS PRN
Qty: 10 TABLET | Refills: 0 | Status: SHIPPED | OUTPATIENT
Start: 2024-10-13 | End: 2024-10-16

## 2024-10-13 RX ADMIN — KETOROLAC TROMETHAMINE 15 MG: 15 INJECTION, SOLUTION INTRAMUSCULAR; INTRAVENOUS at 12:55

## 2024-10-13 RX ADMIN — ONDANSETRON 4 MG: 4 TABLET, ORALLY DISINTEGRATING ORAL at 12:54

## 2024-10-13 ASSESSMENT — PAIN DESCRIPTION - LOCATION: LOCATION: HIP

## 2024-10-13 ASSESSMENT — PAIN - FUNCTIONAL ASSESSMENT: PAIN_FUNCTIONAL_ASSESSMENT: 0-10

## 2024-10-13 ASSESSMENT — PAIN DESCRIPTION - ORIENTATION: ORIENTATION: RIGHT

## 2024-10-13 ASSESSMENT — PAIN SCALES - GENERAL
PAINLEVEL_OUTOF10: 9
PAINLEVEL_OUTOF10: 8

## 2024-10-13 NOTE — ED TRIAGE NOTES
Pt CO R hip pain x2 days. Pt went to urgent care yesterday and was told that she has arthritis.     Pt also CO runny nose and post nasal drip since this morning. Pt had negative covid test yesterday.    Pt ambulatory to triage and placed in wheelchair.

## 2024-10-13 NOTE — DISCHARGE INSTRUCTIONS
Rest, ice, elevate.   Use lidocaine patches and take pain medication as directed.  Please take Coricidin as decongestant for viral URI.    Schedule close follow-up with your primary care physician.  Please return if symptoms worsen or progress in any way.

## 2024-10-13 NOTE — ED NOTES
Patient mobility status  with no difficulty. Provider aware     I have reviewed discharge instructions with the patient.  The patient verbalized understanding.    Patient left ED via Discharge Method: ambulatory to Home with son.    Opportunity for questions and clarification provided.     Patient given 2 scripts.

## 2024-10-16 ENCOUNTER — TELEPHONE (OUTPATIENT)
Dept: INTERNAL MEDICINE CLINIC | Facility: CLINIC | Age: 79
End: 2024-10-16

## 2024-10-16 NOTE — TELEPHONE ENCOUNTER
----- Message from Kayy PICKETT sent at 10/16/2024  4:01 PM EDT -----  Regarding: ECC Escalation To Practice  ECC Escalation To Practice      Type of Escalation: Acute Care Symptom  --------------------------------------------------------------------------------------------------------------------------    Information for Provider:  Patient is looking for appointment for: Symptom Other  Reasons for Message: Unable to reach practice     Additional Information     Pt hip is in pain  --------------------------------------------------------------------------------------------------------------------------    Relationship to Patient: Self     Call Back Info: OK to leave message on voicemail  Preferred Call Back Number: Phone [phone] (home)

## 2024-10-18 ENCOUNTER — HOSPITAL ENCOUNTER (EMERGENCY)
Age: 79
Discharge: HOME | End: 2024-10-18
Payer: MEDICARE

## 2024-10-18 VITALS
DIASTOLIC BLOOD PRESSURE: 70 MMHG | HEIGHT: 62 IN | TEMPERATURE: 98.4 F | SYSTOLIC BLOOD PRESSURE: 117 MMHG | HEART RATE: 60 BPM | RESPIRATION RATE: 14 BRPM | BODY MASS INDEX: 28.52 KG/M2 | OXYGEN SATURATION: 100 % | WEIGHT: 155 LBS

## 2024-10-18 DIAGNOSIS — M54.31 SCIATICA OF RIGHT SIDE: Primary | ICD-10-CM

## 2024-10-18 PROCEDURE — 96372 THER/PROPH/DIAG INJ SC/IM: CPT

## 2024-10-18 PROCEDURE — 6370000000 HC RX 637 (ALT 250 FOR IP): Performed by: NURSE PRACTITIONER

## 2024-10-18 PROCEDURE — 6360000002 HC RX W HCPCS: Performed by: NURSE PRACTITIONER

## 2024-10-18 PROCEDURE — 99284 EMERGENCY DEPT VISIT MOD MDM: CPT

## 2024-10-18 RX ORDER — METHOCARBAMOL 500 MG/1
500 TABLET, FILM COATED ORAL 3 TIMES DAILY PRN
Qty: 30 TABLET | Refills: 0 | Status: SHIPPED | OUTPATIENT
Start: 2024-10-18 | End: 2024-10-28

## 2024-10-18 RX ORDER — METHOCARBAMOL 500 MG/1
500 TABLET, FILM COATED ORAL
Status: COMPLETED | OUTPATIENT
Start: 2024-10-18 | End: 2024-10-18

## 2024-10-18 RX ORDER — NAPROXEN 500 MG/1
500 TABLET ORAL 2 TIMES DAILY WITH MEALS
Qty: 30 TABLET | Refills: 0 | Status: SHIPPED | OUTPATIENT
Start: 2024-10-18

## 2024-10-18 RX ORDER — OXYCODONE HYDROCHLORIDE 5 MG/1
5 TABLET ORAL EVERY 8 HOURS PRN
Qty: 12 TABLET | Refills: 0 | Status: SHIPPED | OUTPATIENT
Start: 2024-10-18 | End: 2024-10-22

## 2024-10-18 RX ORDER — KETOROLAC TROMETHAMINE 15 MG/ML
15 INJECTION, SOLUTION INTRAMUSCULAR; INTRAVENOUS ONCE
Status: COMPLETED | OUTPATIENT
Start: 2024-10-18 | End: 2024-10-18

## 2024-10-18 RX ORDER — OXYCODONE HYDROCHLORIDE 5 MG/1
5 TABLET ORAL
Status: COMPLETED | OUTPATIENT
Start: 2024-10-18 | End: 2024-10-18

## 2024-10-18 RX ADMIN — METHOCARBAMOL 500 MG: 500 TABLET ORAL at 15:15

## 2024-10-18 RX ADMIN — OXYCODONE 5 MG: 5 TABLET ORAL at 15:15

## 2024-10-18 RX ADMIN — KETOROLAC TROMETHAMINE 15 MG: 15 INJECTION, SOLUTION INTRAMUSCULAR; INTRAVENOUS at 15:15

## 2024-10-18 ASSESSMENT — PAIN SCALES - GENERAL
PAINLEVEL_OUTOF10: 8
PAINLEVEL_OUTOF10: 10
PAINLEVEL_OUTOF10: 10

## 2024-10-18 ASSESSMENT — PAIN DESCRIPTION - LOCATION: LOCATION: HIP

## 2024-10-18 ASSESSMENT — PAIN - FUNCTIONAL ASSESSMENT: PAIN_FUNCTIONAL_ASSESSMENT: 0-10

## 2024-10-18 ASSESSMENT — PAIN DESCRIPTION - ORIENTATION: ORIENTATION: RIGHT

## 2024-10-18 NOTE — ED TRIAGE NOTES
Pt presents ambulatory with cane with complaint of right hip pain.  Pt states that she was seen a few weeks ago and discharged with tramadol and lidocaine patches and that they have been ineffective at controlling pain.  Pt rates pain at 10/10.    Pt provided with wheelchair in triage to help with pain.

## 2024-10-18 NOTE — ED NOTES
Patient mobility status  with no difficulty. Provider aware     I have reviewed discharge instructions with the patient.  The patient verbalized understanding.    Patient left ED via Discharge Method: ambulatory to Home with Extended Family:.    Opportunity for questions and clarification provided.     Patient given 0 scripts.      X 3 prescriptions sent to pharmacy

## 2024-10-18 NOTE — ED PROVIDER NOTES
Emergency Department Provider Note       PCP: Nikolas Dunham MD   Age: 79 y.o.   Sex: female     DISPOSITION Discharge - Pending Orders Complete 10/18/2024 02:58:27 PM  Condition at Disposition: Data Unavailable       ICD-10-CM    1. Sciatica of right side  M54.31 oxyCODONE (ROXICODONE) 5 MG immediate release tablet          Medical Decision Making     Overall well-appearing 79-year-old female presents emergency department today with complaint of right lower back pain that radiates into the right hip and leg.  She has no red flag symptoms.  Symptoms most consistent with neuropathic type pain.  Nonpharmacological methods of treatment/pain relief discussed with the patient.  Will provide prescriptions for muscle relaxer and pain medication as well.  Will try oxycodone as she had no relief with tramadol.  We discussed risks associated with this medication.  Advise use with caution.  Encouraged follow-up with her primary care provider for recheck of her symptoms.  ER return precautions discussed but she does appear stable for discharge.     1 acute, uncomplicated illness or injury.  Prescription drug management performed.  Parental controlled substances given in the ED.  Shared medical decision making was utilized in creating the patients health plan today.  I independently ordered and reviewed each unique test.    I reviewed external records: ED visit note from an outside group.  I reviewed external records: provider visit note from PCP.                     History     79-year-old female presents emergency department today with complaint of right lower back pain that radiates into the right hip and right thigh.  She states pain started a couple of weeks ago.  She states that she was seen in the emergency department for this pain.  She was prescribed lidocaine patches and tramadol.  This is not helping with her pain.  Pain is worse with certain positions and ambulation.  Pain is improved with certain  Occupational Health - Occupational Stress Questionnaire     Feeling of Stress : Not at all   Social Connections: Unknown (9/6/2022)    Social Connection and Isolation Panel [NHANES]     Attends Taoism Services: Never     Active Member of Clubs or Organizations: No     Attends Club or Organization Meetings: Never     Marital Status:    Intimate Partner Violence: Not At Risk (9/6/2022)    Humiliation, Afraid, Rape, and Kick questionnaire     Fear of Current or Ex-Partner: No     Emotionally Abused: No     Physically Abused: No     Sexually Abused: No   Housing Stability: Unknown (8/8/2024)    Housing Stability Vital Sign     Unstable Housing in the Last Year: No        Previous Medications    ACETAMINOPHEN (TYLENOL) 500 MG TABLET    Take 2 tablets by mouth every 6 hours as needed for Pain    ATENOLOL-CHLORTHALIDONE (TENORETIC) 50-25 MG PER TABLET    Take 0.5 tablets by mouth daily    ATORVASTATIN (LIPITOR) 40 MG TABLET    Take 1 tablet by mouth at bedtime    BLOOD GLUCOSE TEST STRIPS (ONETOUCH ULTRA) STRIP    1 each by In Vitro route daily As needed.    CALCIUM CARBONATE (OSCAL) 500 MG TABS TABLET    Take 1 tablet by mouth daily    CHOLECALCIFEROL 50 MCG (2000 UT) TABS    Take by mouth    CLOTRIMAZOLE-BETAMETHASONE (LOTRISONE) 1-0.05 % CREAM    Apply topically 2 times daily.    COENZYME Q10 100 MG CAPS CAPSULE    Take 1 capsule by mouth daily    DIABETIC SHOE MISC    by Does not apply route Diabetic shoe, custom orthoses, custom prosthetic filler, DM2, neuropathy    DICLOFENAC SODIUM (VOLTAREN) 1 % GEL        FLUTICASONE (FLONASE) 50 MCG/ACT NASAL SPRAY        HYDROCORTISONE (ANUSOL-HC) 2.5 % CREA RECTAL CREAM    Insert into rectum & apply externally 3-4 times/day prn hemorrhoid    IRBESARTAN (AVAPRO) 150 MG TABLET    Take 1 tablet by mouth at bedtime    LETROZOLE (FEMARA) 2.5 MG TABLET    Take 1 tablet by mouth daily    LEVOTHYROXINE (SYNTHROID) 75 MCG TABLET    Take 1 tablet by mouth Daily    LIDOCAINE 4 %

## 2024-10-18 NOTE — DISCHARGE INSTRUCTIONS
As we discussed, your symptoms and exam is most consistent with neuropathic pain, likely sciatic pain.  This will take some time to improve.  Take medication as prescribed to help with symptoms.  Perform stretching of your lower back.  Apply ice or heat for 15 or 20 minutes every few hours.  Use over-the-counter muscle rub to help with symptoms.  Please follow-up with your primary care provider for recheck of your symptoms.  Return to the emergency department for any new, worsening, or concerning symptoms.

## 2024-10-21 ENCOUNTER — CARE COORDINATION (OUTPATIENT)
Dept: CARE COORDINATION | Facility: CLINIC | Age: 79
End: 2024-10-21

## 2024-10-21 ENCOUNTER — OFFICE VISIT (OUTPATIENT)
Dept: INTERNAL MEDICINE CLINIC | Facility: CLINIC | Age: 79
End: 2024-10-21
Payer: MEDICARE

## 2024-10-21 VITALS
SYSTOLIC BLOOD PRESSURE: 122 MMHG | BODY MASS INDEX: 27.23 KG/M2 | TEMPERATURE: 97.9 F | OXYGEN SATURATION: 100 % | HEART RATE: 70 BPM | WEIGHT: 148 LBS | DIASTOLIC BLOOD PRESSURE: 80 MMHG | HEIGHT: 62 IN | RESPIRATION RATE: 16 BRPM

## 2024-10-21 DIAGNOSIS — M16.11 PRIMARY OSTEOARTHRITIS OF RIGHT HIP: Primary | ICD-10-CM

## 2024-10-21 DIAGNOSIS — M54.41 CHRONIC RIGHT-SIDED LOW BACK PAIN WITH RIGHT-SIDED SCIATICA: ICD-10-CM

## 2024-10-21 DIAGNOSIS — G89.29 CHRONIC RIGHT-SIDED LOW BACK PAIN WITH RIGHT-SIDED SCIATICA: ICD-10-CM

## 2024-10-21 PROCEDURE — 3074F SYST BP LT 130 MM HG: CPT | Performed by: NURSE PRACTITIONER

## 2024-10-21 PROCEDURE — 3079F DIAST BP 80-89 MM HG: CPT | Performed by: NURSE PRACTITIONER

## 2024-10-21 PROCEDURE — 1123F ACP DISCUSS/DSCN MKR DOCD: CPT | Performed by: NURSE PRACTITIONER

## 2024-10-21 PROCEDURE — 99214 OFFICE O/P EST MOD 30 MIN: CPT | Performed by: NURSE PRACTITIONER

## 2024-10-21 ASSESSMENT — ENCOUNTER SYMPTOMS
ABDOMINAL PAIN: 0
BACK PAIN: 1
SHORTNESS OF BREATH: 0
NAUSEA: 0
VOMITING: 0
COUGH: 0

## 2024-10-21 NOTE — CARE COORDINATION
Ambulatory Care Management Outreach Attempt    This patient was received as a referral from  Daily assignment for case management of ed utilizer.    Attempted to reach patient for ED follow up. Pt has respectfully declined services at this time, my contact information has been shared with pt in the event there circumstances change. They are free to reach out at any time. ACM signing off.        Patient: Shalini Rush Patient : 1945 MRN: [id number]    Last Discharge Facility       Date Complaint Diagnosis Description Type Department Provider    10/18/24 Hip Pain Sciatica of right side ED (DISCHARGE) [facility]                 Noted following upcoming appointments from discharge chart review:   [facility] [place]):   Future Appointments   Date Time Provider Department Center   10/29/2024 10:00 AM Alexander Garcia LISW [facility] AMB   2024 11:10 AM [facility] DEXA BI GE LUNAR DEXA [facility]   2024 12:00 PM SFE VERN BI ROOM 3 [facility]   2025  9:30 AM PERIPHERAL GCCO[facility] GCC   2025 10:30 AM Marisol Bautista, APRN - CNP [facility] AMB   2025  8:30 AM MAT LAB MAT BS [facility] DEP   2025 11:40 AM Nikolas Dunham MD MAT [facility] DEP   2025  7:30 AM PERIPHERAL GCCOIG GCC   2025  8:30 AM Connor Enciso MD [facility] AMB     Non-BS follow up appointment(s):

## 2024-10-21 NOTE — PROGRESS NOTES
[facility]      10/21/2024    Patient Name: Shalini Rush  :  1945      Chief Complaint:  Chief Complaint   Patient presents with    Hip Pain     Pt c/o right hip pain. Pt was seen in the ED on 10/13 and 10/18 for the hip pain.  Pt was prescribed Methocarbamol and it didn't help.  Given Naproxen and that didn't help.  Pt stated she couldn't get comfortable to sleep because of the pain.          HPI  Patient presents today with complaint of right hip and right low back pain. Symptoms are chronic but recurred several weeks ago. Pain is present in right low back/hip. She reports associated numbness in her right lateral thigh. No saddle anesthesia or loss of bowel/bladder control. Seen in the ER twice this month for this problem. Hip x-ray revealed severe arthritis in right hip and moderate in left hip. Lumbar x-ray completed as well revealing moderate to severe intervertebral disc space narrowing at L3-S1. Given RX of methocarbamol, naproxen which she says have not helped with the pain. Applying heat several times daily. OTC lidocaine patches have been somewhat helpful. She was given RX for tramadol and then oxycodone but did not take either one as she did not want it to make her too drowsy since she lives alone.  Pain is constant; aggravated by rolling over in bed, getting up after sitting for long periods. Recalls having \"a shot at the base of my spine\" several years back.           Past Medical History:   Diagnosis Date    Abdominal pain     Arthritis     Breast cancer (HCC)     Breast cancer, left ([facility])     radiation, surgery    Breast pain in female     Bunion     Chronic pain of left knee     Corns and callosities     DDD (degenerative disc disease), lumbar     Diabetes mellitus type II, controlled ([facility])     Diet controlled    Emotional disorder ([facility])     Fungal infection of toenail     Hallux valgus     Hip pain, bilateral     Hyperglycemia     Hyperlipidemia     Hypertension     medication

## 2024-10-23 ENCOUNTER — OFFICE VISIT (OUTPATIENT)
Age: 79
End: 2024-10-23

## 2024-10-23 ENCOUNTER — TELEPHONE (OUTPATIENT)
Dept: ORTHOPEDIC SURGERY | Age: 79
End: 2024-10-23

## 2024-10-23 VITALS — BODY MASS INDEX: 28.34 KG/M2 | WEIGHT: 154 LBS | HEIGHT: 62 IN

## 2024-10-23 DIAGNOSIS — M70.61 GREATER TROCHANTERIC BURSITIS OF RIGHT HIP: ICD-10-CM

## 2024-10-23 DIAGNOSIS — M54.50 LUMBAR BACK PAIN: Primary | ICD-10-CM

## 2024-10-23 RX ORDER — METHYLPREDNISOLONE ACETATE 40 MG/ML
40 INJECTION, SUSPENSION INTRA-ARTICULAR; INTRALESIONAL; INTRAMUSCULAR; SOFT TISSUE ONCE
Status: COMPLETED | OUTPATIENT
Start: 2024-10-23 | End: 2024-10-23

## 2024-10-23 RX ADMIN — METHYLPREDNISOLONE ACETATE 40 MG: 40 INJECTION, SUSPENSION INTRA-ARTICULAR; INTRALESIONAL; INTRAMUSCULAR; SOFT TISSUE at 10:32

## 2024-10-23 NOTE — PROGRESS NOTES
Name: Shalini Rush  YOB: 1945  Gender: female  MRN: [id number]  Age: 79 y.o.    Chief Complaint:   Chief Complaint   Patient presents with    New Patient     Low back pain into right hip          History of Present Illness:      This is a very pleasant 79 y.o. female who presents with a 2-week history of severe right lower back, and right hip pain.  The pain is posterior buttock and radiates down the lateral aspect of the right leg.  She thinks she may have seen someone in the past for this, perhaps greater than 5 years ago.  She was prescribed NSAIDs, oxycodone and a muscle relaxer in the emergency department.  She states the muscle relaxers kept her awake at night and she also has had trouble sleeping on her right side.  She has a breast cancer survivor.          Social History:  Social History     Tobacco Use    Smoking status: Former     Current packs/day: 0.00     Types: Cigarettes     Quit date: 2001     Years since quittin.2     Passive exposure: Past    Smokeless tobacco: Never   Substance Use Topics    Alcohol use: No        What is your occupation? retired    Are you disabled? no    Who do you live with? Alone    Who referred you here?   Rox Chavez APRN - *      Medications:  Prior to Visit Medications    Medication Sig Taking? Authorizing Provider   naproxen (NAPROSYN) 500 MG tablet Take 1 tablet by mouth 2 times daily (with meals)  Evelia Waller APRN - CNP   methocarbamol (ROBAXIN) 500 MG tablet Take 1 tablet by mouth 3 times daily as needed (pain)  Evelia Waller APRN - CNP   lidocaine 4 % external patch Place 1 patch onto the skin daily  Festus Howell Jr., MD   clotrimazole-betamethasone (LOTRISONE) 1-0.05 % cream Apply topically 2 times daily.  Nikolas Dunham MD   atorvastatin (LIPITOR) 40 MG tablet Take 1 tablet by mouth at bedtime  Nikolas Dunham MD   levothyroxine (SYNTHROID) 75 MCG tablet Take 1 tablet by mouth Daily  Nikolas Dunham

## 2024-10-23 NOTE — TELEPHONE ENCOUNTER
LUMBAR SPINE APPROVAL     Status   Current Status: Approved   Validity Period: 10/23/2024 - 1/21/2025   Authorization: [id number] (Lumbar Spine MRI)   Patient   Name: JO TOWNSEND   Subscriber ID: [id number]   YOB: 1945   Gender: Female   Physician   Name: ANDREW LAM   Provider ID: [id number]

## 2024-10-29 ENCOUNTER — OFFICE VISIT (OUTPATIENT)
Dept: BEHAVIORAL/MENTAL HEALTH CLINIC | Age: 79
End: 2024-10-29
Payer: MEDICARE

## 2024-10-29 DIAGNOSIS — F43.22 ADJUSTMENT DISORDER WITH ANXIETY: ICD-10-CM

## 2024-10-29 DIAGNOSIS — F33.0 MAJOR DEPRESSIVE DISORDER, RECURRENT EPISODE, MILD (HCC): Primary | ICD-10-CM

## 2024-10-29 PROCEDURE — 90832 PSYTX W PT 30 MINUTES: CPT | Performed by: SOCIAL WORKER

## 2024-10-29 PROCEDURE — 1123F ACP DISCUSS/DSCN MKR DOCD: CPT | Performed by: SOCIAL WORKER

## 2024-11-04 ENCOUNTER — OFFICE VISIT (OUTPATIENT)
Age: 79
End: 2024-11-04
Payer: MEDICARE

## 2024-11-04 DIAGNOSIS — M70.61 GREATER TROCHANTERIC BURSITIS OF RIGHT HIP: Primary | ICD-10-CM

## 2024-11-04 DIAGNOSIS — M54.50 LUMBAR BACK PAIN: ICD-10-CM

## 2024-11-04 PROCEDURE — 99214 OFFICE O/P EST MOD 30 MIN: CPT | Performed by: ORTHOPAEDIC SURGERY

## 2024-11-04 PROCEDURE — 1159F MED LIST DOCD IN RCRD: CPT | Performed by: ORTHOPAEDIC SURGERY

## 2024-11-04 PROCEDURE — 1123F ACP DISCUSS/DSCN MKR DOCD: CPT | Performed by: ORTHOPAEDIC SURGERY

## 2024-11-04 RX ORDER — MELOXICAM 7.5 MG/1
7.5 TABLET ORAL DAILY
Qty: 30 TABLET | Refills: 2 | Status: SHIPPED | OUTPATIENT
Start: 2024-11-04

## 2024-11-04 NOTE — PROGRESS NOTES
Name: Shalini Rush  YOB: 1945  Gender: female  MRN: [id number]  Age: 79 y.o.    Chief Complaint: Right lower back, right hip, right posterior buttock pain    Assessment/Plan at Previous Visit: Right trochanteric bursa injection, MRI lumbar spine    History of Present Illness:      Shalini Rush  is here for MRI results follow up.  She reports that the trochanteric bursa injection provided about 50% relief.  She states that she is sleeping much better and moving around much better.  She still has some pain that radiates down the front of the right thigh.         Medications:       Current Outpatient Medications:     meloxicam (MOBIC) 7.5 MG tablet, Take 1 tablet by mouth daily, Disp: 30 tablet, Rfl: 2    lidocaine 4 % external patch, Place 1 patch onto the skin daily, Disp: 30 patch, Rfl: 0    clotrimazole-betamethasone (LOTRISONE) 1-0.05 % cream, Apply topically 2 times daily., Disp: 45 g, Rfl: 0    atorvastatin (LIPITOR) 40 MG tablet, Take 1 tablet by mouth at bedtime, Disp: 90 tablet, Rfl: 3    levothyroxine (SYNTHROID) 75 MCG tablet, Take 1 tablet by mouth Daily, Disp: 90 tablet, Rfl: 3    letrozole (FEMARA) 2.5 MG tablet, Take 1 tablet by mouth daily, Disp: 90 tablet, Rfl: 3    atenolol-chlorthalidone (TENORETIC) 50-25 MG per tablet, Take 0.5 tablets by mouth daily, Disp: 90 tablet, Rfl: 3    blood glucose test strips (ONETOUCH ULTRA) strip, 1 each by In Vitro route daily As needed., Disp: 100 each, Rfl: 3    irbesartan (AVAPRO) 150 MG tablet, Take 1 tablet by mouth at bedtime, Disp: 90 tablet, Rfl: 3    hydrocortisone (ANUSOL-HC) 2.5 % CREA rectal cream, Insert into rectum & apply externally 3-4 times/day prn hemorrhoid, Disp: 28 g, Rfl: 0    diclofenac sodium (VOLTAREN) 1 % GEL, , Disp: , Rfl:     fluticasone (FLONASE) 50 MCG/ACT nasal spray, , Disp: , Rfl:     acetaminophen (TYLENOL) 500 MG tablet, Take 2 tablets by mouth every 6 hours as needed for Pain, Disp: , Rfl:     magnesium oxide

## 2024-11-04 NOTE — PROGRESS NOTES
Length of meeting; 36 minutes    Start Time: 1000    Stop Time: 1036    Diagnosis: Major depressive disorder, recurrent, mild.  Adjustment disorder with anxiety.    Treatment Plan:  Will focus on relaxation skills.  Will focus on different activities that she can engage in.  AWARE process for panic attacks.    Patient Prognosis: Fair.    Patient Progress: The patient indicates \"I'm a lot better than I was.\" She notes that she is in PT for \"my spine\".  She notes that she is making some changes.  She wanted to talk about boundary setting with one of her sons who is in [facility].  This is discussed.  She found this helpful.        Mental Status exam: PHQ-9 is a 6. No current active SI or HI, and continues to contract for safety.  Thought processes appear normal.  Speech is goal directed.  The patient does not appear to be responding to internal stimuli.      Plan: I will see this patient again in about 2 weeks.  PORFIRIO Rose

## 2024-11-06 ENCOUNTER — HOSPITAL ENCOUNTER (OUTPATIENT)
Dept: PHYSICAL THERAPY | Age: 79
Setting detail: RECURRING SERIES
Discharge: HOME | End: 2024-11-09
Payer: MEDICARE

## 2024-11-06 DIAGNOSIS — M54.51 VERTEBROGENIC LOW BACK PAIN: Primary | ICD-10-CM

## 2024-11-06 DIAGNOSIS — M25.551 PAIN IN RIGHT HIP: ICD-10-CM

## 2024-11-06 PROCEDURE — 97162 PT EVAL MOD COMPLEX 30 MIN: CPT

## 2024-11-06 ASSESSMENT — PAIN SCALES - GENERAL: PAINLEVEL_OUTOF10: 6

## 2024-11-06 NOTE — PROGRESS NOTES
Shalini Rush  : 1945  Primary: [employer] Medicare Hmo/p* (Medicare Managed)  Secondary: MEDICAID [facility] @ [address]  [facility] [address]  Phone: [phone]  Fax: [phone] Plan Frequency: 2 times a week for 10 weeks  Plan of Care/Certification Expiration Date: 01/15/25        Plan of Care/Certification Expiration Date:  Plan of Care/Certification Expiration Date: 01/15/25    Frequency/Duration: Plan Frequency: 2 times a week for 10 weeks      Time In/Out:   Time In: 1300  Time Out: 1345      PT Visit Info:    Progress Note Due Date: 24  Progress Note Counter: 1      Visit Count:  1    OUTPATIENT PHYSICAL THERAPY:   Treatment Note 2024       Episode  (right back and hip pain)               Treatment Diagnosis:    Vertebrogenic low back pain  Pain in right hip  Medical/Referring Diagnosis:    Primary osteoarthritis of right hip  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-sided sciatica       Referring Physician:  Rox Chavez APRN - CNP MD Orders:  PT Eval and Treat   Return MD Appt:  24   Date of Onset:  Onset Date: 23 (about 2 months per patient)     Allergies:   Milk protein  Restrictions/Precautions:     Restrictions/Precautions: None     Interventions Planned (Treatment may consist of any combination of the following):  Current Treatment Recommendations: Strengthening; ROM; Balance training; Functional mobility training; Neuromuscular re-education; Manual; Gait training; Stair training; Pain management; Return to work related activity; Home exercise program; Modalities; Positioning    See Assessment Note    Subjective Comments:   See eval  Initial Pain Level::     6/10  Post Session Pain Level:        /10  Medications Last Reviewed:  2024  Updated Objective Findings:  See Evaluation Note from today  Treatment   THERAPEUTIC EXERCISE: (5 minutes):  Reviewed and issued HEP: bridging, hooklying hip ER against

## 2024-11-06 NOTE — THERAPY EVALUATION
Shalini Rush  : 1945  Primary: [employer] Medicare Hmo/p* (Medicare Managed)  Secondary: MEDICAID [facility] @ [address]  [facility] [address]  Phone: [phone]  Fax: [phone] Plan Frequency: 2 times a week for 10 weeks    Plan of Care/Certification Expiration Date: 01/15/25        Plan of Care/Certification Expiration Date:  Plan of Care/Certification Expiration Date: 01/15/25    Frequency/Duration: Plan Frequency: 2 times a week for 10 weeks      Time In/Out:   Time In: 1300  Time Out: 1345      PT Visit Info:    Progress Note Due Date: 24  Progress Note Counter: 1      Visit Count:  1                OUTPATIENT PHYSICAL THERAPY:             Initial Assessment 2024               Episode (right back and hip pain)         Treatment Diagnosis:     Vertebrogenic low back pain  Pain in right hip  Medical/Referring Diagnosis:    Primary osteoarthritis of right hip  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-sided sciatica     Referring Physician:  Rox Chavez APRN - CNP MD Orders:  PT Eval and Treat   Return MD Appt:  24  Date of Onset:    3 weeks ago  Allergies:  Milk protein  Restrictions/Precautions:      Restrictions/Precautions: None     Medications Last Reviewed:  2024     SUBJECTIVE   History of Injury/Illness (Reason for Referral):  Pt reports having right hip and back pain that has progressed to going down the front of the right thigh. She states the medication didn't help so she had an injection in the right hip bursa with no long term relief. Now having numbness in the right thigh that makes her feel unbalanced. Not able to sleep     Patient Stated Goal(s):  \"Be able to sleep and care for her home chores\"  Initial Pain Level:   8-   6/10   Post Session Pain Level:     4 /10  Past Medical History/Comorbidities:   Ms. Rush  has a past medical history of Abdominal pain, Arthritis, Breast cancer (HCC), Breast

## 2024-11-12 ENCOUNTER — HOSPITAL ENCOUNTER (OUTPATIENT)
Dept: PHYSICAL THERAPY | Age: 79
Setting detail: RECURRING SERIES
Discharge: HOME | End: 2024-11-15
Payer: MEDICARE

## 2024-11-12 PROCEDURE — 97110 THERAPEUTIC EXERCISES: CPT

## 2024-11-12 ASSESSMENT — PAIN SCALES - GENERAL: PAINLEVEL_OUTOF10: 3

## 2024-11-12 NOTE — PROGRESS NOTES
Shalini Rush  : 1945  Primary: [employer] Medicare Hmo/p* (Medicare Managed)  Secondary: MEDICAID [facility] @ [address]  [facility] [address]  Phone: [phone]  Fax: [phone] Plan Frequency: 2 times a week for 10 weeks  Plan of Care/Certification Expiration Date: 01/15/25        Plan of Care/Certification Expiration Date:  Plan of Care/Certification Expiration Date: 01/15/25    Frequency/Duration: Plan Frequency: 2 times a week for 10 weeks      Time In/Out:   Time In: 1030  Time Out: 1115      PT Visit Info:    Progress Note Due Date: 24  Progress Note Counter: 1      Visit Count:  2    OUTPATIENT PHYSICAL THERAPY:   Treatment Note 2024       Episode  (right back and hip pain)               Treatment Diagnosis:    Vertebrogenic low back pain  Pain in right hip  Medical/Referring Diagnosis:    Primary osteoarthritis of right hip  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-sided sciatica       Referring Physician:  Rox Chavez APRN - CNP MD Orders:  PT Eval and Treat   Return MD Appt:  24   Date of Onset:  Onset Date: 23 (about 2 months per patient)     Allergies:   Milk protein  Restrictions/Precautions:     Restrictions/Precautions: None     Interventions Planned (Treatment may consist of any combination of the following):  Current Treatment Recommendations: Strengthening; ROM; Balance training; Functional mobility training; Neuromuscular re-education; Manual; Gait training; Stair training; Pain management; Return to work related activity; Home exercise program; Modalities; Positioning        Subjective Comments: Pt reports doing the exercises and they help it feel better    Initial Pain Level::     3/10    Post Session Pain Level:        0/10    Medications Last Reviewed:  2024  Updated Objective Findings:       Pelvic obliquity     Tender to palpate over B psoas with right more so  Treatment   THERAPEUTIC

## 2024-11-18 ENCOUNTER — HOSPITAL ENCOUNTER (OUTPATIENT)
Dept: PHYSICAL THERAPY | Age: 79
Setting detail: RECURRING SERIES
Discharge: HOME | End: 2024-11-21
Payer: MEDICARE

## 2024-11-18 PROCEDURE — 97110 THERAPEUTIC EXERCISES: CPT

## 2024-11-18 ASSESSMENT — PAIN SCALES - GENERAL: PAINLEVEL_OUTOF10: 2

## 2024-11-18 NOTE — PROGRESS NOTES
[facility] DEP   2/11/2025 11:40 AM Nikolas Dunham MD [facility] DEP   7/16/2025  7:30 AM PERIPHERAL [facility]   7/16/2025  8:30 AM Connor Enciso MD [facility]VL AMB

## 2024-11-19 ENCOUNTER — OFFICE VISIT (OUTPATIENT)
Dept: BEHAVIORAL/MENTAL HEALTH CLINIC | Age: 79
End: 2024-11-19
Payer: MEDICARE

## 2024-11-19 DIAGNOSIS — F33.0 MAJOR DEPRESSIVE DISORDER, RECURRENT EPISODE, MILD (HCC): Primary | ICD-10-CM

## 2024-11-19 DIAGNOSIS — F43.22 ADJUSTMENT DISORDER WITH ANXIETY: ICD-10-CM

## 2024-11-19 PROCEDURE — 1123F ACP DISCUSS/DSCN MKR DOCD: CPT | Performed by: SOCIAL WORKER

## 2024-11-19 PROCEDURE — 90832 PSYTX W PT 30 MINUTES: CPT | Performed by: SOCIAL WORKER

## 2024-11-19 ASSESSMENT — PATIENT HEALTH QUESTIONNAIRE - PHQ9
7. TROUBLE CONCENTRATING ON THINGS, SUCH AS READING THE NEWSPAPER OR WATCHING TELEVISION: SEVERAL DAYS
SUM OF ALL RESPONSES TO PHQ QUESTIONS 1-9: 6
2. FEELING DOWN, DEPRESSED OR HOPELESS: NOT AT ALL
SUM OF ALL RESPONSES TO PHQ QUESTIONS 1-9: 6
SUM OF ALL RESPONSES TO PHQ QUESTIONS 1-9: 6
3. TROUBLE FALLING OR STAYING ASLEEP: SEVERAL DAYS
1. LITTLE INTEREST OR PLEASURE IN DOING THINGS: NOT AT ALL
8. MOVING OR SPEAKING SO SLOWLY THAT OTHER PEOPLE COULD HAVE NOTICED. OR THE OPPOSITE, BEING SO FIGETY OR RESTLESS THAT YOU HAVE BEEN MOVING AROUND A LOT MORE THAN USUAL: MORE THAN HALF THE DAYS
10. IF YOU CHECKED OFF ANY PROBLEMS, HOW DIFFICULT HAVE THESE PROBLEMS MADE IT FOR YOU TO DO YOUR WORK, TAKE CARE OF THINGS AT HOME, OR GET ALONG WITH OTHER PEOPLE: SOMEWHAT DIFFICULT
5. POOR APPETITE OR OVEREATING: NOT AT ALL
SUM OF ALL RESPONSES TO PHQ QUESTIONS 1-9: 6
9. THOUGHTS THAT YOU WOULD BE BETTER OFF DEAD, OR OF HURTING YOURSELF: NOT AT ALL
SUM OF ALL RESPONSES TO PHQ9 QUESTIONS 1 & 2: 0
6. FEELING BAD ABOUT YOURSELF - OR THAT YOU ARE A FAILURE OR HAVE LET YOURSELF OR YOUR FAMILY DOWN: NOT AT ALL
4. FEELING TIRED OR HAVING LITTLE ENERGY: MORE THAN HALF THE DAYS

## 2024-11-20 NOTE — PROGRESS NOTES
Length of meeting; 48 minutes    Start Time: 1250    Stop Time: 9214    Diagnosis: Major depressive disorder, recurrent, mild.  Adjustment disorder with anxiety.    Treatment Plan:  Will focus on relaxation skills.  Will focus on different activities that she can engage in.  AWARE process for panic attacks.    Patient Prognosis: Fair.    Patient Progress: The patient indicates \"I feel like I am making progress.\"    She notes she believes she has made progress in setting boundaries with her son, primarily around money.  She notes she is \"settled\" with her choices in that regard. She discussed several conflictual situations with her sons.  Support given for boundary setting with her sons.      Mental Status exam: PHQ-2 is 0.  PHQ-9 is a 5. No current active SI or HI, and continues to contract for safety.  Thought processes appear normal.  Speech is goal directed.  The patient does not appear to be responding to internal stimuli.      Plan: I will see this patient again in about 4-5 weeks.  PORFIRIO Rose

## 2024-11-25 ENCOUNTER — APPOINTMENT (OUTPATIENT)
Dept: PHYSICAL THERAPY | Age: 79
End: 2024-11-25
Payer: MEDICARE

## 2024-11-29 ENCOUNTER — HOSPITAL ENCOUNTER (OUTPATIENT)
Dept: MAMMOGRAPHY | Age: 79
Discharge: HOME | End: 2024-12-02
Attending: INTERNAL MEDICINE
Payer: MEDICARE

## 2024-11-29 VITALS — BODY MASS INDEX: 26.43 KG/M2 | HEIGHT: 64 IN

## 2024-11-29 DIAGNOSIS — Z79.811 USE OF AROMATASE INHIBITORS: ICD-10-CM

## 2024-11-29 DIAGNOSIS — C50.911 MALIGNANT NEOPLASM OF RIGHT BREAST IN FEMALE, ESTROGEN RECEPTOR POSITIVE, UNSPECIFIED SITE OF BREAST (HCC): ICD-10-CM

## 2024-11-29 DIAGNOSIS — Z17.0 MALIGNANT NEOPLASM OF RIGHT BREAST IN FEMALE, ESTROGEN RECEPTOR POSITIVE, UNSPECIFIED SITE OF BREAST (HCC): ICD-10-CM

## 2024-11-29 DIAGNOSIS — Z12.31 ENCOUNTER FOR SCREENING MAMMOGRAM FOR BREAST CANCER: ICD-10-CM

## 2024-11-29 PROCEDURE — 77080 DXA BONE DENSITY AXIAL: CPT

## 2024-11-29 PROCEDURE — 77063 BREAST TOMOSYNTHESIS BI: CPT

## 2024-12-02 ENCOUNTER — HOSPITAL ENCOUNTER (OUTPATIENT)
Dept: PHYSICAL THERAPY | Age: 79
Setting detail: RECURRING SERIES
Discharge: HOME | End: 2024-12-05
Payer: MEDICARE

## 2024-12-02 PROCEDURE — 97110 THERAPEUTIC EXERCISES: CPT

## 2024-12-02 ASSESSMENT — PAIN SCALES - GENERAL: PAINLEVEL_OUTOF10: 3

## 2024-12-02 NOTE — PROGRESS NOTES
Shalini Rush  : 1945  Primary: [employer] Medicare Hmo/p* (Medicare Managed)  Secondary: MEDICAID [facility] @ [address]  [facility] [address]  Phone: [phone]  Fax: [phone] Plan Frequency: 2 times a week for 10 weeks  Plan of Care/Certification Expiration Date: 01/15/25        Plan of Care/Certification Expiration Date:  Plan of Care/Certification Expiration Date: 01/15/25    Frequency/Duration: Plan Frequency: 2 times a week for 10 weeks      Time In/Out:   Time In: 1115  Time Out: 1200      PT Visit Info:    Progress Note Due Date: 24  Progress Note Counter: 1      Visit Count:  4    OUTPATIENT PHYSICAL THERAPY:   Treatment Note 2024       Episode  (right back and hip pain)               Treatment Diagnosis:    Vertebrogenic low back pain  Pain in right hip  Medical/Referring Diagnosis:    Primary osteoarthritis of right hip  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-sided sciatica       Referring Physician:  Rox Chavez APRN - CNP MD Orders:  PT Eval and Treat   Return MD Appt:  24   Date of Onset:  Onset Date: 23 (about 2 months per patient)     Allergies:   Milk protein  Restrictions/Precautions:     Restrictions/Precautions: None     Interventions Planned (Treatment may consist of any combination of the following):  Current Treatment Recommendations: Strengthening; ROM; Balance training; Functional mobility training; Neuromuscular re-education; Manual; Gait training; Stair training; Pain management; Return to work related activity; Home exercise program; Modalities; Positioning        Subjective Comments: Pt reports feeling the exercises help. The right thigh is feeling funny.    Initial Pain Level::     3/10- right thigh and all over    Post Session Pain Level:        1/10    Medications Last Reviewed:  2024  Updated Objective Findings:    Passive Right hip ER 15 deg       Treatment   THERAPEUTIC

## 2024-12-04 ENCOUNTER — OFFICE VISIT (OUTPATIENT)
Dept: BEHAVIORAL/MENTAL HEALTH CLINIC | Age: 79
End: 2024-12-04
Payer: MEDICARE

## 2024-12-04 DIAGNOSIS — F43.22 ADJUSTMENT DISORDER WITH ANXIETY: ICD-10-CM

## 2024-12-04 DIAGNOSIS — F33.0 MAJOR DEPRESSIVE DISORDER, RECURRENT EPISODE, MILD (HCC): Primary | ICD-10-CM

## 2024-12-04 PROCEDURE — 1123F ACP DISCUSS/DSCN MKR DOCD: CPT | Performed by: SOCIAL WORKER

## 2024-12-04 PROCEDURE — 90834 PSYTX W PT 45 MINUTES: CPT | Performed by: SOCIAL WORKER

## 2024-12-04 ASSESSMENT — PATIENT HEALTH QUESTIONNAIRE - PHQ9
SUM OF ALL RESPONSES TO PHQ QUESTIONS 1-9: 5
8. MOVING OR SPEAKING SO SLOWLY THAT OTHER PEOPLE COULD HAVE NOTICED. OR THE OPPOSITE, BEING SO FIGETY OR RESTLESS THAT YOU HAVE BEEN MOVING AROUND A LOT MORE THAN USUAL: SEVERAL DAYS
7. TROUBLE CONCENTRATING ON THINGS, SUCH AS READING THE NEWSPAPER OR WATCHING TELEVISION: SEVERAL DAYS
SUM OF ALL RESPONSES TO PHQ QUESTIONS 1-9: 5
SUM OF ALL RESPONSES TO PHQ QUESTIONS 1-9: 5
4. FEELING TIRED OR HAVING LITTLE ENERGY: SEVERAL DAYS
6. FEELING BAD ABOUT YOURSELF - OR THAT YOU ARE A FAILURE OR HAVE LET YOURSELF OR YOUR FAMILY DOWN: NOT AT ALL
SUM OF ALL RESPONSES TO PHQ QUESTIONS 1-9: 5
5. POOR APPETITE OR OVEREATING: NOT AT ALL
2. FEELING DOWN, DEPRESSED OR HOPELESS: NOT AT ALL
3. TROUBLE FALLING OR STAYING ASLEEP: MORE THAN HALF THE DAYS
10. IF YOU CHECKED OFF ANY PROBLEMS, HOW DIFFICULT HAVE THESE PROBLEMS MADE IT FOR YOU TO DO YOUR WORK, TAKE CARE OF THINGS AT HOME, OR GET ALONG WITH OTHER PEOPLE: SOMEWHAT DIFFICULT
9. THOUGHTS THAT YOU WOULD BE BETTER OFF DEAD, OR OF HURTING YOURSELF: NOT AT ALL
SUM OF ALL RESPONSES TO PHQ9 QUESTIONS 1 & 2: 0
1. LITTLE INTEREST OR PLEASURE IN DOING THINGS: NOT AT ALL

## 2024-12-11 ENCOUNTER — HOSPITAL ENCOUNTER (OUTPATIENT)
Dept: PHYSICAL THERAPY | Age: 79
Setting detail: RECURRING SERIES
Discharge: HOME | End: 2024-12-14
Payer: MEDICARE

## 2024-12-11 PROCEDURE — 97110 THERAPEUTIC EXERCISES: CPT

## 2024-12-11 ASSESSMENT — PAIN SCALES - GENERAL: PAINLEVEL_OUTOF10: 1

## 2024-12-11 NOTE — PROGRESS NOTES
Shalini Rush  : 1945  Primary: [employer] [place] Hmo/p* (Medicare Managed)  Secondary: MEDICAID [facility] @ [address]  [facility] [address]  Phone: [phone]  Fax: [phone] Plan Frequency: 2 times a week for 10 weeks  Plan of Care/Certification Expiration Date: 01/15/25        Plan of Care/Certification Expiration Date:  Plan of Care/Certification Expiration Date: 01/15/25    Frequency/Duration: Plan Frequency: 2 times a week for 10 weeks      Time In/Out:   Time In: 1345  Time Out: 1430      PT Visit Info:    Progress Note Due Date: 24  Progress Note Counter: 1      Visit Count:  5    OUTPATIENT PHYSICAL THERAPY:   Treatment Note 2024       Episode  (right back and hip pain)               Treatment Diagnosis:    Vertebrogenic low back pain  Pain in right hip  Medical/Referring Diagnosis:    Primary osteoarthritis of right hip  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-sided sciatica       Referring Physician:  Rox Chavez APRN - CNP MD Orders:  PT Eval and Treat   Return MD Appt:  24   Date of Onset:  Onset Date: 23 (about 2 months per patient)     Allergies:   Milk protein  Restrictions/Precautions:     Restrictions/Precautions: None     Interventions Planned (Treatment may consist of any combination of the following):  Current Treatment Recommendations: Strengthening; ROM; Balance training; Functional mobility training; Neuromuscular re-education; Manual; Gait training; Stair training; Pain management; Return to work related activity; Home exercise program; Modalities; Positioning        Subjective Comments: Pt reports feeling the exercises are going ok. There is less sensitivity over the right thigh    Initial Pain Level::     10    Post Session Pain Level:        10    Medications Last Reviewed:  2024  Updated Objective Findings:    Posture flexed at the waist       Treatment   THERAPEUTIC

## 2024-12-13 ENCOUNTER — TELEPHONE (OUTPATIENT)
Dept: INTERNAL MEDICINE CLINIC | Facility: CLINIC | Age: 79
End: 2024-12-13

## 2024-12-13 DIAGNOSIS — R80.9 CONTROLLED TYPE 2 DIABETES MELLITUS WITH MICROALBUMINURIA, WITHOUT LONG-TERM CURRENT USE OF INSULIN (HCC): ICD-10-CM

## 2024-12-13 DIAGNOSIS — E11.29 CONTROLLED TYPE 2 DIABETES MELLITUS WITH MICROALBUMINURIA, WITHOUT LONG-TERM CURRENT USE OF INSULIN (HCC): ICD-10-CM

## 2024-12-13 RX ORDER — BLOOD SUGAR DIAGNOSTIC
STRIP MISCELLANEOUS
Qty: 100 EACH | Refills: 3 | Status: SHIPPED | OUTPATIENT
Start: 2024-12-13

## 2024-12-13 NOTE — TELEPHONE ENCOUNTER
Medication Refill Request    Name of Medication : OneTouch Ultra Test Strips    Strength of Medication:     Directions:     30 day or 90 day supply:     Preferred Pharmacy: [facility] Appt. Date: 10/21/24    Next Appt. Date: 2/11/25    Additional Information For Provider:

## 2024-12-18 ENCOUNTER — HOSPITAL ENCOUNTER (OUTPATIENT)
Dept: PHYSICAL THERAPY | Age: 79
Setting detail: RECURRING SERIES
Discharge: HOME | End: 2024-12-21
Payer: MEDICARE

## 2024-12-18 PROCEDURE — 97110 THERAPEUTIC EXERCISES: CPT

## 2024-12-18 ASSESSMENT — PAIN SCALES - GENERAL: PAINLEVEL_OUTOF10: 1

## 2024-12-18 NOTE — PROGRESS NOTES
Shalini Rush  : 1945  Primary: [employer] Hmo/p* (Medicare Managed)  Secondary: MEDICAID [facility] @ [address]  [facility] [address]  Phone: [phone]  Fax: [phone] Plan Frequency: 2 times a week for 10 weeks  Plan of Care/Certification Expiration Date: 01/15/25        Plan of Care/Certification Expiration Date:  Plan of Care/Certification Expiration Date: 01/15/25    Frequency/Duration: Plan Frequency: 2 times a week for 10 weeks      Time In/Out:   Time In: 1030  Time Out: 1115      PT Visit Info:    Progress Note Due Date: 24  Progress Note Counter: 1      Visit Count:  6    OUTPATIENT PHYSICAL THERAPY:   Treatment Note 2024       Episode  (right back and hip pain)               Treatment Diagnosis:    Vertebrogenic low back pain  Pain in right hip  Medical/Referring Diagnosis:    Primary osteoarthritis of right hip  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-sided sciatica       Referring Physician:  Rox Chavez APRN - CNP MD Orders:  PT Eval and Treat   Return MD Appt:  24   Date of Onset:  Onset Date: 23 (about 2 months per patient)     Allergies:   Milk protein  Restrictions/Precautions:     Restrictions/Precautions: None     Interventions Planned (Treatment may consist of any combination of the following):  Current Treatment Recommendations: Strengthening; ROM; Balance training; Functional mobility training; Neuromuscular re-education; Manual; Gait training; Stair training; Pain management; Return to work related activity; Home exercise program; Modalities; Positioning        Subjective Comments: Pt reports having a good week with less right thigh pain    Initial Pain Level::     1/10    Post Session Pain Level:       0 /10    Medications Last Reviewed:  2024  Updated Objective Findings:           Treatment   THERAPEUTIC EXERCISE: ( 40  minutes):   AIS B hip flexors and ER in prone. Exercises

## 2024-12-30 ENCOUNTER — OFFICE VISIT (OUTPATIENT)
Age: 79
End: 2024-12-30
Payer: MEDICARE

## 2024-12-30 DIAGNOSIS — M79.18 RIGHT BUTTOCK PAIN: ICD-10-CM

## 2024-12-30 DIAGNOSIS — M54.50 LUMBAR BACK PAIN: Primary | ICD-10-CM

## 2024-12-30 PROCEDURE — 1123F ACP DISCUSS/DSCN MKR DOCD: CPT | Performed by: ORTHOPAEDIC SURGERY

## 2024-12-30 PROCEDURE — 1159F MED LIST DOCD IN RCRD: CPT | Performed by: ORTHOPAEDIC SURGERY

## 2024-12-30 PROCEDURE — 99214 OFFICE O/P EST MOD 30 MIN: CPT | Performed by: ORTHOPAEDIC SURGERY

## 2024-12-30 NOTE — PROGRESS NOTES
Name: Shalini Rush  YOB: 1945  Gender: female  MRN: [id number]  Age: 79 y.o.    Chief Complaint: Right hip and posterior buttock pain    Assessment/Plan at Previous Visit: Prescription strength anti-inflammatories, continued conservative management    History of Present Illness:      Shalini Rush  is here for routine follow-up.  She has had trochanteric bursa injections in the past and I last saw her on 11/4/24.  At that time she had good pain relief from a right trochanteric bursa performed by myself on 10/24/2024.  Since the last time we saw her she continues to do well.  Her right hip and buttock pain is tolerable with physical therapy.  She continues to exercise as tolerated.         Medications:       Current Outpatient Medications:     blood glucose test strips (ONETOUCH ULTRA) strip, Check fasting BS once daily. Dx: E11.29, Disp: 100 each, Rfl: 3    meloxicam (MOBIC) 7.5 MG tablet, Take 1 tablet by mouth daily, Disp: 30 tablet, Rfl: 2    clotrimazole-betamethasone (LOTRISONE) 1-0.05 % cream, Apply topically 2 times daily., Disp: 45 g, Rfl: 0    atorvastatin (LIPITOR) 40 MG tablet, Take 1 tablet by mouth at bedtime, Disp: 90 tablet, Rfl: 3    levothyroxine (SYNTHROID) 75 MCG tablet, Take 1 tablet by mouth Daily, Disp: 90 tablet, Rfl: 3    letrozole (FEMARA) 2.5 MG tablet, Take 1 tablet by mouth daily, Disp: 90 tablet, Rfl: 3    atenolol-chlorthalidone (TENORETIC) 50-25 MG per tablet, Take 0.5 tablets by mouth daily, Disp: 90 tablet, Rfl: 3    irbesartan (AVAPRO) 150 MG tablet, Take 1 tablet by mouth at bedtime, Disp: 90 tablet, Rfl: 3    hydrocortisone (ANUSOL-HC) 2.5 % CREA rectal cream, Insert into rectum & apply externally 3-4 times/day prn hemorrhoid, Disp: 28 g, Rfl: 0    diclofenac sodium (VOLTAREN) 1 % GEL, , Disp: , Rfl:     fluticasone (FLONASE) 50 MCG/ACT nasal spray, , Disp: , Rfl:     acetaminophen (TYLENOL) 500 MG tablet, Take 2 tablets by mouth every 6 hours as needed for Pain,

## 2025-01-06 NOTE — PROGRESS NOTES
Length of meeting; 31   minutes    Start Time: 1400    Stop Time: 1431    Diagnosis: Major depressive disorder, recurrent, mild.  Adjustment disorder with anxiety.    Treatment Plan:  Will focus on relaxation skills.  Will focus on different activities that she can engage in.  AWARE process for panic attacks.    Patient Prognosis: Fair.    Patient Progress: The patient indicates that she is doing pretty well.  She notes that she continues to draw boundaries with her sons.  She finds this helpful.  Several situations were discussed.  She talked about some of stressors with drawing those boundaries.         Mental Status exam: PHQ-9 is a 5. SONIDO-7 is a 6. No current active SI or HI, and continues to contract for safety.  Thought processes appear normal.  Speech is goal directed.  The patient does not appear to be responding to internal stimuli.      Plan: I will see this patient again in about 2 weeks.  PORFIRIO Rose

## 2025-01-07 ENCOUNTER — OFFICE VISIT (OUTPATIENT)
Dept: BEHAVIORAL/MENTAL HEALTH CLINIC | Age: 80
End: 2025-01-07
Payer: MEDICARE

## 2025-01-07 DIAGNOSIS — F43.22 ADJUSTMENT DISORDER WITH ANXIETY: ICD-10-CM

## 2025-01-07 DIAGNOSIS — F33.0 MAJOR DEPRESSIVE DISORDER, RECURRENT EPISODE, MILD (HCC): Primary | ICD-10-CM

## 2025-01-07 PROCEDURE — 90832 PSYTX W PT 30 MINUTES: CPT | Performed by: SOCIAL WORKER

## 2025-01-07 PROCEDURE — 1123F ACP DISCUSS/DSCN MKR DOCD: CPT | Performed by: SOCIAL WORKER

## 2025-01-07 ASSESSMENT — PATIENT HEALTH QUESTIONNAIRE - PHQ9
8. MOVING OR SPEAKING SO SLOWLY THAT OTHER PEOPLE COULD HAVE NOTICED. OR THE OPPOSITE, BEING SO FIGETY OR RESTLESS THAT YOU HAVE BEEN MOVING AROUND A LOT MORE THAN USUAL: NOT AT ALL
SUM OF ALL RESPONSES TO PHQ QUESTIONS 1-9: 5
4. FEELING TIRED OR HAVING LITTLE ENERGY: NOT AT ALL
10. IF YOU CHECKED OFF ANY PROBLEMS, HOW DIFFICULT HAVE THESE PROBLEMS MADE IT FOR YOU TO DO YOUR WORK, TAKE CARE OF THINGS AT HOME, OR GET ALONG WITH OTHER PEOPLE: SOMEWHAT DIFFICULT
SUM OF ALL RESPONSES TO PHQ QUESTIONS 1-9: 5
SUM OF ALL RESPONSES TO PHQ9 QUESTIONS 1 & 2: 1
7. TROUBLE CONCENTRATING ON THINGS, SUCH AS READING THE NEWSPAPER OR WATCHING TELEVISION: SEVERAL DAYS
SUM OF ALL RESPONSES TO PHQ QUESTIONS 1-9: 5
SUM OF ALL RESPONSES TO PHQ QUESTIONS 1-9: 5
2. FEELING DOWN, DEPRESSED OR HOPELESS: SEVERAL DAYS
5. POOR APPETITE OR OVEREATING: SEVERAL DAYS
9. THOUGHTS THAT YOU WOULD BE BETTER OFF DEAD, OR OF HURTING YOURSELF: NOT AT ALL
1. LITTLE INTEREST OR PLEASURE IN DOING THINGS: NOT AT ALL
3. TROUBLE FALLING OR STAYING ASLEEP: MORE THAN HALF THE DAYS
6. FEELING BAD ABOUT YOURSELF - OR THAT YOU ARE A FAILURE OR HAVE LET YOURSELF OR YOUR FAMILY DOWN: NOT AT ALL

## 2025-01-07 ASSESSMENT — ANXIETY QUESTIONNAIRES
4. TROUBLE RELAXING: SEVERAL DAYS
IF YOU CHECKED OFF ANY PROBLEMS ON THIS QUESTIONNAIRE, HOW DIFFICULT HAVE THESE PROBLEMS MADE IT FOR YOU TO DO YOUR WORK, TAKE CARE OF THINGS AT HOME, OR GET ALONG WITH OTHER PEOPLE: SOMEWHAT DIFFICULT
GAD7 TOTAL SCORE: 6
3. WORRYING TOO MUCH ABOUT DIFFERENT THINGS: SEVERAL DAYS
2. NOT BEING ABLE TO STOP OR CONTROL WORRYING: SEVERAL DAYS
6. BECOMING EASILY ANNOYED OR IRRITABLE: SEVERAL DAYS
1. FEELING NERVOUS, ANXIOUS, OR ON EDGE: SEVERAL DAYS
5. BEING SO RESTLESS THAT IT IS HARD TO SIT STILL: SEVERAL DAYS
7. FEELING AFRAID AS IF SOMETHING AWFUL MIGHT HAPPEN: NOT AT ALL

## 2025-01-15 NOTE — PROGRESS NOTES
Length of meeting; 36 minutes    Start Time: 0957    Stop Time: 1033    Diagnosis: Major depressive disorder, recurrent, in remission.  Adjustment disorder with anxiety.    Treatment Plan:  Will focus on relaxation skills.  Will focus on different activities that she can engage in.  AWARE process for panic attacks.    Patient Prognosis: Fair.    Patient Progress: The patient indicates that she is \"up and down and in the middle.\"  She is working on setting boundaries with her two sons and notes that \"for the most part it is OK.\", though she notes some discomfort at times with some of the interactions.  She notes that her chief effort is to focus on herself and notes \"I think I like what I am doing and I think I am happier.\"            Mental Status exam: PHQ-9 is a 4.PHQ-9 Total Score: 4 (1/22/2025 10:02 AM)  Thoughts that you would be better off dead, or of hurting yourself in some way: 0 (1/22/2025 10:02 AM)    SONIDO-7 is a 10.      1/22/2025    10:04 AM 1/7/2025     2:08 PM 9/17/2024    10:04 AM   SONIDO-7 SCREENING   Feeling nervous, anxious, or on edge Several days Several days Several days   Not being able to stop or control worrying More than half the days Several days Several days   Worrying too much about different things More than half the days Several days Several days   Trouble relaxing Several days Several days More than half the days   Being so restless that it is hard to sit still More than half the days Several days Several days   Becoming easily annoyed or irritable More than half the days Several days Several days   Feeling afraid as if something awful might happen Not at all Not at all Not at all   SONIDO-7 Total Score 10 6 7   How difficult have these problems made it for you to do your work, take care of things at home, or get along with other people? Somewhat difficult Somewhat difficult Somewhat difficult     No current active SI or HI, and continues to contract for safety.  Thought processes appear

## 2025-01-17 ENCOUNTER — OFFICE VISIT (OUTPATIENT)
Dept: ONCOLOGY | Age: 80
End: 2025-01-17
Payer: MEDICARE

## 2025-01-17 ENCOUNTER — HOSPITAL ENCOUNTER (OUTPATIENT)
Dept: LAB | Age: 80
Discharge: HOME | End: 2025-01-17
Payer: MEDICARE

## 2025-01-17 VITALS
WEIGHT: 150.6 LBS | HEIGHT: 62 IN | SYSTOLIC BLOOD PRESSURE: 147 MMHG | RESPIRATION RATE: 11 BRPM | DIASTOLIC BLOOD PRESSURE: 90 MMHG | TEMPERATURE: 97.9 F | HEART RATE: 61 BPM | BODY MASS INDEX: 27.71 KG/M2 | OXYGEN SATURATION: 100 %

## 2025-01-17 DIAGNOSIS — C50.911 MALIGNANT NEOPLASM OF RIGHT BREAST IN FEMALE, ESTROGEN RECEPTOR POSITIVE, UNSPECIFIED SITE OF BREAST (HCC): ICD-10-CM

## 2025-01-17 DIAGNOSIS — Z17.0 MALIGNANT NEOPLASM OF RIGHT BREAST IN FEMALE, ESTROGEN RECEPTOR POSITIVE, UNSPECIFIED SITE OF BREAST (HCC): Primary | ICD-10-CM

## 2025-01-17 DIAGNOSIS — Z79.811 USE OF AROMATASE INHIBITORS: ICD-10-CM

## 2025-01-17 DIAGNOSIS — Z17.0 MALIGNANT NEOPLASM OF RIGHT BREAST IN FEMALE, ESTROGEN RECEPTOR POSITIVE, UNSPECIFIED SITE OF BREAST (HCC): ICD-10-CM

## 2025-01-17 DIAGNOSIS — C50.911 MALIGNANT NEOPLASM OF RIGHT BREAST IN FEMALE, ESTROGEN RECEPTOR POSITIVE, UNSPECIFIED SITE OF BREAST (HCC): Primary | ICD-10-CM

## 2025-01-17 DIAGNOSIS — T45.1X5A HOT FLASHES RELATED TO AROMATASE INHIBITOR THERAPY: ICD-10-CM

## 2025-01-17 DIAGNOSIS — R23.2 HOT FLASHES RELATED TO AROMATASE INHIBITOR THERAPY: ICD-10-CM

## 2025-01-17 LAB
ALBUMIN SERPL-MCNC: 3.9 G/DL (ref 3.2–4.6)
ALBUMIN/GLOB SERPL: 1 (ref 1–1.9)
ALP SERPL-CCNC: 80 U/L (ref 35–104)
ALT SERPL-CCNC: 12 U/L (ref 8–45)
ANION GAP SERPL CALC-SCNC: 10 MMOL/L (ref 7–16)
AST SERPL-CCNC: 22 U/L (ref 15–37)
BASOPHILS # BLD: 0.03 K/UL (ref 0–0.2)
BASOPHILS NFR BLD: 0.5 % (ref 0–2)
BILIRUB SERPL-MCNC: 1 MG/DL (ref 0–1.2)
BUN SERPL-MCNC: 22 MG/DL (ref 8–23)
CALCIUM SERPL-MCNC: 10.2 MG/DL (ref 8.8–10.2)
CHLORIDE SERPL-SCNC: 102 MMOL/L (ref 98–107)
CO2 SERPL-SCNC: 30 MMOL/L (ref 20–29)
CREAT SERPL-MCNC: 0.99 MG/DL (ref 0.6–1.1)
DIFFERENTIAL METHOD BLD: NORMAL
EOSINOPHIL # BLD: 0.05 K/UL (ref 0–0.8)
EOSINOPHIL NFR BLD: 0.9 % (ref 0.5–7.8)
ERYTHROCYTE [DISTWIDTH] IN BLOOD BY AUTOMATED COUNT: 13.9 % (ref 11.9–14.6)
GLOBULIN SER CALC-MCNC: 4.1 G/DL (ref 2.3–3.5)
GLUCOSE SERPL-MCNC: 122 MG/DL (ref 70–99)
HCT VFR BLD AUTO: 41 % (ref 35.8–46.3)
HGB BLD-MCNC: 13.1 G/DL (ref 11.7–15.4)
IMM GRANULOCYTES # BLD AUTO: 0.02 K/UL (ref 0–0.5)
IMM GRANULOCYTES NFR BLD AUTO: 0.4 % (ref 0–5)
LYMPHOCYTES # BLD: 0.88 K/UL (ref 0.5–4.6)
LYMPHOCYTES NFR BLD: 15.7 % (ref 13–44)
MCH RBC QN AUTO: 28.7 PG (ref 26.1–32.9)
MCHC RBC AUTO-ENTMCNC: 32 G/DL (ref 31.4–35)
MCV RBC AUTO: 89.9 FL (ref 82–102)
MONOCYTES # BLD: 0.45 K/UL (ref 0.1–1.3)
MONOCYTES NFR BLD: 8 % (ref 4–12)
NEUTS SEG # BLD: 4.18 K/UL (ref 1.7–8.2)
NEUTS SEG NFR BLD: 74.5 % (ref 43–78)
NRBC # BLD: 0 K/UL (ref 0–0.2)
PLATELET # BLD AUTO: 189 K/UL (ref 150–450)
PMV BLD AUTO: 10.6 FL (ref 9.4–12.3)
POTASSIUM SERPL-SCNC: 3.8 MMOL/L (ref 3.5–5.1)
PROT SERPL-MCNC: 8 G/DL (ref 6.3–8.2)
RBC # BLD AUTO: 4.56 M/UL (ref 4.05–5.2)
SODIUM SERPL-SCNC: 142 MMOL/L (ref 136–145)
WBC # BLD AUTO: 5.6 K/UL (ref 4.3–11.1)

## 2025-01-17 PROCEDURE — 99214 OFFICE O/P EST MOD 30 MIN: CPT | Performed by: NURSE PRACTITIONER

## 2025-01-17 PROCEDURE — 85025 COMPLETE CBC W/AUTO DIFF WBC: CPT

## 2025-01-17 PROCEDURE — 1160F RVW MEDS BY RX/DR IN RCRD: CPT | Performed by: NURSE PRACTITIONER

## 2025-01-17 PROCEDURE — 36415 COLL VENOUS BLD VENIPUNCTURE: CPT

## 2025-01-17 PROCEDURE — 1159F MED LIST DOCD IN RCRD: CPT | Performed by: NURSE PRACTITIONER

## 2025-01-17 PROCEDURE — 1125F AMNT PAIN NOTED PAIN PRSNT: CPT | Performed by: NURSE PRACTITIONER

## 2025-01-17 PROCEDURE — 1123F ACP DISCUSS/DSCN MKR DOCD: CPT | Performed by: NURSE PRACTITIONER

## 2025-01-17 PROCEDURE — 3077F SYST BP >= 140 MM HG: CPT | Performed by: NURSE PRACTITIONER

## 2025-01-17 PROCEDURE — 3080F DIAST BP >= 90 MM HG: CPT | Performed by: NURSE PRACTITIONER

## 2025-01-17 PROCEDURE — 80053 COMPREHEN METABOLIC PANEL: CPT

## 2025-01-17 ASSESSMENT — PATIENT HEALTH QUESTIONNAIRE - PHQ9
SUM OF ALL RESPONSES TO PHQ QUESTIONS 1-9: 0
SUM OF ALL RESPONSES TO PHQ QUESTIONS 1-9: 0
SUM OF ALL RESPONSES TO PHQ9 QUESTIONS 1 & 2: 0
SUM OF ALL RESPONSES TO PHQ QUESTIONS 1-9: 0
1. LITTLE INTEREST OR PLEASURE IN DOING THINGS: NOT AT ALL
SUM OF ALL RESPONSES TO PHQ QUESTIONS 1-9: 0
2. FEELING DOWN, DEPRESSED OR HOPELESS: NOT AT ALL

## 2025-01-17 NOTE — PROGRESS NOTES
was then placed into the small created biopsy cavity. POSTBIOPSY MAMMOGRAM: CC and ML views of the right breast were obtained. There are expected postprocedural changes at the 9:30 position right breast. The clip is in satisfactory position. No definite residual microcalcifications are present, however, these could be obscured due to postprocedural change.     Stereotactic biopsy of the asymmetry with associated architectural distortion and microcalcifications at the 9:30 position right breast with histologic results pending.     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 Previous left lumpectomy and radiation for breast cancer in 2001 and right benign biopsy greater than 10 years ago. Her sister had breast cancer. COMPARISON: Ultrasound and mammography 8/25/2022, 8/31/2022, 8/17/2022 as well as previous MRI breast 10/28/2013, 10/28/2011, 9/8/2009. TECHNIQUE: Standard MRI sequences were obtained through the breasts in multiple planes. Images were obtained before and after intravenous infusion of 15 mL of Prohance contrast. Images were reviewed with PACS and with Vision[employer] CAD software. FINDINGS: The breasts demonstrate moderate glandularity and minimal background enhancement. Right breast at 9:30 centrally shows the biopsy clip within an irregular heterogeneously enhancing 1.4 x 1.0 cm malignant mass. Mild curvilinear enhancement adjacent to the biopsy tract is likely reactive, and there is a small hematoma. Scattered enhancing foci elsewhere in the right breast have not changed from older MRI exams. There is no other evidence of suspicious enhancing mass, and no dominant or unique nonmass enhancement, to suggest additional malignancy in either breast. There is no evidence of axillary or internal mammary

## 2025-01-17 NOTE — PATIENT INSTRUCTIONS
When you return in 6 months have your labs drawn within the week prior.  List of locations provided.     Hospital Outpatient Visit on 01/17/2025   Component Date Value Ref Range Status    WBC 01/17/2025 5.6  4.3 - 11.1 K/uL Final    RBC 01/17/2025 4.56  4.05 - 5.2 M/uL Final    Hemoglobin 01/17/2025 13.1  11.7 - 15.4 g/dL Final    Hematocrit 01/17/2025 41.0  35.8 - 46.3 % Final    MCV 01/17/2025 89.9  82.0 - 102.0 FL Final    MCH 01/17/2025 28.7  26.1 - 32.9 PG Final    MCHC 01/17/2025 32.0  31.4 - 35.0 g/dL Final    RDW 01/17/2025 13.9  11.9 - 14.6 % Final    Platelets 01/17/2025 189  150 - 450 K/uL Final    MPV 01/17/2025 10.6  9.4 - 12.3 FL Final    nRBC 01/17/2025 0.00  0.0 - 0.2 K/uL Final    **Note: Absolute NRBC parameter is now reported with Hemogram**    Neutrophils % 01/17/2025 74.5  43.0 - 78.0 % Final    Lymphocytes % 01/17/2025 15.7  13.0 - 44.0 % Final    Monocytes % 01/17/2025 8.0  4.0 - 12.0 % Final    Eosinophils % 01/17/2025 0.9  0.5 - 7.8 % Final    Basophils % 01/17/2025 0.5  0.0 - 2.0 % Final    Immature Granulocytes % 01/17/2025 0.4  0.0 - 5.0 % Final    Neutrophils Absolute 01/17/2025 4.18  1.70 - 8.20 K/UL Final    Lymphocytes Absolute 01/17/2025 0.88  0.50 - 4.60 K/UL Final    Monocytes Absolute 01/17/2025 0.45  0.10 - 1.30 K/UL Final    Eosinophils Absolute 01/17/2025 0.05  0.00 - 0.80 K/UL Final    Basophils Absolute 01/17/2025 0.03  0.00 - 0.20 K/UL Final    Immature Granulocytes Absolute 01/17/2025 0.02  0.00 - 0.50 K/UL Final    Differential Type 01/17/2025 AUTOMATED    Final    Sodium 01/17/2025 142  136 - 145 mmol/L Final    Potassium 01/17/2025 3.8  3.5 - 5.1 mmol/L Final    Chloride 01/17/2025 102  98 - 107 mmol/L Final    CO2 01/17/2025 30 (H)  20 - 29 mmol/L Final    Anion Gap 01/17/2025 10  7 - 16 mmol/L Final    Glucose 01/17/2025 122 (H)  70 - 99 mg/dL Final    Comment: <70 mg/dL Consistent with, but not fully diagnostic of hypoglycemia.  100 - 125 mg/dL Impaired fasting

## 2025-01-21 ENCOUNTER — HOSPITAL ENCOUNTER (OUTPATIENT)
Dept: PHYSICAL THERAPY | Age: 80
Setting detail: RECURRING SERIES
Discharge: HOME | End: 2025-01-24
Payer: MEDICARE

## 2025-01-21 PROCEDURE — 97110 THERAPEUTIC EXERCISES: CPT

## 2025-01-21 ASSESSMENT — PAIN SCALES - GENERAL: PAINLEVEL_OUTOF10: 3

## 2025-01-21 NOTE — THERAPY RECERTIFICATION
address her deficits and assist in her regaining function with less pain or chance of falling     Therapy Problem List: (Impacting functional limitations):    Body Structures, Functions, Activity Limitations Requiring Skilled Therapeutic Intervention: Decreased functional mobility ; Decreased ADL status; Decreased ROM; Decreased body mechanics; Decreased tolerance to work activity; Decreased strength; Decreased balance; Increased pain; Decreased posture    Therapy Prognosis:   Therapy Prognosis: Good    Initial Assessment Complexity:   Decision Making: Medium Complexity    PLAN   Effective Dates: 11/6/2024 TO Plan of Care/Certification Expiration Date: 02/18/25     Frequency/Duration: Plan Frequency: 2 down to 1 time a week for 4 weeks      Interventions Planned (Treatment may consist of any combination of the following):    Current Treatment Recommendations: Strengthening; ROM; Balance training; Functional mobility training; Neuromuscular re-education; Manual; Gait training; Stair training; Pain management; Return to work related activity; Home exercise program; Modalities; Positioning    Goals: (Goals have been discussed and agreed upon with patient.)  Short-Term Functional Goals: Time Frame: 3 weeks  Pt will be independent with HEP-  not fully met  Pt will demonstrate proper body mechanics-  not fully met  [T will be able to perform self MET to correct obliquity-  not met  Discharge Goals: Time Frame: 10 weeks  Oswestry score will decrease by 12 points to demonstrate improved function-  met  Pt will sleep at least 6 hours without pain waking her-   met  Pt will be able to perform ADL's with pain no more 3/10-  not consistently met  No falls -   met         Medical Necessity:   > Skilled intervention continues to be required due to  . need for manual treatment, modalities for pain and guided introduction to exercise.    Reason For Services/Other Comments:  > Patient continues to require skilled intervention due to

## 2025-01-22 ENCOUNTER — OFFICE VISIT (OUTPATIENT)
Dept: BEHAVIORAL/MENTAL HEALTH CLINIC | Age: 80
End: 2025-01-22
Payer: MEDICARE

## 2025-01-22 DIAGNOSIS — F43.22 ADJUSTMENT DISORDER WITH ANXIETY: Primary | ICD-10-CM

## 2025-01-22 DIAGNOSIS — F33.40 RECURRENT MAJOR DEPRESSIVE DISORDER, IN REMISSION (HCC): ICD-10-CM

## 2025-01-22 PROCEDURE — 1123F ACP DISCUSS/DSCN MKR DOCD: CPT | Performed by: SOCIAL WORKER

## 2025-01-22 PROCEDURE — 90832 PSYTX W PT 30 MINUTES: CPT | Performed by: SOCIAL WORKER

## 2025-01-22 ASSESSMENT — ANXIETY QUESTIONNAIRES
1. FEELING NERVOUS, ANXIOUS, OR ON EDGE: SEVERAL DAYS
GAD7 TOTAL SCORE: 10
4. TROUBLE RELAXING: SEVERAL DAYS
3. WORRYING TOO MUCH ABOUT DIFFERENT THINGS: MORE THAN HALF THE DAYS
2. NOT BEING ABLE TO STOP OR CONTROL WORRYING: MORE THAN HALF THE DAYS
6. BECOMING EASILY ANNOYED OR IRRITABLE: MORE THAN HALF THE DAYS
5. BEING SO RESTLESS THAT IT IS HARD TO SIT STILL: MORE THAN HALF THE DAYS
IF YOU CHECKED OFF ANY PROBLEMS ON THIS QUESTIONNAIRE, HOW DIFFICULT HAVE THESE PROBLEMS MADE IT FOR YOU TO DO YOUR WORK, TAKE CARE OF THINGS AT HOME, OR GET ALONG WITH OTHER PEOPLE: SOMEWHAT DIFFICULT
7. FEELING AFRAID AS IF SOMETHING AWFUL MIGHT HAPPEN: NOT AT ALL

## 2025-01-22 ASSESSMENT — PATIENT HEALTH QUESTIONNAIRE - PHQ9
2. FEELING DOWN, DEPRESSED OR HOPELESS: NOT AT ALL
9. THOUGHTS THAT YOU WOULD BE BETTER OFF DEAD, OR OF HURTING YOURSELF: NOT AT ALL
5. POOR APPETITE OR OVEREATING: NOT AT ALL
SUM OF ALL RESPONSES TO PHQ QUESTIONS 1-9: 4
1. LITTLE INTEREST OR PLEASURE IN DOING THINGS: NOT AT ALL
10. IF YOU CHECKED OFF ANY PROBLEMS, HOW DIFFICULT HAVE THESE PROBLEMS MADE IT FOR YOU TO DO YOUR WORK, TAKE CARE OF THINGS AT HOME, OR GET ALONG WITH OTHER PEOPLE: NOT DIFFICULT AT ALL
6. FEELING BAD ABOUT YOURSELF - OR THAT YOU ARE A FAILURE OR HAVE LET YOURSELF OR YOUR FAMILY DOWN: NOT AT ALL
7. TROUBLE CONCENTRATING ON THINGS, SUCH AS READING THE NEWSPAPER OR WATCHING TELEVISION: MORE THAN HALF THE DAYS
SUM OF ALL RESPONSES TO PHQ QUESTIONS 1-9: 4
SUM OF ALL RESPONSES TO PHQ9 QUESTIONS 1 & 2: 0
SUM OF ALL RESPONSES TO PHQ QUESTIONS 1-9: 4
SUM OF ALL RESPONSES TO PHQ QUESTIONS 1-9: 4
4. FEELING TIRED OR HAVING LITTLE ENERGY: NOT AT ALL
3. TROUBLE FALLING OR STAYING ASLEEP: MORE THAN HALF THE DAYS
8. MOVING OR SPEAKING SO SLOWLY THAT OTHER PEOPLE COULD HAVE NOTICED. OR THE OPPOSITE, BEING SO FIGETY OR RESTLESS THAT YOU HAVE BEEN MOVING AROUND A LOT MORE THAN USUAL: NOT AT ALL

## 2025-01-24 ENCOUNTER — HOSPITAL ENCOUNTER (OUTPATIENT)
Dept: PHYSICAL THERAPY | Age: 80
Setting detail: RECURRING SERIES
Discharge: HOME | End: 2025-01-27
Payer: MEDICARE

## 2025-01-24 PROCEDURE — 97110 THERAPEUTIC EXERCISES: CPT

## 2025-01-24 ASSESSMENT — PAIN SCALES - GENERAL: PAINLEVEL_OUTOF10: 2

## 2025-01-24 NOTE — PROGRESS NOTES
Shalini Rush  : 1945  Primary: [employer] [place] Hmo/p* (Medicare Managed)  Secondary: MEDICAID [facility] @ [address]  [facility] [address]  Phone: [phone]  Fax: [phone] Plan Frequency: 2 down to 1 time a week for 4 weeks  Plan of Care/Certification Expiration Date: 25        Plan of Care/Certification Expiration Date:  Plan of Care/Certification Expiration Date: 25    Frequency/Duration: Plan Frequency: 2 down to 1 time a week for 4 weeks      Time In/Out:   Time In: 1030  Time Out: 1115      PT Visit Info:    Progress Note Due Date: 24  Progress Note Counter: 1      Visit Count:  8    OUTPATIENT PHYSICAL THERAPY:   Treatment Note 2025       Episode  (right back and hip pain)               Treatment Diagnosis:    Vertebrogenic low back pain  Pain in right hip  Medical/Referring Diagnosis:    Primary osteoarthritis of right hip  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-sided sciatica       Referring Physician:  Rox Chavez APRN - CNP MD Orders:  PT Eval and Treat   Return MD Appt:  24   Date of Onset:  No data recorded     Allergies:   Milk protein  Restrictions/Precautions:     Restrictions/Precautions: None     Interventions Planned (Treatment may consist of any combination of the following):  Current Treatment Recommendations: Strengthening; ROM; Balance training; Functional mobility training; Neuromuscular re-education; Manual; Gait training; Stair training; Pain management; Return to work related activity; Home exercise program; Modalities; Positioning        Subjective Comments: Pt reports being sore in the muscles after last time so it is working. Doing the HEP    Initial Pain Level::     2/10    Post Session Pain Level:       0 /10- right hip feels stiff    Medications Last Reviewed:  2025  Updated Objective Findings:      No Pelvic obliquity       Treatment   THERAPEUTIC EXERCISE: ( 42

## 2025-01-29 ENCOUNTER — HOSPITAL ENCOUNTER (OUTPATIENT)
Dept: PHYSICAL THERAPY | Age: 80
Setting detail: RECURRING SERIES
Discharge: HOME | End: 2025-02-01
Payer: MEDICARE

## 2025-01-29 PROCEDURE — 97110 THERAPEUTIC EXERCISES: CPT

## 2025-01-29 NOTE — PROGRESS NOTES
Shalini Rush  : 1945  Primary: [employer] Hmo/p* (Medicare Managed)  Secondary: MEDICAID [facility] @ [address]  [facility] [address]  Phone: [phone]  Fax: [phone] Plan Frequency: 2 down to 1 time a week for 4 weeks  Plan of Care/Certification Expiration Date: 25        Plan of Care/Certification Expiration Date:  Plan of Care/Certification Expiration Date: 25    Frequency/Duration: Plan Frequency: 2 down to 1 time a week for 4 weeks      Time In/Out:   Time In: 1345  Time Out: 1427      PT Visit Info:    Progress Note Due Date: 24  Progress Note Counter: 1      Visit Count:  9    OUTPATIENT PHYSICAL THERAPY:   Treatment Note 2025       Episode  (right back and hip pain)               Treatment Diagnosis:    Vertebrogenic low back pain  Pain in right hip  Medical/Referring Diagnosis:    Primary osteoarthritis of right hip  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-sided sciatica       Referring Physician:  Rox Chavez APRN - CNP MD Orders:  PT Eval and Treat   Return MD Appt:  24   Date of Onset:  No data recorded     Allergies:   Milk protein  Restrictions/Precautions:     Restrictions/Precautions: None     Interventions Planned (Treatment may consist of any combination of the following):  Current Treatment Recommendations: Strengthening; ROM; Balance training; Functional mobility training; Neuromuscular re-education; Manual; Gait training; Stair training; Pain management; Return to work related activity; Home exercise program; Modalities; Positioning        Subjective Comments: Pt reports being sore in the muscles and with some numbness in the right thigh. Npt doing the HEP    Initial Pain Level::      /10    Post Session Pain Level:       0 /10- I feel good and it helps me    Medications Last Reviewed:  2025  Updated Objective Findings:         Treatment   THERAPEUTIC EXERCISE: ( 42

## 2025-01-30 DIAGNOSIS — E78.5 HYPERLIPIDEMIA LDL GOAL <100: ICD-10-CM

## 2025-01-30 DIAGNOSIS — R80.9 CONTROLLED TYPE 2 DIABETES MELLITUS WITH MICROALBUMINURIA, WITHOUT LONG-TERM CURRENT USE OF INSULIN (HCC): Primary | ICD-10-CM

## 2025-01-30 DIAGNOSIS — E03.9 ACQUIRED HYPOTHYROIDISM: ICD-10-CM

## 2025-01-30 DIAGNOSIS — I10 PRIMARY HYPERTENSION: ICD-10-CM

## 2025-01-30 DIAGNOSIS — E11.29 CONTROLLED TYPE 2 DIABETES MELLITUS WITH MICROALBUMINURIA, WITHOUT LONG-TERM CURRENT USE OF INSULIN (HCC): Primary | ICD-10-CM

## 2025-02-03 NOTE — PROGRESS NOTES
Length of meeting; 28 minutes    Start Time: 1100    Stop Time: 1128    Diagnosis: Adjustment disorder with anxiety.    Treatment Plan:  Will focus on relaxation skills.  Will focus on different activities that she can engage in.  AWARE process for panic attacks.    Patient Prognosis: Fair.    Patient Progress: The patient indicates that she is \"topsy turvey in some ways (setting boundaries), \" but I think I am good in a lot of ways.\"   Talked about some stressors with her sons as well.  She notes \"I am happy with Shalini.\"   Talked about other measures that she is doing to improve her physical and emotional health.          Mental Status exam: PHQ-9 is a 2.  SONIDO-7 is a 10. No current active SI or HI, and continues to contract for safety.  Thought processes appear normal.  Speech is goal directed.  The patient does not appear to be responding to internal stimuli. Affect is congruent with topic being discussed.       Plan: I will see this patient again in about 3 weeks.  PORFIRIO Rose

## 2025-02-05 ENCOUNTER — OFFICE VISIT (OUTPATIENT)
Dept: BEHAVIORAL/MENTAL HEALTH CLINIC | Age: 80
End: 2025-02-05
Payer: MEDICARE

## 2025-02-05 ENCOUNTER — HOSPITAL ENCOUNTER (OUTPATIENT)
Dept: PHYSICAL THERAPY | Age: 80
Setting detail: RECURRING SERIES
Discharge: HOME | End: 2025-02-08
Payer: MEDICARE

## 2025-02-05 DIAGNOSIS — F43.22 ADJUSTMENT DISORDER WITH ANXIETY: Primary | ICD-10-CM

## 2025-02-05 PROCEDURE — 97110 THERAPEUTIC EXERCISES: CPT

## 2025-02-05 PROCEDURE — 90832 PSYTX W PT 30 MINUTES: CPT | Performed by: SOCIAL WORKER

## 2025-02-05 PROCEDURE — 1123F ACP DISCUSS/DSCN MKR DOCD: CPT | Performed by: SOCIAL WORKER

## 2025-02-05 ASSESSMENT — PATIENT HEALTH QUESTIONNAIRE - PHQ9
9. THOUGHTS THAT YOU WOULD BE BETTER OFF DEAD, OR OF HURTING YOURSELF: NOT AT ALL
SUM OF ALL RESPONSES TO PHQ QUESTIONS 1-9: 2
SUM OF ALL RESPONSES TO PHQ QUESTIONS 1-9: 2
4. FEELING TIRED OR HAVING LITTLE ENERGY: NOT AT ALL
7. TROUBLE CONCENTRATING ON THINGS, SUCH AS READING THE NEWSPAPER OR WATCHING TELEVISION: SEVERAL DAYS
SUM OF ALL RESPONSES TO PHQ QUESTIONS 1-9: 2
SUM OF ALL RESPONSES TO PHQ QUESTIONS 1-9: 2
10. IF YOU CHECKED OFF ANY PROBLEMS, HOW DIFFICULT HAVE THESE PROBLEMS MADE IT FOR YOU TO DO YOUR WORK, TAKE CARE OF THINGS AT HOME, OR GET ALONG WITH OTHER PEOPLE: NOT DIFFICULT AT ALL
1. LITTLE INTEREST OR PLEASURE IN DOING THINGS: NOT AT ALL
5. POOR APPETITE OR OVEREATING: NOT AT ALL
SUM OF ALL RESPONSES TO PHQ9 QUESTIONS 1 & 2: 0
6. FEELING BAD ABOUT YOURSELF - OR THAT YOU ARE A FAILURE OR HAVE LET YOURSELF OR YOUR FAMILY DOWN: NOT AT ALL
2. FEELING DOWN, DEPRESSED OR HOPELESS: NOT AT ALL
3. TROUBLE FALLING OR STAYING ASLEEP: SEVERAL DAYS
8. MOVING OR SPEAKING SO SLOWLY THAT OTHER PEOPLE COULD HAVE NOTICED. OR THE OPPOSITE, BEING SO FIGETY OR RESTLESS THAT YOU HAVE BEEN MOVING AROUND A LOT MORE THAN USUAL: NOT AT ALL

## 2025-02-05 ASSESSMENT — ANXIETY QUESTIONNAIRES
2. NOT BEING ABLE TO STOP OR CONTROL WORRYING: SEVERAL DAYS
4. TROUBLE RELAXING: SEVERAL DAYS
3. WORRYING TOO MUCH ABOUT DIFFERENT THINGS: MORE THAN HALF THE DAYS
6. BECOMING EASILY ANNOYED OR IRRITABLE: MORE THAN HALF THE DAYS
1. FEELING NERVOUS, ANXIOUS, OR ON EDGE: SEVERAL DAYS
7. FEELING AFRAID AS IF SOMETHING AWFUL MIGHT HAPPEN: SEVERAL DAYS
IF YOU CHECKED OFF ANY PROBLEMS ON THIS QUESTIONNAIRE, HOW DIFFICULT HAVE THESE PROBLEMS MADE IT FOR YOU TO DO YOUR WORK, TAKE CARE OF THINGS AT HOME, OR GET ALONG WITH OTHER PEOPLE: SOMEWHAT DIFFICULT
5. BEING SO RESTLESS THAT IT IS HARD TO SIT STILL: MORE THAN HALF THE DAYS
GAD7 TOTAL SCORE: 10

## 2025-02-05 ASSESSMENT — PAIN SCALES - GENERAL: PAINLEVEL_OUTOF10: 2

## 2025-02-05 NOTE — PROGRESS NOTES
Shalini Rush  : 1945  Primary: [employer] [place] Hmo/p* (Medicare Managed)  Secondary: MEDICAID [facility] @ [address]  [facility] [address]  Phone: [phone]  Fax: [phone] Plan Frequency: 2 down to 1 time a week for 4 weeks  Plan of Care/Certification Expiration Date: 25        Plan of Care/Certification Expiration Date:  Plan of Care/Certification Expiration Date: 25    Frequency/Duration: Plan Frequency: 2 down to 1 time a week for 4 weeks      Time In/Out:   Time In: 1346  Time Out: 1430      PT Visit Info:    Progress Note Due Date: 24  Progress Note Counter: 1      Visit Count:  10    OUTPATIENT PHYSICAL THERAPY:   Treatment Note 2025       Episode  (right back and hip pain)               Treatment Diagnosis:    Vertebrogenic low back pain  Pain in right hip  Medical/Referring Diagnosis:    Primary osteoarthritis of right hip  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-sided sciatica       Referring Physician:  Rox Chavez APRN - CNP MD Orders:  PT Eval and Treat   Return MD Appt:  24   Date of Onset:  No data recorded     Allergies:   Milk protein  Restrictions/Precautions:     Restrictions/Precautions: None     Interventions Planned (Treatment may consist of any combination of the following):  Current Treatment Recommendations: Strengthening; ROM; Balance training; Functional mobility training; Neuromuscular re-education; Manual; Gait training; Stair training; Pain management; Return to work related activity; Home exercise program; Modalities; Positioning        Subjective Comments: Pt reports the back is pretty good. The right shoulder hurts from sleeping on it    Initial Pain Level::     2/10    Post Session Pain Level:       0 /10    Medications Last Reviewed:  2025  Updated Objective Findings:       Slow sit to stand with use of hands to get up  Posture flexed at waist with

## 2025-02-07 ENCOUNTER — TELEPHONE (OUTPATIENT)
Dept: ORTHOPEDIC SURGERY | Age: 80
End: 2025-02-07

## 2025-02-07 DIAGNOSIS — M54.50 LUMBAR BACK PAIN: Primary | ICD-10-CM

## 2025-02-07 RX ORDER — MELOXICAM 7.5 MG/1
7.5 TABLET ORAL DAILY
Qty: 7 TABLET | Refills: 0 | Status: SHIPPED | OUTPATIENT
Start: 2025-02-07 | End: 2025-02-11 | Stop reason: SDUPTHER

## 2025-02-11 ENCOUNTER — OFFICE VISIT (OUTPATIENT)
Dept: INTERNAL MEDICINE CLINIC | Facility: CLINIC | Age: 80
End: 2025-02-11
Payer: MEDICARE

## 2025-02-11 VITALS
DIASTOLIC BLOOD PRESSURE: 76 MMHG | TEMPERATURE: 97.8 F | WEIGHT: 150 LBS | BODY MASS INDEX: 27.6 KG/M2 | SYSTOLIC BLOOD PRESSURE: 138 MMHG | HEART RATE: 52 BPM | HEIGHT: 62 IN | OXYGEN SATURATION: 98 %

## 2025-02-11 DIAGNOSIS — F33.1 MDD (MAJOR DEPRESSIVE DISORDER), RECURRENT EPISODE, MODERATE (HCC): ICD-10-CM

## 2025-02-11 DIAGNOSIS — E03.9 ACQUIRED HYPOTHYROIDISM: ICD-10-CM

## 2025-02-11 DIAGNOSIS — E78.5 HYPERLIPIDEMIA LDL GOAL <100: ICD-10-CM

## 2025-02-11 DIAGNOSIS — C50.911 INFILTRATING DUCTAL CARCINOMA OF RIGHT BREAST (HCC): ICD-10-CM

## 2025-02-11 DIAGNOSIS — R80.9 CONTROLLED TYPE 2 DIABETES MELLITUS WITH MICROALBUMINURIA, WITHOUT LONG-TERM CURRENT USE OF INSULIN (HCC): Primary | ICD-10-CM

## 2025-02-11 DIAGNOSIS — M50.30 DDD (DEGENERATIVE DISC DISEASE), CERVICAL: ICD-10-CM

## 2025-02-11 DIAGNOSIS — E11.29 CONTROLLED TYPE 2 DIABETES MELLITUS WITH MICROALBUMINURIA, WITHOUT LONG-TERM CURRENT USE OF INSULIN (HCC): Primary | ICD-10-CM

## 2025-02-11 DIAGNOSIS — I10 ESSENTIAL (PRIMARY) HYPERTENSION: ICD-10-CM

## 2025-02-11 DIAGNOSIS — I10 PRIMARY HYPERTENSION: ICD-10-CM

## 2025-02-11 PROCEDURE — 1159F MED LIST DOCD IN RCRD: CPT | Performed by: INTERNAL MEDICINE

## 2025-02-11 PROCEDURE — 1160F RVW MEDS BY RX/DR IN RCRD: CPT | Performed by: INTERNAL MEDICINE

## 2025-02-11 PROCEDURE — G2211 COMPLEX E/M VISIT ADD ON: HCPCS | Performed by: INTERNAL MEDICINE

## 2025-02-11 PROCEDURE — 99214 OFFICE O/P EST MOD 30 MIN: CPT | Performed by: INTERNAL MEDICINE

## 2025-02-11 PROCEDURE — 3075F SYST BP GE 130 - 139MM HG: CPT | Performed by: INTERNAL MEDICINE

## 2025-02-11 PROCEDURE — 1123F ACP DISCUSS/DSCN MKR DOCD: CPT | Performed by: INTERNAL MEDICINE

## 2025-02-11 PROCEDURE — 1126F AMNT PAIN NOTED NONE PRSNT: CPT | Performed by: INTERNAL MEDICINE

## 2025-02-11 PROCEDURE — 3044F HG A1C LEVEL LT 7.0%: CPT | Performed by: INTERNAL MEDICINE

## 2025-02-11 PROCEDURE — 3078F DIAST BP <80 MM HG: CPT | Performed by: INTERNAL MEDICINE

## 2025-02-11 RX ORDER — IRBESARTAN 150 MG/1
150 TABLET ORAL NIGHTLY
Qty: 90 TABLET | Refills: 3 | Status: SHIPPED | OUTPATIENT
Start: 2025-02-11

## 2025-02-11 RX ORDER — LEVOTHYROXINE SODIUM 75 UG/1
75 TABLET ORAL DAILY
Qty: 90 TABLET | Refills: 3 | Status: SHIPPED | OUTPATIENT
Start: 2025-02-11

## 2025-02-11 RX ORDER — ATENOLOL AND CHLORTHALIDONE TABLET 50; 25 MG/1; MG/1
0.5 TABLET ORAL DAILY
Qty: 90 TABLET | Refills: 3 | Status: SHIPPED | OUTPATIENT
Start: 2025-02-11

## 2025-02-11 RX ORDER — ATORVASTATIN CALCIUM 40 MG/1
40 TABLET, FILM COATED ORAL NIGHTLY
Qty: 90 TABLET | Refills: 3 | Status: SHIPPED | OUTPATIENT
Start: 2025-02-11

## 2025-02-11 SDOH — ECONOMIC STABILITY: FOOD INSECURITY: WITHIN THE PAST 12 MONTHS, THE FOOD YOU BOUGHT JUST DIDN'T LAST AND YOU DIDN'T HAVE MONEY TO GET MORE.: NEVER TRUE

## 2025-02-11 SDOH — ECONOMIC STABILITY: FOOD INSECURITY: WITHIN THE PAST 12 MONTHS, YOU WORRIED THAT YOUR FOOD WOULD RUN OUT BEFORE YOU GOT MONEY TO BUY MORE.: NEVER TRUE

## 2025-02-11 ASSESSMENT — PATIENT HEALTH QUESTIONNAIRE - PHQ9
3. TROUBLE FALLING OR STAYING ASLEEP: NOT AT ALL
9. THOUGHTS THAT YOU WOULD BE BETTER OFF DEAD, OR OF HURTING YOURSELF: NOT AT ALL
SUM OF ALL RESPONSES TO PHQ QUESTIONS 1-9: 0
1. LITTLE INTEREST OR PLEASURE IN DOING THINGS: NOT AT ALL
8. MOVING OR SPEAKING SO SLOWLY THAT OTHER PEOPLE COULD HAVE NOTICED. OR THE OPPOSITE, BEING SO FIGETY OR RESTLESS THAT YOU HAVE BEEN MOVING AROUND A LOT MORE THAN USUAL: NOT AT ALL
10. IF YOU CHECKED OFF ANY PROBLEMS, HOW DIFFICULT HAVE THESE PROBLEMS MADE IT FOR YOU TO DO YOUR WORK, TAKE CARE OF THINGS AT HOME, OR GET ALONG WITH OTHER PEOPLE: NOT DIFFICULT AT ALL
SUM OF ALL RESPONSES TO PHQ9 QUESTIONS 1 & 2: 0
SUM OF ALL RESPONSES TO PHQ QUESTIONS 1-9: 0
5. POOR APPETITE OR OVEREATING: NOT AT ALL
SUM OF ALL RESPONSES TO PHQ QUESTIONS 1-9: 0
4. FEELING TIRED OR HAVING LITTLE ENERGY: NOT AT ALL
6. FEELING BAD ABOUT YOURSELF - OR THAT YOU ARE A FAILURE OR HAVE LET YOURSELF OR YOUR FAMILY DOWN: NOT AT ALL
2. FEELING DOWN, DEPRESSED OR HOPELESS: NOT AT ALL
SUM OF ALL RESPONSES TO PHQ QUESTIONS 1-9: 0
7. TROUBLE CONCENTRATING ON THINGS, SUCH AS READING THE NEWSPAPER OR WATCHING TELEVISION: NOT AT ALL

## 2025-02-11 NOTE — ASSESSMENT & PLAN NOTE
Well-controlled, continue current medications  Hemoglobin A1C   Date Value Ref Range Status   02/04/2025 6.5 (H) 0 - 5.6 % Final     Comment:     Reference Range  Normal       <5.7%  Prediabetes  5.7-6.4%  Diabetes     >6.4%       Hemoglobin A1C, POC   Date Value Ref Range Status   10/16/2023 6.4 % Final     Date of last diabetic eye exam: 9/25/2024  Foot exam completed. 2/11/25

## 2025-02-11 NOTE — ASSESSMENT & PLAN NOTE
Well-controlled, continue current medications    Lab Results   Component Value Date/Time     02/04/2025 08:45 AM    K 4.3 02/04/2025 08:45 AM     02/04/2025 08:45 AM    CO2 29 02/04/2025 08:45 AM    BUN 24 02/04/2025 08:45 AM    CREATININE 1.15 02/04/2025 08:45 AM    GLUCOSE 122 02/04/2025 08:45 AM    CALCIUM 9.8 02/04/2025 08:45 AM    LABGLOM 48 02/04/2025 08:45 AM    LABGLOM 51 01/18/2024 08:15 AM        Hypertension Medications       Antihypertensive Combinations       atenolol-chlorthalidone (TENORETIC) 50-25 MG per tablet Take 0.5 tablets by mouth daily       Angiotensin II Receptor Antagonists       irbesartan (AVAPRO) 150 MG tablet Take 1 tablet by mouth at bedtime

## 2025-02-11 NOTE — ASSESSMENT & PLAN NOTE
Lab Results   Component Value Date    CHOL 126 02/04/2025    TRIG 47 02/04/2025    HDL 52 02/04/2025    LDL 65 02/04/2025    VLDL 9 02/04/2025    CHOLHDLRATIO 2.4 02/04/2025     Treatment regimen is effective.       Lipid Lowering Medications       HMG CoA Reductase Inhibitors       atorvastatin (LIPITOR) 40 MG tablet Take 1 tablet by mouth at bedtime          Literature reviewed and considered in regards to LDL management and treatment with statin medications; considering current age, comorbid medical conditions; considering the risks vs benefits    Krystin RN, Catracho BARNES, Altagracia OG, Liz Morris A, Stephany DG, Diakd CC. Treatment with Statins in Elderly Patients. [facility] ([facility]). 2019 Oct 30;55(11):721.    Karen K, Wily SH. Effects of Statins for Primary Prevention in the Elderly: Recent Evidence. J Lipid Atheroscler. 2020 Jan;9(1):1-7.    Pilar MG, Ran A, Jitendra MB, Tiara AM. Primary prevention statin therapy in older adults. Curr Opin Cardiol. 2023 Jan 1;38(1):11-20

## 2025-02-11 NOTE — ASSESSMENT & PLAN NOTE
Seeing behavioral health/psychiatry.    Declines medication treatment.    No SI.    Counseling has been helpful for her.  Lots of family issues with her children.       Patient Education:  Reviewed concept of anxiety as biochemical imbalance of neurotransmitters and rationale for treatment. Instructed patient to contact office or on-call physician promptly should condition worsen or any new symptoms appear and provided on-call telephone numbers.  IF THE PATIENT HAS ANY SUICIDAL OR HOMICIDAL IDEATION, CALL THE OFFICE, DISCUSS WITH A SUPPORT MEMBER OR GO TO THE ER IMMEDIATELY.  Patient was agreeable with this plan.

## 2025-02-11 NOTE — ASSESSMENT & PLAN NOTE
Well-controlled, continue current medications  Lab Results   Component Value Date    TSH 0.349 02/04/2025

## 2025-02-11 NOTE — PROGRESS NOTES
ASSESSMENT/PLAN:      1. Controlled type 2 diabetes mellitus with microalbuminuria, without long-term current use of insulin (HCC)  Assessment & Plan:   Well-controlled, continue current medications  Hemoglobin A1C   Date Value Ref Range Status   02/04/2025 6.5 (H) 0 - 5.6 % Final     Comment:     Reference Range  Normal       <5.7%  Prediabetes  5.7-6.4%  Diabetes     >6.4%       Hemoglobin A1C, POC   Date Value Ref Range Status   10/16/2023 6.4 % Final     Date of last diabetic eye exam: 9/25/2024  Foot exam completed. 2/11/25     Orders:  -     Hemoglobin A1C; Future  -     Lipid Panel; Future  -     Basic Metabolic Panel; Future  -     ALT; Future  -     AST; Future  -     TSH; Future  -     HM DIABETES FOOT EXAM  2. Essential (primary) hypertension  -     atenolol-chlorthalidone (TENORETIC) 50-25 MG per tablet; Take 0.5 tablets by mouth daily, Disp-90 tablet, R-3Normal  -     irbesartan (AVAPRO) 150 MG tablet; Take 1 tablet by mouth at bedtime, Disp-90 tablet, R-3Normal  -     Hemoglobin A1C; Future  -     Lipid Panel; Future  -     Basic Metabolic Panel; Future  -     ALT; Future  -     AST; Future  -     TSH; Future  3. Hyperlipidemia LDL goal <100  Assessment & Plan:     Lab Results   Component Value Date    CHOL 126 02/04/2025    TRIG 47 02/04/2025    HDL 52 02/04/2025    LDL 65 02/04/2025    VLDL 9 02/04/2025    CHOLHDLRATIO 2.4 02/04/2025     Treatment regimen is effective.       Lipid Lowering Medications       HMG CoA Reductase Inhibitors       atorvastatin (LIPITOR) 40 MG tablet Take 1 tablet by mouth at bedtime          Literature reviewed and considered in regards to LDL management and treatment with statin medications; considering current age, comorbid medical conditions; considering the risks vs benefits    Krystin RN, Catracho BARNES, Altagracia OG, Liz Morris A, Stephany DG, Yenni CC. Treatment with Statins in Elderly Patients. [employer] ([facility]). 2019 Oct 30;55(11):721.    Karen K, Wily SH.

## 2025-02-11 NOTE — PATIENT INSTRUCTIONS
Follow up in six months for Annual Wellness Visit and routine labs prior to the visit.  See lab orders.

## 2025-02-12 ENCOUNTER — HOSPITAL ENCOUNTER (OUTPATIENT)
Dept: PHYSICAL THERAPY | Age: 80
Setting detail: RECURRING SERIES
Discharge: HOME | End: 2025-02-15
Payer: MEDICARE

## 2025-02-12 PROCEDURE — 97110 THERAPEUTIC EXERCISES: CPT

## 2025-02-12 PROCEDURE — 97140 MANUAL THERAPY 1/> REGIONS: CPT

## 2025-02-12 ASSESSMENT — PAIN SCALES - GENERAL: PAINLEVEL_OUTOF10: 1

## 2025-02-12 NOTE — PROGRESS NOTES
and in supine  Treatment   Manual treatment: ( 10 minutes): grade 4-- to 4- to 4 right L2-4 unilateral PA mobilizations, grade 4- to 4 translation glides at L1-2. Grade 4- right femoral PA glides at end ROM hip ER  THERAPEUTIC EXERCISE: ( 30  minutes):   AIS B hip flexors and right hip ER  in prone for ROM prior to back strengthening. Exercises below  to improve mobility and strength.  Required minimal verbal and tactile cues to promote proper body alignment, promote proper body posture, and promote proper body mechanics.      HEP: bridging, hooklying hip ER against blue tband   Date   2/12/25   Activity/Exercise    LAQ With ankle DF for hamstring stretch 4# 10,   Lean back Seated into tball X 10 between LAQ sets         Bridging  With SAQ 2# x10,    Hooklying with medium tband around knees x10       R Hip ER Side right 2# x10   Prone on elbows During knee flexion   Knee flexion 2# B prone on elbows x10   LTR    Hooklying ball press    Standing shoulder ext    Hip ext Propped alternating 2# x10       Treatment/Session Summary:    Treatment Assessment: right hip stiffness may be causing over use of lumbar spine during pivoting or rotation and increasing pain     Communication/Consultation:  Therapy Evaluation sent to referring provider  Equipment provided today:  HEP  Recommendations/Intent for next treatment session: Next visit will focus on stretching and strengthening for core/trunk and BLE.     >Total Treatment Billable Duration:    40  minutes   Time In: 1345  Time Out: 1429    Rissa Sousa PT         Charge Capture  Events  [employer] Portal  Appt Desk  Attendance Report     Future Appointments   Date Time Provider Department Center   2/19/2025  1:45 PM Rissa Sousa PT [facility]   2/25/2025 10:00 AM Alexander Garcia LISW [facility] AM[facility]   2/26/2025  1:45 PM Rissa Sousa PT [facility]   3/31/2025 10:10 AM Tobin Lemon Jr., MD [facility] AMB   7/16/2025  8:30 AM Connor Enciso

## 2025-02-14 ENCOUNTER — OFFICE VISIT (OUTPATIENT)
Dept: INTERNAL MEDICINE CLINIC | Facility: CLINIC | Age: 80
End: 2025-02-14
Payer: MEDICARE

## 2025-02-14 ENCOUNTER — CARE COORDINATION (OUTPATIENT)
Dept: CARE COORDINATION | Facility: CLINIC | Age: 80
End: 2025-02-14

## 2025-02-14 ENCOUNTER — HOSPITAL ENCOUNTER (EMERGENCY)
Age: 80
Discharge: HOME | End: 2025-02-14
Attending: EMERGENCY MEDICINE
Payer: MEDICARE

## 2025-02-14 VITALS
RESPIRATION RATE: 16 BRPM | WEIGHT: 152 LBS | TEMPERATURE: 97.3 F | BODY MASS INDEX: 27.97 KG/M2 | DIASTOLIC BLOOD PRESSURE: 98 MMHG | SYSTOLIC BLOOD PRESSURE: 150 MMHG | HEIGHT: 62 IN | HEART RATE: 51 BPM | OXYGEN SATURATION: 99 %

## 2025-02-14 VITALS
OXYGEN SATURATION: 97 % | DIASTOLIC BLOOD PRESSURE: 82 MMHG | BODY MASS INDEX: 27.44 KG/M2 | SYSTOLIC BLOOD PRESSURE: 183 MMHG | HEART RATE: 51 BPM | WEIGHT: 150 LBS | TEMPERATURE: 97.3 F | RESPIRATION RATE: 18 BRPM
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
## 2025-02-14 DIAGNOSIS — T63.421D FIRE ANT BITE, ACCIDENTAL OR UNINTENTIONAL, SUBSEQUENT ENCOUNTER: Primary | ICD-10-CM

## 2025-02-14 DIAGNOSIS — S70.362A INSECT BITE OF LEFT THIGH, INITIAL ENCOUNTER: Primary | ICD-10-CM

## 2025-02-14 PROCEDURE — 1126F AMNT PAIN NOTED NONE PRSNT: CPT | Performed by: PHYSICIAN ASSISTANT

## 2025-02-14 PROCEDURE — 3079F DIAST BP 80-89 MM HG: CPT | Performed by: PHYSICIAN ASSISTANT

## 2025-02-14 PROCEDURE — 1159F MED LIST DOCD IN RCRD: CPT | Performed by: PHYSICIAN ASSISTANT

## 2025-02-14 PROCEDURE — 1123F ACP DISCUSS/DSCN MKR DOCD: CPT | Performed by: PHYSICIAN ASSISTANT

## 2025-02-14 PROCEDURE — 99213 OFFICE O/P EST LOW 20 MIN: CPT | Performed by: PHYSICIAN ASSISTANT

## 2025-02-14 PROCEDURE — 3077F SYST BP >= 140 MM HG: CPT | Performed by: PHYSICIAN ASSISTANT

## 2025-02-14 PROCEDURE — 99282 EMERGENCY DEPT VISIT SF MDM: CPT

## 2025-02-14 SDOH — ECONOMIC STABILITY: FOOD INSECURITY: WITHIN THE PAST 12 MONTHS, YOU WORRIED THAT YOUR FOOD WOULD RUN OUT BEFORE YOU GOT MONEY TO BUY MORE.: NEVER TRUE

## 2025-02-14 SDOH — ECONOMIC STABILITY: FOOD INSECURITY: WITHIN THE PAST 12 MONTHS, THE FOOD YOU BOUGHT JUST DIDN'T LAST AND YOU DIDN'T HAVE MONEY TO GET MORE.: NEVER TRUE

## 2025-02-14 ASSESSMENT — PAIN SCALES - GENERAL: PAINLEVEL_OUTOF10: 3

## 2025-02-14 ASSESSMENT — PAIN DESCRIPTION - DESCRIPTORS: DESCRIPTORS: ITCHING

## 2025-02-14 ASSESSMENT — PAIN - FUNCTIONAL ASSESSMENT: PAIN_FUNCTIONAL_ASSESSMENT: 0-10

## 2025-02-14 NOTE — CARE COORDINATION
Ambulatory Care Coordination Note     2025 11:17 AM     Patient Current Location:  [place]     This patient was received as a referral from Ambulatory Care Manager .    ACM contacted the patient by telephone. Verified name and  with patient as identifiers. Provided introduction to self, and explanation of the ACM role.   Patient declined care management services at this time.          ACM: Jeanine Mckeon RN     Challenges to be reviewed by the provider   Additional needs identified to be addressed with provider No  none               Method of communication with provider: phone.        Care Summary Note: Pt has respectfully declined services at this time, my contact information has been shared with pt in the event there circumstances change. They are free to reach out at any time. ACM signing off.      PCP/Specialist follow up:   Future Appointments         Provider Specialty Dept Phone    2025 1:45 PM Rissa Sousa, PT Physical Therapy [phone]    2025 10:00 AM Alexander Garcia LISW Behavioral Health [phone]    2025 1:45 PM Rissa Sousa, PT Physical Therapy [phone]    3/31/2025 10:10 AM (Arrive by 9:55 AM) Tobin Lemon Jr., MD Orthopedic Surgery [phone]    2025 8:30 AM Connor Enciso MD Oncology [phone]    2025 8:30 AM [facility] Internal Medicine [phone]    2025 1:00 PM Nikolas Dunham MD Internal Medicine [phone]

## 2025-02-14 NOTE — ED PROVIDER NOTES
Emergency Department Provider Note       PCP: Nikolas Dunham MD   Age: 79 y.o.   Sex: female     DISPOSITION Decision To Discharge 02/14/2025 01:36:25 AM    ICD-10-CM    1. Insect bite of left thigh, initial encounter  S70.362A     W57.XXXA           Medical Decision Making     Pt comes to the ED for evaluation of suspected insect bites to her R thigh.              Patient's states she first noticed the bites this morning.  She states she put Neosporin on them.  Patient states they are now beginning to itch.  Patient states she was concerned because she is a diabetic and could not see the lesions.  On exam, patient with several erythematous lesions to her left thigh.  No signs of cellulitis or abscess.  Discussed patient alternating topical Benadryl and hydrocortisone onto these areas for comfort.  She is stable for DC home.  Strict return precautions discussed.    1 acute, uncomplicated illness or injury.    Over the counter drug management performed.  Prescription drug management performed.  Shared medical decision making was utilized in creating the patients health plan today.    I independently ordered and reviewed each unique test.    I reviewed external records: provider visit note from PCP.       History     Pt comes to the ED for evaluation of suspected insect bites to her R thigh.     The history is provided by the patient and medical records. No  was used.     Physical Exam     Vitals signs and nursing note reviewed:  Vitals:    02/14/25 0046   BP: (!) 183/82   Pulse: 51   Resp: 18   Temp: 97.3 °F (36.3 °C)   TempSrc: Oral   SpO2: 97%   Weight: 68 kg (150 lb)      Physical Exam  Vitals and nursing note reviewed.   Constitutional:       General: She is not in acute distress.     Appearance: Normal appearance. She is obese. She is not ill-appearing, toxic-appearing or diaphoretic.   HENT:      Head: Normocephalic and atraumatic.      Nose: Nose normal.      Mouth/Throat:       Diabetic shoe, custom orthoses, custom prosthetic filler, DM2, neuropathy    DICLOFENAC SODIUM (VOLTAREN) 1 % GEL        FLUTICASONE (FLONASE) 50 MCG/ACT NASAL SPRAY        HYDROCORTISONE (ANUSOL-HC) 2.5 % CREA RECTAL CREAM    Insert into rectum & apply externally 3-4 times/day prn hemorrhoid    IRBESARTAN (AVAPRO) 150 MG TABLET    Take 1 tablet by mouth at bedtime    LETROZOLE (FEMARA) 2.5 MG TABLET    Take 1 tablet by mouth daily    LEVOTHYROXINE (SYNTHROID) 75 MCG TABLET    Take 1 tablet by mouth Daily    MAGNESIUM OXIDE (MAG-OX) 400 MG TABLET    Take 1 tablet by mouth daily    MELOXICAM (MOBIC) 7.5 MG TABLET    Take 1 tablet by mouth daily    MULTIPLE VITAMIN (MULTIVITAMIN ADULT PO)    Take by mouth daily    NYSTATIN (MYCOSTATIN) 432168 UNIT/GM CREAM    Apply topically 2 times daily.        Results from this emergency department visit:      No results found for any visits on 02/14/25.      No orders to display                No results for input(s): \"COVID19\" in the last 72 hours.     Voice dictation software was used during the making of this note.  This software is not perfect and grammatical and other typographical errors may be present.  This note has not been completely proofread for errors.        Taylor Acosta, DO  02/14/25 0137

## 2025-02-14 NOTE — PROGRESS NOTES
Chief Complaint   Patient presents with    Insect Bite     Pt states she was bitten on 02/13/2025        HISTORY OF PRESENT ILLNESS:  Shalini Rush is a very pleasant 79 y.o. female who presents with a complaint of several insect bites on her left leg that occurred while walking her dog this AM- reports feeling a stinging/burning sensation on her leg. She went to the ED this AM for evaluation and dx'd with insect bites- discharged home with recommendation to apply otc hydrocortisone cream, which has helped a little.       PAST MEDICAL HISTORY:   Current Outpatient Medications   Medication Sig    atenolol-chlorthalidone (TENORETIC) 50-25 MG per tablet Take 0.5 tablets by mouth daily    atorvastatin (LIPITOR) 40 MG tablet Take 1 tablet by mouth at bedtime    irbesartan (AVAPRO) 150 MG tablet Take 1 tablet by mouth at bedtime    levothyroxine (SYNTHROID) 75 MCG tablet Take 1 tablet by mouth Daily    blood glucose test strips (ONETOUCH ULTRA) strip Check fasting BS once daily. Dx: E11.29    meloxicam (MOBIC) 7.5 MG tablet Take 1 tablet by mouth daily    clotrimazole-betamethasone (LOTRISONE) 1-0.05 % cream Apply topically 2 times daily.    letrozole (FEMARA) 2.5 MG tablet Take 1 tablet by mouth daily    hydrocortisone (ANUSOL-HC) 2.5 % CREA rectal cream Insert into rectum & apply externally 3-4 times/day prn hemorrhoid    diclofenac sodium (VOLTAREN) 1 % GEL     fluticasone (FLONASE) 50 MCG/ACT nasal spray     magnesium oxide (MAG-OX) 400 MG tablet Take 1 tablet by mouth daily    Multiple Vitamin (MULTIVITAMIN ADULT PO) Take by mouth daily    calcium carbonate (OSCAL) 500 MG TABS tablet Take 1 tablet by mouth daily    nystatin (MYCOSTATIN) 505921 UNIT/GM cream Apply topically 2 times daily.    Diabetic Shoe MISC by Does not apply route Diabetic shoe, custom orthoses, custom prosthetic filler, DM2, neuropathy    Cholecalciferol 50 MCG (2000 UT) TABS Take by mouth    coenzyme Q10 100 MG CAPS capsule Take 1 capsule by

## 2025-02-14 NOTE — DISCHARGE INSTRUCTIONS
You can alternate place of Benadryl cream and hydrocortisone cream on your insect bites.  If you notice and did not, more red, swollen, or if you have any other concerning symptoms, please return to the ER immediately.

## 2025-02-14 NOTE — ED TRIAGE NOTES
Pt ambulatory to ED    Pt c/o insect bite on the back of her L thigh area.   Pt did clean it w/ alcohol  and applied neosporin.  Bite site red and swollen.

## 2025-02-17 ENCOUNTER — TELEPHONE (OUTPATIENT)
Dept: ORTHOPEDIC SURGERY | Age: 80
End: 2025-02-17

## 2025-02-18 ENCOUNTER — CARE COORDINATION (OUTPATIENT)
Dept: CARE COORDINATION | Facility: CLINIC | Age: 80
End: 2025-02-18

## 2025-02-18 ENCOUNTER — OFFICE VISIT (OUTPATIENT)
Age: 80
End: 2025-02-18
Payer: MEDICARE

## 2025-02-18 DIAGNOSIS — M54.50 LUMBAR BACK PAIN: ICD-10-CM

## 2025-02-18 DIAGNOSIS — M79.18 RIGHT BUTTOCK PAIN: Primary | ICD-10-CM

## 2025-02-18 PROCEDURE — 1159F MED LIST DOCD IN RCRD: CPT | Performed by: ORTHOPAEDIC SURGERY

## 2025-02-18 PROCEDURE — 99214 OFFICE O/P EST MOD 30 MIN: CPT | Performed by: ORTHOPAEDIC SURGERY

## 2025-02-18 PROCEDURE — 1123F ACP DISCUSS/DSCN MKR DOCD: CPT | Performed by: ORTHOPAEDIC SURGERY

## 2025-02-18 NOTE — CARE COORDINATION
Ambulatory Care Coordination Note     2025 11:07 AM     Patient Current Location:  [place]     This patient was received as a referral from Ambulatory Care Manager .    ACM contacted the patient by telephone. Verified name and  with patient as identifiers. Provided introduction to self, and explanation of the ACM role.   Patient declined care management services at this time.          ACM: Jeanine Mckeon RN     Challenges to be reviewed by the provider   Additional needs identified to be addressed with provider No  none               Method of communication with provider: phone.    Utilization:     Care Summary Note: pt called back b/c she stated that she saw this # on her phone and didn't know who it was. After speaking to her she did in fact refuse services again. My contact information has been shared with pt in the event there circumstances change. They are free to reach out at any time.         PCP/Specialist follow up:   Future Appointments         Provider Specialty Dept Phone    2025 10:00 AM Alexander Garcia LISW Behavioral Health [phone]    3/31/2025 10:10 AM (Arrive by 9:55 AM) Tobin Lemon Jr., MD Orthopedic Surgery [phone]    2025 8:30 AM Connor Enciso MD Oncology [phone]    2025 8:30 AM [facility] Internal Medicine [phone]    2025 1:00 PM Nikolas Dunham MD Internal Medicine [phone]

## 2025-02-18 NOTE — PROGRESS NOTES
Name: Shalini Rush  YOB: 1945  Gender: female  MRN: [id number]  Age: 79 y.o.    Chief Complaint: Right hip stiffness, right anterior thigh numbness and tingling    Assessment/Plan at Previous Visit: Physical therapy, oral anti-inflammatories    History of Present Illness:      Shalini Rush  is here for follow-up.  She reports right hip stiffness.  She still has some physical therapy ongoing.  She also has some anterior right thigh numbness and paresthesias that seem to be on the distal aspect of the thigh.  Denies any trauma.         Medications:       Current Outpatient Medications:     atenolol-chlorthalidone (TENORETIC) 50-25 MG per tablet, Take 0.5 tablets by mouth daily, Disp: 90 tablet, Rfl: 3    atorvastatin (LIPITOR) 40 MG tablet, Take 1 tablet by mouth at bedtime, Disp: 90 tablet, Rfl: 3    irbesartan (AVAPRO) 150 MG tablet, Take 1 tablet by mouth at bedtime, Disp: 90 tablet, Rfl: 3    levothyroxine (SYNTHROID) 75 MCG tablet, Take 1 tablet by mouth Daily, Disp: 90 tablet, Rfl: 3    blood glucose test strips (ONETOUCH ULTRA) strip, Check fasting BS once daily. Dx: E11.29, Disp: 100 each, Rfl: 3    meloxicam (MOBIC) 7.5 MG tablet, Take 1 tablet by mouth daily, Disp: 30 tablet, Rfl: 2    clotrimazole-betamethasone (LOTRISONE) 1-0.05 % cream, Apply topically 2 times daily., Disp: 45 g, Rfl: 0    letrozole (FEMARA) 2.5 MG tablet, Take 1 tablet by mouth daily, Disp: 90 tablet, Rfl: 3    hydrocortisone (ANUSOL-HC) 2.5 % CREA rectal cream, Insert into rectum & apply externally 3-4 times/day prn hemorrhoid, Disp: 28 g, Rfl: 0    diclofenac sodium (VOLTAREN) 1 % GEL, , Disp: , Rfl:     fluticasone (FLONASE) 50 MCG/ACT nasal spray, , Disp: , Rfl:     magnesium oxide (MAG-OX) 400 MG tablet, Take 1 tablet by mouth daily, Disp: , Rfl:     Multiple Vitamin (MULTIVITAMIN ADULT PO), Take by mouth daily, Disp: , Rfl:     calcium carbonate (OSCAL) 500 MG TABS tablet, Take 1 tablet by mouth daily, Disp: ,

## 2025-02-19 ENCOUNTER — HOSPITAL ENCOUNTER (OUTPATIENT)
Dept: PHYSICAL THERAPY | Age: 80
Setting detail: RECURRING SERIES
Discharge: HOME | End: 2025-02-22
Payer: MEDICARE

## 2025-02-19 PROCEDURE — 97035 APP MDLTY 1+ULTRASOUND EA 15: CPT

## 2025-02-19 PROCEDURE — 97140 MANUAL THERAPY 1/> REGIONS: CPT

## 2025-02-19 PROCEDURE — 97110 THERAPEUTIC EXERCISES: CPT

## 2025-02-19 ASSESSMENT — PAIN SCALES - GENERAL: PAINLEVEL_OUTOF10: 4

## 2025-02-19 NOTE — THERAPY DISCHARGE
Shalini Rush  : 1945  Primary: [employer] Medicare Hmo/p* (Medicare Managed)  Secondary: MEDICAID [facility] @ [address]  [facility] [address]  Phone: [phone]  Fax: [phone] Plan Frequency: 2 down to 1 time a week for 4 weeks    Plan of Care/Certification Expiration Date: 25        Plan of Care/Certification Expiration Date:  Plan of Care/Certification Expiration Date: 25    Frequency/Duration: Plan Frequency: 2 down to 1 time a week for 4 weeks      Time In/Out:   Time In: 1345  Time Out: 1430      PT Visit Info:    Progress Note Due Date: 24  Progress Note Counter: 1      Visit Count:  12                OUTPATIENT PHYSICAL THERAPY:             Discharge Summary 2025               Episode (right back and hip pain)         Treatment Diagnosis:     Vertebrogenic low back pain  Pain in right hip  Medical/Referring Diagnosis:    Primary osteoarthritis of right hip  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-sided sciatica    Referring Physician:  Rox Chavez APRN - CNP MD Orders:  PT Eval and Treat   Return MD Appt:  unsure  Date of Onset:    10/2024  Allergies:  Milk protein  Restrictions/Precautions:      Restrictions/Precautions: None     Medications Last Reviewed:  2025     SUBJECTIVE   History of Injury/Illness (Reason for Referral):  Pt reports having right hip and back pain that has progressed to going down the front of the right thigh. She states the medication didn't help so she had an injection in the right hip bursa with no long term relief. Now having numbness in the right thigh that makes her feel unbalanced. Not able to sleep     Patient Stated Goal(s):  \"Be able to sleep and care for her home chores\"- sleeping some better, but not met for chores    Subjective:Pt reports doing the exercises but the doctor wants to give me injections. My right back and hip really hurt after being busy going

## 2025-02-19 NOTE — PROGRESS NOTES
Shalini XIOMY Rush  : 1945  Primary: [employer] Hmo/p* (Medicare Managed)  Secondary: MEDICAID [facility] @ [address]  [facility] [address]  Phone: [phone]  Fax: [phone] Plan Frequency: 2 down to 1 time a week for 4 weeks  Plan of Care/Certification Expiration Date: 25        Plan of Care/Certification Expiration Date:  Plan of Care/Certification Expiration Date: 25    Frequency/Duration: Plan Frequency: 2 down to 1 time a week for 4 weeks      Time In/Out:   Time In: 1345  Time Out: 1430      PT Visit Info:    Progress Note Due Date: 24  Progress Note Counter: 1      Visit Count:  12    OUTPATIENT PHYSICAL THERAPY:   Treatment Note 2025       Episode  (right back and hip pain)               Treatment Diagnosis:    Vertebrogenic low back pain  Pain in right hip  Medical/Referring Diagnosis:    Primary osteoarthritis of right hip  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-sided sciatica       Referring Physician:  Rox Chavez APRN - CNP MD Orders:  PT Eval and Treat   Return MD Appt:  24   Date of Onset:  No data recorded     Allergies:   Milk protein  Restrictions/Precautions:     Restrictions/Precautions: None     Interventions Planned (Treatment may consist of any combination of the following):  Current Treatment Recommendations: Strengthening; ROM; Balance training; Functional mobility training; Neuromuscular re-education; Manual; Gait training; Stair training; Pain management; Return to work related activity; Home exercise program; Modalities; Positioning        Subjective Comments: Pt reports the right side of my back and the hip feels painful today because of me doing so much today. I'm even walking with a limp in the left leg too. I'm going to get the injection in my hip rather than my back.    Initial Pain Level::     10    Post Session Pain Level:       2 /10  Medications Last Reviewed:

## 2025-02-19 NOTE — PROGRESS NOTES
Length of meeting; 39  minutes    Start Time: 1006    Stop Time: 1045    Diagnosis: Adjustment disorder with anxiety.    Treatment Plan:  Will focus on relaxation skills.  Will focus on different activities that she can engage in.  AWARE process for panic attacks.    Patient Prognosis: Fair.    Patient Progress: The patient indicates that she is \"I am good and not good; my back is hurting.\"   She is noted to ambulate with some difficulty down the hunt.  She is having this treated.  She is considering an injection in her hip to deal with her pain but that appt is pending.  She notes that she has some stressors that she is dealing with \"but I am getting there.\"  She talked about a conflict with her younger son who is in senior care.  Continues to set boundaries with her sons as she deems appropriate and notes regret for not having done it sooner.                                         Mental Status exam: PHQ-2 is a 1.  PHQ-9 is a 5.  SONIDO-7 is a 9. No current active SI or HI, and continues to contract for safety.  Thought processes appear normal.  Speech is goal directed.  The patient does not appear to be responding to internal stimuli. Affect is congruent with topic being discussed.       Plan: I will see this patient again in about 2 weeks.  PORFIRIO Rose

## 2025-02-25 ENCOUNTER — OFFICE VISIT (OUTPATIENT)
Dept: BEHAVIORAL/MENTAL HEALTH CLINIC | Age: 80
End: 2025-02-25
Payer: MEDICARE

## 2025-02-25 DIAGNOSIS — F43.22 ADJUSTMENT DISORDER WITH ANXIETY: Primary | ICD-10-CM

## 2025-02-25 PROCEDURE — 90834 PSYTX W PT 45 MINUTES: CPT | Performed by: SOCIAL WORKER

## 2025-02-25 PROCEDURE — 1123F ACP DISCUSS/DSCN MKR DOCD: CPT | Performed by: SOCIAL WORKER

## 2025-02-25 ASSESSMENT — ANXIETY QUESTIONNAIRES
4. TROUBLE RELAXING: SEVERAL DAYS
GAD7 TOTAL SCORE: 9
5. BEING SO RESTLESS THAT IT IS HARD TO SIT STILL: MORE THAN HALF THE DAYS
3. WORRYING TOO MUCH ABOUT DIFFERENT THINGS: MORE THAN HALF THE DAYS
6. BECOMING EASILY ANNOYED OR IRRITABLE: MORE THAN HALF THE DAYS
IF YOU CHECKED OFF ANY PROBLEMS ON THIS QUESTIONNAIRE, HOW DIFFICULT HAVE THESE PROBLEMS MADE IT FOR YOU TO DO YOUR WORK, TAKE CARE OF THINGS AT HOME, OR GET ALONG WITH OTHER PEOPLE: SOMEWHAT DIFFICULT
1. FEELING NERVOUS, ANXIOUS, OR ON EDGE: SEVERAL DAYS
7. FEELING AFRAID AS IF SOMETHING AWFUL MIGHT HAPPEN: NOT AT ALL
2. NOT BEING ABLE TO STOP OR CONTROL WORRYING: SEVERAL DAYS

## 2025-02-25 ASSESSMENT — PATIENT HEALTH QUESTIONNAIRE - PHQ9
1. LITTLE INTEREST OR PLEASURE IN DOING THINGS: NOT AT ALL
9. THOUGHTS THAT YOU WOULD BE BETTER OFF DEAD, OR OF HURTING YOURSELF: NOT AT ALL
6. FEELING BAD ABOUT YOURSELF - OR THAT YOU ARE A FAILURE OR HAVE LET YOURSELF OR YOUR FAMILY DOWN: NOT AT ALL
SUM OF ALL RESPONSES TO PHQ QUESTIONS 1-9: 5
4. FEELING TIRED OR HAVING LITTLE ENERGY: NOT AT ALL
SUM OF ALL RESPONSES TO PHQ QUESTIONS 1-9: 5
5. POOR APPETITE OR OVEREATING: SEVERAL DAYS
10. IF YOU CHECKED OFF ANY PROBLEMS, HOW DIFFICULT HAVE THESE PROBLEMS MADE IT FOR YOU TO DO YOUR WORK, TAKE CARE OF THINGS AT HOME, OR GET ALONG WITH OTHER PEOPLE: SOMEWHAT DIFFICULT
SUM OF ALL RESPONSES TO PHQ QUESTIONS 1-9: 5
2. FEELING DOWN, DEPRESSED OR HOPELESS: SEVERAL DAYS
SUM OF ALL RESPONSES TO PHQ9 QUESTIONS 1 & 2: 1
SUM OF ALL RESPONSES TO PHQ QUESTIONS 1-9: 5
7. TROUBLE CONCENTRATING ON THINGS, SUCH AS READING THE NEWSPAPER OR WATCHING TELEVISION: SEVERAL DAYS
8. MOVING OR SPEAKING SO SLOWLY THAT OTHER PEOPLE COULD HAVE NOTICED. OR THE OPPOSITE, BEING SO FIGETY OR RESTLESS THAT YOU HAVE BEEN MOVING AROUND A LOT MORE THAN USUAL: NOT AT ALL
3. TROUBLE FALLING OR STAYING ASLEEP: MORE THAN HALF THE DAYS

## 2025-02-26 ENCOUNTER — TELEPHONE (OUTPATIENT)
Dept: ORTHOPEDIC SURGERY | Age: 80
End: 2025-02-26

## 2025-02-26 ENCOUNTER — APPOINTMENT (OUTPATIENT)
Dept: PHYSICAL THERAPY | Age: 80
End: 2025-02-26
Payer: MEDICARE

## 2025-02-26 DIAGNOSIS — M51.361 DEGENERATION OF INTERVERTEBRAL DISC OF LUMBAR REGION WITH LOWER EXTREMITY PAIN: Primary | ICD-10-CM

## 2025-02-26 DIAGNOSIS — M54.16 LUMBAR RADICULOPATHY: ICD-10-CM

## 2025-02-26 NOTE — TELEPHONE ENCOUNTER
She is wanting to try the back injections. She was to think about it and call when she made up her mind. Please let her know when she can come in.

## 2025-02-27 ENCOUNTER — TELEPHONE (OUTPATIENT)
Dept: ORTHOPEDIC SURGERY | Age: 80
End: 2025-02-27

## 2025-02-27 DIAGNOSIS — M25.559 HIP PAIN, UNSPECIFIED LATERALITY: Primary | ICD-10-CM

## 2025-03-04 RX ORDER — METHYLPREDNISOLONE ACETATE 40 MG/ML
40 INJECTION, SUSPENSION INTRA-ARTICULAR; INTRALESIONAL; INTRAMUSCULAR; SOFT TISSUE ONCE
Status: CANCELLED | OUTPATIENT
Start: 2025-03-04 | End: 2025-03-04

## 2025-03-04 NOTE — PROGRESS NOTES
Femoroacetabular joint injection - Right Hip, Ultrasound Guided    An injection of corticosteroid was discussed today and is indicated for patient’s pain and condition today.  A time out was completed prior to proceeding. Site of injection, procedure, and all team members were identified. Risks were discussed. Patient verbally consented to procedure. Risks explained include infection, bleeding, nerve damage, steroid flare, elevation in blood glucose and pain at the site of injection were discussed at length with the patient.     The patient was positioned lying supine. An anterior approach was utilized for this injection procedure. The site of the injection was cleansed iodine. A sterile gel was then placed over the area and the curvilinear probe was used to positioned to reveal the FA joint. Following this a vapo coolant spray was utilized to provide local skin anesthesia. A solution of 80mg Depo-medrol, 1cc, and 4cc 1% lidocaine was delivered through a 22 gauge, 3.5\" spinal needle into the intraarticular space. The site was again cleansed with an alcohol swab. Ultrasound was used to ensure adequate deposition of the injectate solution into the correct location. The patient tolerated this procedure well with no adverse events. There was some notable pain relief reported by the patient prior to leaving the office today. The patient was counseled not to submerge the site for 24 hours, not to perform strenuous activity for the next five days, and if notes any signs or symptoms consistent with joint infection or allergic reaction to go to a local emergency room. The patient was observed for 15 minutes postprocedure and was allowed to be discharged from clinic in their usual state of health.  Imaging was saved to PACS system.

## 2025-03-06 ENCOUNTER — OFFICE VISIT (OUTPATIENT)
Dept: ORTHOPEDIC SURGERY | Age: 80
End: 2025-03-06

## 2025-03-06 DIAGNOSIS — M16.11 ARTHRITIS OF RIGHT HIP: Primary | ICD-10-CM

## 2025-03-06 RX ORDER — METHYLPREDNISOLONE ACETATE 80 MG/ML
80 INJECTION, SUSPENSION INTRA-ARTICULAR; INTRALESIONAL; INTRAMUSCULAR; SOFT TISSUE ONCE
Status: COMPLETED | OUTPATIENT
Start: 2025-03-06 | End: 2025-03-06

## 2025-03-06 RX ADMIN — METHYLPREDNISOLONE ACETATE 80 MG: 80 INJECTION, SUSPENSION INTRA-ARTICULAR; INTRALESIONAL; INTRAMUSCULAR; SOFT TISSUE at 10:05

## 2025-03-10 NOTE — PROGRESS NOTES
Length of meeting; 37  minutes    Start Time: 1053      Stop Time: 1130    Diagnosis: Adjustment disorder with anxiety.    Treatment Plan:  Will focus on relaxation skills.  Will focus on different activities that she can engage in.  AWARE process for panic attacks.    Patient Prognosis: Fair.    Patient Progress: The patient indicates that \"I'm a little rough, but I am hanging.\"   She notes some conflicts with her son in senior living that we discussed, and then with her issues of having limited trust with each of her sons for which she sets boundaries in certain activities. She is tearful at times.  Talked about her participation in [employer] for supportive type of relationships there.  She notes \"I am working on that.\"                                              Mental Status exam: PHQ-2 is a 0.  PHQ-9 is a 6.  SONIDO-7 is a 6. These are shared with the patient.  No current active SI or HI, and continues to contract for safety.  Thought processes appear normal.  Speech is goal directed.  The patient does not appear to be responding to internal stimuli. Affect is congruent with topic being discussed.       Plan: I will see this patient again in about 2 weeks.  PORFIRIO Rose

## 2025-03-11 ENCOUNTER — OFFICE VISIT (OUTPATIENT)
Dept: BEHAVIORAL/MENTAL HEALTH CLINIC | Age: 80
End: 2025-03-11
Payer: MEDICARE

## 2025-03-11 DIAGNOSIS — F43.22 ADJUSTMENT DISORDER WITH ANXIETY: Primary | ICD-10-CM

## 2025-03-11 PROCEDURE — 1123F ACP DISCUSS/DSCN MKR DOCD: CPT | Performed by: SOCIAL WORKER

## 2025-03-11 PROCEDURE — 90832 PSYTX W PT 30 MINUTES: CPT | Performed by: SOCIAL WORKER

## 2025-03-11 ASSESSMENT — PATIENT HEALTH QUESTIONNAIRE - PHQ9
SUM OF ALL RESPONSES TO PHQ QUESTIONS 1-9: 6
2. FEELING DOWN, DEPRESSED OR HOPELESS: NOT AT ALL
7. TROUBLE CONCENTRATING ON THINGS, SUCH AS READING THE NEWSPAPER OR WATCHING TELEVISION: SEVERAL DAYS
3. TROUBLE FALLING OR STAYING ASLEEP: SEVERAL DAYS
SUM OF ALL RESPONSES TO PHQ QUESTIONS 1-9: 6
1. LITTLE INTEREST OR PLEASURE IN DOING THINGS: NOT AT ALL
8. MOVING OR SPEAKING SO SLOWLY THAT OTHER PEOPLE COULD HAVE NOTICED. OR THE OPPOSITE, BEING SO FIGETY OR RESTLESS THAT YOU HAVE BEEN MOVING AROUND A LOT MORE THAN USUAL: NOT AT ALL
6. FEELING BAD ABOUT YOURSELF - OR THAT YOU ARE A FAILURE OR HAVE LET YOURSELF OR YOUR FAMILY DOWN: SEVERAL DAYS
SUM OF ALL RESPONSES TO PHQ QUESTIONS 1-9: 6
SUM OF ALL RESPONSES TO PHQ QUESTIONS 1-9: 6
5. POOR APPETITE OR OVEREATING: MORE THAN HALF THE DAYS
9. THOUGHTS THAT YOU WOULD BE BETTER OFF DEAD, OR OF HURTING YOURSELF: NOT AT ALL
10. IF YOU CHECKED OFF ANY PROBLEMS, HOW DIFFICULT HAVE THESE PROBLEMS MADE IT FOR YOU TO DO YOUR WORK, TAKE CARE OF THINGS AT HOME, OR GET ALONG WITH OTHER PEOPLE: NOT DIFFICULT AT ALL
4. FEELING TIRED OR HAVING LITTLE ENERGY: SEVERAL DAYS

## 2025-03-11 ASSESSMENT — ANXIETY QUESTIONNAIRES
2. NOT BEING ABLE TO STOP OR CONTROL WORRYING: SEVERAL DAYS
GAD7 TOTAL SCORE: 6
5. BEING SO RESTLESS THAT IT IS HARD TO SIT STILL: SEVERAL DAYS
7. FEELING AFRAID AS IF SOMETHING AWFUL MIGHT HAPPEN: NOT AT ALL
6. BECOMING EASILY ANNOYED OR IRRITABLE: SEVERAL DAYS
3. WORRYING TOO MUCH ABOUT DIFFERENT THINGS: SEVERAL DAYS
1. FEELING NERVOUS, ANXIOUS, OR ON EDGE: SEVERAL DAYS
IF YOU CHECKED OFF ANY PROBLEMS ON THIS QUESTIONNAIRE, HOW DIFFICULT HAVE THESE PROBLEMS MADE IT FOR YOU TO DO YOUR WORK, TAKE CARE OF THINGS AT HOME, OR GET ALONG WITH OTHER PEOPLE: SOMEWHAT DIFFICULT
4. TROUBLE RELAXING: SEVERAL DAYS

## 2025-03-18 ENCOUNTER — OFFICE VISIT (OUTPATIENT)
Age: 80
End: 2025-03-18
Payer: MEDICARE

## 2025-03-18 DIAGNOSIS — M16.11 ARTHRITIS OF RIGHT HIP: Primary | ICD-10-CM

## 2025-03-18 PROCEDURE — 99214 OFFICE O/P EST MOD 30 MIN: CPT | Performed by: ORTHOPAEDIC SURGERY

## 2025-03-18 PROCEDURE — 1123F ACP DISCUSS/DSCN MKR DOCD: CPT | Performed by: ORTHOPAEDIC SURGERY

## 2025-03-18 NOTE — PROGRESS NOTES
manageable and does not necessitate surgical intervention at this point. She is advised to continue her consultations with Dr. Rush to determine the necessity of further hip injections or to explore surgical options. She is advised to maintain her meloxicam regimen, provided it continues to alleviate her pain without inducing renal or gastrointestinal complications. She is also advised to manage stress effectively to enhance overall well-being.    2. Bowel issues.  She mentioned experiencing some bowel issues. She is advised to discuss this with her primary care doctor. It is noted that these symptoms are likely unrelated to her current medication, meloxicam.    Follow-up  The patient will follow up in 6 months, or earlier if necessary.    PROCEDURE  The patient received a right hip intra-articular steroid injection on 03/06/2025.                Electronically Signed by Tobin Lemon Jr, MD  03/18/25  11:37 AM

## 2025-03-20 NOTE — PROGRESS NOTES
Length of meeting; 28  minutes    Start Time: 1112    Stop Time:  1140    Diagnosis: Adjustment disorder with anxiety.    Treatment Plan:  Will focus on relaxation skills.  Will focus on different activities that she can engage in.  AWARE process for panic attacks.    Patient Prognosis: Fair.    Patient Progress: The patient indicates that \"I'm pretty good.\" Notes that her mood is both good with herself and negative with others. Notes her son is getting out of senior care; she has declined to let him spend the night in her home. Notes some irritability with that son and one other son.  Support provided and patient discussed her thoughts and feelings related to the above.  She discussed boundaries that she continues to set with her sons based on her perceptions of the respective relationships.                                            Mental Status exam: PHQ-2 is a 0.  PHQ-9 is a 2.  No current active SI or HI, and continues to contract for safety.  Thought processes appear normal.  Speech is goal directed.  The patient does not appear to be responding to internal stimuli. Affect is congruent with topic being discussed.       Plan: I will see this patient again in about 2 weeks.  PORFIRIO Rose

## 2025-03-25 ENCOUNTER — OFFICE VISIT (OUTPATIENT)
Dept: BEHAVIORAL/MENTAL HEALTH CLINIC | Age: 80
End: 2025-03-25
Payer: MEDICARE

## 2025-03-25 DIAGNOSIS — F43.22 ADJUSTMENT DISORDER WITH ANXIETY: Primary | ICD-10-CM

## 2025-03-25 PROCEDURE — 1123F ACP DISCUSS/DSCN MKR DOCD: CPT | Performed by: SOCIAL WORKER

## 2025-03-25 PROCEDURE — 90832 PSYTX W PT 30 MINUTES: CPT | Performed by: SOCIAL WORKER

## 2025-03-25 ASSESSMENT — PATIENT HEALTH QUESTIONNAIRE - PHQ9
7. TROUBLE CONCENTRATING ON THINGS, SUCH AS READING THE NEWSPAPER OR WATCHING TELEVISION: NOT AT ALL
SUM OF ALL RESPONSES TO PHQ QUESTIONS 1-9: 2
SUM OF ALL RESPONSES TO PHQ QUESTIONS 1-9: 2
10. IF YOU CHECKED OFF ANY PROBLEMS, HOW DIFFICULT HAVE THESE PROBLEMS MADE IT FOR YOU TO DO YOUR WORK, TAKE CARE OF THINGS AT HOME, OR GET ALONG WITH OTHER PEOPLE: SOMEWHAT DIFFICULT
1. LITTLE INTEREST OR PLEASURE IN DOING THINGS: NOT AT ALL
SUM OF ALL RESPONSES TO PHQ QUESTIONS 1-9: 2
8. MOVING OR SPEAKING SO SLOWLY THAT OTHER PEOPLE COULD HAVE NOTICED. OR THE OPPOSITE, BEING SO FIGETY OR RESTLESS THAT YOU HAVE BEEN MOVING AROUND A LOT MORE THAN USUAL: NOT AT ALL
6. FEELING BAD ABOUT YOURSELF - OR THAT YOU ARE A FAILURE OR HAVE LET YOURSELF OR YOUR FAMILY DOWN: NOT AT ALL
3. TROUBLE FALLING OR STAYING ASLEEP: SEVERAL DAYS
4. FEELING TIRED OR HAVING LITTLE ENERGY: NOT AT ALL
5. POOR APPETITE OR OVEREATING: SEVERAL DAYS
2. FEELING DOWN, DEPRESSED OR HOPELESS: NOT AT ALL
SUM OF ALL RESPONSES TO PHQ QUESTIONS 1-9: 2
9. THOUGHTS THAT YOU WOULD BE BETTER OFF DEAD, OR OF HURTING YOURSELF: NOT AT ALL

## 2025-03-28 RX ORDER — LETROZOLE 2.5 MG/1
2.5 TABLET, FILM COATED ORAL DAILY
Qty: 90 TABLET | Refills: 3 | OUTPATIENT
Start: 2025-03-28

## 2025-04-01 NOTE — PROGRESS NOTES
Length of meeting;   42  minutes    Start Time: 1105    Stop Time:  1147    Diagnosis: Adjustment disorder with anxiety.    Treatment Plan:  Will focus on relaxation skills.  Will focus on different activities that she can engage in.  AWARE process for panic attacks.    Patient Prognosis: Fair.    Patient Progress: The patient indicates that \"I'm up but I may be coming down\". She notes ongoing conflict with her sons that is discussed and processed, including her ongoing efforts with boundary setting with her sons. She believes that this is a direction that she wants to continue working on.   Talked about making her decisions on what she finds to be logical versus basing it on her emotions alone.                Mental Status exam: SONIDO-7 is a 6. PHQ-9 is a 4; PHQ-2 is a 0.  No current active SI or HI, and continues to contract for safety.  Thought processes appear normal.  Affect is congruent with the topic being dicussed.  Speech is goal directed.  The patient does not appear to be responding to internal stimuli.     Plan: I will see this patient again in about 2 weeks.  PORFIRIO Rose

## 2025-04-02 ENCOUNTER — TELEPHONE (OUTPATIENT)
Dept: INTERNAL MEDICINE CLINIC | Facility: CLINIC | Age: 80
End: 2025-04-02

## 2025-04-02 NOTE — TELEPHONE ENCOUNTER
Pt arrived for 1:20 appt. Checked pt in at . 1 hour later, pt came to other check in desk to check on what time she would be taken back. It was found that the check in was never truly completed. Consulted w/KUNAL Branham and Dr. Carpenter, stated pt was still ok to be seen. Made pt aware that Dr. Carpenter will still see her now, and that MA would be up to take her back right away. Pt declined to stay, states she is going to . Pt left office. We had also apologized to the pt about this error prior to her leaving.

## 2025-04-07 NOTE — PROGRESS NOTES
Evelin Rodriguez  :  [facility]  [address], [address], [place]  [phone]   [phone]        OUTPATIENT PHYSICAL THERAPY:Daily Note 3/9/2017    ICD-10: Treatment Diagnosis: Pain in right shoulder (M25.511),Strain of muscle and tendon of front wall of thorax, sequela (S29.011S)   Precautions/Allergies:   Review of patient's allergies indicates no known allergies. Fall Risk Score: 6  MD Orders: Evaluate and treat. MEDICAL/REFERRING DIAGNOSIS:  strain of tendon of front wall of thorax   DATE OF ONSET: 17  REFERRING PHYSICIAN: Victor Manuel Greene [facility] Street: 3-1-17     INITIAL ASSESSMENT:  Ms. Julius Charles presents to physical therapy with a pain in her right pectoralis minor and pectoralis major muscles due a fall on 17. She demonstrates strength deficits and range of motion deficits in both upper extremities. Ms. Julius Charles would benefit from skilled physical therapy to address these impairments and improve the mobility in her upper extremities. PROBLEM LIST (Impacting functional limitations):  1. Decreased Strength  2. Increased Pain  3. Decreased Activity Tolerance  4. Decreased Flexibility/Joint Mobility INTERVENTIONS PLANNED:  1. Cold  2. Heat  3. Home Exercise Program (HEP)  4. Manual Therapy  5. Neuromuscular Re-education/Strengthening  6. Range of Motion (ROM)  7. Therapeutic Exercise/Strengthening   TREATMENT PLAN:  Effective Dates: 17 TO 17. Frequency/Duration: 2 times a week for 3 weeks  GOALS: (Goals have been discussed and agreed upon with patient.)  Short-Term Functional Goals: Time Frame: 1.5 weeks  1. Patient will decrease by 5 points on the QuickDASH to show improvement in mobility and function. 2. Patient will participate in a strengthening program for both upper extremities to improve her mobility.    3. Patient will demonstrate compliance with HEP so that she is able to increase the Pl[facility] before July appt   strength in her upper extremities to have less difficulty performing duties at work. Discharge Goals: Time Frame: 3 weeks  1. Patient will decrease by 10 points on the QuickDASH to show improvement in mobility and function. 2. Patient will demonstrate full range of motion in her right upper extremity without pain so that she is able to perform the physical requirements of her work. 3. Patient will demonstrate 5/5 strength in her right upper extremity with all movements so that she is able to perform all duties required by her job. Rehabilitation Potential For Stated Goals: Good  Regarding Kristi Arambula's therapy, I certify that the treatment plan above will be carried out by a therapist or under their direction. Thank you for this referral,  LUC PierceT     Referring Physician Signature: Salty Alfaro*              Date                    HISTORY:   History of Present Injury/Illness (Reason for Referral):  Patient reports having a fall on 1-23-17. She reports blacking out before she hit the floor but she remembers trying to avoid bumping into a kid and side-stepping and then coming back to consciousness on the floor. She reports going to the doctor and being told that she has a muscle strain. She reports having pain when raising right arm above head and feeling a pulling sensation in her right chest. Patient states that pain depends on how active she is during the day. Patient reports that the pain is not constant but currently it is at a 4/10 pain. Patient reports that tylenol and ice help the pain. Patient reports currently working on light duty. Patient denies any recent falls within the last three months. Past Medical History/Comorbidities:   Ms. Jm Carcamo  has a past medical history of Abdominal pain; Arthritis; Breast cancer, left ([facility] [address] (2001); Breast pain in female; Bunion;  Cholesterol serum elevated; Chronic pain of left knee; Corns and callosities; DDD (degenerative disc disease), lumbar; Diabetes mellitus type II, controlled ([facility] [address] Emotional disorder; Fungal infection of toenail; Hallux valgus; Hemorrhoids; Hip pain, bilateral; Hyperglycemia; Hyperlipidemia; Hypertension; Hypothyroidism; Lower GI bleed; Obesity; Other ill-defined conditions(799.89); Postmenopausal; Radiation therapy (2001); Radiation therapy complication; Right median nerve neuropathy; Stress incontinence; Traumatic arthropathy of knee; and Urinary incontinence. Ms. Mary Soto  has a past surgical history that includes orthopaedic (2005); orthopaedic; orthopaedic; us guided core breast biopsy (Right); hysterectomy; breast lumpectomy (Left, 2001); tubal ligation; orthopaedic (Left); urological; shoulder replacement (Left); colonoscopy (2008); and breast biopsy (Right, 2011). Social History/Living Environment:    Lives alone. Prior Level of Function/Work/Activity:  Walking  Dominant Side:         RIGHT  Current Medications:    Current Outpatient Prescriptions:     Calcium-Cholecalciferol, D3, (CALCIUM 600 WITH VITAMIN D3) 600 mg(1,500mg) -400 unit chew, Take  by mouth daily. , Disp: , Rfl:     metFORMIN (GLUCOPHAGE) 500 mg tablet, Take  by mouth two (2) times a day. 1/2 tab, Disp: , Rfl:     multivitamin (ONE A DAY) tablet, Take 1 Tab by mouth., Disp: , Rfl:     glucose blood VI test strips (ONETOUCH ULTRA TEST) strip, by Does Not Apply route See Admin Instructions. , Disp: , Rfl:     promethazine-dextromethorphan (PROMETHAZINE-DM) 6.25-15 mg/5 mL syrup, Take  by mouth three (3) times daily as needed for Cough. , Disp: , Rfl:     Cetirizine (ZYRTEC) 10 mg cap, Take 10 mg by mouth nightly., Disp: , Rfl:     diclofenac EC (VOLTAREN) 75 mg EC tablet, Take  by mouth., Disp: , Rfl:     B.infantis-B.ani-B.long-B.bifi (PROBIOTIC 4X) 10-15 mg TbEC, Take  by mouth., Disp: , Rfl:     meloxicam (MOBIC) 15 mg tablet, Take 1 Tab by mouth daily. , Disp: 14 Tab, Rfl: 1    traMADol (ULTRAM) 50 mg tablet, Take 50 mg by mouth every six (6) hours as needed. , Disp: , Rfl:     Cholecalciferol, Vitamin D3, (VITAMIN D3) 1,000 unit Cap, Take  by mouth every Monday and Friday.  , Disp: , Rfl:     magnesium 500 mg Tab, Take  by mouth.  , Disp: , Rfl:     omega-3 fatty acids-vitamin e (FISH OIL) 1,000 mg Cap, Take 1 Cap by mouth daily. , Disp: , Rfl:     PV W-O TONO/FERROUS FUMARATE/FA (M-VIT PO), Take  by mouth daily. , Disp: , Rfl:     coenzyme q10 (CO Q-10) 10 mg Cap, Take  by mouth daily. , Disp: , Rfl:     levothyroxine (SYNTHROID) 88 mcg tablet, Take 88 mcg by mouth Daily (before breakfast). Take day of surgery. , Disp: , Rfl:     valsartan (DIOVAN) 320 mg tablet, Take 320 mg by mouth daily. , Disp: , Rfl:     atorvastatin (LIPITOR) 10 mg tablet, Take 20 mg by mouth every evening.  Pt takes at night, Disp: , Rfl:      Date Last Reviewed:  3/9/2017   Number of Personal Factors/Comorbidities that affect the Plan of Care: 0: LOW COMPLEXITY   EXAMINATION:   Observation/Orthostatic Postural Assessment:          Slouched posture   Palpation:          Trigger-point over right pectoralis major and pectoralis minor   ROM:          Decreased range on the left due to history of total shoulder replacement   Right: active range of motion: decreased flexion and abduction   Right Passive: pain at end range during flexion and abduction, range within functional limits  Strength:     UE: LEFT  Shoulder Flexion 3+/5     Shoulder Abduction 4/5  Shoulder Adduction 4-/5  Shoulder Internal Rotation 5/5  Shoulder External Rotation 4-/5  Elbow Flexion 5/5  Elbow Extension 3+/5    UE: RIGHT   Shoulder Flexion 5/5 (painful)  Shoulder Abduction 5/5  Shoulder Adduction 4/5  Shoulder Internal Rotation 5/5  Shoulder External Rotation 4/5  Elbow Flexion 5/5  Elbow Extension 5/5  Special Tests:          Pectoralis length: positive on right   Neurological Screen:        Myotomes:  intact        Dermatomes:  intact  Balance:          Not tested   Body Structures Involved:  1. Bones  2. Joints  3. Muscles Body Functions Affected:  1. Neuromusculoskeletal  2. Movement Related Activities and Participation Affected:  1. Domestic Life  2. Community, Social and [place]   Number of elements that affect the Plan of Care: 1-2: LOW COMPLEXITY   CLINICAL PRESENTATION:   Presentation: Stable and uncomplicated: LOW COMPLEXITY   CLINICAL DECISION MAKING:   Outcome Measure: Tool Used: Disabilities of the Arm, Shoulder and Hand (DASH) Questionnaire - Quick Version  Score:  Initial: 28/55  Most Recent: X/55 (Date: -- )   Interpretation of Score: The DASH is designed to measure the activities of daily living in person's with upper extremity dysfunction or pain. Each section is scored on a 1-5 scale, 5 representing the greatest disability. The scores of each section are added together for a total score of 55. Score 11 12-19 20-28 29-37 38-45 46-54 55   Modifier CH CI CJ CK CL CM CN     Medical Necessity:   · Patient demonstrates good rehab potential due to higher previous functional level. Reason for Services/Other Comments:  · Patient continues to require skilled intervention due to strength deficits and range of motion deficits. Use of outcome tool(s) and clinical judgement create a POC that gives a: Clear prediction of patient's progress: LOW COMPLEXITY   TREATMENT:   Pre-treatment Symptoms:  Patient reports no complaints. (In addition to Assessment/Re-Assessment sessions the following treatments were rendered)  THERAPEUTIC EXERCISE: ( 40 minutes):  Exercises per grid below to improve mobility and strength. Required minimal visual, verbal, manual and tactile cues to promote proper body posture and promote proper body mechanics. Progressed resistance, range and repetitions as indicated.    Date:  2-23-17 Date:  2-28-17 Date:  3-2-17 Date:  3-6-17 Date:  3-9-17   Activity/Exercise Parameters Parameters Parameters     Patient Education Discussed POC and pectoralis stretch for HEP       UBE  Level 1 4/4   Level 2 4/4 Level 3 4/4 Level 2 4/4 RUE only   Corner Pec Stretch    5 sec hold x 10 5 sec hold x 10 5 sec hold x 10 5 sec hold x 10   Scapular Retractions    5 sec hold x 10 5 sec hold x 10 5 sec hold x 10    Rows     GTB x 15 GTB  x 15 BTB x 15   Extensions     GTB x 15 GTB x 15 BTB x 15   Bear Hugs     YTB x 15     Scaption    1# x 15 R only   1# x 15 R only   Lateral Raises    1# x 15 R only   1# x 15 R only   Wall Washes      Next Visit Forward elevation x 15    Circles CW/CCW x 10 each direction         Treatment/Session Assessment:  Patient able to tolerate increased resistance and addition of exercises today. · Pain: Initial:  Pain Intensity 1: 0  Post Session:   ·   Compliance with Program/Exercises: compliant all of the time. · Recommendations/Intent for next treatment session: \"Next visit will focus on advancements to more challenging activities\".   Future Appointments  Date Time Provider Anaya Arias   3/13/2017 2:30 PM Dorie Wilhelm DPT [facility]     Total Treatment Duration:  PT Patient Time In/Time Out  Time In: 1430  Time Out: [address] [facility] [place]

## 2025-04-08 ENCOUNTER — OFFICE VISIT (OUTPATIENT)
Dept: BEHAVIORAL/MENTAL HEALTH CLINIC | Age: 80
End: 2025-04-08
Payer: MEDICARE

## 2025-04-08 DIAGNOSIS — F43.22 ADJUSTMENT DISORDER WITH ANXIETY: Primary | ICD-10-CM

## 2025-04-08 PROCEDURE — 1123F ACP DISCUSS/DSCN MKR DOCD: CPT | Performed by: SOCIAL WORKER

## 2025-04-08 PROCEDURE — 90834 PSYTX W PT 45 MINUTES: CPT | Performed by: SOCIAL WORKER

## 2025-04-08 ASSESSMENT — PATIENT HEALTH QUESTIONNAIRE - PHQ9
10. IF YOU CHECKED OFF ANY PROBLEMS, HOW DIFFICULT HAVE THESE PROBLEMS MADE IT FOR YOU TO DO YOUR WORK, TAKE CARE OF THINGS AT HOME, OR GET ALONG WITH OTHER PEOPLE: SOMEWHAT DIFFICULT
3. TROUBLE FALLING OR STAYING ASLEEP: MORE THAN HALF THE DAYS
4. FEELING TIRED OR HAVING LITTLE ENERGY: NOT AT ALL
9. THOUGHTS THAT YOU WOULD BE BETTER OFF DEAD, OR OF HURTING YOURSELF: NOT AT ALL
2. FEELING DOWN, DEPRESSED OR HOPELESS: NOT AT ALL
SUM OF ALL RESPONSES TO PHQ QUESTIONS 1-9: 4
7. TROUBLE CONCENTRATING ON THINGS, SUCH AS READING THE NEWSPAPER OR WATCHING TELEVISION: SEVERAL DAYS
5. POOR APPETITE OR OVEREATING: SEVERAL DAYS
6. FEELING BAD ABOUT YOURSELF - OR THAT YOU ARE A FAILURE OR HAVE LET YOURSELF OR YOUR FAMILY DOWN: NOT AT ALL
SUM OF ALL RESPONSES TO PHQ QUESTIONS 1-9: 4
1. LITTLE INTEREST OR PLEASURE IN DOING THINGS: NOT AT ALL
SUM OF ALL RESPONSES TO PHQ QUESTIONS 1-9: 4
SUM OF ALL RESPONSES TO PHQ QUESTIONS 1-9: 4
8. MOVING OR SPEAKING SO SLOWLY THAT OTHER PEOPLE COULD HAVE NOTICED. OR THE OPPOSITE, BEING SO FIGETY OR RESTLESS THAT YOU HAVE BEEN MOVING AROUND A LOT MORE THAN USUAL: NOT AT ALL

## 2025-04-08 ASSESSMENT — ANXIETY QUESTIONNAIRES
2. NOT BEING ABLE TO STOP OR CONTROL WORRYING: SEVERAL DAYS
1. FEELING NERVOUS, ANXIOUS, OR ON EDGE: SEVERAL DAYS
5. BEING SO RESTLESS THAT IT IS HARD TO SIT STILL: SEVERAL DAYS
4. TROUBLE RELAXING: SEVERAL DAYS
6. BECOMING EASILY ANNOYED OR IRRITABLE: SEVERAL DAYS
3. WORRYING TOO MUCH ABOUT DIFFERENT THINGS: SEVERAL DAYS
7. FEELING AFRAID AS IF SOMETHING AWFUL MIGHT HAPPEN: NOT AT ALL
GAD7 TOTAL SCORE: 6
IF YOU CHECKED OFF ANY PROBLEMS ON THIS QUESTIONNAIRE, HOW DIFFICULT HAVE THESE PROBLEMS MADE IT FOR YOU TO DO YOUR WORK, TAKE CARE OF THINGS AT HOME, OR GET ALONG WITH OTHER PEOPLE: SOMEWHAT DIFFICULT

## 2025-04-09 NOTE — PROGRESS NOTES
Length of meeting; 47 minutes    Start Time: 0955    Stop Time: 1042    Diagnosis: Adjustment disorder with mixed anxiety and depressed mood.    Treatment Plan:  Will focus on relaxation skills.  Will focus on different activities that she can engage in.  AWARE process for panic attacks.    Patient Prognosis: Fair.    Patient Progress: The patient indicates that she remains annoyed with some of her family members.  She processed the relative stressors that she is experiencing and her responses to her family members.  Noted that she has some intermittent fears of her cancer returning which is discussed.         Mental Status exam: Noted to be both irritable and tearful at times today.  SONIDO-7 is a 10. PHQ-9 is a 10; PHQ-2 is a 3.  No current active SI or HI, and continues to contract for safety.  Thought processes appear normal.  Affect is congruent with the topic being dicussed.  Speech is goal directed.  The patient does not appear to be responding to internal stimuli.     Plan: I will see this patient again in about 2 weeks.  PORFIRIO Rose

## 2025-04-22 ENCOUNTER — OFFICE VISIT (OUTPATIENT)
Dept: INTERNAL MEDICINE CLINIC | Facility: CLINIC | Age: 80
End: 2025-04-22
Payer: MEDICARE

## 2025-04-22 ENCOUNTER — TELEPHONE (OUTPATIENT)
Dept: ORTHOPEDIC SURGERY | Age: 80
End: 2025-04-22

## 2025-04-22 ENCOUNTER — LAB (OUTPATIENT)
Dept: INTERNAL MEDICINE CLINIC | Facility: CLINIC | Age: 80
End: 2025-04-22

## 2025-04-22 ENCOUNTER — OFFICE VISIT (OUTPATIENT)
Dept: BEHAVIORAL/MENTAL HEALTH CLINIC | Age: 80
End: 2025-04-22
Payer: MEDICARE

## 2025-04-22 VITALS
HEIGHT: 62 IN | DIASTOLIC BLOOD PRESSURE: 88 MMHG | TEMPERATURE: 97 F | OXYGEN SATURATION: 99 % | SYSTOLIC BLOOD PRESSURE: 136 MMHG | HEART RATE: 96 BPM | WEIGHT: 155 LBS | BODY MASS INDEX: 28.52 KG/M2

## 2025-04-22 DIAGNOSIS — R80.9 CONTROLLED TYPE 2 DIABETES MELLITUS WITH MICROALBUMINURIA, WITHOUT LONG-TERM CURRENT USE OF INSULIN (HCC): ICD-10-CM

## 2025-04-22 DIAGNOSIS — R53.83 OTHER FATIGUE: ICD-10-CM

## 2025-04-22 DIAGNOSIS — F43.23 ADJUSTMENT DISORDER WITH MIXED ANXIETY AND DEPRESSED MOOD: Primary | ICD-10-CM

## 2025-04-22 DIAGNOSIS — G47.9 SLEEP DISTURBANCE: ICD-10-CM

## 2025-04-22 DIAGNOSIS — E11.29 CONTROLLED TYPE 2 DIABETES MELLITUS WITH MICROALBUMINURIA, WITHOUT LONG-TERM CURRENT USE OF INSULIN (HCC): ICD-10-CM

## 2025-04-22 DIAGNOSIS — G47.62 NOCTURNAL LEG CRAMPS: ICD-10-CM

## 2025-04-22 DIAGNOSIS — F43.9 STRESS: ICD-10-CM

## 2025-04-22 DIAGNOSIS — R53.83 OTHER FATIGUE: Primary | ICD-10-CM

## 2025-04-22 DIAGNOSIS — I10 ESSENTIAL (PRIMARY) HYPERTENSION: ICD-10-CM

## 2025-04-22 LAB
ALBUMIN SERPL-MCNC: 3.7 G/DL (ref 3.2–4.6)
ALBUMIN/GLOB SERPL: 0.9 (ref 1–1.9)
ALP SERPL-CCNC: 79 U/L (ref 35–104)
ALT SERPL-CCNC: 19 U/L (ref 8–45)
ANION GAP SERPL CALC-SCNC: 12 MMOL/L (ref 7–16)
AST SERPL-CCNC: 26 U/L (ref 15–37)
BASOPHILS # BLD: 0.03 K/UL (ref 0–0.2)
BASOPHILS NFR BLD: 0.5 % (ref 0–2)
BILIRUB SERPL-MCNC: 0.8 MG/DL (ref 0–1.2)
BUN SERPL-MCNC: 21 MG/DL (ref 8–23)
CALCIUM SERPL-MCNC: 10.1 MG/DL (ref 8.8–10.2)
CHLORIDE SERPL-SCNC: 103 MMOL/L (ref 98–107)
CO2 SERPL-SCNC: 30 MMOL/L (ref 20–29)
CREAT SERPL-MCNC: 1.06 MG/DL (ref 0.6–1.1)
DIFFERENTIAL METHOD BLD: NORMAL
EOSINOPHIL # BLD: 0.07 K/UL (ref 0–0.8)
EOSINOPHIL NFR BLD: 1.2 % (ref 0.5–7.8)
ERYTHROCYTE [DISTWIDTH] IN BLOOD BY AUTOMATED COUNT: 13.7 % (ref 11.9–14.6)
GLOBULIN SER CALC-MCNC: 4.1 G/DL (ref 2.3–3.5)
GLUCOSE SERPL-MCNC: 180 MG/DL (ref 70–99)
HCT VFR BLD AUTO: 43.2 % (ref 35.8–46.3)
HGB BLD-MCNC: 13.9 G/DL (ref 11.7–15.4)
IMM GRANULOCYTES # BLD AUTO: 0.02 K/UL (ref 0–0.5)
IMM GRANULOCYTES NFR BLD AUTO: 0.3 % (ref 0–5)
LYMPHOCYTES # BLD: 1.14 K/UL (ref 0.5–4.6)
LYMPHOCYTES NFR BLD: 19.1 % (ref 13–44)
MAGNESIUM SERPL-MCNC: 1.7 MG/DL (ref 1.8–2.4)
MCH RBC QN AUTO: 27.9 PG (ref 26.1–32.9)
MCHC RBC AUTO-ENTMCNC: 32.2 G/DL (ref 31.4–35)
MCV RBC AUTO: 86.7 FL (ref 82–102)
MONOCYTES # BLD: 0.54 K/UL (ref 0.1–1.3)
MONOCYTES NFR BLD: 9 % (ref 4–12)
NEUTS SEG # BLD: 4.18 K/UL (ref 1.7–8.2)
NEUTS SEG NFR BLD: 69.9 % (ref 43–78)
NRBC # BLD: 0 K/UL (ref 0–0.2)
PLATELET # BLD AUTO: 209 K/UL (ref 150–450)
PMV BLD AUTO: 11.5 FL (ref 9.4–12.3)
POTASSIUM SERPL-SCNC: 3.9 MMOL/L (ref 3.5–5.1)
PROT SERPL-MCNC: 7.8 G/DL (ref 6.3–8.2)
RBC # BLD AUTO: 4.98 M/UL (ref 4.05–5.2)
SODIUM SERPL-SCNC: 145 MMOL/L (ref 136–145)
WBC # BLD AUTO: 6 K/UL (ref 4.3–11.1)

## 2025-04-22 PROCEDURE — 1123F ACP DISCUSS/DSCN MKR DOCD: CPT | Performed by: NURSE PRACTITIONER

## 2025-04-22 PROCEDURE — 3079F DIAST BP 80-89 MM HG: CPT | Performed by: NURSE PRACTITIONER

## 2025-04-22 PROCEDURE — G2211 COMPLEX E/M VISIT ADD ON: HCPCS | Performed by: NURSE PRACTITIONER

## 2025-04-22 PROCEDURE — 1123F ACP DISCUSS/DSCN MKR DOCD: CPT | Performed by: SOCIAL WORKER

## 2025-04-22 PROCEDURE — 3075F SYST BP GE 130 - 139MM HG: CPT | Performed by: NURSE PRACTITIONER

## 2025-04-22 PROCEDURE — 90834 PSYTX W PT 45 MINUTES: CPT | Performed by: SOCIAL WORKER

## 2025-04-22 PROCEDURE — 3044F HG A1C LEVEL LT 7.0%: CPT | Performed by: NURSE PRACTITIONER

## 2025-04-22 PROCEDURE — 1160F RVW MEDS BY RX/DR IN RCRD: CPT | Performed by: NURSE PRACTITIONER

## 2025-04-22 PROCEDURE — 1159F MED LIST DOCD IN RCRD: CPT | Performed by: NURSE PRACTITIONER

## 2025-04-22 PROCEDURE — 99214 OFFICE O/P EST MOD 30 MIN: CPT | Performed by: NURSE PRACTITIONER

## 2025-04-22 ASSESSMENT — ENCOUNTER SYMPTOMS
CONSTIPATION: 0
NAUSEA: 0
VOMITING: 0
WHEEZING: 0
DIARRHEA: 0
SORE THROAT: 0
SHORTNESS OF BREATH: 0
BLOOD IN STOOL: 0
ABDOMINAL PAIN: 0
COUGH: 0

## 2025-04-22 ASSESSMENT — PATIENT HEALTH QUESTIONNAIRE - PHQ9
SUM OF ALL RESPONSES TO PHQ QUESTIONS 1-9: 10
10. IF YOU CHECKED OFF ANY PROBLEMS, HOW DIFFICULT HAVE THESE PROBLEMS MADE IT FOR YOU TO DO YOUR WORK, TAKE CARE OF THINGS AT HOME, OR GET ALONG WITH OTHER PEOPLE: SOMEWHAT DIFFICULT
4. FEELING TIRED OR HAVING LITTLE ENERGY: MORE THAN HALF THE DAYS
SUM OF ALL RESPONSES TO PHQ QUESTIONS 1-9: 10
8. MOVING OR SPEAKING SO SLOWLY THAT OTHER PEOPLE COULD HAVE NOTICED. OR THE OPPOSITE, BEING SO FIGETY OR RESTLESS THAT YOU HAVE BEEN MOVING AROUND A LOT MORE THAN USUAL: NOT AT ALL
6. FEELING BAD ABOUT YOURSELF - OR THAT YOU ARE A FAILURE OR HAVE LET YOURSELF OR YOUR FAMILY DOWN: NOT AT ALL
SUM OF ALL RESPONSES TO PHQ QUESTIONS 1-9: 10
SUM OF ALL RESPONSES TO PHQ QUESTIONS 1-9: 10
1. LITTLE INTEREST OR PLEASURE IN DOING THINGS: SEVERAL DAYS
5. POOR APPETITE OR OVEREATING: MORE THAN HALF THE DAYS
2. FEELING DOWN, DEPRESSED OR HOPELESS: MORE THAN HALF THE DAYS
7. TROUBLE CONCENTRATING ON THINGS, SUCH AS READING THE NEWSPAPER OR WATCHING TELEVISION: SEVERAL DAYS
9. THOUGHTS THAT YOU WOULD BE BETTER OFF DEAD, OR OF HURTING YOURSELF: NOT AT ALL
3. TROUBLE FALLING OR STAYING ASLEEP: MORE THAN HALF THE DAYS

## 2025-04-22 ASSESSMENT — ANXIETY QUESTIONNAIRES
IF YOU CHECKED OFF ANY PROBLEMS ON THIS QUESTIONNAIRE, HOW DIFFICULT HAVE THESE PROBLEMS MADE IT FOR YOU TO DO YOUR WORK, TAKE CARE OF THINGS AT HOME, OR GET ALONG WITH OTHER PEOPLE: SOMEWHAT DIFFICULT
2. NOT BEING ABLE TO STOP OR CONTROL WORRYING: MORE THAN HALF THE DAYS
6. BECOMING EASILY ANNOYED OR IRRITABLE: SEVERAL DAYS
3. WORRYING TOO MUCH ABOUT DIFFERENT THINGS: MORE THAN HALF THE DAYS
5. BEING SO RESTLESS THAT IT IS HARD TO SIT STILL: MORE THAN HALF THE DAYS
1. FEELING NERVOUS, ANXIOUS, OR ON EDGE: MORE THAN HALF THE DAYS
4. TROUBLE RELAXING: SEVERAL DAYS
7. FEELING AFRAID AS IF SOMETHING AWFUL MIGHT HAPPEN: NOT AT ALL
GAD7 TOTAL SCORE: 10

## 2025-04-22 NOTE — PROGRESS NOTES
[facility]      2025    Patient Name: Shalini Rush  :  1945      Chief Complaint:  Chief Complaint   Patient presents with    Blood Sugar Problem     High         HPI  Patient presents today with complaint of fatigue and family-related stress. Has been seeing a therapist about every other week for the past 8 months which she feels has been helpful. Also reports trouble staying asleep at night. Appetite not as good as it used to be, but weight has been stable. Occasional nocturnal leg cramps. Reports that BP and blood sugar are high on occasion which she attributes to stress. Had an episode about 3 weeks ago where her blood sugar went up to 250 despite no change in diet. A1c on 25 was 6.5%. She is diet-controlled. Since that episode, she has been monitoring blood sugar a couple of times daily and reports that readings have been in the 110-150 range. Says that she \"cannot pinpoint it exactly, but I know there's something off with my body\". Had an annual check-up with an in-home provider that her insurance company sent out recently and was told something was off with her kidney function?       Past Medical History:   Diagnosis Date    Abdominal pain     Arthritis     Breast cancer     Breast cancer, left     radiation, surgery    Breast pain in female     Bunion     Chronic pain of left knee     Corns and callosities     DDD (degenerative disc disease), lumbar     Diabetes mellitus type II, controlled ([facility])     Diet controlled    Emotional disorder     Fungal infection of toenail     Hallux valgus     Hip pain, bilateral     Hyperglycemia     Hyperlipidemia     Hypertension     medication    Hypothyroidism     Lower GI bleed     Obesity     Other ill-defined conditions(149.89)     pt reports urinalysis shows blood - she is scheduled for CT pelvis and abdomen in August)    Postmenopausal     Right median nerve neuropathy     Stress incontinence     Traumatic arthropathy of knee

## 2025-04-22 NOTE — TELEPHONE ENCOUNTER
She called back about the rx. It is Mobic 7.5 and she needs a refill sent to the pharmacy on file.

## 2025-04-23 ENCOUNTER — RESULTS FOLLOW-UP (OUTPATIENT)
Dept: INTERNAL MEDICINE CLINIC | Facility: CLINIC | Age: 80
End: 2025-04-23

## 2025-04-23 DIAGNOSIS — M16.11 ARTHRITIS OF RIGHT HIP: ICD-10-CM

## 2025-04-23 DIAGNOSIS — M54.50 LUMBAR BACK PAIN: Primary | ICD-10-CM

## 2025-04-23 RX ORDER — MELOXICAM 7.5 MG/1
7.5 TABLET ORAL DAILY
Qty: 30 TABLET | Refills: 2 | Status: SHIPPED | OUTPATIENT
Start: 2025-04-23

## 2025-04-28 NOTE — PROGRESS NOTES
Length of meeting; 43   minutes    Start Time: 0950    Stop Time: 1036    Diagnosis: Adjustment disorder with mixed anxiety and depressed mood.    Treatment Plan:  Will focus on relaxation skills.  Will focus on different activities that she can engage in.  AWARE process for panic attacks.    Patient Prognosis: Fair.    Patient Progress: The patient indicates that \"I am doing good; just a little tired.\" She processed some issues with one of her sons today. Continues to set boundaries with her sons based on what she thinks is important.  She is accomplishing some of her goals in terms of her ADL's.        Mental Status exam: SONIDO-7 is a 8. PHQ-9 is a 7; PHQ-2 is a 1.  No current active SI or HI, and continues to contract for safety.  Thought processes appear normal.  Affect is congruent with the topic being dicussed.  Speech is goal directed.  The patient does not appear to be responding to internal stimuli.     Plan: I will see this patient again in about 2 weeks.  PORFIRIO Rose

## 2025-05-06 ENCOUNTER — OFFICE VISIT (OUTPATIENT)
Dept: BEHAVIORAL/MENTAL HEALTH CLINIC | Age: 80
End: 2025-05-06
Payer: MEDICARE

## 2025-05-06 DIAGNOSIS — F43.23 ADJUSTMENT DISORDER WITH MIXED ANXIETY AND DEPRESSED MOOD: Primary | ICD-10-CM

## 2025-05-06 PROCEDURE — 90834 PSYTX W PT 45 MINUTES: CPT | Performed by: SOCIAL WORKER

## 2025-05-06 PROCEDURE — 1123F ACP DISCUSS/DSCN MKR DOCD: CPT | Performed by: SOCIAL WORKER

## 2025-05-06 ASSESSMENT — PATIENT HEALTH QUESTIONNAIRE - PHQ9
SUM OF ALL RESPONSES TO PHQ QUESTIONS 1-9: 7
4. FEELING TIRED OR HAVING LITTLE ENERGY: MORE THAN HALF THE DAYS
3. TROUBLE FALLING OR STAYING ASLEEP: SEVERAL DAYS
7. TROUBLE CONCENTRATING ON THINGS, SUCH AS READING THE NEWSPAPER OR WATCHING TELEVISION: SEVERAL DAYS
6. FEELING BAD ABOUT YOURSELF - OR THAT YOU ARE A FAILURE OR HAVE LET YOURSELF OR YOUR FAMILY DOWN: NOT AT ALL
8. MOVING OR SPEAKING SO SLOWLY THAT OTHER PEOPLE COULD HAVE NOTICED. OR THE OPPOSITE, BEING SO FIGETY OR RESTLESS THAT YOU HAVE BEEN MOVING AROUND A LOT MORE THAN USUAL: SEVERAL DAYS
9. THOUGHTS THAT YOU WOULD BE BETTER OFF DEAD, OR OF HURTING YOURSELF: NOT AT ALL
SUM OF ALL RESPONSES TO PHQ QUESTIONS 1-9: 7
5. POOR APPETITE OR OVEREATING: SEVERAL DAYS
2. FEELING DOWN, DEPRESSED OR HOPELESS: SEVERAL DAYS
1. LITTLE INTEREST OR PLEASURE IN DOING THINGS: NOT AT ALL
SUM OF ALL RESPONSES TO PHQ QUESTIONS 1-9: 7
SUM OF ALL RESPONSES TO PHQ QUESTIONS 1-9: 7
10. IF YOU CHECKED OFF ANY PROBLEMS, HOW DIFFICULT HAVE THESE PROBLEMS MADE IT FOR YOU TO DO YOUR WORK, TAKE CARE OF THINGS AT HOME, OR GET ALONG WITH OTHER PEOPLE: SOMEWHAT DIFFICULT

## 2025-05-06 ASSESSMENT — ANXIETY QUESTIONNAIRES
3. WORRYING TOO MUCH ABOUT DIFFERENT THINGS: SEVERAL DAYS
2. NOT BEING ABLE TO STOP OR CONTROL WORRYING: MORE THAN HALF THE DAYS
6. BECOMING EASILY ANNOYED OR IRRITABLE: SEVERAL DAYS
7. FEELING AFRAID AS IF SOMETHING AWFUL MIGHT HAPPEN: NOT AT ALL
IF YOU CHECKED OFF ANY PROBLEMS ON THIS QUESTIONNAIRE, HOW DIFFICULT HAVE THESE PROBLEMS MADE IT FOR YOU TO DO YOUR WORK, TAKE CARE OF THINGS AT HOME, OR GET ALONG WITH OTHER PEOPLE: SOMEWHAT DIFFICULT
4. TROUBLE RELAXING: SEVERAL DAYS
1. FEELING NERVOUS, ANXIOUS, OR ON EDGE: MORE THAN HALF THE DAYS
5. BEING SO RESTLESS THAT IT IS HARD TO SIT STILL: SEVERAL DAYS
GAD7 TOTAL SCORE: 8

## 2025-05-08 ENCOUNTER — OFFICE VISIT (OUTPATIENT)
Age: 80
End: 2025-05-08
Payer: MEDICARE

## 2025-05-08 VITALS
BODY MASS INDEX: 28.16 KG/M2 | HEART RATE: 56 BPM | SYSTOLIC BLOOD PRESSURE: 140 MMHG | HEIGHT: 62 IN | DIASTOLIC BLOOD PRESSURE: 78 MMHG | WEIGHT: 153 LBS

## 2025-05-08 DIAGNOSIS — I10 ESSENTIAL (PRIMARY) HYPERTENSION: ICD-10-CM

## 2025-05-08 DIAGNOSIS — I10 HYPERTENSION, ESSENTIAL: ICD-10-CM

## 2025-05-08 DIAGNOSIS — R93.1 AGATSTON CORONARY ARTERY CALCIUM SCORE GREATER THAN 400: Primary | ICD-10-CM

## 2025-05-08 DIAGNOSIS — E11.65 TYPE 2 DIABETES MELLITUS WITH HYPERGLYCEMIA, WITHOUT LONG-TERM CURRENT USE OF INSULIN (HCC): ICD-10-CM

## 2025-05-08 PROCEDURE — 3078F DIAST BP <80 MM HG: CPT | Performed by: INTERNAL MEDICINE

## 2025-05-08 PROCEDURE — 1126F AMNT PAIN NOTED NONE PRSNT: CPT | Performed by: INTERNAL MEDICINE

## 2025-05-08 PROCEDURE — 99214 OFFICE O/P EST MOD 30 MIN: CPT | Performed by: INTERNAL MEDICINE

## 2025-05-08 PROCEDURE — 1123F ACP DISCUSS/DSCN MKR DOCD: CPT | Performed by: INTERNAL MEDICINE

## 2025-05-08 PROCEDURE — 3077F SYST BP >= 140 MM HG: CPT | Performed by: INTERNAL MEDICINE

## 2025-05-08 PROCEDURE — 93000 ELECTROCARDIOGRAM COMPLETE: CPT | Performed by: INTERNAL MEDICINE

## 2025-05-08 PROCEDURE — 3044F HG A1C LEVEL LT 7.0%: CPT | Performed by: INTERNAL MEDICINE

## 2025-05-08 NOTE — PROGRESS NOTES
[facility] CARDIOLOGY, [address], [address]  [place] [address]  PHONE: [phone]    SUBJECTIVE:   Shalini Rush is a 79 y.o. female 1945   seen for a follow up visit regarding the following:     Chief Complaint   Patient presents with    Hypertension    Hyperlipidemia         History of Present Illness  The patient is an 79-year-old individual with a medical history significant for hypertension, bradycardia, an Agatston score greater than 400, and hyperlipidemia.    The patient reports occasional labial protrusion due to tension, infrequent cephalalgia, decreased patience, and frequent interpersonal conflicts with their sons. Approximately one week ago, the patient experienced a cervical muscle strain, which has since improved. They express concerns regarding hypervigilance, dizziness attributed to resolved legal stress, insomnia, and occasional memory lapses. The patient is currently undergoing therapy for anxiety, which they find beneficial.    The patient is in remission from cancer.        Cardiac history:  6/2017 CT calcium score 840 [90th percentile]  7/2017 nuclear stress SPECT normal perfusion  2/28/2023 CT chest 3 mm right middle lobe nodule 12-month CT follow-up recommended     Results  - Imaging:    - Agatston score: > 400    - Diagnostic Testing:    - EKG (05/08/2025):      - Sinus bradycardia      - Low voltage      - Nonspecific ST abnormality       Assessment & Plan  Hypertension  - On chlorthalidone and irbesartan 150 mg daily    Bradycardia  - EKG on 05/08/2025 showed sinus bradycardia, low voltage, nonspecific ST abnormality  - No indications for Lasix therapy    Agatston score > 400  -NST last year now sx   Hyperlipidemia  - On Lipitor 40 mg daily              Current Outpatient Medications   Medication Sig    meloxicam (MOBIC) 7.5 MG tablet Take 1 tablet by mouth daily    atenolol-chlorthalidone (TENORETIC) 50-25 MG per tablet Take 0.5 tablets by mouth daily

## 2025-05-11 ENCOUNTER — APPOINTMENT (OUTPATIENT)
Dept: GENERAL RADIOLOGY | Age: 80
End: 2025-05-11
Payer: MEDICARE

## 2025-05-11 ENCOUNTER — HOSPITAL ENCOUNTER (EMERGENCY)
Age: 80
Discharge: HOME | End: 2025-05-11
Attending: EMERGENCY MEDICINE
Payer: MEDICARE

## 2025-05-11 ENCOUNTER — APPOINTMENT (OUTPATIENT)
Dept: CT IMAGING | Age: 80
End: 2025-05-11
Payer: MEDICARE

## 2025-05-11 VITALS
HEART RATE: 74 BPM | OXYGEN SATURATION: 98 % | SYSTOLIC BLOOD PRESSURE: 155 MMHG | TEMPERATURE: 97.3 F | RESPIRATION RATE: 16 BRPM | DIASTOLIC BLOOD PRESSURE: 82 MMHG

## 2025-05-11 DIAGNOSIS — S09.90XA CLOSED HEAD INJURY, INITIAL ENCOUNTER: ICD-10-CM

## 2025-05-11 DIAGNOSIS — S00.511A ABRASION OF LIP, INITIAL ENCOUNTER: Primary | ICD-10-CM

## 2025-05-11 DIAGNOSIS — M25.512 ACUTE PAIN OF LEFT SHOULDER: ICD-10-CM

## 2025-05-11 PROCEDURE — 99284 EMERGENCY DEPT VISIT MOD MDM: CPT

## 2025-05-11 PROCEDURE — 73030 X-RAY EXAM OF SHOULDER: CPT

## 2025-05-11 PROCEDURE — 70450 CT HEAD/BRAIN W/O DYE: CPT

## 2025-05-11 PROCEDURE — 90714 TD VACC NO PRESV 7 YRS+ IM: CPT | Performed by: EMERGENCY MEDICINE

## 2025-05-11 PROCEDURE — 72125 CT NECK SPINE W/O DYE: CPT

## 2025-05-11 PROCEDURE — 6360000002 HC RX W HCPCS: Performed by: EMERGENCY MEDICINE

## 2025-05-11 PROCEDURE — 90471 IMMUNIZATION ADMIN: CPT | Performed by: EMERGENCY MEDICINE

## 2025-05-11 RX ORDER — BACITRACIN ZINC AND POLYMYXIN B SULFATE 500; 1000 [USP'U]/G; [USP'U]/G
OINTMENT TOPICAL
Qty: 15 G | Refills: 1 | Status: SHIPPED | OUTPATIENT
Start: 2025-05-11 | End: 2025-05-18

## 2025-05-11 RX ADMIN — CLOSTRIDIUM TETANI TOXOID ANTIGEN (FORMALDEHYDE INACTIVATED) AND CORYNEBACTERIUM DIPHTHERIAE TOXOID ANTIGEN (FORMALDEHYDE INACTIVATED) 0.5 ML: 5; 2 INJECTION, SUSPENSION INTRAMUSCULAR at 16:08

## 2025-05-11 NOTE — ED PROVIDER NOTES
1/27/2023.     FINDINGS: There is no evidence of cerebral hemorrhage. No abnormal focal mass  effect is seen in the brain. Mild patchy low-attenuation changes in the mainly  periventricular corona radiata bilaterally suggestive of chronic ischemic  demyelinative change appear stable. There is no evidence of midline shift. There  is no evidence of subdural hematoma. Calvarium remains intact.      Impression    No CT evidence of acute intracranial abnormality.    Electronically signed by Aristides Cavazos   CT CERVICAL SPINE WO CONTRAST    Narrative    CT of the Cervical Spine    INDICATION:  Fall; injury    TECHNIQUE: [id number]  axial images were obtained through the cervical spine  without intravenous contrast. Coronal and sagittal reformatted images were also  reviewed.  Radiation dose reduction techniques were used for this study:  All CT  scans performed at this facility use one or all of the following: Automated  exposure control, adjustment of the mA and/or kVp according to patient's size,  iterative reconstruction.    COMPARISON: 27 July 2023    Findings: No findings of traumatic malalignment. Moderate degenerative changes  of the cervical spine disproportionally involving the C5-6 and C6-7 discs as  well as multilevel facet degenerative changes particularly at the C4-5 level. No  acute finding.      Impression    Degenerative changes without acute finding.      Electronically signed by Domo Childress         XR SHOULDER LEFT (MIN 2 VIEWS)   Final Result   Findings/impression: There are postsurgical changes of left shoulder   arthroplasty without acute finding. Chronic appearing changes to the visualized   thorax.         Electronically signed by Domo Childress      CT HEAD WO CONTRAST   Final Result   No CT evidence of acute intracranial abnormality.      Electronically signed by Aristides Cavazos      CT CERVICAL SPINE WO CONTRAST   Final Result   Degenerative changes without acute finding.

## 2025-05-11 NOTE — ED TRIAGE NOTES
Patient came via EMS from home after fall from standing. Patient states she fell after stepping down from sidewalk. Patient states she unsure of LOC. Reports right arm pain and an abrasion to lower lip.

## 2025-05-11 NOTE — ED NOTES
Patient mobility status  with no difficulty.     I have reviewed discharge instructions with the patient.  The patient verbalized understanding.    Patient left ED via Discharge Method: ambulatory to Home with Child.    Opportunity for questions and clarification provided.     Patient given 1e scripts.

## 2025-05-11 NOTE — DISCHARGE INSTRUCTIONS
Keep lip wound clean.  Apply topical bacitracin ointment to site.  Schedule close follow-up with primary care physician.  Return to ED if symptoms worsen or progress in any way including but not limited to headache, dizziness, focal weakness, chest pain, shortness of breath, etc.

## 2025-05-12 ENCOUNTER — CARE COORDINATION (OUTPATIENT)
Dept: CARE COORDINATION | Facility: CLINIC | Age: 80
End: 2025-05-12

## 2025-05-12 NOTE — CARE COORDINATION
Ambulatory Care Coordination Note     2025 1:59 PM     Patient Current Location:  [place]     This patient was received as a referral from Ambulatory Care Manager .    ACM contacted the patient by telephone. Verified name and  with patient as identifiers. Provided introduction to self, and explanation of the ACM role.   Patient declined care management services at this time.          ACM: Jeanine Mckeon RN     Challenges to be reviewed by the provider   Additional needs identified to be addressed with provider No  none               Method of communication with provider: phone.    Utilization: Initial Call - Discharge Date: 25   Discharge Facility: [facility]  Reason for ED Visit: fall after stepping off sidewal.  Visit Diagnosis: abrasion of lip, acute pain of Lt shoulder    Number of ED visits in the last 6 months: 2      Do you have any ongoing symptoms? No  Did you call your PCP prior to going to the ED? No, did not call the PCP office.     Review of Discharge Instructions:   [x] AVS discharge instructions  [x] Right Care, Right Place, Right Time document  [x] Medication changes  [x] Follow up appointments  [x] Referral follow up          Care Summary Note: Pt has respectfully declined services at this time, my contact information has been shared with pt in the event there circumstances change. They are free to reach out at any time. ACM signing off.    PCP/Specialist follow up:   Future Appointments         Provider Specialty Dept Phone    2025 10:00 AM [facility] [phone]    2025 8:30 AM Connor Enciso MD Oncology [phone]    2025 8:30 AM [facility] Internal Medicine [phone]    2025 1:00 PM Nikolas Dunham MD Internal Medicine [phone]    2025 10:10 AM (Arrive by 9:55 AM) Tobin Lemon Jr., MD Orthopedic Surgery [phone]

## 2025-05-13 NOTE — PROGRESS NOTES
Length of meeting; 43  minutes    Start Time: 0952 8    Stop Time: 1035    Diagnosis: Adjustment disorder with mixed anxiety and depressed mood.    Treatment Plan:  Will focus on relaxation skills.  Will focus on different activities that she can engage in.  AWARE process for panic attacks.    Patient Prognosis: Fair.    Patient Progress: The patient indicates that \"I got shook up with a fall, but I'm good.\" Notes some residual fear of falling. Discussed her relationship with one of her sons and related issues. Talked a bit about her expectations and the role it may play in her frustrations. Emotional support provided.          Mental Status exam: PHQ-9 is a  6.  No current active SI or HI, and continues to contract for safety.  Thought processes appear normal.  Affect is congruent with the topic being dicussed.  Speech is goal directed.  The patient does not appear to be responding to internal stimuli.     Plan: I will see this patient again in about 2 weeks.  PORFIRIO Rose

## 2025-05-20 ENCOUNTER — OFFICE VISIT (OUTPATIENT)
Dept: BEHAVIORAL/MENTAL HEALTH CLINIC | Age: 80
End: 2025-05-20
Payer: MEDICARE

## 2025-05-20 DIAGNOSIS — F43.23 ADJUSTMENT DISORDER WITH MIXED ANXIETY AND DEPRESSED MOOD: Primary | ICD-10-CM

## 2025-05-20 PROCEDURE — 1123F ACP DISCUSS/DSCN MKR DOCD: CPT | Performed by: SOCIAL WORKER

## 2025-05-20 PROCEDURE — 90834 PSYTX W PT 45 MINUTES: CPT | Performed by: SOCIAL WORKER

## 2025-05-20 ASSESSMENT — PATIENT HEALTH QUESTIONNAIRE - PHQ9
7. TROUBLE CONCENTRATING ON THINGS, SUCH AS READING THE NEWSPAPER OR WATCHING TELEVISION: SEVERAL DAYS
SUM OF ALL RESPONSES TO PHQ QUESTIONS 1-9: 6
SUM OF ALL RESPONSES TO PHQ QUESTIONS 1-9: 6
9. THOUGHTS THAT YOU WOULD BE BETTER OFF DEAD, OR OF HURTING YOURSELF: NOT AT ALL
SUM OF ALL RESPONSES TO PHQ QUESTIONS 1-9: 6
2. FEELING DOWN, DEPRESSED OR HOPELESS: SEVERAL DAYS
8. MOVING OR SPEAKING SO SLOWLY THAT OTHER PEOPLE COULD HAVE NOTICED. OR THE OPPOSITE, BEING SO FIGETY OR RESTLESS THAT YOU HAVE BEEN MOVING AROUND A LOT MORE THAN USUAL: SEVERAL DAYS
SUM OF ALL RESPONSES TO PHQ QUESTIONS 1-9: 6
3. TROUBLE FALLING OR STAYING ASLEEP: SEVERAL DAYS
6. FEELING BAD ABOUT YOURSELF - OR THAT YOU ARE A FAILURE OR HAVE LET YOURSELF OR YOUR FAMILY DOWN: NOT AT ALL
5. POOR APPETITE OR OVEREATING: SEVERAL DAYS
4. FEELING TIRED OR HAVING LITTLE ENERGY: SEVERAL DAYS
1. LITTLE INTEREST OR PLEASURE IN DOING THINGS: NOT AT ALL
10. IF YOU CHECKED OFF ANY PROBLEMS, HOW DIFFICULT HAVE THESE PROBLEMS MADE IT FOR YOU TO DO YOUR WORK, TAKE CARE OF THINGS AT HOME, OR GET ALONG WITH OTHER PEOPLE: SOMEWHAT DIFFICULT

## 2025-05-27 NOTE — PROGRESS NOTES
Shalini Rush (:  1945) is a 80 y.o. female,Established patient, here for evaluation of the following chief complaint(s):  ED Follow-up (Pt seen at [facility] ER on 25 due to a fall and she c/o an abrasion on her lip and L shoulder pain.)          Assessment/Plan  1. Left shoulder strain, initial encounter  -     [facility] - Physical Therapy, [facility] Internal Clin[facility]  2. History of recent fall  -     [facility] - Physical Therapy, [facility] Clin[facility]  3. History of left shoulder replacement  -     [facility] - Physical Therapy, [facility]  4. Insomnia due to medical condition         Patient Instructions   Recommend considering increase of Meloxicam to 15 mg daily (2 x 7.5 mg tablets) daily x 2-4 weeks - if this is something you decide to do just give us a call and we can modify your prescription to accommodate the dosage change  Alternative to above is to continue Meloxicam 7.5 mg daily and add OTC Voltaren Gel 4 grams to L shoulder 3-4 times/day x 2-4 weeks  Suggest switching magnesium supplement to magnesium glycinate 350 mg nightly - see handout  Additional natural efforts to improve sleep quality could include Extended Release Melatonin 3-10 mg nightly - this would likely take a few weeks to work so would suggest committing 1 month trial if this step is taken  Encouraged range of motion exercises for both shoulders at home in addition to physical therapy ordered  Continue chronic medications as prescribed          Return if symptoms worsen or fail to improve.      Subjective   Shoulder Injury   Incident location: outside of [employer]. Both (L>R) shoulders are affected. The incident occurred more than 1 week ago (25). The injury mechanism was a fall. Quality: soreness. The pain does not radiate. The pain is at a severity of 5/10. Pertinent negatives include no muscle weakness, numbness or tingling. The symptoms are aggravated by movement. She has tried heat and

## 2025-05-29 ENCOUNTER — OFFICE VISIT (OUTPATIENT)
Dept: INTERNAL MEDICINE CLINIC | Facility: CLINIC | Age: 80
End: 2025-05-29
Payer: MEDICARE

## 2025-05-29 VITALS
TEMPERATURE: 97.2 F | WEIGHT: 153 LBS | DIASTOLIC BLOOD PRESSURE: 80 MMHG | SYSTOLIC BLOOD PRESSURE: 130 MMHG | HEIGHT: 62 IN | BODY MASS INDEX: 28.16 KG/M2 | OXYGEN SATURATION: 98 % | HEART RATE: 59 BPM

## 2025-05-29 DIAGNOSIS — Z91.81 HISTORY OF RECENT FALL: ICD-10-CM

## 2025-05-29 DIAGNOSIS — S46.912A LEFT SHOULDER STRAIN, INITIAL ENCOUNTER: Primary | ICD-10-CM

## 2025-05-29 DIAGNOSIS — G47.01 INSOMNIA DUE TO MEDICAL CONDITION: ICD-10-CM

## 2025-05-29 DIAGNOSIS — Z96.612 HISTORY OF LEFT SHOULDER REPLACEMENT: ICD-10-CM

## 2025-05-29 PROCEDURE — 3079F DIAST BP 80-89 MM HG: CPT | Performed by: PHYSICIAN ASSISTANT

## 2025-05-29 PROCEDURE — 1123F ACP DISCUSS/DSCN MKR DOCD: CPT | Performed by: PHYSICIAN ASSISTANT

## 2025-05-29 PROCEDURE — G2211 COMPLEX E/M VISIT ADD ON: HCPCS | Performed by: PHYSICIAN ASSISTANT

## 2025-05-29 PROCEDURE — 99214 OFFICE O/P EST MOD 30 MIN: CPT | Performed by: PHYSICIAN ASSISTANT

## 2025-05-29 PROCEDURE — 1125F AMNT PAIN NOTED PAIN PRSNT: CPT | Performed by: PHYSICIAN ASSISTANT

## 2025-05-29 PROCEDURE — 3075F SYST BP GE 130 - 139MM HG: CPT | Performed by: PHYSICIAN ASSISTANT

## 2025-05-29 PROCEDURE — 1159F MED LIST DOCD IN RCRD: CPT | Performed by: PHYSICIAN ASSISTANT

## 2025-05-29 NOTE — PATIENT INSTRUCTIONS
Recommend considering increase of Meloxicam to 15 mg daily (2 x 7.5 mg tablets) daily x 2-4 weeks - if this is something you decide to do just give us a call and we can modify your prescription to accommodate the dosage change  Alternative to above is to continue Meloxicam 7.5 mg daily and add OTC Voltaren Gel 4 grams to L shoulder 3-4 times/day x 2-4 weeks  Suggest switching magnesium supplement to magnesium glycinate 350 mg nightly - see handout  Additional natural efforts to improve sleep quality could include Extended Release Melatonin 3-10 mg nightly - this would likely take a few weeks to work so would suggest committing 1 month trial if this step is taken  Encouraged range of motion exercises for both shoulders at home in addition to physical therapy ordered  Continue chronic medications as prescribed

## 2025-06-04 ENCOUNTER — OFFICE VISIT (OUTPATIENT)
Dept: BEHAVIORAL/MENTAL HEALTH CLINIC | Age: 80
End: 2025-06-04
Payer: MEDICARE

## 2025-06-04 DIAGNOSIS — F43.23 ADJUSTMENT DISORDER WITH MIXED ANXIETY AND DEPRESSED MOOD: Primary | ICD-10-CM

## 2025-06-04 PROCEDURE — 90832 PSYTX W PT 30 MINUTES: CPT | Performed by: SOCIAL WORKER

## 2025-06-04 PROCEDURE — 1123F ACP DISCUSS/DSCN MKR DOCD: CPT | Performed by: SOCIAL WORKER

## 2025-06-04 ASSESSMENT — PATIENT HEALTH QUESTIONNAIRE - PHQ9
7. TROUBLE CONCENTRATING ON THINGS, SUCH AS READING THE NEWSPAPER OR WATCHING TELEVISION: SEVERAL DAYS
3. TROUBLE FALLING OR STAYING ASLEEP: MORE THAN HALF THE DAYS
6. FEELING BAD ABOUT YOURSELF - OR THAT YOU ARE A FAILURE OR HAVE LET YOURSELF OR YOUR FAMILY DOWN: NOT AT ALL
SUM OF ALL RESPONSES TO PHQ QUESTIONS 1-9: 6
4. FEELING TIRED OR HAVING LITTLE ENERGY: SEVERAL DAYS
SUM OF ALL RESPONSES TO PHQ QUESTIONS 1-9: 6
1. LITTLE INTEREST OR PLEASURE IN DOING THINGS: NOT AT ALL
9. THOUGHTS THAT YOU WOULD BE BETTER OFF DEAD, OR OF HURTING YOURSELF: NOT AT ALL
10. IF YOU CHECKED OFF ANY PROBLEMS, HOW DIFFICULT HAVE THESE PROBLEMS MADE IT FOR YOU TO DO YOUR WORK, TAKE CARE OF THINGS AT HOME, OR GET ALONG WITH OTHER PEOPLE: SOMEWHAT DIFFICULT
5. POOR APPETITE OR OVEREATING: MORE THAN HALF THE DAYS
2. FEELING DOWN, DEPRESSED OR HOPELESS: NOT AT ALL
SUM OF ALL RESPONSES TO PHQ QUESTIONS 1-9: 6
8. MOVING OR SPEAKING SO SLOWLY THAT OTHER PEOPLE COULD HAVE NOTICED. OR THE OPPOSITE, BEING SO FIGETY OR RESTLESS THAT YOU HAVE BEEN MOVING AROUND A LOT MORE THAN USUAL: NOT AT ALL
SUM OF ALL RESPONSES TO PHQ QUESTIONS 1-9: 6

## 2025-06-05 ENCOUNTER — TELEPHONE (OUTPATIENT)
Age: 80
End: 2025-06-05

## 2025-06-05 RX ORDER — LETROZOLE 2.5 MG/1
2.5 TABLET, FILM COATED ORAL DAILY
Qty: 90 TABLET | Refills: 3 | Status: SHIPPED | OUTPATIENT
Start: 2025-06-05

## 2025-06-05 NOTE — TELEPHONE ENCOUNTER
Medication Requested: Letrozole    Date last prescribed: 6/10/24    [facility]    Action Taken: Chart reviewed, refill pended and routed for signature.

## 2025-06-05 NOTE — TELEPHONE ENCOUNTER
Physician provider: Dr. Enciso  Reason for today's call (Please detail here patients chief complaint): Rx refill  Last office visit:n/a  Patient Callback Number: [phone]  Was callback number verified?: Yes  Preferred pharmacy (If refill request): [facility]  Veriified that patient confirmed no refills left at pharmacy? :Yes  Calls to office within the last 48 hours?:No    Warm Transfer to (For Red Words): none    Patient notified that their information will be routed to the [facility] clinical triage team for review. Patient is advised that they will receive a phone call from the triage department. If symptom related and symptoms worsen before receiving a call back, the patient has been advised to proceed to the nearest ED.              Rx refill letrozole    [phone]

## 2025-06-09 NOTE — PROGRESS NOTES
Length of meeting;  42 minutes    Start Time: 1101    Stop Time: 1142    Diagnosis: Adjustment disorder with mixed anxiety and depressed mood.    Treatment Plan:  Will focus on relaxation skills.  Will focus on different activities that she can engage in.  AWARE process for panic attacks.    Patient Prognosis: Fair.    Patient Progress: The patient indicates that \"I have had some rough moments since I saw you last.\"  She discussed some conflicts with her sons, respectively.   She notes that her perception is that she suspects that her sons are not as supportive of her as she is of them.  This is also discussed; emotional support is offered.      Mental Status exam: SONIDO-7 was a 13.  PHQ-9 is a 8; PHQ-2 was a 2.  No current active SI or HI, and continues to contract for safety.  Thought processes appear normal.  Affect is congruent with the topic being dicussed.  Speech is goal directed.  The patient does not appear to be responding to internal stimuli.     Plan: I will see this patient again in about 2 weeks.  PORFIRIO Rose

## 2025-06-12 ENCOUNTER — HOSPITAL ENCOUNTER (OUTPATIENT)
Dept: PHYSICAL THERAPY | Age: 80
Setting detail: RECURRING SERIES
Discharge: HOME | End: 2025-06-15
Payer: MEDICARE

## 2025-06-12 DIAGNOSIS — M25.512 ACUTE PAIN OF LEFT SHOULDER: Primary | ICD-10-CM

## 2025-06-12 PROCEDURE — 97110 THERAPEUTIC EXERCISES: CPT

## 2025-06-12 PROCEDURE — 97162 PT EVAL MOD COMPLEX 30 MIN: CPT

## 2025-06-12 ASSESSMENT — PAIN DESCRIPTION - LOCATION: LOCATION: SHOULDER

## 2025-06-12 ASSESSMENT — PAIN SCALES - GENERAL: PAINLEVEL_OUTOF10: 5

## 2025-06-12 ASSESSMENT — PAIN DESCRIPTION - ORIENTATION: ORIENTATION: LEFT

## 2025-06-12 NOTE — PROGRESS NOTES
Shalini STAUFFER Victor Manuel  : 1945  Primary: [employer] [place] Hmo/p* (Medicare Managed)  Secondary: MEDICAID [facility] @ [address]  [facility] [address]  Phone: [phone]  Fax: [phone] Plan Frequency: 1 time per week for 90 days    Plan of Care/Certification Expiration Date: 09/10/25        Plan of Care/Certification Expiration Date:  Plan of Care/Certification Expiration Date: 09/10/25    Frequency/Duration: Plan Frequency: 1 time per week for 90 days      Time In/Out:   Time In: 0830  Time Out: 0900      PT Visit Info:    Progress Note Due Date: 25  Total # of Visits to Date: 1  Progress Note Counter: 1      Visit Count:  1    OUTPATIENT PHYSICAL THERAPY:   Treatment Note 2025       Episode  (PT: Left shoulder pain)               Treatment Diagnosis:    Acute pain of left shoulder  Medical/Referring Diagnosis:    Left shoulder strain, initial encounter  History of recent fall  History of left shoulder replacement    Referring Physician:  Jana Huggins PA-C MD Orders:  PT Eval and Treat   Return MD Appt:  TBD   Date of Onset:  Onset Date:  (Fell in May 2025)     Allergies:   Milk protein  Restrictions/Precautions:   None      Interventions Planned (Treatment may consist of any combination of the following):     See Assessment Note    Subjective Comments:   Patient reports she fell onto her left shoulder in May.  She reports that was the shoulder that was replaced about 15 years ago.  She reports she hopes therapy can help with her pain.    Initial Pain Level: Left Shoulder 5/10  Post Session Pain Level: Left  Shoulder 5/10  Medications Last Reviewed: 2025  Updated Objective Findings:  See Evaluation Note from today  Treatment   THERAPEUTIC EXERCISE: (15 minutes):    Exercises per grid below to improve mobility and strength.  Required minimal visual, verbal, and manual cues to promote proper body alignment, promote proper body

## 2025-06-12 NOTE — THERAPY EVALUATION
Shalini Rush  : 1945  Primary: [employer] [place] Hmo/p* (Medicare Managed)  Secondary: MEDICAID [facility] @ [address]  [facility] [address]  Phone: [phone]  Fax: [phone] Plan Frequency: 1 time per week for 90 days    Plan of Care/Certification Expiration Date: 09/10/25        Plan of Care/Certification Expiration Date:  Plan of Care/Certification Expiration Date: 09/10/25    Frequency/Duration: Plan Frequency: 1 time per week for 90 days      Time In/Out:   Time In: 0830  Time Out: 0900      PT Visit Info:    Progress Note Due Date: 25  Total # of Visits to Date: 1  Progress Note Counter: 1      Visit Count:  1                OUTPATIENT PHYSICAL THERAPY:             Initial Assessment 2025               Episode (PT: Left shoulder pain)         Treatment Diagnosis:     Acute pain of left shoulder  Medical/Referring Diagnosis:    Left shoulder strain, initial encounter  History of recent fall  History of left shoulder replacement    Referring Physician:  Jana Huggins PA-C MD Orders:  PT Eval and Treat   Return MD Appt:  TBD  Date of Onset:  Onset Date:  (Fell in May 2025)  Allergies:  Milk protein  Restrictions/Precautions:    None      Medications Last Reviewed: 2025     SUBJECTIVE   History of Injury/Illness (Reason for Referral):  Patient reports that she was leaving the [employer] store on [facility] in May 2025, when she fell and \"blacked out.\"  She reports that she came to with pain in her left shoulder and her lip bleeding.  She reports that she went to the ER and they did several tests which were negative.  She reports that she had her left shoulder replaced about 15 years ago and never regained full range of motion.  She reports that since her fall, the range of motion is about the same, but she is having more pain. She reports that pain medication and heat do help with her pain.  She reports she hopes

## 2025-06-15 DIAGNOSIS — I10 ESSENTIAL (PRIMARY) HYPERTENSION: ICD-10-CM

## 2025-06-16 RX ORDER — IRBESARTAN 150 MG/1
150 TABLET ORAL NIGHTLY
Qty: 90 TABLET | Refills: 3 | OUTPATIENT
Start: 2025-06-16

## 2025-06-19 ENCOUNTER — HOSPITAL ENCOUNTER (OUTPATIENT)
Dept: PHYSICAL THERAPY | Age: 80
Setting detail: RECURRING SERIES
Discharge: HOME | End: 2025-06-22
Payer: MEDICARE

## 2025-06-19 PROCEDURE — 97140 MANUAL THERAPY 1/> REGIONS: CPT

## 2025-06-19 PROCEDURE — 97110 THERAPEUTIC EXERCISES: CPT

## 2025-06-19 NOTE — PROGRESS NOTES
Shalini Rush  : 1945  Primary: [employer] [place] Hmo/p* (Medicare Managed)  Secondary: MEDICAID [facility] @ [address]  [facility] [address]  Phone: [phone]  Fax: [phone] Plan Frequency: 1 time per week for 90 days    Plan of Care/Certification Expiration Date: 09/10/25        Plan of Care/Certification Expiration Date:  Plan of Care/Certification Expiration Date: 09/10/25    Frequency/Duration: Plan Frequency: 1 time per week for 90 days      Time In/Out:   Time In: 1522  Time Out: 1600      PT Visit Info:    Progress Note Due Date: 25  Total # of Visits to Date: 2  Progress Note Counter: 2      Visit Count:  2    OUTPATIENT PHYSICAL THERAPY:   Treatment Note 2025       Episode  (PT: Left shoulder pain)               Treatment Diagnosis:    Acute pain of left shoulder  Medical/Referring Diagnosis:    Left shoulder strain, initial encounter  History of recent fall  History of left shoulder replacement    Referring Physician:  Jana Huggins PA-C MD Orders:  PT Eval and Treat   Return MD Appt:  TBD   Date of Onset:  Onset Date:  (Fell in May 2025)     Allergies:   Milk protein  Restrictions/Precautions:   None      Interventions Planned (Treatment may consist of any combination of the following):     See Assessment Note    Subjective Comments:   \"I am ok\" \"If I use it consistency and I feel it at night when I sleep\"    Initial Pain Level:   4   /10  Post Session Pain Level:  4     /10  Medications Last Reviewed: 2025  Updated Objective Findings:  None Today  Treatment   THERAPEUTIC EXERCISE: (16 minutes):    Exercises per grid below to improve mobility and strength.  Required minimal visual, verbal, and manual cues to promote proper body alignment, promote proper body posture, and promote proper body mechanics.  Progressed resistance, range, repetitions, and complexity of movement as indicated.   Date:  2025

## 2025-06-26 ENCOUNTER — HOSPITAL ENCOUNTER (OUTPATIENT)
Dept: PHYSICAL THERAPY | Age: 80
Setting detail: RECURRING SERIES
Discharge: HOME | End: 2025-06-29
Payer: MEDICARE

## 2025-06-26 PROCEDURE — 97110 THERAPEUTIC EXERCISES: CPT

## 2025-06-26 PROCEDURE — 97140 MANUAL THERAPY 1/> REGIONS: CPT

## 2025-06-26 ASSESSMENT — PAIN DESCRIPTION - LOCATION: LOCATION: SHOULDER

## 2025-06-26 ASSESSMENT — PAIN SCALES - GENERAL: PAINLEVEL_OUTOF10: 2

## 2025-06-26 ASSESSMENT — PAIN DESCRIPTION - ORIENTATION: ORIENTATION: LEFT

## 2025-06-26 NOTE — PROGRESS NOTES
Shalini Rush  : 1945  Primary: [employer] [place] Hmo/p* (Medicare Managed)  Secondary: MEDICAID [facility] @ [address]  [facility] [address]  Phone: [phone]  Fax: [phone] Plan Frequency: 1 time per week for 90 days    Plan of Care/Certification Expiration Date: 09/10/25        Plan of Care/Certification Expiration Date:  Plan of Care/Certification Expiration Date: 09/10/25    Frequency/Duration: Plan Frequency: 1 time per week for 90 days      Time In/Out:   Time In: 1300  Time Out: 1338      PT Visit Info:    Progress Note Due Date: 25  Total # of Visits to Date: 3  Progress Note Counter: 3      Visit Count:  3    OUTPATIENT PHYSICAL THERAPY:   Treatment Note 2025       Episode  (PT: Left shoulder pain)               Treatment Diagnosis:    Acute pain of left shoulder  Medical/Referring Diagnosis:    Left shoulder strain, initial encounter  History of recent fall  History of left shoulder replacement    Referring Physician:  Jana Huggins PA-C MD Orders:  PT Eval and Treat   Return MD Appt:  TBD   Date of Onset:  Onset Date:  (Fell in May 2025)     Allergies:   Milk protein  Restrictions/Precautions:   None      Interventions Planned (Treatment may consist of any combination of the following):     See Assessment Note    Subjective Comments:   Patient reports she is doing ok today.    Initial Pain Level: Left Shoulder 2/10  Post Session Pain Level: Left Shoulder 2/10  Medications Last Reviewed: 2025  Updated Objective Findings:  None Today  Treatment   THERAPEUTIC EXERCISE: (30 minutes):    Exercises per grid below to improve mobility and strength.  Required minimal visual, verbal, and manual cues to promote proper body alignment, promote proper body posture, and promote proper body mechanics.  Progressed resistance, range, repetitions, and complexity of movement as indicated.   Date:  2025   Activity/Exercise

## 2025-06-30 ENCOUNTER — OFFICE VISIT (OUTPATIENT)
Dept: BEHAVIORAL/MENTAL HEALTH CLINIC | Age: 80
End: 2025-06-30
Payer: MEDICARE

## 2025-06-30 DIAGNOSIS — F43.23 ADJUSTMENT DISORDER WITH MIXED ANXIETY AND DEPRESSED MOOD: Primary | ICD-10-CM

## 2025-06-30 PROCEDURE — 1123F ACP DISCUSS/DSCN MKR DOCD: CPT | Performed by: SOCIAL WORKER

## 2025-06-30 PROCEDURE — 90834 PSYTX W PT 45 MINUTES: CPT | Performed by: SOCIAL WORKER

## 2025-06-30 ASSESSMENT — PATIENT HEALTH QUESTIONNAIRE - PHQ9
SUM OF ALL RESPONSES TO PHQ QUESTIONS 1-9: 8
5. POOR APPETITE OR OVEREATING: SEVERAL DAYS
8. MOVING OR SPEAKING SO SLOWLY THAT OTHER PEOPLE COULD HAVE NOTICED. OR THE OPPOSITE, BEING SO FIGETY OR RESTLESS THAT YOU HAVE BEEN MOVING AROUND A LOT MORE THAN USUAL: NOT AT ALL
4. FEELING TIRED OR HAVING LITTLE ENERGY: MORE THAN HALF THE DAYS
7. TROUBLE CONCENTRATING ON THINGS, SUCH AS READING THE NEWSPAPER OR WATCHING TELEVISION: SEVERAL DAYS
SUM OF ALL RESPONSES TO PHQ QUESTIONS 1-9: 8
9. THOUGHTS THAT YOU WOULD BE BETTER OFF DEAD, OR OF HURTING YOURSELF: NOT AT ALL
2. FEELING DOWN, DEPRESSED OR HOPELESS: SEVERAL DAYS
3. TROUBLE FALLING OR STAYING ASLEEP: MORE THAN HALF THE DAYS
1. LITTLE INTEREST OR PLEASURE IN DOING THINGS: SEVERAL DAYS
10. IF YOU CHECKED OFF ANY PROBLEMS, HOW DIFFICULT HAVE THESE PROBLEMS MADE IT FOR YOU TO DO YOUR WORK, TAKE CARE OF THINGS AT HOME, OR GET ALONG WITH OTHER PEOPLE: SOMEWHAT DIFFICULT
SUM OF ALL RESPONSES TO PHQ QUESTIONS 1-9: 8
SUM OF ALL RESPONSES TO PHQ QUESTIONS 1-9: 8
6. FEELING BAD ABOUT YOURSELF - OR THAT YOU ARE A FAILURE OR HAVE LET YOURSELF OR YOUR FAMILY DOWN: NOT AT ALL

## 2025-07-03 ENCOUNTER — HOSPITAL ENCOUNTER (OUTPATIENT)
Dept: PHYSICAL THERAPY | Age: 80
Setting detail: RECURRING SERIES
Discharge: HOME | End: 2025-07-06
Payer: MEDICARE

## 2025-07-03 PROCEDURE — 97110 THERAPEUTIC EXERCISES: CPT

## 2025-07-03 PROCEDURE — 97140 MANUAL THERAPY 1/> REGIONS: CPT

## 2025-07-03 ASSESSMENT — PAIN DESCRIPTION - ORIENTATION: ORIENTATION: LEFT

## 2025-07-03 ASSESSMENT — PAIN SCALES - GENERAL: PAINLEVEL_OUTOF10: 2

## 2025-07-03 ASSESSMENT — PAIN DESCRIPTION - LOCATION: LOCATION: SHOULDER

## 2025-07-03 NOTE — PROGRESS NOTES
Shalini Rush  : 1945  Primary: [employer] H[place]/p* (Medicare Managed)  Secondary:  [facility] @ [address]  [facility] [address]  Phone: [phone]  Fax: [phone] Plan Frequency: 1 time per week for 90 days    Plan of Care/Certification Expiration Date: 09/10/25        Plan of Care/Certification Expiration Date:  Plan of Care/Certification Expiration Date: 09/10/25    Frequency/Duration: Plan Frequency: 1 time per week for 90 days      Time In/Out:   Time In: 1030  Time Out: 1108      PT Visit Info:    Progress Note Due Date: 25  Total # of Visits to Date: 4  Progress Note Counter: 4      Visit Count:  4    OUTPATIENT PHYSICAL THERAPY:   Treatment Note 7/3/2025       Episode  (PT: Left shoulder pain)               Treatment Diagnosis:    Acute pain of left shoulder  Medical/Referring Diagnosis:    Left shoulder strain, initial encounter  History of recent fall  History of left shoulder replacement    Referring Physician:  Jana Huggins PA-C MD Orders:  PT Eval and Treat   Return MD Appt:  TBD   Date of Onset:  Onset Date:  (Fell in May 2025)     Allergies:   Milk protein  Restrictions/Precautions:   None      Interventions Planned (Treatment may consist of any combination of the following):     See Assessment Note    Subjective Comments:   Patient reports she is doing ok today.    Initial Pain Level: Left Shoulder 2/10  Post Session Pain Level: Left Shoulder 2/10  Medications Last Reviewed: 7/3/2025  Updated Objective Findings:  None Today  Treatment   THERAPEUTIC EXERCISE: (30 minutes):    Exercises per grid below to improve mobility and strength.  Required minimal visual, verbal, and manual cues to promote proper body alignment, promote proper body posture, and promote proper body mechanics.  Progressed resistance, range, repetitions, and complexity of movement as indicated.   Date:  7/3/2025   Activity/Exercise Parameters

## 2025-07-09 ENCOUNTER — HOSPITAL ENCOUNTER (EMERGENCY)
Age: 80
Discharge: HOME | End: 2025-07-09
Attending: STUDENT IN AN ORGANIZED HEALTH CARE EDUCATION/TRAINING PROGRAM
Payer: MEDICARE

## 2025-07-09 VITALS
TEMPERATURE: 97.9 F | WEIGHT: 152 LBS | DIASTOLIC BLOOD PRESSURE: 87 MMHG | HEART RATE: 58 BPM | HEIGHT: 62 IN | RESPIRATION RATE: 15 BRPM | OXYGEN SATURATION: 96 % | BODY MASS INDEX: 27.97 KG/M2 | SYSTOLIC BLOOD PRESSURE: 140 MMHG

## 2025-07-09 DIAGNOSIS — T50.901A ACCIDENTAL DRUG INGESTION, INITIAL ENCOUNTER: Primary | ICD-10-CM

## 2025-07-09 PROCEDURE — 99282 EMERGENCY DEPT VISIT SF MDM: CPT

## 2025-07-09 ASSESSMENT — PAIN - FUNCTIONAL ASSESSMENT: PAIN_FUNCTIONAL_ASSESSMENT: 0-10

## 2025-07-09 ASSESSMENT — PAIN SCALES - GENERAL: PAINLEVEL_OUTOF10: 0

## 2025-07-09 NOTE — DISCHARGE INSTRUCTIONS
After consult with poison control, they state that there is no significant risk of injury from your unintentional ingestion of denture .  Drink plenty clear liquids to ensure hydration.  You should return to this department should symptoms worsen.

## 2025-07-09 NOTE — ED TRIAGE NOTES
Patient reports she may have consumed some [employer] brand over night denture . Patient reports she \"gagged\" and spit up after drinking the .

## 2025-07-10 ENCOUNTER — HOSPITAL ENCOUNTER (OUTPATIENT)
Dept: PHYSICAL THERAPY | Age: 80
Setting detail: RECURRING SERIES
Discharge: HOME | End: 2025-07-13
Payer: MEDICARE

## 2025-07-10 PROCEDURE — 97140 MANUAL THERAPY 1/> REGIONS: CPT

## 2025-07-10 PROCEDURE — 97110 THERAPEUTIC EXERCISES: CPT

## 2025-07-10 NOTE — PROGRESS NOTES
Activity/Exercise Parameters   Hooklying chest press with t-bar 10 reps to tolerance   Hooklying shoulder flexion with t-bar 10 reps to tolerance   Pulleys 15 reps   UBE 6 minutes   Level 2   Wall ladder 5 reps to tolerance  L UE   Theraband rows Red t-band  15 reps   Theraband pulls Red t-band  15 reps   Theraband internal rotation Red t-band  15 reps  L UE   Theraband external rotation Red t-band  15 reps  L UE      MANUAL THERAPY: (8 minutes):   Soft tissue and joint mobilization with PROM in all planes to L UE was utilized and necessary because of the patient's restricted joint motion, loss of articular motion, and restricted motion of soft tissue.     Treatment/Session Summary:    Treatment Assessment:   Patient tolerated treatment well with improving overall mobility.  Communication/Consultation:  None today  Equipment provided today:  HEP  Recommendations/Intent for next treatment session: Next visit will focus on improving overall mobility.    >Total Treatment Billable Duration:  38 minutes   Time In: 1110  Time Out: 1148      DEANNA BRAVO PTA         Charge Capture  Events  [employer] Portal  Appt Desk  Attendance Report     Future Appointments   Date Time Provider Department Center   7/16/2025  8:30 AM Connor Enciso MD [facility] AMB   7/21/2025 11:00 AM [facility]   8/4/2025  8:30 AM MAT LAB [facility] DEP   8/11/2025  1:00 PM Nikolas Dunham MD [facility] DEP   9/18/2025 10:10 AM LemonTobin chew Jr., MD [facility] AMB

## 2025-07-10 NOTE — ED PROVIDER NOTES
SURGERY      left knee repair    ORTHOPEDIC SURGERY      right ankle    ORTHOPEDIC SURGERY  2005    Left TSA    SHOULDER ARTHROPLASTY Left     TUBAL LIGATION      UROLOGICAL SURGERY      cystoscopy    US GUIDED CORE BREAST BIOPSY Right         Social History     Socioeconomic History    Marital status: Single     Spouse name: None    Number of children: None    Years of education: None    Highest education level: None   Tobacco Use    Smoking status: Former     Current packs/day: 0.00     Types: Cigarettes     Quit date: 2001     Years since quittin.0     Passive exposure: Past    Smokeless tobacco: Never   Vaping Use    Vaping status: Never Used   Substance and Sexual Activity    Alcohol use: No    Drug use: No    Sexual activity: Not Currently     Social Drivers of Health     Financial Resource Strain: Low Risk  (2024)    Overall Financial Resource Strain (CARDIA)     Difficulty of Paying Living Expenses: Not hard at all   Food Insecurity: No Food Insecurity (2025)    Hunger Vital Sign     Worried About Running Out of Food in the Last Year: Never true     Ran Out of Food in the Last Year: Never true   Transportation Needs: No Transportation Needs (2025)    PRAPARE - Transportation     Lack of Transportation (Medical): No     Lack of Transportation (Non-Medical): No   Physical Activity: Insufficiently Active (7/10/2023)    Exercise Vital Sign     Days of Exercise per Week: 4 days     Minutes of Exercise per Session: 30 min   Stress: No Stress Concern Present (2022)    [place] [employer] - Occupational Stress Questionnaire     Feeling of Stress : Not at all   Social Connections: Unknown (2022)    Social Connection and Isolation Panel [NHANES]     Attends [employer] Services: Never     Active Member of Clubs or Organizations: No     Attends Club or Organization Meetings: Never     Marital Status:    Intimate Partner Violence: Not At Risk (2022)

## 2025-07-16 ENCOUNTER — HOSPITAL ENCOUNTER (OUTPATIENT)
Dept: LAB | Age: 80
Discharge: HOME | End: 2025-07-16
Payer: MEDICARE

## 2025-07-16 ENCOUNTER — OFFICE VISIT (OUTPATIENT)
Dept: ONCOLOGY | Age: 80
End: 2025-07-16
Payer: MEDICARE

## 2025-07-16 VITALS
BODY MASS INDEX: 28.52 KG/M2 | DIASTOLIC BLOOD PRESSURE: 80 MMHG | HEART RATE: 63 BPM | HEIGHT: 62 IN | TEMPERATURE: 97.9 F | WEIGHT: 155 LBS | OXYGEN SATURATION: 98 % | SYSTOLIC BLOOD PRESSURE: 170 MMHG | RESPIRATION RATE: 18 BRPM

## 2025-07-16 DIAGNOSIS — Z79.811 USE OF AROMATASE INHIBITORS: ICD-10-CM

## 2025-07-16 DIAGNOSIS — Z17.0 MALIGNANT NEOPLASM OF RIGHT BREAST IN FEMALE, ESTROGEN RECEPTOR POSITIVE, UNSPECIFIED SITE OF BREAST (HCC): ICD-10-CM

## 2025-07-16 DIAGNOSIS — C50.911 MALIGNANT NEOPLASM OF RIGHT BREAST IN FEMALE, ESTROGEN RECEPTOR POSITIVE, UNSPECIFIED SITE OF BREAST (HCC): ICD-10-CM

## 2025-07-16 DIAGNOSIS — Z12.31 ENCOUNTER FOR SCREENING MAMMOGRAM FOR BREAST CANCER: ICD-10-CM

## 2025-07-16 DIAGNOSIS — Z17.0 MALIGNANT NEOPLASM OF RIGHT BREAST IN FEMALE, ESTROGEN RECEPTOR POSITIVE, UNSPECIFIED SITE OF BREAST (HCC): Primary | ICD-10-CM

## 2025-07-16 DIAGNOSIS — C50.911 MALIGNANT NEOPLASM OF RIGHT BREAST IN FEMALE, ESTROGEN RECEPTOR POSITIVE, UNSPECIFIED SITE OF BREAST (HCC): Primary | ICD-10-CM

## 2025-07-16 LAB
ALBUMIN SERPL-MCNC: 3.8 G/DL (ref 3.2–4.6)
ALBUMIN/GLOB SERPL: 0.9 (ref 1–1.9)
ALP SERPL-CCNC: 78 U/L (ref 35–104)
ALT SERPL-CCNC: 15 U/L (ref 8–45)
ANION GAP SERPL CALC-SCNC: 12 MMOL/L (ref 7–16)
AST SERPL-CCNC: 29 U/L (ref 15–37)
BASOPHILS # BLD: 0.04 K/UL (ref 0–0.2)
BASOPHILS NFR BLD: 0.8 % (ref 0–2)
BILIRUB SERPL-MCNC: 1.5 MG/DL (ref 0–1.2)
BUN SERPL-MCNC: 22 MG/DL (ref 8–23)
CALCIUM SERPL-MCNC: 9.9 MG/DL (ref 8.8–10.2)
CHLORIDE SERPL-SCNC: 104 MMOL/L (ref 98–107)
CO2 SERPL-SCNC: 27 MMOL/L (ref 20–29)
CREAT SERPL-MCNC: 1 MG/DL (ref 0.6–1.1)
DIFFERENTIAL METHOD BLD: NORMAL
EOSINOPHIL # BLD: 0.06 K/UL (ref 0–0.8)
EOSINOPHIL NFR BLD: 1.1 % (ref 0.5–7.8)
ERYTHROCYTE [DISTWIDTH] IN BLOOD BY AUTOMATED COUNT: 13.7 % (ref 11.9–14.6)
GLOBULIN SER CALC-MCNC: 4.1 G/DL (ref 2.3–3.5)
GLUCOSE SERPL-MCNC: 120 MG/DL (ref 70–99)
HCT VFR BLD AUTO: 40.2 % (ref 35.8–46.3)
HGB BLD-MCNC: 13 G/DL (ref 11.7–15.4)
IMM GRANULOCYTES # BLD AUTO: 0.02 K/UL (ref 0–0.5)
IMM GRANULOCYTES NFR BLD AUTO: 0.4 % (ref 0–5)
LYMPHOCYTES # BLD: 1.01 K/UL (ref 0.5–4.6)
LYMPHOCYTES NFR BLD: 19.1 % (ref 13–44)
MCH RBC QN AUTO: 28.6 PG (ref 26.1–32.9)
MCHC RBC AUTO-ENTMCNC: 32.3 G/DL (ref 31.4–35)
MCV RBC AUTO: 88.4 FL (ref 82–102)
MONOCYTES # BLD: 0.5 K/UL (ref 0.1–1.3)
MONOCYTES NFR BLD: 9.4 % (ref 4–12)
NEUTS SEG # BLD: 3.67 K/UL (ref 1.7–8.2)
NEUTS SEG NFR BLD: 69.2 % (ref 43–78)
NRBC # BLD: 0 K/UL (ref 0–0.2)
PLATELET # BLD AUTO: 197 K/UL (ref 150–450)
PMV BLD AUTO: 10.7 FL (ref 9.4–12.3)
POTASSIUM SERPL-SCNC: 4.3 MMOL/L (ref 3.5–5.1)
PROT SERPL-MCNC: 7.8 G/DL (ref 6.3–8.2)
RBC # BLD AUTO: 4.55 M/UL (ref 4.05–5.2)
SODIUM SERPL-SCNC: 143 MMOL/L (ref 136–145)
WBC # BLD AUTO: 5.3 K/UL (ref 4.3–11.1)

## 2025-07-16 PROCEDURE — 85025 COMPLETE CBC W/AUTO DIFF WBC: CPT

## 2025-07-16 PROCEDURE — 3079F DIAST BP 80-89 MM HG: CPT | Performed by: INTERNAL MEDICINE

## 2025-07-16 PROCEDURE — 36415 COLL VENOUS BLD VENIPUNCTURE: CPT

## 2025-07-16 PROCEDURE — 80053 COMPREHEN METABOLIC PANEL: CPT

## 2025-07-16 PROCEDURE — 3077F SYST BP >= 140 MM HG: CPT | Performed by: INTERNAL MEDICINE

## 2025-07-16 PROCEDURE — 99214 OFFICE O/P EST MOD 30 MIN: CPT | Performed by: INTERNAL MEDICINE

## 2025-07-16 PROCEDURE — 1123F ACP DISCUSS/DSCN MKR DOCD: CPT | Performed by: INTERNAL MEDICINE

## 2025-07-16 PROCEDURE — 1125F AMNT PAIN NOTED PAIN PRSNT: CPT | Performed by: INTERNAL MEDICINE

## 2025-07-16 NOTE — PATIENT INSTRUCTIONS
Differential Type 07/16/2025 AUTOMATED    Final           Please refer to After Visit Summary or [facility] for upcoming appointment information. Please call our office for rescheduling needs at least 24 hours before your scheduled appointment time.If you have any questions regarding your upcoming schedule please reach out to your care team through [facility] or call [phone].     Please notify your assigned Nurse Navigator of any unplanned hospital admissions or Emergency Room visits within 24 hours of discharge.    -------------------------------------------------------------------------------------------------------------------  Please call our office at [phone] if you have any  of the following symptoms:   Fever of 100.5 or greater  Chills  Shortness of breath  Swelling or pain in one leg    After office hours an answering service is available and will contact a provider for emergencies or if you are experiencing any of the above symptoms.        WILLIE MARINO RN

## 2025-07-16 NOTE — PROGRESS NOTES
[facility] Hematology and Oncology: Office Visit Established Patient    Chief Complaint:    Chief Complaint   Patient presents with    Follow-up           History of Present Illness:  Ms. Rush is a 80 y.o. female who presents today for follow-up regarding breast cancer.  She had a left breast cancer in 2001 for which she had a lumpectomy and radiation, she also took endocrine therapy of unknown type and duration.  In August 2022 she had her routine screening mammogram that reported a right breast asymmetry. On 8/25/22 she had a right breast diagnostic mammogram and ultrasound which confirmed the findings and on 8/31/22 she underwent a core needle biopsy of the right breast mass. The pathology reported infiltrating ductal carcinoma, low grade, ER 95%, AL 0% and HER2 1+.  On 9/7/22 she had a bilateral breast MRI that reported the known right breast cancer of 1.4 cm, no additional suspicious findings in either breast or no evidence of lymphadenopathy was noted.  She was recommended to see surgery for upfront management, then return to us for adjuvant therapy recommendations.  Path confirms the tumor to be 1.6 cm, tumor extended to inferior margin but re-excision was negative.  3 sentinel nodes were also negative for tumor.  We discussed options, including gene recurrence testing for risk stratification, but she admits that this would not change her mind about potential for chemotherapy.  Therefore, we will recommend antiestrogen therapy alone.  She is post-menopausal, so a prescription was given for anastrozole.  We also discussed radiation referral for risk reduction but she does not seem interested in this discussion given our explanation of the data, which suggests no mortality benefit for patients over 70 who are on endocrine therapy.  She was changed from Arimidex to Femara for tolerance in January 2023.    History of Present Illness  The patient, an 80-year-old female, presents for a follow-up regarding her

## 2025-07-16 NOTE — PROGRESS NOTES
Length of meetin minutes    Start Time: 1102    Stop Time: 1150    Diagnosis: Adjustment disorder with mixed anxiety and depressed mood.    Treatment Plan:  Will focus on relaxation skills.  Will focus on different activities that she can engage in.  AWARE process for panic attacks.    Patient Prognosis: Fair.    Patient Progress: The patient indicates that \"I feel good today, but it has been rough.\"  Discussed stressors with her adult children which is processed with her today.  She set boundaries and was satisfied with her response in the relative situations that were discussed today.      Mental Status exam: SONIDO-7 was a 6. PHQ-9 is a 5; PHQ-2 was a 0. No current active SI or HI, and continues to contract for safety.  Thought processes appear normal.  Affect is congruent with the topic being dicussed.  Speech is goal directed.  The patient does not appear to be responding to internal stimuli.     Plan: I will see this patient again in about 2 weeks.  PORFIRIO Rose

## 2025-07-21 ENCOUNTER — OFFICE VISIT (OUTPATIENT)
Dept: BEHAVIORAL/MENTAL HEALTH CLINIC | Age: 80
End: 2025-07-21
Payer: MEDICARE

## 2025-07-21 DIAGNOSIS — F43.23 ADJUSTMENT DISORDER WITH MIXED ANXIETY AND DEPRESSED MOOD: Primary | ICD-10-CM

## 2025-07-21 PROCEDURE — 1123F ACP DISCUSS/DSCN MKR DOCD: CPT | Performed by: SOCIAL WORKER

## 2025-07-21 PROCEDURE — 90834 PSYTX W PT 45 MINUTES: CPT | Performed by: SOCIAL WORKER

## 2025-07-21 ASSESSMENT — PATIENT HEALTH QUESTIONNAIRE - PHQ9
2. FEELING DOWN, DEPRESSED OR HOPELESS: NOT AT ALL
5. POOR APPETITE OR OVEREATING: SEVERAL DAYS
4. FEELING TIRED OR HAVING LITTLE ENERGY: SEVERAL DAYS
1. LITTLE INTEREST OR PLEASURE IN DOING THINGS: NOT AT ALL
7. TROUBLE CONCENTRATING ON THINGS, SUCH AS READING THE NEWSPAPER OR WATCHING TELEVISION: MORE THAN HALF THE DAYS
9. THOUGHTS THAT YOU WOULD BE BETTER OFF DEAD, OR OF HURTING YOURSELF: NOT AT ALL
SUM OF ALL RESPONSES TO PHQ QUESTIONS 1-9: 5
8. MOVING OR SPEAKING SO SLOWLY THAT OTHER PEOPLE COULD HAVE NOTICED. OR THE OPPOSITE, BEING SO FIGETY OR RESTLESS THAT YOU HAVE BEEN MOVING AROUND A LOT MORE THAN USUAL: NOT AT ALL
SUM OF ALL RESPONSES TO PHQ QUESTIONS 1-9: 5
SUM OF ALL RESPONSES TO PHQ QUESTIONS 1-9: 5
3. TROUBLE FALLING OR STAYING ASLEEP: SEVERAL DAYS
6. FEELING BAD ABOUT YOURSELF - OR THAT YOU ARE A FAILURE OR HAVE LET YOURSELF OR YOUR FAMILY DOWN: NOT AT ALL
SUM OF ALL RESPONSES TO PHQ QUESTIONS 1-9: 5

## 2025-07-24 DIAGNOSIS — R80.9 CONTROLLED TYPE 2 DIABETES MELLITUS WITH MICROALBUMINURIA, WITHOUT LONG-TERM CURRENT USE OF INSULIN (HCC): Primary | ICD-10-CM

## 2025-07-24 DIAGNOSIS — E11.29 CONTROLLED TYPE 2 DIABETES MELLITUS WITH MICROALBUMINURIA, WITHOUT LONG-TERM CURRENT USE OF INSULIN (HCC): Primary | ICD-10-CM

## 2025-07-28 ENCOUNTER — TELEPHONE (OUTPATIENT)
Dept: ORTHOPEDIC SURGERY | Age: 80
End: 2025-07-28

## 2025-07-28 DIAGNOSIS — M54.50 LUMBAR BACK PAIN: Primary | ICD-10-CM

## 2025-07-28 RX ORDER — MELOXICAM 7.5 MG/1
7.5 TABLET ORAL DAILY
Qty: 30 TABLET | Refills: 1 | Status: SHIPPED | OUTPATIENT
Start: 2025-07-28

## 2025-07-31 ENCOUNTER — HOSPITAL ENCOUNTER (OUTPATIENT)
Dept: PHYSICAL THERAPY | Age: 80
Setting detail: RECURRING SERIES
End: 2025-07-31
Payer: MEDICARE

## 2025-07-31 PROCEDURE — 97110 THERAPEUTIC EXERCISES: CPT

## 2025-07-31 ASSESSMENT — PAIN SCALES - GENERAL: PAINLEVEL_OUTOF10: 3

## 2025-07-31 ASSESSMENT — PAIN DESCRIPTION - ORIENTATION: ORIENTATION: LEFT

## 2025-07-31 ASSESSMENT — PAIN DESCRIPTION - LOCATION: LOCATION: SHOULDER

## 2025-07-31 NOTE — PROGRESS NOTES
Shalini Rush  : 1945  Primary: [employer] [place] Medicare Hmo/p* (Medicare Managed)  Secondary: MEDICAID [facility] @ [address]  [facility] [address]  Phone: [phone]  Fax: [phone] Plan Frequency: 1 time per week for 90 days    Plan of Care/Certification Expiration Date: 09/10/25        Plan of Care/Certification Expiration Date:  Plan of Care/Certification Expiration Date: 09/10/25    Frequency/Duration: Plan Frequency: 1 time per week for 90 days      Time In/Out:   Time In: 0830  Time Out: 0900      PT Visit Info:    Progress Note Due Date: 25  Total # of Visits to Date: 6  Progress Note Counter: 1  No Show: 1      Visit Count:  6    OUTPATIENT PHYSICAL THERAPY:   Treatment Note 2025       Episode  (PT: Left shoulder pain)               Treatment Diagnosis:    Acute pain of left shoulder  Medical/Referring Diagnosis:    Left shoulder strain, initial encounter  History of recent fall  History of left shoulder replacement    Referring Physician:  Jana Huggins PA-C MD Orders:  PT Eval and Treat   Return MD Appt:  TBD   Date of Onset:  Onset Date:  (Fell in May 2025)     Allergies:   Milk protein  Restrictions/Precautions:   None      Interventions Planned (Treatment may consist of any combination of the following):     See Assessment Note    Subjective Comments:   Patient reports she is doing ok today.      Initial Pain Level: Left Shoulder3 3/10  Post Session Pain Level: Left3 Shoulder 3/10  Medications Last Reviewed: 2025  Updated Objective Findings:  None Today  Treatment   THERAPEUTIC EXERCISE: (30 minutes):    Exercises per grid below to improve mobility and strength.  Required minimal visual, verbal, and manual cues to promote proper body alignment, promote proper body posture, and promote proper body mechanics.  Progressed resistance, range, repetitions, and complexity of movement as indicated.   Date:  2025    Activity/Exercise Parameters   Hooklying chest press with t-bar 10 reps to tolerance   Hooklying shoulder flexion with t-bar 10 reps to tolerance   Pulleys 15 reps   UBE 6 minutes   Level 2   Wall ladder 5 reps to tolerance  L UE   Theraband rows Red t-band  15 reps   Theraband pulls Red t-band  15 reps   Theraband internal rotation Red t-band  15 reps  L UE   Theraband external rotation Red t-band  15 reps  L UE      MANUAL THERAPY: (0 minutes):   Soft tissue and joint mobilization with PROM in all planes to L UE was utilized and necessary because of the patient's restricted joint motion, loss of articular motion, and restricted motion of soft tissue.     Treatment/Session Summary:    Treatment Assessment:   Patient completed all activities.  Patient 15 minutes late to appointment.  Communication/Consultation:  None today  Equipment provided today:  HEP  Recommendations/Intent for next treatment session: Next visit will focus on improving overall mobility.    >Total Treatment Billable Duration:  30 minutes   Time In: 0830  Time Out: 0900      SHERWIN FRIAS, PT         Charge Capture  Events  [facility] Portal  Appt Desk  Attendance Report     Future Appointments   Date Time Provider Department Center   8/4/2025  8:30 AM MAT LAB [facility] DEP   8/5/2025 10:00 AM [facility]   8/6/2025 11:15 AM Silvia Lawson, PTA [facility]   8/11/2025  1:00 PM Nikolas Dunham MD [facility] DEP   8/12/2025 10:30 AM Sherwin Frias, PT [facility]   9/18/2025 10:10 AM LemonTobin chew Jr., MD [facility] AMB   1/16/2026  8:30 AM PERIPHERAL [facility]   1/16/2026  9:30 AM Marisol Bautista, APRN - CNP [facility] AMB   7/17/2026  8:30 AM PERIPHERAL [facility]   7/17/2026  9:30 AM Connor Enciso MD [facility] AMB

## 2025-07-31 NOTE — PROGRESS NOTES
Shalini STAUFFER Victor Manuel  : 1945  Primary: [employer] [place] Hmo/p* (Medicare Managed)  Secondary: MEDICAID [facility] @ [address]  [facility] [address]  Phone: [phone]  Fax: [phone] Plan Frequency: 1 time per week for 90 days    Plan of Care/Certification Expiration Date: 09/10/25        Plan of Care/Certification Expiration Date:  Plan of Care/Certification Expiration Date: 09/10/25    Frequency/Duration: Plan Frequency: 1 time per week for 90 days      Time In/Out:   Time In: 0830  Time Out: 0900      PT Visit Info:    Progress Note Due Date: 25  Total # of Visits to Date: 6  Progress Note Counter: 1  No Show: 1      Visit Count:  6                OUTPATIENT PHYSICAL THERAPY:             Progress Report 2025               Episode (PT: Left shoulder pain)         Treatment Diagnosis:     Acute pain of left shoulder  Medical/Referring Diagnosis:    Left shoulder strain, initial encounter  History of recent fall  History of left shoulder replacement    Referring Physician:  Jana Huggins PA-C MD Orders:  PT Eval and Treat   Return MD Appt:  TBD  Date of Onset:  Onset Date:  (Fell in May 2025)  Allergies:  Milk protein  Restrictions/Precautions:    None      Medications Last Reviewed: 2025     SUBJECTIVE   History of Injury/Illness (Reason for Referral):  Patient reports that she was leaving the [email or web address] store on [facility] in May 2025, when she fell and \"blacked out.\"  She reports that she came to with pain in her left shoulder and her lip bleeding.  She reports that she went to the ER and they did several tests which were negative.  She reports that she had her left shoulder replaced about 15 years ago and never regained full range of motion.  She reports that since her fall, the range of motion is about the same, but she is having more pain. She reports that pain medication and heat do help with her pain.  She reports  she hopes therapy can continue to help.    7/31/2025 (Progress Note): Patient reports overall she feels some improvement in her shoulder.  She reports that she knows that her shoulder replacement from 10 years ago has limited her, but she feels less pain in her shoulder.    Initial Pain Level:  Left Shoulder 3/10   Post Session Pain Level: Left Shoulder 3/10  Past Medical History/Comorbidities:   Ms. Rush  has a past medical history of Abdominal pain, Arthritis, Breast cancer (HCC), Breast cancer, left (HCC), Breast pain in female, Bunion, Chronic pain of left knee, Corns and callosities, DDD (degenerative disc disease), lumbar, Diabetes mellitus type II, controlled (HCC), Emotional disorder, Fungal infection of toenail, Hallux valgus, Hip pain, bilateral, Hyperglycemia, Hyperlipidemia, Hypertension, Hypothyroidism, Lower GI bleed, Obesity, Other ill-defined conditions(799.89), Postmenopausal, Right median nerve neuropathy, Stress incontinence, and Traumatic arthropathy of knee.  Ms. Ruhs  has a past surgical history that includes Breast lumpectomy (Left, 2001); Urological Surgery; Colonoscopy (2008); Tubal ligation; Hysterectomy; us guided core breast biopsy (Right); Breast biopsy (Right, 2011, Stereo Core Bx 8-31-22); orthopedic surgery (Left); orthopedic surgery; Total shoulder arthroplasty (Left); orthopedic surgery; orthopedic surgery (2005); [facility] STEREO BREAST BX W LOC DEVICE 1ST LESION RIGHT (Right, 08/31/2022); Breast biopsy (Right, 10/05/2022); Breast biopsy (Right, 10/05/2022); Breast surgery (Right, 10/19/2022); and Breast lumpectomy (Right, 2022).  Social History/Living Environment:   Patient lives with their family    Prior Level of Function/Work/Activity:   Prior Level of Function: Independent   Current Level of Function: Mod Independent      Learning:   Does the patient/guardian have any barriers to learning?: No barriers  Will there be a co-learner?: No  What is the preferred language of the

## 2025-08-04 ENCOUNTER — LAB (OUTPATIENT)
Dept: INTERNAL MEDICINE CLINIC | Facility: CLINIC | Age: 80
End: 2025-08-04

## 2025-08-04 DIAGNOSIS — R80.9 CONTROLLED TYPE 2 DIABETES MELLITUS WITH MICROALBUMINURIA, WITHOUT LONG-TERM CURRENT USE OF INSULIN (HCC): ICD-10-CM

## 2025-08-04 DIAGNOSIS — C50.911 INFILTRATING DUCTAL CARCINOMA OF RIGHT BREAST (HCC): ICD-10-CM

## 2025-08-04 DIAGNOSIS — I10 ESSENTIAL (PRIMARY) HYPERTENSION: ICD-10-CM

## 2025-08-04 DIAGNOSIS — E03.9 ACQUIRED HYPOTHYROIDISM: ICD-10-CM

## 2025-08-04 DIAGNOSIS — I10 PRIMARY HYPERTENSION: ICD-10-CM

## 2025-08-04 DIAGNOSIS — F33.1 MDD (MAJOR DEPRESSIVE DISORDER), RECURRENT EPISODE, MODERATE (HCC): ICD-10-CM

## 2025-08-04 DIAGNOSIS — E78.5 HYPERLIPIDEMIA LDL GOAL <100: ICD-10-CM

## 2025-08-04 DIAGNOSIS — E11.29 CONTROLLED TYPE 2 DIABETES MELLITUS WITH MICROALBUMINURIA, WITHOUT LONG-TERM CURRENT USE OF INSULIN (HCC): ICD-10-CM

## 2025-08-04 LAB
ALT SERPL-CCNC: 19 U/L (ref 8–45)
ANION GAP SERPL CALC-SCNC: 14 MMOL/L (ref 7–16)
AST SERPL-CCNC: 27 U/L (ref 15–37)
BUN SERPL-MCNC: 18 MG/DL (ref 8–23)
CALCIUM SERPL-MCNC: 10.2 MG/DL (ref 8.8–10.2)
CHLORIDE SERPL-SCNC: 101 MMOL/L (ref 98–107)
CHOLEST SERPL-MCNC: 133 MG/DL (ref 0–200)
CO2 SERPL-SCNC: 29 MMOL/L (ref 20–29)
CREAT SERPL-MCNC: 1.04 MG/DL (ref 0.6–1.1)
CREAT UR-MCNC: 334 MG/DL (ref 28–217)
EST. AVERAGE GLUCOSE BLD GHB EST-MCNC: 150 MG/DL
GLUCOSE SERPL-MCNC: 110 MG/DL (ref 70–99)
HBA1C MFR BLD: 6.9 % (ref 0–5.6)
HDLC SERPL-MCNC: 51 MG/DL (ref 40–60)
HDLC SERPL: 2.6 (ref 0–5)
LDLC SERPL CALC-MCNC: 69 MG/DL (ref 0–100)
MICROALBUMIN UR-MCNC: 7.79 MG/DL (ref 0–20)
MICROALBUMIN/CREAT UR-RTO: 23 MG/G (ref 0–30)
POTASSIUM SERPL-SCNC: 4.1 MMOL/L (ref 3.5–5.1)
SODIUM SERPL-SCNC: 144 MMOL/L (ref 136–145)
TRIGL SERPL-MCNC: 65 MG/DL (ref 0–150)
TSH, 3RD GENERATION: 0.31 UIU/ML (ref 0.27–4.2)
VLDLC SERPL CALC-MCNC: 13 MG/DL (ref 6–23)

## 2025-08-05 ENCOUNTER — OFFICE VISIT (OUTPATIENT)
Dept: BEHAVIORAL/MENTAL HEALTH CLINIC | Age: 80
End: 2025-08-05
Payer: MEDICARE

## 2025-08-05 DIAGNOSIS — F43.23 ADJUSTMENT DISORDER WITH MIXED ANXIETY AND DEPRESSED MOOD: Primary | ICD-10-CM

## 2025-08-05 PROCEDURE — 90834 PSYTX W PT 45 MINUTES: CPT | Performed by: SOCIAL WORKER

## 2025-08-05 PROCEDURE — 1123F ACP DISCUSS/DSCN MKR DOCD: CPT | Performed by: SOCIAL WORKER

## 2025-08-05 ASSESSMENT — PATIENT HEALTH QUESTIONNAIRE - PHQ9
1. LITTLE INTEREST OR PLEASURE IN DOING THINGS: NOT AT ALL
2. FEELING DOWN, DEPRESSED OR HOPELESS: SEVERAL DAYS
SUM OF ALL RESPONSES TO PHQ QUESTIONS 1-9: 5
SUM OF ALL RESPONSES TO PHQ QUESTIONS 1-9: 5
10. IF YOU CHECKED OFF ANY PROBLEMS, HOW DIFFICULT HAVE THESE PROBLEMS MADE IT FOR YOU TO DO YOUR WORK, TAKE CARE OF THINGS AT HOME, OR GET ALONG WITH OTHER PEOPLE: NOT DIFFICULT AT ALL
7. TROUBLE CONCENTRATING ON THINGS, SUCH AS READING THE NEWSPAPER OR WATCHING TELEVISION: SEVERAL DAYS
4. FEELING TIRED OR HAVING LITTLE ENERGY: NOT AT ALL
SUM OF ALL RESPONSES TO PHQ QUESTIONS 1-9: 5
9. THOUGHTS THAT YOU WOULD BE BETTER OFF DEAD, OR OF HURTING YOURSELF: NOT AT ALL
SUM OF ALL RESPONSES TO PHQ QUESTIONS 1-9: 5
3. TROUBLE FALLING OR STAYING ASLEEP: MORE THAN HALF THE DAYS
5. POOR APPETITE OR OVEREATING: SEVERAL DAYS
8. MOVING OR SPEAKING SO SLOWLY THAT OTHER PEOPLE COULD HAVE NOTICED. OR THE OPPOSITE, BEING SO FIGETY OR RESTLESS THAT YOU HAVE BEEN MOVING AROUND A LOT MORE THAN USUAL: NOT AT ALL
6. FEELING BAD ABOUT YOURSELF - OR THAT YOU ARE A FAILURE OR HAVE LET YOURSELF OR YOUR FAMILY DOWN: NOT AT ALL

## 2025-08-06 ENCOUNTER — HOSPITAL ENCOUNTER (OUTPATIENT)
Dept: PHYSICAL THERAPY | Age: 80
Setting detail: RECURRING SERIES
Discharge: HOME | End: 2025-08-09
Payer: MEDICARE

## 2025-08-06 PROCEDURE — 97110 THERAPEUTIC EXERCISES: CPT

## 2025-08-06 PROCEDURE — 97140 MANUAL THERAPY 1/> REGIONS: CPT

## 2025-08-11 ENCOUNTER — OFFICE VISIT (OUTPATIENT)
Dept: INTERNAL MEDICINE CLINIC | Facility: CLINIC | Age: 80
End: 2025-08-11
Payer: MEDICARE

## 2025-08-11 VITALS
OXYGEN SATURATION: 97 % | WEIGHT: 155.2 LBS | SYSTOLIC BLOOD PRESSURE: 122 MMHG | TEMPERATURE: 97 F | HEIGHT: 64 IN | DIASTOLIC BLOOD PRESSURE: 80 MMHG | HEART RATE: 58 BPM | BODY MASS INDEX: 26.5 KG/M2

## 2025-08-11 DIAGNOSIS — R80.9 CONTROLLED TYPE 2 DIABETES MELLITUS WITH MICROALBUMINURIA, WITHOUT LONG-TERM CURRENT USE OF INSULIN (HCC): ICD-10-CM

## 2025-08-11 DIAGNOSIS — I10 ESSENTIAL (PRIMARY) HYPERTENSION: ICD-10-CM

## 2025-08-11 DIAGNOSIS — I10 PRIMARY HYPERTENSION: ICD-10-CM

## 2025-08-11 DIAGNOSIS — M50.30 DDD (DEGENERATIVE DISC DISEASE), CERVICAL: ICD-10-CM

## 2025-08-11 DIAGNOSIS — Z71.89 ACP (ADVANCE CARE PLANNING): ICD-10-CM

## 2025-08-11 DIAGNOSIS — Z00.00 MEDICARE ANNUAL WELLNESS VISIT, SUBSEQUENT: Primary | ICD-10-CM

## 2025-08-11 DIAGNOSIS — E78.5 HYPERLIPIDEMIA LDL GOAL <100: ICD-10-CM

## 2025-08-11 DIAGNOSIS — E03.9 ACQUIRED HYPOTHYROIDISM: ICD-10-CM

## 2025-08-11 DIAGNOSIS — C50.911 INFILTRATING DUCTAL CARCINOMA OF RIGHT BREAST (HCC): ICD-10-CM

## 2025-08-11 DIAGNOSIS — K43.9 ABDOMINAL WALL HERNIA: ICD-10-CM

## 2025-08-11 DIAGNOSIS — E11.29 CONTROLLED TYPE 2 DIABETES MELLITUS WITH MICROALBUMINURIA, WITHOUT LONG-TERM CURRENT USE OF INSULIN (HCC): ICD-10-CM

## 2025-08-11 DIAGNOSIS — F33.1 MDD (MAJOR DEPRESSIVE DISORDER), RECURRENT EPISODE, MODERATE (HCC): ICD-10-CM

## 2025-08-11 PROCEDURE — 3044F HG A1C LEVEL LT 7.0%: CPT | Performed by: INTERNAL MEDICINE

## 2025-08-11 PROCEDURE — 99214 OFFICE O/P EST MOD 30 MIN: CPT | Performed by: INTERNAL MEDICINE

## 2025-08-11 PROCEDURE — 3079F DIAST BP 80-89 MM HG: CPT | Performed by: INTERNAL MEDICINE

## 2025-08-11 PROCEDURE — 1160F RVW MEDS BY RX/DR IN RCRD: CPT | Performed by: INTERNAL MEDICINE

## 2025-08-11 PROCEDURE — 3074F SYST BP LT 130 MM HG: CPT | Performed by: INTERNAL MEDICINE

## 2025-08-11 PROCEDURE — 1159F MED LIST DOCD IN RCRD: CPT | Performed by: INTERNAL MEDICINE

## 2025-08-11 PROCEDURE — 1123F ACP DISCUSS/DSCN MKR DOCD: CPT | Performed by: INTERNAL MEDICINE

## 2025-08-11 PROCEDURE — 99497 ADVNCD CARE PLAN 30 MIN: CPT | Performed by: INTERNAL MEDICINE

## 2025-08-11 PROCEDURE — G0439 PPPS, SUBSEQ VISIT: HCPCS | Performed by: INTERNAL MEDICINE

## 2025-08-11 PROCEDURE — G2211 COMPLEX E/M VISIT ADD ON: HCPCS | Performed by: INTERNAL MEDICINE

## 2025-08-11 ASSESSMENT — PATIENT HEALTH QUESTIONNAIRE - PHQ9
SUM OF ALL RESPONSES TO PHQ QUESTIONS 1-9: 1
2. FEELING DOWN, DEPRESSED OR HOPELESS: NOT AT ALL
1. LITTLE INTEREST OR PLEASURE IN DOING THINGS: NOT AT ALL
SUM OF ALL RESPONSES TO PHQ QUESTIONS 1-9: 0
SUM OF ALL RESPONSES TO PHQ QUESTIONS 1-9: 1
SUM OF ALL RESPONSES TO PHQ QUESTIONS 1-9: 1
1. LITTLE INTEREST OR PLEASURE IN DOING THINGS: NOT AT ALL
SUM OF ALL RESPONSES TO PHQ QUESTIONS 1-9: 0
9. THOUGHTS THAT YOU WOULD BE BETTER OFF DEAD, OR OF HURTING YOURSELF: NOT AT ALL
7. TROUBLE CONCENTRATING ON THINGS, SUCH AS READING THE NEWSPAPER OR WATCHING TELEVISION: NOT AT ALL
SUM OF ALL RESPONSES TO PHQ QUESTIONS 1-9: 1
10. IF YOU CHECKED OFF ANY PROBLEMS, HOW DIFFICULT HAVE THESE PROBLEMS MADE IT FOR YOU TO DO YOUR WORK, TAKE CARE OF THINGS AT HOME, OR GET ALONG WITH OTHER PEOPLE: SOMEWHAT DIFFICULT
2. FEELING DOWN, DEPRESSED OR HOPELESS: NOT AT ALL
4. FEELING TIRED OR HAVING LITTLE ENERGY: NOT AT ALL
6. FEELING BAD ABOUT YOURSELF - OR THAT YOU ARE A FAILURE OR HAVE LET YOURSELF OR YOUR FAMILY DOWN: NOT AT ALL
SUM OF ALL RESPONSES TO PHQ QUESTIONS 1-9: 0
8. MOVING OR SPEAKING SO SLOWLY THAT OTHER PEOPLE COULD HAVE NOTICED. OR THE OPPOSITE, BEING SO FIGETY OR RESTLESS THAT YOU HAVE BEEN MOVING AROUND A LOT MORE THAN USUAL: NOT AT ALL
SUM OF ALL RESPONSES TO PHQ QUESTIONS 1-9: 0
3. TROUBLE FALLING OR STAYING ASLEEP: SEVERAL DAYS

## 2025-08-12 ENCOUNTER — CLINICAL DOCUMENTATION (OUTPATIENT)
Age: 80
End: 2025-08-12

## 2025-08-12 ENCOUNTER — CLINICAL DOCUMENTATION (OUTPATIENT)
Dept: SPIRITUAL SERVICES | Age: 80
End: 2025-08-12

## 2025-08-14 ENCOUNTER — CLINICAL DOCUMENTATION (OUTPATIENT)
Age: 80
End: 2025-08-14

## 2025-08-19 ENCOUNTER — OFFICE VISIT (OUTPATIENT)
Dept: BEHAVIORAL/MENTAL HEALTH CLINIC | Age: 80
End: 2025-08-19
Payer: MEDICARE

## 2025-08-19 DIAGNOSIS — F43.22 ADJUSTMENT DISORDER WITH ANXIETY: Primary | ICD-10-CM

## 2025-08-19 PROCEDURE — 90834 PSYTX W PT 45 MINUTES: CPT | Performed by: SOCIAL WORKER

## 2025-08-19 PROCEDURE — 1123F ACP DISCUSS/DSCN MKR DOCD: CPT | Performed by: SOCIAL WORKER

## 2025-08-19 ASSESSMENT — PATIENT HEALTH QUESTIONNAIRE - PHQ9
9. THOUGHTS THAT YOU WOULD BE BETTER OFF DEAD, OR OF HURTING YOURSELF: NOT AT ALL
5. POOR APPETITE OR OVEREATING: MORE THAN HALF THE DAYS
2. FEELING DOWN, DEPRESSED OR HOPELESS: NOT AT ALL
SUM OF ALL RESPONSES TO PHQ QUESTIONS 1-9: 6
SUM OF ALL RESPONSES TO PHQ QUESTIONS 1-9: 6
8. MOVING OR SPEAKING SO SLOWLY THAT OTHER PEOPLE COULD HAVE NOTICED. OR THE OPPOSITE, BEING SO FIGETY OR RESTLESS THAT YOU HAVE BEEN MOVING AROUND A LOT MORE THAN USUAL: NOT AT ALL
3. TROUBLE FALLING OR STAYING ASLEEP: MORE THAN HALF THE DAYS
4. FEELING TIRED OR HAVING LITTLE ENERGY: SEVERAL DAYS
SUM OF ALL RESPONSES TO PHQ QUESTIONS 1-9: 6
6. FEELING BAD ABOUT YOURSELF - OR THAT YOU ARE A FAILURE OR HAVE LET YOURSELF OR YOUR FAMILY DOWN: NOT AT ALL
7. TROUBLE CONCENTRATING ON THINGS, SUCH AS READING THE NEWSPAPER OR WATCHING TELEVISION: SEVERAL DAYS
10. IF YOU CHECKED OFF ANY PROBLEMS, HOW DIFFICULT HAVE THESE PROBLEMS MADE IT FOR YOU TO DO YOUR WORK, TAKE CARE OF THINGS AT HOME, OR GET ALONG WITH OTHER PEOPLE: NOT DIFFICULT AT ALL
1. LITTLE INTEREST OR PLEASURE IN DOING THINGS: NOT AT ALL
SUM OF ALL RESPONSES TO PHQ QUESTIONS 1-9: 6

## 2025-08-20 ENCOUNTER — APPOINTMENT (OUTPATIENT)
Dept: GENERAL RADIOLOGY | Age: 80
End: 2025-08-20
Payer: MEDICARE

## 2025-08-20 ENCOUNTER — HOSPITAL ENCOUNTER (EMERGENCY)
Age: 80
Discharge: HOME | End: 2025-08-20
Attending: STUDENT IN AN ORGANIZED HEALTH CARE EDUCATION/TRAINING PROGRAM
Payer: MEDICARE

## 2025-08-20 VITALS
SYSTOLIC BLOOD PRESSURE: 147 MMHG | BODY MASS INDEX: 27.5 KG/M2 | RESPIRATION RATE: 16 BRPM | DIASTOLIC BLOOD PRESSURE: 92 MMHG | TEMPERATURE: 98 F | HEIGHT: 63 IN | HEART RATE: 85 BPM | OXYGEN SATURATION: 95 % | WEIGHT: 155.2 LBS

## 2025-08-20 DIAGNOSIS — M25.532 LEFT WRIST PAIN: Primary | ICD-10-CM

## 2025-08-20 DIAGNOSIS — M11.20 PSEUDOGOUT: ICD-10-CM

## 2025-08-20 PROCEDURE — 6360000002 HC RX W HCPCS: Performed by: STUDENT IN AN ORGANIZED HEALTH CARE EDUCATION/TRAINING PROGRAM

## 2025-08-20 PROCEDURE — 99284 EMERGENCY DEPT VISIT MOD MDM: CPT

## 2025-08-20 PROCEDURE — 73110 X-RAY EXAM OF WRIST: CPT

## 2025-08-20 RX ORDER — KETOROLAC TROMETHAMINE 30 MG/ML
30 INJECTION, SOLUTION INTRAMUSCULAR; INTRAVENOUS ONCE
Status: COMPLETED | OUTPATIENT
Start: 2025-08-20 | End: 2025-08-20

## 2025-08-20 RX ADMIN — KETOROLAC TROMETHAMINE 30 MG: 30 INJECTION, SOLUTION INTRAMUSCULAR at 03:06

## 2025-08-20 ASSESSMENT — PAIN SCALES - GENERAL
PAINLEVEL_OUTOF10: 5

## 2025-08-20 ASSESSMENT — PAIN DESCRIPTION - ORIENTATION: ORIENTATION: LEFT

## 2025-08-20 ASSESSMENT — PAIN DESCRIPTION - LOCATION: LOCATION: WRIST

## 2025-08-20 ASSESSMENT — PAIN - FUNCTIONAL ASSESSMENT
PAIN_FUNCTIONAL_ASSESSMENT: 0-10
PAIN_FUNCTIONAL_ASSESSMENT: 0-10

## 2025-08-27 ENCOUNTER — HOSPITAL ENCOUNTER (OUTPATIENT)
Dept: PHYSICAL THERAPY | Age: 80
Setting detail: RECURRING SERIES
Discharge: HOME | End: 2025-08-30
Payer: MEDICARE

## 2025-08-27 PROCEDURE — 97110 THERAPEUTIC EXERCISES: CPT

## 2025-08-27 PROCEDURE — 97140 MANUAL THERAPY 1/> REGIONS: CPT

## 2025-08-27 ASSESSMENT — PAIN DESCRIPTION - ORIENTATION: ORIENTATION: LEFT

## 2025-08-27 ASSESSMENT — PAIN SCALES - GENERAL: PAINLEVEL_OUTOF10: 3

## 2025-08-27 ASSESSMENT — PAIN DESCRIPTION - LOCATION: LOCATION: SHOULDER

## 2025-09-02 ENCOUNTER — CLINICAL DOCUMENTATION (OUTPATIENT)
Age: 80
End: 2025-09-02

## 2025-09-02 ENCOUNTER — OFFICE VISIT (OUTPATIENT)
Dept: BEHAVIORAL/MENTAL HEALTH CLINIC | Age: 80
End: 2025-09-02
Payer: MEDICARE

## 2025-09-02 DIAGNOSIS — F43.22 ADJUSTMENT DISORDER WITH ANXIETY: Primary | ICD-10-CM

## 2025-09-02 PROCEDURE — 90834 PSYTX W PT 45 MINUTES: CPT | Performed by: SOCIAL WORKER

## 2025-09-02 PROCEDURE — 1123F ACP DISCUSS/DSCN MKR DOCD: CPT | Performed by: SOCIAL WORKER

## 2025-09-02 ASSESSMENT — PATIENT HEALTH QUESTIONNAIRE - PHQ9
SUM OF ALL RESPONSES TO PHQ QUESTIONS 1-9: 4
SUM OF ALL RESPONSES TO PHQ QUESTIONS 1-9: 4
5. POOR APPETITE OR OVEREATING: SEVERAL DAYS
9. THOUGHTS THAT YOU WOULD BE BETTER OFF DEAD, OR OF HURTING YOURSELF: NOT AT ALL
3. TROUBLE FALLING OR STAYING ASLEEP: SEVERAL DAYS
8. MOVING OR SPEAKING SO SLOWLY THAT OTHER PEOPLE COULD HAVE NOTICED. OR THE OPPOSITE, BEING SO FIGETY OR RESTLESS THAT YOU HAVE BEEN MOVING AROUND A LOT MORE THAN USUAL: SEVERAL DAYS
4. FEELING TIRED OR HAVING LITTLE ENERGY: NOT AT ALL
7. TROUBLE CONCENTRATING ON THINGS, SUCH AS READING THE NEWSPAPER OR WATCHING TELEVISION: SEVERAL DAYS
10. IF YOU CHECKED OFF ANY PROBLEMS, HOW DIFFICULT HAVE THESE PROBLEMS MADE IT FOR YOU TO DO YOUR WORK, TAKE CARE OF THINGS AT HOME, OR GET ALONG WITH OTHER PEOPLE: SOMEWHAT DIFFICULT
SUM OF ALL RESPONSES TO PHQ QUESTIONS 1-9: 4
6. FEELING BAD ABOUT YOURSELF - OR THAT YOU ARE A FAILURE OR HAVE LET YOURSELF OR YOUR FAMILY DOWN: NOT AT ALL
1. LITTLE INTEREST OR PLEASURE IN DOING THINGS: NOT AT ALL
SUM OF ALL RESPONSES TO PHQ QUESTIONS 1-9: 4
2. FEELING DOWN, DEPRESSED OR HOPELESS: NOT AT ALL

## 2025-09-03 ENCOUNTER — HOSPITAL ENCOUNTER (OUTPATIENT)
Dept: PHYSICAL THERAPY | Age: 80
Setting detail: RECURRING SERIES
Discharge: HOME | End: 2025-09-06
Payer: MEDICARE

## 2025-09-03 PROCEDURE — 97110 THERAPEUTIC EXERCISES: CPT

## 2025-09-03 PROCEDURE — 97140 MANUAL THERAPY 1/> REGIONS: CPT

## 2025-09-04 ENCOUNTER — HOSPITAL ENCOUNTER (EMERGENCY)
Age: 80
Discharge: HOME | End: 2025-09-04
Attending: EMERGENCY MEDICINE
Payer: MEDICARE

## 2025-09-04 VITALS
DIASTOLIC BLOOD PRESSURE: 83 MMHG | TEMPERATURE: 98.4 F | RESPIRATION RATE: 16 BRPM | BODY MASS INDEX: 26.93 KG/M2 | WEIGHT: 152 LBS | HEART RATE: 65 BPM | SYSTOLIC BLOOD PRESSURE: 162 MMHG | OXYGEN SATURATION: 100 % | HEIGHT: 63 IN

## 2025-09-04 DIAGNOSIS — S16.1XXA STRAIN OF NECK MUSCLE, INITIAL ENCOUNTER: Primary | ICD-10-CM

## 2025-09-04 PROCEDURE — 6370000000 HC RX 637 (ALT 250 FOR IP): Performed by: EMERGENCY MEDICINE

## 2025-09-04 RX ORDER — METHOCARBAMOL 500 MG/1
500 TABLET, FILM COATED ORAL 3 TIMES DAILY PRN
Qty: 15 TABLET | Refills: 0 | Status: SHIPPED | OUTPATIENT
Start: 2025-09-04 | End: 2025-09-09

## 2025-09-04 RX ORDER — METHOCARBAMOL 500 MG/1
500 TABLET, FILM COATED ORAL
Status: COMPLETED | OUTPATIENT
Start: 2025-09-04 | End: 2025-09-04

## 2025-09-04 RX ORDER — NAPROXEN 500 MG/1
500 TABLET ORAL 2 TIMES DAILY WITH MEALS
Qty: 20 TABLET | Refills: 0 | Status: SHIPPED | OUTPATIENT
Start: 2025-09-04 | End: 2025-09-14

## 2025-09-04 RX ORDER — BUTALBITAL, ACETAMINOPHEN AND CAFFEINE 50; 325; 40 MG/1; MG/1; MG/1
1 TABLET ORAL EVERY 6 HOURS PRN
Qty: 12 TABLET | Refills: 0 | Status: SHIPPED | OUTPATIENT
Start: 2025-09-04

## 2025-09-04 RX ORDER — BUTALBITAL, ACETAMINOPHEN AND CAFFEINE 50; 325; 40 MG/1; MG/1; MG/1
1 TABLET ORAL
Status: COMPLETED | OUTPATIENT
Start: 2025-09-04 | End: 2025-09-04

## 2025-09-04 RX ORDER — IBUPROFEN 400 MG/1
400 TABLET, FILM COATED ORAL
Status: COMPLETED | OUTPATIENT
Start: 2025-09-04 | End: 2025-09-04

## 2025-09-04 RX ADMIN — IBUPROFEN 400 MG: 400 TABLET, FILM COATED ORAL at 04:00

## 2025-09-04 RX ADMIN — BUTALBITAL, ACETAMINOPHEN, AND CAFFEINE 1 TABLET: 50; 325; 40 TABLET ORAL at 04:00

## 2025-09-04 RX ADMIN — METHOCARBAMOL 500 MG: 500 TABLET ORAL at 04:00

## 2025-09-04 ASSESSMENT — PAIN SCALES - GENERAL
PAINLEVEL_OUTOF10: 6
PAINLEVEL_OUTOF10: 8

## 2025-09-04 ASSESSMENT — PAIN - FUNCTIONAL ASSESSMENT
PAIN_FUNCTIONAL_ASSESSMENT: 0-10
PAIN_FUNCTIONAL_ASSESSMENT: 0-10

## 2025-09-04 ASSESSMENT — PAIN DESCRIPTION - LOCATION
LOCATION: NECK
LOCATION: NECK

## 2025-09-04 ASSESSMENT — PAIN DESCRIPTION - ORIENTATION
ORIENTATION: RIGHT
ORIENTATION: LEFT;RIGHT

## 2025-09-04 ASSESSMENT — PAIN DESCRIPTION - DESCRIPTORS: DESCRIPTORS: ACHING

## 2025-09-04 ASSESSMENT — PAIN DESCRIPTION - PAIN TYPE: TYPE: ACUTE PAIN

## (undated) DEVICE — RETRACTOR SURG NARROW FLAT ULT LTWT ELEV TIP LO PROF

## (undated) DEVICE — Device

## (undated) DEVICE — SUTURE PERMA-HAND SZ 2-0 L30IN NONABSORBABLE BLK L26MM SH K833H

## (undated) DEVICE — SOLUTION PREP POVIDONE IOD FOR SKIN MUCOUS MEM PRIOR TO

## (undated) DEVICE — SOLUTION SCRB 0.04 CHG DYNA HEX

## (undated) DEVICE — GLOVE ORANGE PI 7   MSG9070

## (undated) DEVICE — MINOR SPLIT GENERAL: Brand: MEDLINE INDUSTRIES, INC.

## (undated) DEVICE — GLOVE SURG SZ 7 L12IN FNGR THK79MIL GRN LTX FREE

## (undated) DEVICE — NEEDLE HYPO 21GA L1.5IN INTRAMUSCULAR S STL LATCH BVL UP

## (undated) DEVICE — SOLUTION IRRIG 1000ML STRL H2O USP PLAS POUR BTL

## (undated) DEVICE — PAD,NON-ADHERENT,3X8,STERILE,LF,1/PK: Brand: MEDLINE

## (undated) DEVICE — ELECTRODE PT RET AD L9FT HI MOIST COND ADH HYDRGEL CORDED

## (undated) DEVICE — ADHESIVE SKIN CLSR 0.7ML TOP DERMBND ADV

## (undated) DEVICE — SUTURE MCRYL SZ 3-0 L27IN ABSRB UD L19MM PS-2 3/8 CIR PRIM Y427H

## (undated) DEVICE — 48" PROBE COVER W/GEL, ULTRASOUND, STERILE: Brand: SITE-RITE

## (undated) DEVICE — CANISTER, RIGID, 2000CC: Brand: MEDLINE INDUSTRIES, INC.

## (undated) DEVICE — BLADE ES ELASTOMERIC COAT INSUL DURABLE BEND UPTO 90DEG

## (undated) DEVICE — NEEDLE HYPO 18GA L1.5IN PNK S STL HUB POLYPR SHLD REG BVL

## (undated) DEVICE — NEEDLE HYPO 25GA L5/8IN ORNG HUB S STL LATCH BVL UP

## (undated) DEVICE — SYRINGE MED 10ML LUERLOCK TIP W/O SFTY DISP

## (undated) DEVICE — WIRE CUTTER, STERILE (WCS144): Brand: CENTURION MEDICAL PRODUCTS CORP

## (undated) DEVICE — SPONGE LAPAROTOMY W18XL18IN WHITE STRUNG RADIOPAQUE STERILE

## (undated) DEVICE — COVER PRB L11.9CM TAPR L3.8X61CM TRNSPAR SFT PLIABLE